# Patient Record
Sex: MALE | Race: WHITE | NOT HISPANIC OR LATINO | Employment: FULL TIME | URBAN - METROPOLITAN AREA
[De-identification: names, ages, dates, MRNs, and addresses within clinical notes are randomized per-mention and may not be internally consistent; named-entity substitution may affect disease eponyms.]

---

## 2023-06-18 ENCOUNTER — APPOINTMENT (EMERGENCY)
Dept: VASCULAR ULTRASOUND | Facility: HOSPITAL | Age: 48
End: 2023-06-18
Payer: COMMERCIAL

## 2023-06-18 ENCOUNTER — APPOINTMENT (EMERGENCY)
Dept: RADIOLOGY | Facility: HOSPITAL | Age: 48
End: 2023-06-18
Payer: COMMERCIAL

## 2023-06-18 ENCOUNTER — HOSPITAL ENCOUNTER (EMERGENCY)
Facility: HOSPITAL | Age: 48
Discharge: HOME | End: 2023-06-18
Attending: EMERGENCY MEDICINE
Payer: COMMERCIAL

## 2023-06-18 VITALS
DIASTOLIC BLOOD PRESSURE: 82 MMHG | HEART RATE: 80 BPM | OXYGEN SATURATION: 99 % | SYSTOLIC BLOOD PRESSURE: 122 MMHG | TEMPERATURE: 97.9 F | RESPIRATION RATE: 18 BRPM

## 2023-06-18 DIAGNOSIS — M25.561 RIGHT KNEE PAIN: Primary | ICD-10-CM

## 2023-06-18 LAB
ALBUMIN SERPL BCP-MCNC: 4.1 G/DL (ref 3.5–5)
ALP SERPL-CCNC: 172 U/L (ref 34–104)
ALT SERPL W P-5'-P-CCNC: 50 U/L (ref 7–52)
ANION GAP SERPL CALCULATED.3IONS-SCNC: 9 MMOL/L (ref 4–13)
AST SERPL W P-5'-P-CCNC: 31 U/L (ref 13–39)
BASOPHILS # BLD AUTO: 0.04 THOUSANDS/ÂΜL (ref 0–0.1)
BASOPHILS NFR BLD AUTO: 1 % (ref 0–1)
BILIRUB SERPL-MCNC: 0.64 MG/DL (ref 0.2–1)
BUN SERPL-MCNC: 19 MG/DL (ref 5–25)
CALCIUM SERPL-MCNC: 9.9 MG/DL (ref 8.4–10.2)
CHLORIDE SERPL-SCNC: 98 MMOL/L (ref 96–108)
CO2 SERPL-SCNC: 26 MMOL/L (ref 21–32)
CREAT SERPL-MCNC: 0.92 MG/DL (ref 0.6–1.3)
CRP SERPL QL: 241.2 MG/L
EOSINOPHIL # BLD AUTO: 0.29 THOUSAND/ÂΜL (ref 0–0.61)
EOSINOPHIL NFR BLD AUTO: 4 % (ref 0–6)
ERYTHROCYTE [DISTWIDTH] IN BLOOD BY AUTOMATED COUNT: 12.6 % (ref 11.6–15.1)
ERYTHROCYTE [SEDIMENTATION RATE] IN BLOOD: 48 MM/HOUR (ref 0–14)
GFR SERPL CREATININE-BSD FRML MDRD: 97 ML/MIN/1.73SQ M
GLUCOSE SERPL-MCNC: 98 MG/DL (ref 65–140)
HCT VFR BLD AUTO: 38.9 % (ref 36.5–49.3)
HGB BLD-MCNC: 13.1 G/DL (ref 12–17)
IMM GRANULOCYTES # BLD AUTO: 0.09 THOUSAND/UL (ref 0–0.2)
IMM GRANULOCYTES NFR BLD AUTO: 1 % (ref 0–2)
LYMPHOCYTES # BLD AUTO: 0.59 THOUSANDS/ÂΜL (ref 0.6–4.47)
LYMPHOCYTES NFR BLD AUTO: 7 % (ref 14–44)
MCH RBC QN AUTO: 33.1 PG (ref 26.8–34.3)
MCHC RBC AUTO-ENTMCNC: 33.7 G/DL (ref 31.4–37.4)
MCV RBC AUTO: 98 FL (ref 82–98)
MONOCYTES # BLD AUTO: 1.18 THOUSAND/ÂΜL (ref 0.17–1.22)
MONOCYTES NFR BLD AUTO: 14 % (ref 4–12)
NEUTROPHILS # BLD AUTO: 6.08 THOUSANDS/ÂΜL (ref 1.85–7.62)
NEUTS SEG NFR BLD AUTO: 73 % (ref 43–75)
NRBC BLD AUTO-RTO: 0 /100 WBCS
PLATELET # BLD AUTO: 412 THOUSANDS/UL (ref 149–390)
PMV BLD AUTO: 8.7 FL (ref 8.9–12.7)
POTASSIUM SERPL-SCNC: 4.1 MMOL/L (ref 3.5–5.3)
PROT SERPL-MCNC: 8.6 G/DL (ref 6.4–8.4)
RBC # BLD AUTO: 3.96 MILLION/UL (ref 3.88–5.62)
SODIUM SERPL-SCNC: 133 MMOL/L (ref 135–147)
WBC # BLD AUTO: 8.27 THOUSAND/UL (ref 4.31–10.16)

## 2023-06-18 PROCEDURE — 99284 EMERGENCY DEPT VISIT MOD MDM: CPT

## 2023-06-18 PROCEDURE — 85652 RBC SED RATE AUTOMATED: CPT

## 2023-06-18 PROCEDURE — 80053 COMPREHEN METABOLIC PANEL: CPT

## 2023-06-18 PROCEDURE — 36415 COLL VENOUS BLD VENIPUNCTURE: CPT

## 2023-06-18 PROCEDURE — 86140 C-REACTIVE PROTEIN: CPT

## 2023-06-18 PROCEDURE — 93971 EXTREMITY STUDY: CPT

## 2023-06-18 PROCEDURE — 85025 COMPLETE CBC W/AUTO DIFF WBC: CPT

## 2023-06-18 PROCEDURE — 73564 X-RAY EXAM KNEE 4 OR MORE: CPT

## 2023-06-18 NOTE — ED PROVIDER NOTES
"History  Chief Complaint   Patient presents with   • Knee Pain     Pt arrived to ED with crutches c/o \"right knee pain and swelling after tripping and landing on his knee last week  Was seen in an ER after the incident  Pt states the pain and swelling hasn't gone away  Has been taking Advil at home  \"     Patient is a 24-year-old male with a past medical history of anxiety, hypertension and migraines presenting to the emergency department for evaluation of right knee pain  Patient reports right knee pain and swelling after tripping and landing on his spine last week  Patient reports he was seen at [facility] and was told they were not going to tap his knee secondary to his psoriasis and that needs to be under control prior to a knee tap  Patient reports he has been having worsening pain, swelling and no decreased range of motion  Patient reports he has been unable to bend or straighten his knee since the injury  Patient reports a prior knee replacement in that knee along with a quadriceps tendon rupture  Reports he has been taking Advil placed himself in a knee immobilizer  Patient reports he has had no relief of his pain  Patient reports a previous puncture wound to the knee that he has been tending to and has stated it has been improving  Patient reports he did have a internal suture removed through the puncture wound by his orthopedic doctor a few weeks ago  Patient reports he is unable to weight-bear without worsening pain  Patient reports now he has been having pain in the posterior thigh and calf  Denies fevers, chills, rash, headache, weakness, dizziness, visual changes, abdominal pain, nausea, vomiting, diarrhea, constipation, chest pain, shortness of breath or difficulty breathing  Does not offer any other concerns or complaints            None       Past Medical History:   Diagnosis Date   • Anxiety    • Hypertension    • Migraine        Past Surgical History:   Procedure Laterality Date   • " REPLACEMENT TOTAL KNEE Right     x2       History reviewed  No pertinent family history  I have reviewed and agree with the history as documented  E-Cigarette/Vaping     E-Cigarette/Vaping Substances     Social History     Tobacco Use   • Smoking status: Never   • Smokeless tobacco: Current     Types: Chew   Substance Use Topics   • Alcohol use: Yes     Comment: occasionally       Review of Systems   Constitutional: Negative for chills and fever  HENT: Negative for ear pain and sore throat  Eyes: Negative for pain and visual disturbance  Respiratory: Negative for cough and shortness of breath  Cardiovascular: Negative for chest pain and palpitations  Gastrointestinal: Negative for abdominal pain, constipation, diarrhea, nausea and vomiting  Genitourinary: Negative for dysuria and hematuria  Musculoskeletal: Positive for gait problem and joint swelling  Negative for arthralgias and back pain  Right knee pain and swelling   Skin: Negative for color change and rash  Neurological: Negative for seizures and syncope  All other systems reviewed and are negative  Physical Exam  Physical Exam  Vitals and nursing note reviewed  Constitutional:       General: He is not in acute distress  Appearance: Normal appearance  He is well-developed  He is not toxic-appearing or diaphoretic  HENT:      Head: Normocephalic and atraumatic  Right Ear: External ear normal       Left Ear: External ear normal       Nose: Nose normal       Mouth/Throat:      Mouth: Mucous membranes are moist    Eyes:      General: No scleral icterus  Right eye: No discharge  Left eye: No discharge  Conjunctiva/sclera: Conjunctivae normal    Cardiovascular:      Rate and Rhythm: Normal rate and regular rhythm  Heart sounds: No murmur heard  Pulmonary:      Effort: Pulmonary effort is normal  No respiratory distress  Breath sounds: Normal breath sounds     Abdominal:      Palpations: Abdomen is soft  Tenderness: There is no abdominal tenderness  Musculoskeletal:         General: No swelling, deformity or signs of injury  Normal range of motion  Cervical back: Normal range of motion and neck supple  No rigidity  Legs:       Comments: Psoriasis noted throughout the legs  Swelling noted to the anterior aspect of the knee  There is erythema around the wound, patient reports improved  Patient is unable to flex or extend the knee  Attempted PROM, unable to range the knee  Skin:     General: Skin is warm and dry  Capillary Refill: Capillary refill takes less than 2 seconds  Coloration: Skin is not jaundiced  Findings: No erythema or rash  Neurological:      General: No focal deficit present  Mental Status: He is alert and oriented to person, place, and time  Mental status is at baseline  Cranial Nerves: No cranial nerve deficit  Gait: Gait normal    Psychiatric:         Mood and Affect: Mood normal          Behavior: Behavior normal          Thought Content:  Thought content normal          Judgment: Judgment normal          Vital Signs  ED Triage Vitals [06/18/23 1001]   Temperature Pulse Respirations Blood Pressure SpO2   97 9 °F (36 6 °C) 88 18 124/84 98 %      Temp Source Heart Rate Source Patient Position - Orthostatic VS BP Location FiO2 (%)   Temporal Monitor Sitting Right arm --      Pain Score       --           Vitals:    06/18/23 1001 06/18/23 1200   BP: 124/84 122/82   Pulse: 88 80   Patient Position - Orthostatic VS: Sitting Lying         Visual Acuity      ED Medications  Medications - No data to display    Diagnostic Studies  Results Reviewed     Procedure Component Value Units Date/Time    Comprehensive metabolic panel [[id number]]  (Abnormal) Collected: 06/18/23 1108    Lab Status: Final result Specimen: Blood from Arm, Right Updated: 06/18/23 1143     Sodium 133 mmol/L      Potassium 4 1 mmol/L      Chloride 98 mmol/L      CO2 26 mmol/L      ANION GAP 9 mmol/L      BUN 19 mg/dL      Creatinine 0 92 mg/dL      Glucose 98 mg/dL      Calcium 9 9 mg/dL      AST 31 U/L      ALT 50 U/L      Alkaline Phosphatase 172 U/L      Total Protein 8 6 g/dL      Albumin 4 1 g/dL      Total Bilirubin 0 64 mg/dL      eGFR 97 ml/min/1 73sq m     Narrative:      National Kidney Disease Foundation guidelines for Chronic Kidney Disease (CKD):   •  Stage 1 with normal or high GFR (GFR > 90 mL/min/1 73 square meters)  •  Stage 2 Mild CKD (GFR = 60-89 mL/min/1 73 square meters)  •  Stage 3A Moderate CKD (GFR = 45-59 mL/min/1 73 square meters)  •  Stage 3B Moderate CKD (GFR = 30-44 mL/min/1 73 square meters)  •  Stage 4 Severe CKD (GFR = 15-29 mL/min/1 73 square meters)  •  Stage 5 End Stage CKD (GFR <15 mL/min/1 73 square meters)  Note: GFR calculation is accurate only with a steady state creatinine    C-reactive protein [[id number]]  (Abnormal) Collected: 06/18/23 1108    Lab Status: Final result Specimen: Blood from Arm, Right Updated: 06/18/23 1143      2 mg/L     Sedimentation rate, automated [[id number]]  (Abnormal) Collected: 06/18/23 1108    Lab Status: Final result Specimen: Blood from Arm, Right Updated: 06/18/23 1120     Sed Rate 48 mm/hour     CBC and differential [[id number]]  (Abnormal) Collected: 06/18/23 1108    Lab Status: Final result Specimen: Blood from Arm, Right Updated: 06/18/23 1114     WBC 8 27 Thousand/uL      RBC 3 96 Million/uL      Hemoglobin 13 1 g/dL      Hematocrit 38 9 %      MCV 98 fL      MCH 33 1 pg      MCHC 33 7 g/dL      RDW 12 6 %      MPV 8 7 fL      Platelets 341 Thousands/uL      nRBC 0 /100 WBCs      Neutrophils Relative 73 %      Immat GRANS % 1 %      Lymphocytes Relative 7 %      Monocytes Relative 14 %      Eosinophils Relative 4 %      Basophils Relative 1 %      Neutrophils Absolute 6 08 Thousands/µL      Immature Grans Absolute 0 09 Thousand/uL      Lymphocytes Absolute 0 59 Thousands/µL      Monocytes Absolute 1 18 Thousand/µL      Eosinophils Absolute 0 29 Thousand/µL      Basophils Absolute 0 04 Thousands/µL                  VAS lower limb venous duplex study, unilateral/limited    (Results Pending)   XR knee 4+ views Right injury    (Results Pending)              Procedures  Procedures         ED Course  ED Course as of 06/18/23 1332   Sun Jun 18, 2023   1140 Venous duplex negative for DVT   1201 Reached out to ortho via TT    1226 Dr Bernarda Rinaldi, recommending outpatient follow up with knee immobilizer and weight bear as tolerated  SBIRT 22yo+    Flowsheet Row Most Recent Value   Initial Alcohol Screen: US AUDIT-C     1  How often do you have a drink containing alcohol? 0 Filed at: 06/18/2023 1031   2  How many drinks containing alcohol do you have on a typical day you are drinking? 1 Filed at: 06/18/2023 1031   3a  Male UNDER 65: How often do you have five or more drinks on one occasion? 0 Filed at: 06/18/2023 1031   Audit-C Score 1 Filed at: 06/18/2023 1031   STORM: How many times in the past year have you    Used an illegal drug or used a prescription medication for non-medical reasons? Never Filed at: 06/18/2023 1031                    Medical Decision Making    This is a 78-year-old male presenting to the emergency department for evaluation of right knee pain  Patient reports he has had multiple surgeries on this knee in the past   Patient reports sustaining a puncture wound to the area and followed up with orthopedics  Patient reports he had a internal suture coming through the hole which was taken out by orthopedics  Patient reports the wound has been healing  Patient reports last week he tripped over a shoe and landed on the right knee  Patient reports he did notice swelling to the knee  Patient reports he went to [facility] and was told the knee could not be drained secondary to his psoriasis by orthopedics    Patient reports the swelling, pain and range of motion has worsened  Patient reports he is unable to flex or extend his knee  Patient reports that has been going on since the fall  Patient reports he has been using a knee immobilizer at home along with Advil  Patient is in no acute distress on initial examination  Patient reports he is unable to bear weight without pain  Differential diagnosis to include but is not limited to: Osteoarthritis, septic joint, joint effusion, DVT    Initial ED Plan: CBC, CMP, ESR, CRP, venous duplex, x-ray right knee    ED results:  -Discussed with Dr Renetta Monroy, Ortho attending on call, recommending outpatient follow up with orthopedics and knee immobilization with weight bearing as tolerated  Venous duplex negative for DVT  Final ED assessment: Patient is stable and well appearing  Discussed radiologic studies and laboratory results  Discussed follow up with PCP and orthopedics  Strict return precautions were discussed including but not limited to worsening pain, redness, swelling  Patient verbalized understanding and is agreeable with the plan for discharge  Amount and/or Complexity of Data Reviewed  Labs: ordered  Radiology: ordered  Disposition  Final diagnoses:   Right knee pain     Time reflects when diagnosis was documented in both MDM as applicable and the Disposition within this note     Time User Action Codes Description Comment    6/18/2023 12:26 PM Angelito Martinez Add [M25 561] Right knee pain       ED Disposition     ED Disposition   Discharge    Condition   Stable    Date/Time   Sun Jun 18, 2023 12:27 PM    Comment   [facility] discharge to home/self care                 Follow-up Information     Follow up With Specialties Details Why Contact Info Additional Information    your PCP  Call in 3 days For follow up      [facility] Orthopedic Surgery Call in 3 days For follow up [address] [facility]  [facility], [address] [address] [facility], [place], [place], [address]    [facility] Emergency Department Emergency Medicine Go to  If symptoms worsen [facility] Emergency Department, [address] [place] [address], [id number]          There are no discharge medications for this patient  No discharge procedures on file      PDMP Review     None          ED Provider  Electronically Signed by           Janene Carpio PA-C  06/18/23 7810

## 2023-06-18 NOTE — DISCHARGE INSTRUCTIONS
Follow up with PCP  Follow up with orthopedics  Tylenol/motrin as needed for pain  Return to the ED with new or worsening symptoms including but not limited to worsening pain, swelling, redness, fevers

## 2023-06-19 PROCEDURE — 93971 EXTREMITY STUDY: CPT | Performed by: SURGERY

## 2023-06-29 NOTE — TELEPHONE ENCOUNTER
Called & left detailed message regarding Dr Scottie Mcduffie scheduling process for patient who have had a prior knee replacement and also revisions  I did make patient aware in message that records must be reviewed by Alexsander Marie before an appt can be offered  I did let him know that Dr Soraya Guevara would be agreeable to see him Thursday 0706/2023       #[phone]

## 2023-07-06 VITALS — WEIGHT: 189.6 LBS | DIASTOLIC BLOOD PRESSURE: 90 MMHG | SYSTOLIC BLOOD PRESSURE: 136 MMHG | HEART RATE: 83 BPM

## 2023-07-06 DIAGNOSIS — M25.561 ACUTE PAIN OF RIGHT KNEE: ICD-10-CM

## 2023-07-06 DIAGNOSIS — Z96.651 HISTORY OF REVISION OF TOTAL REPLACEMENT OF RIGHT KNEE JOINT: ICD-10-CM

## 2023-07-06 DIAGNOSIS — M25.461 EFFUSION OF RIGHT KNEE: Primary | ICD-10-CM

## 2023-07-06 PROCEDURE — 20610 DRAIN/INJ JOINT/BURSA W/O US: CPT | Performed by: STUDENT IN AN ORGANIZED HEALTH CARE EDUCATION/TRAINING PROGRAM

## 2023-07-06 PROCEDURE — 99204 OFFICE O/P NEW MOD 45 MIN: CPT | Performed by: STUDENT IN AN ORGANIZED HEALTH CARE EDUCATION/TRAINING PROGRAM

## 2023-07-06 RX ORDER — VENLAFAXINE HYDROCHLORIDE 150 MG/1
TABLET, EXTENDED RELEASE ORAL
COMMUNITY

## 2023-07-06 RX ORDER — FOLIC ACID 1 MG/1
1 TABLET ORAL DAILY
COMMUNITY
Start: 2023-06-13

## 2023-07-06 RX ORDER — LISINOPRIL 10 MG/1
10 TABLET ORAL DAILY
COMMUNITY
Start: 2023-04-10

## 2023-07-06 RX ORDER — BUSPIRONE HYDROCHLORIDE 15 MG/1
TABLET ORAL
COMMUNITY
Start: 2023-05-16

## 2023-07-06 RX ORDER — HYDROCODONE BITARTRATE AND ACETAMINOPHEN 5; 325 MG/1; MG/1
1 TABLET ORAL EVERY 6 HOURS PRN
Qty: 30 TABLET | Refills: 0 | Status: SHIPPED | OUTPATIENT
Start: 2023-07-06

## 2023-07-06 RX ORDER — TRAMADOL HYDROCHLORIDE 50 MG/1
50 TABLET ORAL EVERY 6 HOURS PRN
Status: CANCELLED | OUTPATIENT
Start: 2023-07-06

## 2023-07-06 RX ORDER — NORTRIPTYLINE HYDROCHLORIDE 50 MG/1
CAPSULE ORAL
COMMUNITY

## 2023-07-06 NOTE — PROGRESS NOTES
Knee New Office Note    Assessment:     1. Acute pain of right knee    2. 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 X-rays of the right knee reviewed showing that there is no acute fracture or dislocation noted and his prosthesis is in good position, however there is HO with a hyperdense effusion concerning for infection. On physical exam he has significantly limited range of motion with pain of the knee. 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 Labs from the ER show significantly elevated CRP of 241. We did explain that this could be elevated due to his psoriasis flare however it is highly likely that this could be from infection. He does have multiple psoriatic plaques around his right knee with a healing wound on the anterior aspect of the right knee. He does have an effusion on exam today. Discussed that based on his worsening pain, motion and elevated inflammatory markers from the ER there is concern for infection.   Right knee was aspirated today through a clean area of skin without psoriatic plaque yielding 10 cc of cloudy turbid yellow fluid. Synovial fluid sent for Synovasure testing. We will call him when we get results. Follow-up in 2 weeks to discuss next treatment plan. Subjective:     Patient ID: Cam Contreras is a 50 y.o. male. Chief Complaint:  HPI:  Patient is a 49 y/o male who presents today for an evaluation of his RIGHT knee. He states that about 3 weeks ago he woke up in the middle of the night and he tripped over a shoe and fell. When he fell he noted significant pain in the right knee and was seen at [facility] ER. He had xrays at that time and was told nothing was broken. He continued to experience pain in the knee and was then seen [facility] ER [facility]. He had labs done, xrays, and a doppler which was negative for DVT. He was placed in a knee immobilizer and referred to outpatient follow-ip. He has been using crutches and his knee immobilizer. About 2.5 months ago he states that he had a spitting suture removed by someone at [facility]. He continues with pain around the sides of the knee and aching in the surrounding musculature. He states that since his revision surgery, he has not had good motion of the knee. He had a TKA in 1998 and a Revision in 2016 by Dr. Bhumi Lorenzo in [place]. He fell after his revision and he tore his quad requiring surgical repair. He had his original TKA at the age of 20. He is on methotrexate for psoriasis, which he recently restarted. Previously he was on Stelara when back in [place]. Allergy:  No Known Allergies  Medications:  all current active meds have been reviewed  Past Medical History:  Past Medical History:   Diagnosis Date   • Anxiety    • Hypertension    • Migraine      Past Surgical History:  Past Surgical History:   Procedure Laterality Date   • REPLACEMENT TOTAL KNEE Right     x2     Family History:  History reviewed. No pertinent family history.   Social History:  Social History Substance and Sexual Activity   Alcohol Use Yes    Comment: occasionally     Social History     Substance and Sexual Activity   Drug Use Not on file     Social History     Tobacco Use   Smoking Status Never   Smokeless Tobacco Current   • Types: Chew           ROS:  General: Per HPI  Skin: Negative, except if noted below  HEENT: Negative  Respiratory: Negative  Cardiovascular: Negative  Gastrointestinal: Negative  Urinary: Negative  Vascular: Negative  Musculoskeletal: Positive per HPI   Neurologic: Positive per HPI  Endocrine: Negative    Objective:  BP Readings from Last 1 Encounters:   07/06/23 136/90      Wt Readings from Last 1 Encounters:   07/06/23 86 kg (189 lb 9.6 oz)        Respiratory:   non-labored respirations    Lymphatics:  no palpable lymph nodes    Gait:   antalgic    Neurologic:   Alert and oriented times 3  Patient with normal sensation except as noted below  Deep tendon reflexes 2+ except as noted in MSK exam    Bilateral Lower Extremity:    Right knee: Inspection:  Previous surgical scar noted with wound at mid-point that is improving. Several psoriatic plaques noted throughout the knee and lower leg. Overall limb alignment neurtral    Effusion: mild    ROM 35-60 with pain    Extensor Lag: none, able to hold SLR    Palpation: medial and lateral Joint line tenderness to palpation    AP Stability at 90 deg unable to test    M/L stability in full extension unable to test    M/L stability in midflexion stable    Motor: 5/5 Q/HS/TA/GS/P    Pulses: 2+ DP / 2+ PT    SILT DP/SP/S/S/TN    Imaging:  My interpretation XR R knee: cemented Shannan CCK TKA. Components in good position without fracture. HO along anterior and posterior femur. Hyperdense effusion. No signs of loosening or fracture. Large joint arthrocentesis: R knee  Universal Protocol:  Consent: Verbal consent obtained.   Risks and benefits: risks, benefits and alternatives were discussed  Consent given by: patient  Patient understanding: patient states understanding of the procedure being performed    Supporting Documentation  Indications: pain, joint swelling and diagnostic evaluation   Procedure Details  Location: knee - R knee  Preparation: Patient was prepped and draped in the usual sterile fashion  Needle size: 18 G  Approach: anterolateral    Aspirate amount: 10 mL  Aspirate: yellow, cloudy and serous  Analysis: fluid sample sent for laboratory analysis    Patient tolerance: patient tolerated the procedure well with no immediate complications  Dressing:  Sterile dressing applied          BMI:   There is no height or weight on file to calculate BMI. BSA:   There is no height or weight on file to calculate BSA.            Scribe Attestation    I,:  Luisana Hurt PA-C am acting as a scribe while in the presence of the attending physician.:       I,:  Louann Medina DO personally performed the services described in this documentation    as scribed in my presence.:

## 2023-07-20 VITALS
SYSTOLIC BLOOD PRESSURE: 148 MMHG | DIASTOLIC BLOOD PRESSURE: 90 MMHG | HEART RATE: 80 BPM | WEIGHT: 189 LBS | HEIGHT: 71 IN | BODY MASS INDEX: 26.46 KG/M2

## 2023-07-20 DIAGNOSIS — Z96.651 HISTORY OF REVISION OF TOTAL REPLACEMENT OF RIGHT KNEE JOINT: ICD-10-CM

## 2023-07-20 DIAGNOSIS — T84.53XA INFECTION OF TOTAL RIGHT KNEE REPLACEMENT, INITIAL ENCOUNTER (HCC): Primary | ICD-10-CM

## 2023-07-20 PROCEDURE — 99215 OFFICE O/P EST HI 40 MIN: CPT | Performed by: STUDENT IN AN ORGANIZED HEALTH CARE EDUCATION/TRAINING PROGRAM

## 2023-07-20 RX ORDER — ONDANSETRON 2 MG/ML
4 INJECTION INTRAMUSCULAR; INTRAVENOUS ONCE
Status: CANCELLED | OUTPATIENT
Start: 2023-07-28 | End: 2023-07-20

## 2023-07-20 RX ORDER — TRANEXAMIC ACID 10 MG/ML
1000 INJECTION, SOLUTION INTRAVENOUS ONCE
Status: CANCELLED | OUTPATIENT
Start: 2023-07-28 | End: 2023-07-20

## 2023-07-20 RX ORDER — MULTIVIT-MIN/IRON FUM/FOLIC AC 7.5 MG-4
1 TABLET ORAL DAILY
Qty: 30 TABLET | Refills: 1 | Status: ON HOLD | OUTPATIENT
Start: 2023-07-20

## 2023-07-20 RX ORDER — ASCORBIC ACID 500 MG
500 TABLET ORAL 2 TIMES DAILY
Qty: 60 TABLET | Refills: 1 | Status: ON HOLD | OUTPATIENT
Start: 2023-07-20

## 2023-07-20 RX ORDER — CHLORHEXIDINE GLUCONATE 0.12 MG/ML
15 RINSE ORAL ONCE
Status: CANCELLED | OUTPATIENT
Start: 2023-07-28 | End: 2023-07-20

## 2023-07-20 RX ORDER — VANCOMYCIN HYDROCHLORIDE 1 G/200ML
1000 INJECTION, SOLUTION INTRAVENOUS ONCE
Status: CANCELLED | OUTPATIENT
Start: 2023-07-28 | End: 2023-07-20

## 2023-07-20 RX ORDER — CHLORHEXIDINE GLUCONATE 4 G/100ML
SOLUTION TOPICAL DAILY PRN
Status: CANCELLED | OUTPATIENT
Start: 2023-07-28

## 2023-07-20 RX ORDER — CEFAZOLIN SODIUM 2 G/50ML
2000 SOLUTION INTRAVENOUS ONCE
Status: CANCELLED | OUTPATIENT
Start: 2023-07-28 | End: 2023-07-20

## 2023-07-20 RX ORDER — FERROUS SULFATE TAB EC 324 MG (65 MG FE EQUIVALENT) 324 (65 FE) MG
324 TABLET DELAYED RESPONSE ORAL
Qty: 60 TABLET | Refills: 1 | Status: ON HOLD | OUTPATIENT
Start: 2023-07-20 | End: 2023-09-18

## 2023-07-20 RX ORDER — GABAPENTIN 300 MG/1
300 CAPSULE ORAL ONCE
Status: CANCELLED | OUTPATIENT
Start: 2023-07-28 | End: 2023-07-20

## 2023-07-20 RX ORDER — ACETAMINOPHEN 325 MG/1
975 TABLET ORAL ONCE
Status: CANCELLED | OUTPATIENT
Start: 2023-07-28 | End: 2023-07-20

## 2023-07-20 RX ORDER — SODIUM CHLORIDE, SODIUM LACTATE, POTASSIUM CHLORIDE, CALCIUM CHLORIDE 600; 310; 30; 20 MG/100ML; MG/100ML; MG/100ML; MG/100ML
125 INJECTION, SOLUTION INTRAVENOUS CONTINUOUS
Status: CANCELLED | OUTPATIENT
Start: 2023-07-28

## 2023-07-20 NOTE — PROGRESS NOTES
Knee New Office Note    Assessment:     1. Infection of total right knee replacement, initial encounter ([address])    2. 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 Imaging and prognosis was reviewed with the patient today. Discussed that treatment of this infection requires a revision with an articulating antibiotic spacer vs static spacer with PICC line for antibiotics. Discussed that due to his arthrofibrosis and history of quad rupture in the past he is at significant risk of need for static spacer and extensor mechanism disruption. All questions were answered and risks/benefits discussed. Will have infectious disease see the patient post-op. Will urgently schedule the patient for approximately 1 week from today. All questions answered. Pre-op labs and surgical optimization consult placed. Subjective:     Patient ID: Connie Stanley is a 46 y.o. male. Chief Complaint:  HPI:  46 y.o. male presents to the office for follow up of right knee. HE is here today to review the results of his lab work. He continues with pain around the sides of the knee and aching in the surrounding musculature. He states that since his revision surgery, he has not had good motion of the knee. He had a TKA in 1998 and a Revision in 2016 by Dr. Any Beaver in [place]. He fell after his revision and he tore his quadriceps requiring surgical repair. He had his original TKA at the age of 20. He is on methotrexate for psoriasis, which he recently restarted. Previously he was on Stelara when back in [place].  He is accompanied by his wife today in the office. Allergy:  No Known Allergies  Medications:  all current active meds have been reviewed  Past Medical History:  Past Medical History:   Diagnosis Date   • Anxiety    • Hypertension    • Migraine      Past Surgical History:  Past Surgical History:   Procedure Laterality Date   • REPLACEMENT TOTAL KNEE Right     x2     Family History:  History reviewed. No pertinent family history. Social History:  Social History     Substance and Sexual Activity   Alcohol Use Yes    Comment: occasionally     Social History     Substance and Sexual Activity   Drug Use Not on file     Social History     Tobacco Use   Smoking Status Never   Smokeless Tobacco Current   • Types: Chew           ROS:  General: Per HPI  Skin: Negative, except if noted below  HEENT: Negative  Respiratory: Negative  Cardiovascular: Negative  Gastrointestinal: Negative  Urinary: Negative  Vascular: Negative  Musculoskeletal: Positive per HPI   Neurologic: Positive per HPI  Endocrine: Negative    Objective:  BP Readings from Last 1 Encounters:   07/20/23 148/90      Wt Readings from Last 1 Encounters:   07/20/23 85.7 kg (189 lb)        Respiratory:   non-labored respirations    Lymphatics:  no palpable lymph nodes    Gait:   antalgic    Neurologic:   Alert and oriented times 3  Patient with normal sensation except as noted below  Deep tendon reflexes 2+ except as noted in MSK exam    Bilateral Lower Extremity:    Right knee: Inspection:  Previous surgical scar noted with wound at mid-point that is improving. Several improving psoriatic plaques noted throughout the knee and lower leg.  Erythema present    Overall limb alignment neurtral    Effusion: mild    ROM 35-60 with significant pain    Extensor Lag: none, able to hold SLR at 35 degrees     Palpation: medial and lateral Joint line tenderness to palpation    AP Stability at 90 deg unable to test    M/L stability in full extension unable to test    M/L stability in midflexion stable    Motor: 5/5 Q/HS/TA/GS/P    Pulses: 2+ DP / 2+ PT    SILT DP/SP/S/S/TN     Imaging:  My interpretation XR R knee: cemented Shannan CCK TKA. Components in good position without fracture. HO along anterior and posterior femur. Hyperdense effusion. No signs of loosening or fracture. BMI:   Estimated body mass index is 26.36 kg/m² as calculated from the following:    Height as of this encounter: 5' 11" (1.803 m). Weight as of this encounter: 85.7 kg (189 lb). BSA:   Estimated body surface area is 2.06 meters squared as calculated from the following:    Height as of this encounter: 5' 11" (1.803 m). Weight as of this encounter: 85.7 kg (189 lb).            Scribe Attestation    I,:  Laqueta Spurling am acting as a scribe while in the presence of the attending physician.:       I,:  Chanel Her, DO personally performed the services described in this documentation    as scribed in my presence.:

## 2023-07-21 ENCOUNTER — APPOINTMENT (OUTPATIENT)
Dept: LAB | Facility: HOSPITAL | Age: 48
End: 2023-07-21
Payer: COMMERCIAL

## 2023-07-21 ENCOUNTER — LAB REQUISITION (OUTPATIENT)
Dept: LAB | Facility: HOSPITAL | Age: 48
End: 2023-07-21
Payer: COMMERCIAL

## 2023-07-21 ENCOUNTER — OFFICE VISIT (OUTPATIENT)
Dept: LAB | Facility: HOSPITAL | Age: 48
End: 2023-07-21
Payer: COMMERCIAL

## 2023-07-21 ENCOUNTER — TELEPHONE (OUTPATIENT)
Dept: OBGYN CLINIC | Facility: HOSPITAL | Age: 48
End: 2023-07-21

## 2023-07-21 DIAGNOSIS — Z96.651 HISTORY OF REVISION OF TOTAL REPLACEMENT OF RIGHT KNEE JOINT: ICD-10-CM

## 2023-07-21 DIAGNOSIS — M25.561 ACUTE PAIN OF RIGHT KNEE: ICD-10-CM

## 2023-07-21 DIAGNOSIS — Z96.651 PRESENCE OF RIGHT ARTIFICIAL KNEE JOINT: ICD-10-CM

## 2023-07-21 DIAGNOSIS — Z01.818 PRE-OP EXAMINATION: ICD-10-CM

## 2023-07-21 DIAGNOSIS — Z01.818 ENCOUNTER FOR OTHER PREPROCEDURAL EXAMINATION: ICD-10-CM

## 2023-07-21 DIAGNOSIS — M25.461 EFFUSION OF RIGHT KNEE: ICD-10-CM

## 2023-07-21 DIAGNOSIS — T84.53XA INFECTION OF TOTAL RIGHT KNEE REPLACEMENT, INITIAL ENCOUNTER (HCC): ICD-10-CM

## 2023-07-21 DIAGNOSIS — T84.53XA INFECTION OF TOTAL RIGHT KNEE REPLACEMENT, INITIAL ENCOUNTER (HCC): Primary | ICD-10-CM

## 2023-07-21 DIAGNOSIS — T84.53XA INFECTION AND INFLAMMATORY REACTION DUE TO INTERNAL RIGHT KNEE PROSTHESIS, INITIAL ENCOUNTER (HCC): ICD-10-CM

## 2023-07-21 PROBLEM — G43.909 MIGRAINE: Status: ACTIVE | Noted: 2023-07-21

## 2023-07-21 PROBLEM — L40.0 PLAQUE PSORIASIS: Status: ACTIVE | Noted: 2023-05-31

## 2023-07-21 PROBLEM — I10 HIGH BLOOD PRESSURE: Status: ACTIVE | Noted: 2023-07-21

## 2023-07-21 PROBLEM — F41.9 ANXIETY: Status: ACTIVE | Noted: 2023-07-21

## 2023-07-21 LAB
ABO GROUP BLD: NORMAL
ALBUMIN SERPL BCP-MCNC: 3.8 G/DL (ref 3.5–5)
ALP SERPL-CCNC: 98 U/L (ref 34–104)
ALT SERPL W P-5'-P-CCNC: 23 U/L (ref 7–52)
ANION GAP SERPL CALCULATED.3IONS-SCNC: 8 MMOL/L
APTT PPP: 34 SECONDS (ref 23–37)
AST SERPL W P-5'-P-CCNC: 17 U/L (ref 13–39)
BASOPHILS # BLD AUTO: 0.04 THOUSANDS/ÂΜL (ref 0–0.1)
BASOPHILS NFR BLD AUTO: 1 % (ref 0–1)
BILIRUB SERPL-MCNC: 0.34 MG/DL (ref 0.2–1)
BLD GP AB SCN SERPL QL: NEGATIVE
BUN SERPL-MCNC: 17 MG/DL (ref 5–25)
CALCIUM SERPL-MCNC: 9.9 MG/DL (ref 8.4–10.2)
CHLORIDE SERPL-SCNC: 102 MMOL/L (ref 96–108)
CO2 SERPL-SCNC: 28 MMOL/L (ref 21–32)
CREAT SERPL-MCNC: 0.78 MG/DL (ref 0.6–1.3)
EOSINOPHIL # BLD AUTO: 0.36 THOUSAND/ÂΜL (ref 0–0.61)
EOSINOPHIL NFR BLD AUTO: 6 % (ref 0–6)
ERYTHROCYTE [DISTWIDTH] IN BLOOD BY AUTOMATED COUNT: 13.6 % (ref 11.6–15.1)
EST. AVERAGE GLUCOSE BLD GHB EST-MCNC: 131 MG/DL
FERRITIN SERPL-MCNC: 248 NG/ML (ref 24–336)
GFR SERPL CREATININE-BSD FRML MDRD: 105 ML/MIN/1.73SQ M
GLUCOSE P FAST SERPL-MCNC: 102 MG/DL (ref 65–99)
HBA1C MFR BLD: 6.2 %
HCT VFR BLD AUTO: 31.7 % (ref 36.5–49.3)
HGB BLD-MCNC: 10.1 G/DL (ref 12–17)
IMM GRANULOCYTES # BLD AUTO: 0.03 THOUSAND/UL (ref 0–0.2)
IMM GRANULOCYTES NFR BLD AUTO: 1 % (ref 0–2)
INR PPP: 1.07 (ref 0.84–1.19)
IRON SATN MFR SERPL: 10 % (ref 20–50)
IRON SERPL-MCNC: 20 UG/DL (ref 65–175)
LYMPHOCYTES # BLD AUTO: 0.74 THOUSANDS/ÂΜL (ref 0.6–4.47)
LYMPHOCYTES NFR BLD AUTO: 13 % (ref 14–44)
MCH RBC QN AUTO: 30.1 PG (ref 26.8–34.3)
MCHC RBC AUTO-ENTMCNC: 31.9 G/DL (ref 31.4–37.4)
MCV RBC AUTO: 94 FL (ref 82–98)
MONOCYTES # BLD AUTO: 0.4 THOUSAND/ÂΜL (ref 0.17–1.22)
MONOCYTES NFR BLD AUTO: 7 % (ref 4–12)
NEUTROPHILS # BLD AUTO: 4.11 THOUSANDS/ÂΜL (ref 1.85–7.62)
NEUTS SEG NFR BLD AUTO: 72 % (ref 43–75)
NRBC BLD AUTO-RTO: 0 /100 WBCS
PLATELET # BLD AUTO: 410 THOUSANDS/UL (ref 149–390)
PMV BLD AUTO: 8.8 FL (ref 8.9–12.7)
POTASSIUM SERPL-SCNC: 3.8 MMOL/L (ref 3.5–5.3)
PROT SERPL-MCNC: 8.5 G/DL (ref 6.4–8.4)
PROTHROMBIN TIME: 13.7 SECONDS (ref 11.6–14.5)
RBC # BLD AUTO: 3.36 MILLION/UL (ref 3.88–5.62)
RETICS # AUTO: NORMAL 10*3/UL (ref 14356–105094)
RETICS # CALC: 1.08 % (ref 0.37–1.87)
RH BLD: POSITIVE
SODIUM SERPL-SCNC: 138 MMOL/L (ref 135–147)
SPECIMEN EXPIRATION DATE: NORMAL
TIBC SERPL-MCNC: 195 UG/DL (ref 250–450)
WBC # BLD AUTO: 5.68 THOUSAND/UL (ref 4.31–10.16)

## 2023-07-21 PROCEDURE — 83036 HEMOGLOBIN GLYCOSYLATED A1C: CPT

## 2023-07-21 PROCEDURE — 36415 COLL VENOUS BLD VENIPUNCTURE: CPT

## 2023-07-21 PROCEDURE — 85610 PROTHROMBIN TIME: CPT

## 2023-07-21 PROCEDURE — 85045 AUTOMATED RETICULOCYTE COUNT: CPT

## 2023-07-21 PROCEDURE — 85025 COMPLETE CBC W/AUTO DIFF WBC: CPT

## 2023-07-21 PROCEDURE — 93005 ELECTROCARDIOGRAM TRACING: CPT

## 2023-07-21 PROCEDURE — 86900 BLOOD TYPING SEROLOGIC ABO: CPT

## 2023-07-21 PROCEDURE — 86901 BLOOD TYPING SEROLOGIC RH(D): CPT

## 2023-07-21 PROCEDURE — 80053 COMPREHEN METABOLIC PANEL: CPT

## 2023-07-21 PROCEDURE — 82728 ASSAY OF FERRITIN: CPT

## 2023-07-21 PROCEDURE — 83550 IRON BINDING TEST: CPT

## 2023-07-21 PROCEDURE — 85730 THROMBOPLASTIN TIME PARTIAL: CPT

## 2023-07-21 PROCEDURE — 80323 ALKALOIDS NOS: CPT

## 2023-07-21 PROCEDURE — 83540 ASSAY OF IRON: CPT

## 2023-07-21 PROCEDURE — 86850 RBC ANTIBODY SCREEN: CPT

## 2023-07-21 RX ORDER — HYDROCODONE BITARTRATE AND ACETAMINOPHEN 5; 325 MG/1; MG/1
1 TABLET ORAL EVERY 6 HOURS PRN
Qty: 30 TABLET | Refills: 0 | Status: ON HOLD | OUTPATIENT
Start: 2023-07-21

## 2023-07-21 NOTE — TELEPHONE ENCOUNTER
Medication Name: Hydrocodone      Dosage of Med: 5-325 mg      Frequency of Med: Take 1 tablet po q 6 hrs prn for pain Max Daily Amount: 4 tablets      Pharmacy and Location: [facility]  Preferred Callback Phone Number: [phone]

## 2023-07-22 LAB
ATRIAL RATE: 62 BPM
P AXIS: 32 DEGREES
PR INTERVAL: 166 MS
QRS AXIS: 3 DEGREES
QRSD INTERVAL: 92 MS
QT INTERVAL: 410 MS
QTC INTERVAL: 416 MS
T WAVE AXIS: 3 DEGREES
VENTRICULAR RATE: 62 BPM

## 2023-07-22 PROCEDURE — 93010 ELECTROCARDIOGRAM REPORT: CPT | Performed by: INTERNAL MEDICINE

## 2023-07-24 ENCOUNTER — OFFICE VISIT (OUTPATIENT)
Age: 48
End: 2023-07-24
Payer: COMMERCIAL

## 2023-07-24 VITALS
BODY MASS INDEX: 26.46 KG/M2 | WEIGHT: 189 LBS | HEART RATE: 80 BPM | SYSTOLIC BLOOD PRESSURE: 126 MMHG | DIASTOLIC BLOOD PRESSURE: 74 MMHG | HEIGHT: 71 IN

## 2023-07-24 DIAGNOSIS — Z96.651 HISTORY OF REVISION OF TOTAL REPLACEMENT OF RIGHT KNEE JOINT: ICD-10-CM

## 2023-07-24 DIAGNOSIS — T84.53XA INFECTION OF TOTAL RIGHT KNEE REPLACEMENT, INITIAL ENCOUNTER (HCC): ICD-10-CM

## 2023-07-24 PROCEDURE — 99204 OFFICE O/P NEW MOD 45 MIN: CPT | Performed by: INTERNAL MEDICINE

## 2023-07-24 NOTE — H&P (VIEW-ONLY)
.       Internal Medicine Pre-Operative Evaluation:     Reason for Visit: Pre-operative Evaluation for Risk Stratification and Optimization    Patient ID: Shraddha Larios is a 46 y.o. male. Surgery: Antibx spacer placement and eventual Arthroplasty of right knee  Referring Provider: Dr Senait Blankenship      Recommendations to Proceed withSurgery    Patient is considered to be Low risk for Medium risk procedure. After evaluation and discussion with patient with emphasis that all surgery has some degree of inherent risk it is determined this procedure is of acceptable risk  medically. Patient may proceed with planned procedure      Assessment      Primary infected previous joint arthroplasty osteoarthritis right knee  • Failed conservative measures  • Electing to undergo antibiotix spacer and IV antibx via PICC with eventual new total joint arthroplasty    Psoriatic Arthritis  · Mgmt per rheumatology  · Continue methotrexate without interruption    HTN Essential  · Hold lisinopril 24 hours prior to surgery to lessen risk of MELY  · Continue metoprolol without interruption    Depression/Anxiety  · Continue current medical treatment without interruption    Pre-operative Medical Evaluation for planned surgery  • Patient meets preoperative quality goals as noted below  • Recommendations as listed in PLAN section below  • Contact surgical nurse  navigator with any questions regarding preoperative plan or schedule. Patient Active Problem List   Diagnosis   • Anxiety   • High blood pressure   • Migraine   • Plaque psoriasis   • Status post revision of total replacement of right knee   • History of arthroplasty of right knee        Plan:     1. Further preoperative workup as follows:   - none no further testing required may proceed with surgery    2.  Medication Management/Recommendations:   - continue all current medicines through morning of surgery with sip of water except the following:  - hold ACE / ARB specifically  24 hours prior to surgery  - avoid use of NSAID such as motrin, advil, aleve for 7 days prior to surgery  - hold rheumatologic medicines as instructed by your rheumatologist  - hold all OTC herbal or nutritional supplements 7 days before surgery    3. Patient requires further consultation with:   No Consults Required    4. Discharge Planning / Barriers to Discharge  none identified - patients has post discharge therapy plan in place, transportation arranged for discharge day, adequate family support at home to assist with discharge to home. Subjective:           History of Present Illness:     Connie Stanley is a 46 y.o. male who presents to the office today for a preoperative consultation at the request of surgeon. The patient understands this is an elective procedure and not emergent. They are electing to undergo planned procedure with an understanding that all surgery has inherent risk. They have worked with their surgeon and failed conservative treatment measures. Today they present for preoperative risk assessment and recommendations for optimization in preparation for surgery. Pt seen in surgical optimization center for upcoming proposed surgery. They have failed previous conservative measures and have elected surgical intervention. Pt will undergo intervention for infected right knee  Previous arthroplasty. Plan per his surgical team is for antibx spacer and PICC placement for long term antibx with eventual TKA down the road. Pt meets presurgical lab and BMI optimization goals. Upon interview questioning patient is able to perform greater than 4 METs workload in daily life without any reported cardio-pulmonary symptoms. ROS: No TIA's or unusual headaches, no dysphagia. No prolonged cough. No dyspnea or chest pain on exertion. No abdominal pain, change in bowel habits, black or bloody stools. No urinary tract or BPH symptoms.   Positive reported pain in arthritic joint. Positive difficulty with gait. No skin rashes or issues. Objective:      /74   Pulse 80   Ht 5' 11" (1.803 m)   Wt 85.7 kg (189 lb)   BMI 26.36 kg/m²       General Appearance: no distress, conversive  HEENT: PERRLA, conjuctiva normal; oropharynx clear; mucous membranes moist;   Neck:  Supple, no lymphadenopathy or thyromegaly  Lungs: breath sounds normal, normal respiratory effort, no retractions, expiratory effort normal  CV: normal heart sounds S1/S2, PMI normal   ABD: soft non tender, no masses , no hepatic or splenomegaly  EXT: DP pulses intact, no lymphadenopathy, no edema  Skin: normal turgor, normal texture, no rash  Psych: affect normal, mood normal  Neuro: AAOx3     ASSESSED BY CALIN Centeno    The following portions of the patient's history were reviewed and updated as appropriate: allergies, current medications, past family history, past medical history, past social history, past surgical history and problem list.     Past History:       Past Medical History:   Diagnosis Date   • Anxiety    • Hypertension    • Migraine     Past Surgical History:   Procedure Laterality Date   • REPLACEMENT TOTAL KNEE Right     x2          Social History     Tobacco Use   • Smoking status: Never   • Smokeless tobacco: Current     Types: Chew   Substance Use Topics   • Alcohol use: Yes     Comment: occasionally     No family history on file.        Allergies:     No Known Allergies     Current Medications:     Current Outpatient Medications   Medication Instructions   • ascorbic acid (VITAMIN C) 500 mg, Oral, 2 times daily   • busPIRone (BUSPAR) 15 mg tablet TAKE 1/2 TABLET (7.5 MG) BY ORAL ROUTE 2 TIMES PER DAY   • ferrous sulfate 324 mg, Oral, 2 times daily before meals   • folic acid (FOLVITE) 1 mg, Oral, Daily   • HYDROcodone-acetaminophen (Norco) 5-325 mg per tablet 1 tablet, Oral, Every 6 hours PRN   • lisinopril (ZESTRIL) 10 mg, Oral, Daily   • methotrexate 2.5 mg tablet 6 tablets, Oral, Weekly   • metoprolol succinate (TOPROL-XL) 100 mg 24 hr tablet No dose, route, or frequency recorded. • Multiple Vitamins-Minerals (multivitamin with minerals) tablet 1 tablet, Oral, Daily   • nortriptyline (PAMELOR) 50 mg capsule Oral   • venlafaxine 150 MG TB24 24 hr tablet Oral             PRE-OP WORKSHEET DATA    Assessment of Pre-Operative Risks     MLJ Quality Hard Stops:  BMI (<40) : Estimated body mass index is 26.36 kg/m² as calculated from the following:    Height as of this encounter: 5' 11" (1.803 m). Weight as of this encounter: 85.7 kg (189 lb). Hgb ( >11): Lab Results   Component Value Date    HGB 10.1 (L) 07/21/2023     HbA1c (<7.0) :   Lab Results   Component Value Date    HGBA1C 6.2 (H) 07/21/2023     GFR (>60) (Less then 45 = Nephrology consult):  Estimated Creatinine Clearance: 120.7 mL/min (by C-G formula based on SCr of 0.78 mg/dL). Active Decompensated Chronic Conditions which would delay surgery  No acutely decompensated medical issues such as recent CVA, MI, new onset arrhythmia, severe aortic stenosis, CHF, uncontrolled COPD       Exercise Capacity: (if less the 4 mets consider functional assessment via cardiac stress testing or consultation)    · Able to walk 2 blocks without symptoms?: Yes  · Able to walk 1 flights without symptoms?: Yes    Assessment of intra and post operative respiratory, hemodynamic and thrombotic risks     Prior Anesthesia Reactions: No     Personal history of venous thromboembolic disease? No    History of steroid use > 5 mg for >2 weeks within last year? No    Cardiac Risk Estimation: per the Revised Cardiac Risk Index (Circ. 100:1043, 1999),     The patient's risk factors for cardiac complications include :  serum Cr > 2.0 mg/dL or GFR < 30 and none    Mihir Wesley has a in hospital cardiac risk of RCI RISK CLASS I (0 risk factors, risk of major cardiac compl. appr.  0.5%) based on RCRI calculator          Pre-Op Data Reviewed: Laboratory Results: I have personally reviewed the pertinent laboratory results/reports     EKG:I have personally reviewed pertinent reports. . I personally reviewed and interpreted available tracings in the electronic medical record    Normal sinus rhythm  Minimal voltage criteria for LVH, may be normal variant  No previous ECGs available  Confirmed by Guille Paredes ([id number]) on 7/22/2023 10:31:26 AM    OLD RECORDS: reviewed old records in the chart review section if EHR on day of visit.     Previous cardiopulmonary studies within the past year:  · Echocardiogram: no  · Cardiac Catheterization: no  · Stress Test: no      Time of visit including pre-visit chart review, visit and post-visit coordination of plan and care , review of pre-surgical lab work, preparation and time spent documenting note in electronic medical record, time spent face-to-face in physical examination answering patient questions by care team 45 minutes             [address]

## 2023-07-24 NOTE — PROGRESS NOTES
.       Internal Medicine Pre-Operative Evaluation:     Reason for Visit: Pre-operative Evaluation for Risk Stratification and Optimization    Patient ID: Jaydon Prado is a 46 y.o. male. Surgery: Antibx spacer placement and eventual Arthroplasty of right knee  Referring Provider: Dr Christiano Musa      Recommendations to Proceed withSurgery    Patient is considered to be Low risk for Medium risk procedure. After evaluation and discussion with patient with emphasis that all surgery has some degree of inherent risk it is determined this procedure is of acceptable risk  medically. Patient may proceed with planned procedure      Assessment      Primary infected previous joint arthroplasty osteoarthritis right knee  • Failed conservative measures  • Electing to undergo antibiotix spacer and IV antibx via PICC with eventual new total joint arthroplasty    Psoriatic Arthritis  · Mgmt per rheumatology  · Continue methotrexate without interruption    HTN Essential  · Hold lisinopril 24 hours prior to surgery to lessen risk of MELY  · Continue metoprolol without interruption    Depression/Anxiety  · Continue current medical treatment without interruption    Pre-operative Medical Evaluation for planned surgery  • Patient meets preoperative quality goals as noted below  • Recommendations as listed in PLAN section below  • Contact surgical nurse  navigator with any questions regarding preoperative plan or schedule. Patient Active Problem List   Diagnosis   • Anxiety   • High blood pressure   • Migraine   • Plaque psoriasis   • Status post revision of total replacement of right knee   • History of arthroplasty of right knee        Plan:     1. Further preoperative workup as follows:   - none no further testing required may proceed with surgery    2.  Medication Management/Recommendations:   - continue all current medicines through morning of surgery with sip of water except the following:  - hold ACE / ARB specifically  24 hours prior to surgery  - avoid use of NSAID such as motrin, advil, aleve for 7 days prior to surgery  - hold rheumatologic medicines as instructed by your rheumatologist  - hold all OTC herbal or nutritional supplements 7 days before surgery    3. Patient requires further consultation with:   No Consults Required    4. Discharge Planning / Barriers to Discharge  none identified - patients has post discharge therapy plan in place, transportation arranged for discharge day, adequate family support at home to assist with discharge to home. Subjective:           History of Present Illness:     Sandy Neff is a 46 y.o. male who presents to the office today for a preoperative consultation at the request of surgeon. The patient understands this is an elective procedure and not emergent. They are electing to undergo planned procedure with an understanding that all surgery has inherent risk. They have worked with their surgeon and failed conservative treatment measures. Today they present for preoperative risk assessment and recommendations for optimization in preparation for surgery. Pt seen in surgical optimization center for upcoming proposed surgery. They have failed previous conservative measures and have elected surgical intervention. Pt will undergo intervention for infected right knee  Previous arthroplasty. Plan per his surgical team is for antibx spacer and PICC placement for long term antibx with eventual TKA down the road. Pt meets presurgical lab and BMI optimization goals. Upon interview questioning patient is able to perform greater than 4 METs workload in daily life without any reported cardio-pulmonary symptoms. ROS: No TIA's or unusual headaches, no dysphagia. No prolonged cough. No dyspnea or chest pain on exertion. No abdominal pain, change in bowel habits, black or bloody stools. No urinary tract or BPH symptoms.   Positive reported pain in arthritic joint. Positive difficulty with gait. No skin rashes or issues. Objective:      /74   Pulse 80   Ht 5' 11" (1.803 m)   Wt 85.7 kg (189 lb)   BMI 26.36 kg/m²       General Appearance: no distress, conversive  HEENT: PERRLA, conjuctiva normal; oropharynx clear; mucous membranes moist;   Neck:  Supple, no lymphadenopathy or thyromegaly  Lungs: breath sounds normal, normal respiratory effort, no retractions, expiratory effort normal  CV: normal heart sounds S1/S2, PMI normal   ABD: soft non tender, no masses , no hepatic or splenomegaly  EXT: DP pulses intact, no lymphadenopathy, no edema  Skin: normal turgor, normal texture, no rash  Psych: affect normal, mood normal  Neuro: AAOx3     ASSESSED BY CALIN Centeno    The following portions of the patient's history were reviewed and updated as appropriate: allergies, current medications, past family history, past medical history, past social history, past surgical history and problem list.     Past History:       Past Medical History:   Diagnosis Date   • Anxiety    • Hypertension    • Migraine     Past Surgical History:   Procedure Laterality Date   • REPLACEMENT TOTAL KNEE Right     x2          Social History     Tobacco Use   • Smoking status: Never   • Smokeless tobacco: Current     Types: Chew   Substance Use Topics   • Alcohol use: Yes     Comment: occasionally     No family history on file.        Allergies:     No Known Allergies     Current Medications:     Current Outpatient Medications   Medication Instructions   • ascorbic acid (VITAMIN C) 500 mg, Oral, 2 times daily   • busPIRone (BUSPAR) 15 mg tablet TAKE 1/2 TABLET (7.5 MG) BY ORAL ROUTE 2 TIMES PER DAY   • ferrous sulfate 324 mg, Oral, 2 times daily before meals   • folic acid (FOLVITE) 1 mg, Oral, Daily   • HYDROcodone-acetaminophen (Norco) 5-325 mg per tablet 1 tablet, Oral, Every 6 hours PRN   • lisinopril (ZESTRIL) 10 mg, Oral, Daily   • methotrexate 2.5 mg tablet 6 tablets, Oral, Weekly   • metoprolol succinate (TOPROL-XL) 100 mg 24 hr tablet No dose, route, or frequency recorded. • Multiple Vitamins-Minerals (multivitamin with minerals) tablet 1 tablet, Oral, Daily   • nortriptyline (PAMELOR) 50 mg capsule Oral   • venlafaxine 150 MG TB24 24 hr tablet Oral             PRE-OP WORKSHEET DATA    Assessment of Pre-Operative Risks     MLJ Quality Hard Stops:  BMI (<40) : Estimated body mass index is 26.36 kg/m² as calculated from the following:    Height as of this encounter: 5' 11" (1.803 m). Weight as of this encounter: 85.7 kg (189 lb). Hgb ( >11): Lab Results   Component Value Date    HGB 10.1 (L) 07/21/2023     HbA1c (<7.0) :   Lab Results   Component Value Date    HGBA1C 6.2 (H) 07/21/2023     GFR (>60) (Less then 45 = Nephrology consult):  Estimated Creatinine Clearance: 120.7 mL/min (by C-G formula based on SCr of 0.78 mg/dL). Active Decompensated Chronic Conditions which would delay surgery  No acutely decompensated medical issues such as recent CVA, MI, new onset arrhythmia, severe aortic stenosis, CHF, uncontrolled COPD       Exercise Capacity: (if less the 4 mets consider functional assessment via cardiac stress testing or consultation)    · Able to walk 2 blocks without symptoms?: Yes  · Able to walk 1 flights without symptoms?: Yes    Assessment of intra and post operative respiratory, hemodynamic and thrombotic risks     Prior Anesthesia Reactions: No     Personal history of venous thromboembolic disease? No    History of steroid use > 5 mg for >2 weeks within last year? No    Cardiac Risk Estimation: per the Revised Cardiac Risk Index (Circ. 100:1043, 1999),     The patient's risk factors for cardiac complications include :  serum Cr > 2.0 mg/dL or GFR < 30 and none    Bakari Arnett has a in hospital cardiac risk of RCI RISK CLASS I (0 risk factors, risk of major cardiac compl. appr.  0.5%) based on RCRI calculator          Pre-Op Data Reviewed: Laboratory Results: I have personally reviewed the pertinent laboratory results/reports     EKG:I have personally reviewed pertinent reports. . I personally reviewed and interpreted available tracings in the electronic medical record    Normal sinus rhythm  Minimal voltage criteria for LVH, may be normal variant  No previous ECGs available  Confirmed by Lemuel Cogan ([id number]) on 7/22/2023 10:31:26 AM    OLD RECORDS: reviewed old records in the chart review section if EHR on day of visit.     Previous cardiopulmonary studies within the past year:  · Echocardiogram: no  · Cardiac Catheterization: no  · Stress Test: no      Time of visit including pre-visit chart review, visit and post-visit coordination of plan and care , review of pre-surgical lab work, preparation and time spent documenting note in electronic medical record, time spent face-to-face in physical examination answering patient questions by care team 45 minutes             [address]

## 2023-07-24 NOTE — PATIENT INSTRUCTIONS
Contact surgical nurse  navigator with any questions regarding preoperative plan or schedule.   Stop all over the counter supplements, herbal, naturopathic  medications for 1 week prior to surgery UNLESS prescribed by your surgeon  Hold NSAIDS (i.e. advil, alleve, motrin, ibuprofen, celebrex) minimum 7 days prior to surgery  Hold Asprin minimum 7 days prior to surgery  Recommend using Tylenol ( acetaminophen ) 500mg every eight hours during the first week post discharge in conjunction with any additional pain medicine prescribed by your surgeon  Use bowel medicines prescribed by your surgeon ( colace) daily post op during the first 1-2 weeks to avoid post operative constipation issues  Call [phone] with any post discharge concerns or medical issues  The morning of surgery take only the following medication with small sip of water: metoprolol, venlafaxine, buspirone  Hold lisinopril day before and morning of surgery

## 2023-07-25 ENCOUNTER — ANESTHESIA EVENT (OUTPATIENT)
Dept: PERIOP | Facility: HOSPITAL | Age: 48
DRG: 302 | End: 2023-07-25
Payer: COMMERCIAL

## 2023-07-25 LAB
COTININE SERPL-MCNC: 267.9 NG/ML
NICOTINE SERPL-MCNC: 10.4 NG/ML

## 2023-07-25 NOTE — PRE-PROCEDURE INSTRUCTIONS
Pre-Surgery Instructions:   Medication Instructions   • ascorbic acid (VITAMIN C) 500 MG tablet Hold day of surgery. • busPIRone (BUSPAR) 15 mg tablet Take day of surgery. • ferrous sulfate 324 (65 Fe) mg Hold day of surgery. • folic acid (FOLVITE) 1 mg tablet Hold day of surgery. • HYDROcodone-acetaminophen (Norco) 5-325 mg per tablet Uses PRN- OK to take day of surgery   • lisinopril (ZESTRIL) 10 mg tablet Stop taking 1 day prior to surgery. • methotrexate 2.5 mg tablet takes Sundays-will not take DOS   • metoprolol succinate (TOPROL-XL) 100 mg 24 hr tablet Take day of surgery. • Multiple Vitamins-Minerals (multivitamin with minerals) tablet Hold day of surgery. • nortriptyline (PAMELOR) 50 mg capsule Take night before surgery   • venlafaxine 150 MG TB24 24 hr tablet Take day of surgery. Ortho class complete. Medication instructions for day surgery reviewed. Please use only a sip of water to take your instructed medications. Avoid all over the counter vitamins, supplements and NSAIDS for one week prior to surgery per anesthesia guidelines. Tylenol is ok to take as needed. You will receive a call one business day prior to surgery with an arrival time and hospital directions. If your surgery is scheduled on a Monday, the hospital will be calling you on the Friday prior to your surgery. If you have not heard from anyone by 8pm, please call the hospital supervisor through the hospital  at [phone]. [employer] [phone]). Do not eat or drink anything after midnight the night before your surgery, including candy, mints, lifesavers, or chewing gum. Do not drink alcohol 24hrs before your surgery. Try not to smoke at least 24hrs before your surgery. Follow the pre surgery showering instructions as listed in the [facility] Surgical Experience Booklet” or otherwise provided by your surgeon's office. Do not shave the surgical area 24 hours before surgery.  Do not apply any lotions, creams, including makeup, cologne, deodorant, or perfumes after showering on the day of your surgery. No contact lenses, eye make-up, or artificial eyelashes. Remove nail polish, including gel polish, and any artificial, gel, or acrylic nails if possible. Remove all jewelry including rings and body piercing jewelry. Wear causal clothing that is easy to take on and off. Consider your type of surgery. Keep any valuables, jewelry, piercings at home. Please bring any specially ordered equipment (sling, braces) if indicated. Arrange for a responsible person to drive you to and from the hospital on the day of your surgery. Visitor Guidelines discussed. Call the surgeon's office with any new illnesses, exposures, or additional questions prior to surgery. Please reference your [facility] Surgical Experience Booklet” for additional information to prepare for your upcoming surgery.

## 2023-07-28 ENCOUNTER — APPOINTMENT (OUTPATIENT)
Dept: RADIOLOGY | Facility: HOSPITAL | Age: 48
DRG: 302 | End: 2023-07-28
Payer: COMMERCIAL

## 2023-07-28 ENCOUNTER — ANESTHESIA (OUTPATIENT)
Dept: PERIOP | Facility: HOSPITAL | Age: 48
DRG: 302 | End: 2023-07-28
Payer: COMMERCIAL

## 2023-07-28 ENCOUNTER — HOSPITAL ENCOUNTER (INPATIENT)
Facility: HOSPITAL | Age: 48
LOS: 6 days | Discharge: HOME | DRG: 302 | End: 2023-08-03
Attending: STUDENT IN AN ORGANIZED HEALTH CARE EDUCATION/TRAINING PROGRAM | Admitting: STUDENT IN AN ORGANIZED HEALTH CARE EDUCATION/TRAINING PROGRAM
Payer: COMMERCIAL

## 2023-07-28 DIAGNOSIS — Z96.651 HISTORY OF REVISION OF TOTAL REPLACEMENT OF RIGHT KNEE JOINT: ICD-10-CM

## 2023-07-28 DIAGNOSIS — T84.53XA INFECTION OF TOTAL RIGHT KNEE REPLACEMENT, INITIAL ENCOUNTER (HCC): Primary | ICD-10-CM

## 2023-07-28 PROBLEM — T88.4XXA DIFFICULT INTUBATION: Chronic | Status: ACTIVE | Noted: 2023-07-28

## 2023-07-28 PROBLEM — Z98.890 PONV (POSTOPERATIVE NAUSEA AND VOMITING): Chronic | Status: ACTIVE | Noted: 2023-07-28

## 2023-07-28 PROBLEM — R11.2 PONV (POSTOPERATIVE NAUSEA AND VOMITING): Chronic | Status: ACTIVE | Noted: 2023-07-28

## 2023-07-28 LAB
ABO GROUP BLD: NORMAL
GLUCOSE SERPL-MCNC: 91 MG/DL (ref 65–140)
RH BLD: POSITIVE

## 2023-07-28 PROCEDURE — 87075 CULTR BACTERIA EXCEPT BLOOD: CPT | Performed by: STUDENT IN AN ORGANIZED HEALTH CARE EDUCATION/TRAINING PROGRAM

## 2023-07-28 PROCEDURE — 87070 CULTURE OTHR SPECIMN AEROBIC: CPT | Performed by: STUDENT IN AN ORGANIZED HEALTH CARE EDUCATION/TRAINING PROGRAM

## 2023-07-28 PROCEDURE — 0SRC0J9 REPLACEMENT OF RIGHT KNEE JOINT WITH SYNTHETIC SUBSTITUTE, CEMENTED, OPEN APPROACH: ICD-10-PCS | Performed by: STUDENT IN AN ORGANIZED HEALTH CARE EDUCATION/TRAINING PROGRAM

## 2023-07-28 PROCEDURE — C1776 JOINT DEVICE (IMPLANTABLE): HCPCS | Performed by: STUDENT IN AN ORGANIZED HEALTH CARE EDUCATION/TRAINING PROGRAM

## 2023-07-28 PROCEDURE — 73560 X-RAY EXAM OF KNEE 1 OR 2: CPT

## 2023-07-28 PROCEDURE — 0S9C0ZZ DRAINAGE OF RIGHT KNEE JOINT, OPEN APPROACH: ICD-10-PCS | Performed by: STUDENT IN AN ORGANIZED HEALTH CARE EDUCATION/TRAINING PROGRAM

## 2023-07-28 PROCEDURE — 0SRC0EZ REPLACEMENT OF RIGHT KNEE JOINT WITH ARTICULATING SPACER, OPEN APPROACH: ICD-10-PCS | Performed by: STUDENT IN AN ORGANIZED HEALTH CARE EDUCATION/TRAINING PROGRAM

## 2023-07-28 PROCEDURE — C1713 ANCHOR/SCREW BN/BN,TIS/BN: HCPCS | Performed by: STUDENT IN AN ORGANIZED HEALTH CARE EDUCATION/TRAINING PROGRAM

## 2023-07-28 PROCEDURE — 87147 CULTURE TYPE IMMUNOLOGIC: CPT | Performed by: STUDENT IN AN ORGANIZED HEALTH CARE EDUCATION/TRAINING PROGRAM

## 2023-07-28 PROCEDURE — 87102 FUNGUS ISOLATION CULTURE: CPT | Performed by: STUDENT IN AN ORGANIZED HEALTH CARE EDUCATION/TRAINING PROGRAM

## 2023-07-28 PROCEDURE — 87205 SMEAR GRAM STAIN: CPT | Performed by: STUDENT IN AN ORGANIZED HEALTH CARE EDUCATION/TRAINING PROGRAM

## 2023-07-28 PROCEDURE — 27488 REMOVAL OF KNEE PROSTHESIS: CPT | Performed by: STUDENT IN AN ORGANIZED HEALTH CARE EDUCATION/TRAINING PROGRAM

## 2023-07-28 PROCEDURE — 82948 REAGENT STRIP/BLOOD GLUCOSE: CPT

## 2023-07-28 PROCEDURE — 27488 REMOVAL OF KNEE PROSTHESIS: CPT | Performed by: PHYSICIAN ASSISTANT

## 2023-07-28 PROCEDURE — 0SPC0JZ REMOVAL OF SYNTHETIC SUBSTITUTE FROM RIGHT KNEE JOINT, OPEN APPROACH: ICD-10-PCS | Performed by: STUDENT IN AN ORGANIZED HEALTH CARE EDUCATION/TRAINING PROGRAM

## 2023-07-28 PROCEDURE — 87186 SC STD MICRODIL/AGAR DIL: CPT | Performed by: STUDENT IN AN ORGANIZED HEALTH CARE EDUCATION/TRAINING PROGRAM

## 2023-07-28 DEVICE — STIMULAN® RAPID CURE PROVIDED STERILE FOR SINGLE PATIENT USE. STIMULAN® RAPID CURE CONTAINS CALCIUM SULFATE POWDER AND MIXING SOLUTION IN PRE-MEASURED QUANTITIES SO THAT WHEN MIXED TOGETHER IN A STERILE MIXING BOWL, THE RESULTANT PASTE IS TO BE DIGITALLY PACKED INTO OPEN BONE VOID/GAP TO SET INSITU OR PLACED INTO THE MOULD PROVIDED, THE MIXTURE SETS TO FORM BEADS. THE BIODEGRADABLE, RADIOPAQUE BEADS ARE RESORBED IN APPROXIMATELY 30 – 60 DAYS WHEN USED IN ACCORDANCE WITH THE DEVICE LABELLING. STIMULAN® RAPID CURE IS MANUFACTURED FROM SYNTHETIC IMPLANT GRADE CALCIUM SULFATE DIHYDRATE(CASO4.2H2O) THAT RESORBS AND IS REPLACED WITH BONE DURING THE HEALING PROCESS. ALSO, AS THE BONE VOID FILLER BEADS ARE BIODEGRADABLE AND BIOCOMPATIBLE, THEY MAY BE USED AT AN INFECTED SITE.
Type: IMPLANTABLE DEVICE | Site: KNEE | Status: FUNCTIONAL
Brand: STIMULAN® RAPID CURE

## 2023-07-28 DEVICE — FULL DOSE BONE CEMENT, 10 PACK CATALOG NUMBER IS 6191-1-010
Type: IMPLANTABLE DEVICE | Site: KNEE | Status: FUNCTIONAL
Brand: SIMPLEX

## 2023-07-28 DEVICE — ATTUNE KNEE SYSTEM ALL POLY TIBIAL IMPLANT CRUCIATE RETAINING SIZE 6, 10MM AOX
Type: IMPLANTABLE DEVICE | Site: KNEE | Status: FUNCTIONAL
Brand: ATTUNE

## 2023-07-28 DEVICE — ATTUNE KNEE SYSTEM FEMORAL CRUCIATE RETAINING NARROW SIZE 6N RIGHT CEMENTED
Type: IMPLANTABLE DEVICE | Site: KNEE | Status: FUNCTIONAL
Brand: ATTUNE

## 2023-07-28 RX ORDER — ONDANSETRON 4 MG/1
4 TABLET, ORALLY DISINTEGRATING ORAL EVERY 6 HOURS PRN
Qty: 20 TABLET | Refills: 0 | Status: SHIPPED | OUTPATIENT
Start: 2023-07-28

## 2023-07-28 RX ORDER — AMOXICILLIN 250 MG
1 CAPSULE ORAL DAILY
Qty: 30 TABLET | Refills: 0 | Status: SHIPPED | OUTPATIENT
Start: 2023-07-28

## 2023-07-28 RX ORDER — PROPOFOL 10 MG/ML
INJECTION, EMULSION INTRAVENOUS CONTINUOUS PRN
Status: DISCONTINUED | OUTPATIENT
Start: 2023-07-28 | End: 2023-07-28

## 2023-07-28 RX ORDER — SODIUM CHLORIDE, SODIUM LACTATE, POTASSIUM CHLORIDE, CALCIUM CHLORIDE 600; 310; 30; 20 MG/100ML; MG/100ML; MG/100ML; MG/100ML
100 INJECTION, SOLUTION INTRAVENOUS CONTINUOUS
Status: DISCONTINUED | OUTPATIENT
Start: 2023-07-28 | End: 2023-07-29

## 2023-07-28 RX ORDER — METOPROLOL SUCCINATE 50 MG/1
100 TABLET, EXTENDED RELEASE ORAL DAILY
Status: DISCONTINUED | OUTPATIENT
Start: 2023-07-29 | End: 2023-08-03 | Stop reason: HOSPADM

## 2023-07-28 RX ORDER — OXYCODONE HYDROCHLORIDE 5 MG/1
5 TABLET ORAL EVERY 4 HOURS PRN
Status: DISCONTINUED | OUTPATIENT
Start: 2023-07-28 | End: 2023-08-03 | Stop reason: HOSPADM

## 2023-07-28 RX ORDER — CEFAZOLIN SODIUM 2 G/50ML
2000 SOLUTION INTRAVENOUS EVERY 8 HOURS
Status: DISCONTINUED | OUTPATIENT
Start: 2023-07-28 | End: 2023-08-03 | Stop reason: HOSPADM

## 2023-07-28 RX ORDER — OXYCODONE HYDROCHLORIDE 10 MG/1
10 TABLET ORAL EVERY 4 HOURS PRN
Status: DISCONTINUED | OUTPATIENT
Start: 2023-07-28 | End: 2023-08-03 | Stop reason: HOSPADM

## 2023-07-28 RX ORDER — OXYCODONE HYDROCHLORIDE 5 MG/1
5 TABLET ORAL EVERY 4 HOURS PRN
Qty: 42 TABLET | Refills: 0 | Status: SHIPPED | OUTPATIENT
Start: 2023-07-28 | End: 2023-08-07

## 2023-07-28 RX ORDER — DOCUSATE SODIUM 100 MG/1
100 CAPSULE, LIQUID FILLED ORAL 2 TIMES DAILY
Status: DISCONTINUED | OUTPATIENT
Start: 2023-07-28 | End: 2023-08-03 | Stop reason: HOSPADM

## 2023-07-28 RX ORDER — SODIUM CHLORIDE 9 MG/ML
125 INJECTION, SOLUTION INTRAVENOUS CONTINUOUS
Status: DISCONTINUED | OUTPATIENT
Start: 2023-07-28 | End: 2023-07-28

## 2023-07-28 RX ORDER — PROMETHAZINE HYDROCHLORIDE 25 MG/ML
6.25 INJECTION, SOLUTION INTRAMUSCULAR; INTRAVENOUS ONCE AS NEEDED
Status: DISCONTINUED | OUTPATIENT
Start: 2023-07-28 | End: 2023-07-28 | Stop reason: HOSPADM

## 2023-07-28 RX ORDER — SENNOSIDES 8.6 MG
1 TABLET ORAL DAILY
Status: DISCONTINUED | OUTPATIENT
Start: 2023-07-29 | End: 2023-08-03 | Stop reason: HOSPADM

## 2023-07-28 RX ORDER — LIDOCAINE HCL/PF 100 MG/5ML
SYRINGE (ML) INJECTION AS NEEDED
Status: DISCONTINUED | OUTPATIENT
Start: 2023-07-28 | End: 2023-07-28

## 2023-07-28 RX ORDER — PANTOPRAZOLE SODIUM 40 MG/1
40 TABLET, DELAYED RELEASE ORAL
Status: DISCONTINUED | OUTPATIENT
Start: 2023-07-29 | End: 2023-08-03 | Stop reason: HOSPADM

## 2023-07-28 RX ORDER — FENTANYL CITRATE/PF 50 MCG/ML
50 SYRINGE (ML) INJECTION
Status: DISCONTINUED | OUTPATIENT
Start: 2023-07-28 | End: 2023-07-28 | Stop reason: HOSPADM

## 2023-07-28 RX ORDER — FENTANYL CITRATE 50 UG/ML
INJECTION, SOLUTION INTRAMUSCULAR; INTRAVENOUS AS NEEDED
Status: DISCONTINUED | OUTPATIENT
Start: 2023-07-28 | End: 2023-07-28

## 2023-07-28 RX ORDER — MAGNESIUM HYDROXIDE 1200 MG/15ML
LIQUID ORAL AS NEEDED
Status: DISCONTINUED | OUTPATIENT
Start: 2023-07-28 | End: 2023-07-28 | Stop reason: HOSPADM

## 2023-07-28 RX ORDER — ACETAMINOPHEN 325 MG/1
650 TABLET ORAL EVERY 4 HOURS PRN
Status: DISCONTINUED | OUTPATIENT
Start: 2023-07-28 | End: 2023-08-03 | Stop reason: HOSPADM

## 2023-07-28 RX ORDER — ACETAMINOPHEN 325 MG/1
975 TABLET ORAL EVERY 8 HOURS SCHEDULED
Status: DISCONTINUED | OUTPATIENT
Start: 2023-07-28 | End: 2023-08-03 | Stop reason: HOSPADM

## 2023-07-28 RX ORDER — HYDRALAZINE HYDROCHLORIDE 20 MG/ML
INJECTION INTRAMUSCULAR; INTRAVENOUS AS NEEDED
Status: DISCONTINUED | OUTPATIENT
Start: 2023-07-28 | End: 2023-07-28

## 2023-07-28 RX ORDER — ONDANSETRON 2 MG/ML
4 INJECTION INTRAMUSCULAR; INTRAVENOUS ONCE AS NEEDED
Status: DISCONTINUED | OUTPATIENT
Start: 2023-07-28 | End: 2023-07-28 | Stop reason: HOSPADM

## 2023-07-28 RX ORDER — MEPERIDINE HYDROCHLORIDE 25 MG/ML
12.5 INJECTION INTRAMUSCULAR; INTRAVENOUS; SUBCUTANEOUS ONCE AS NEEDED
Status: DISCONTINUED | OUTPATIENT
Start: 2023-07-28 | End: 2023-07-28 | Stop reason: HOSPADM

## 2023-07-28 RX ORDER — SIMETHICONE 80 MG
80 TABLET,CHEWABLE ORAL 4 TIMES DAILY PRN
Status: DISCONTINUED | OUTPATIENT
Start: 2023-07-28 | End: 2023-08-03 | Stop reason: HOSPADM

## 2023-07-28 RX ORDER — SODIUM CHLORIDE, SODIUM LACTATE, POTASSIUM CHLORIDE, CALCIUM CHLORIDE 600; 310; 30; 20 MG/100ML; MG/100ML; MG/100ML; MG/100ML
125 INJECTION, SOLUTION INTRAVENOUS CONTINUOUS
Status: DISCONTINUED | OUTPATIENT
Start: 2023-07-28 | End: 2023-07-28

## 2023-07-28 RX ORDER — ONDANSETRON 2 MG/ML
INJECTION INTRAMUSCULAR; INTRAVENOUS AS NEEDED
Status: DISCONTINUED | OUTPATIENT
Start: 2023-07-28 | End: 2023-07-28

## 2023-07-28 RX ORDER — CHLORHEXIDINE GLUCONATE 0.12 MG/ML
15 RINSE ORAL ONCE
Status: COMPLETED | OUTPATIENT
Start: 2023-07-28 | End: 2023-07-28

## 2023-07-28 RX ORDER — ONDANSETRON 2 MG/ML
4 INJECTION INTRAMUSCULAR; INTRAVENOUS EVERY 6 HOURS PRN
Status: DISCONTINUED | OUTPATIENT
Start: 2023-07-28 | End: 2023-08-03 | Stop reason: HOSPADM

## 2023-07-28 RX ORDER — FOLIC ACID 1 MG/1
1 TABLET ORAL DAILY
Status: DISCONTINUED | OUTPATIENT
Start: 2023-07-29 | End: 2023-08-03 | Stop reason: HOSPADM

## 2023-07-28 RX ORDER — ASCORBIC ACID 500 MG
500 TABLET ORAL 2 TIMES DAILY WITH MEALS
Status: DISCONTINUED | OUTPATIENT
Start: 2023-07-29 | End: 2023-08-03 | Stop reason: HOSPADM

## 2023-07-28 RX ORDER — GABAPENTIN 300 MG/1
300 CAPSULE ORAL
Status: DISCONTINUED | OUTPATIENT
Start: 2023-07-28 | End: 2023-08-03 | Stop reason: HOSPADM

## 2023-07-28 RX ORDER — LABETALOL HYDROCHLORIDE 5 MG/ML
INJECTION, SOLUTION INTRAVENOUS AS NEEDED
Status: DISCONTINUED | OUTPATIENT
Start: 2023-07-28 | End: 2023-07-28

## 2023-07-28 RX ORDER — ONDANSETRON 2 MG/ML
4 INJECTION INTRAMUSCULAR; INTRAVENOUS ONCE
Status: DISCONTINUED | OUTPATIENT
Start: 2023-07-28 | End: 2023-07-28 | Stop reason: HOSPADM

## 2023-07-28 RX ORDER — SCOLOPAMINE TRANSDERMAL SYSTEM 1 MG/1
1 PATCH, EXTENDED RELEASE TRANSDERMAL ONCE AS NEEDED
Status: DISCONTINUED | OUTPATIENT
Start: 2023-07-28 | End: 2023-07-28 | Stop reason: HOSPADM

## 2023-07-28 RX ORDER — VANCOMYCIN HYDROCHLORIDE 1 G/200ML
1000 INJECTION, SOLUTION INTRAVENOUS ONCE
Status: DISCONTINUED | OUTPATIENT
Start: 2023-07-28 | End: 2023-07-28 | Stop reason: HOSPADM

## 2023-07-28 RX ORDER — SUCCINYLCHOLINE/SOD CL,ISO/PF 100 MG/5ML
SYRINGE (ML) INTRAVENOUS AS NEEDED
Status: DISCONTINUED | OUTPATIENT
Start: 2023-07-28 | End: 2023-07-28

## 2023-07-28 RX ORDER — TOBRAMYCIN 1.2 G/30ML
INJECTION, POWDER, LYOPHILIZED, FOR SOLUTION INTRAVENOUS AS NEEDED
Status: DISCONTINUED | OUTPATIENT
Start: 2023-07-28 | End: 2023-07-28 | Stop reason: HOSPADM

## 2023-07-28 RX ORDER — NORTRIPTYLINE HYDROCHLORIDE 25 MG/1
50 CAPSULE ORAL
Status: DISCONTINUED | OUTPATIENT
Start: 2023-07-28 | End: 2023-07-29

## 2023-07-28 RX ORDER — HYDROMORPHONE HCL/PF 1 MG/ML
0.5 SYRINGE (ML) INJECTION
Status: COMPLETED | OUTPATIENT
Start: 2023-07-28 | End: 2023-07-28

## 2023-07-28 RX ORDER — CALCIUM CARBONATE 500 MG/1
1000 TABLET, CHEWABLE ORAL DAILY PRN
Status: DISCONTINUED | OUTPATIENT
Start: 2023-07-28 | End: 2023-08-03 | Stop reason: HOSPADM

## 2023-07-28 RX ORDER — HYDROMORPHONE HCL/PF 1 MG/ML
SYRINGE (ML) INJECTION AS NEEDED
Status: DISCONTINUED | OUTPATIENT
Start: 2023-07-28 | End: 2023-07-28

## 2023-07-28 RX ORDER — NORTRIPTYLINE HYDROCHLORIDE 25 MG/1
100 CAPSULE ORAL
Status: DISCONTINUED | OUTPATIENT
Start: 2023-07-28 | End: 2023-07-28

## 2023-07-28 RX ORDER — SCOLOPAMINE TRANSDERMAL SYSTEM 1 MG/1
1 PATCH, EXTENDED RELEASE TRANSDERMAL ONCE AS NEEDED
Status: DISCONTINUED | OUTPATIENT
Start: 2023-07-28 | End: 2023-07-28

## 2023-07-28 RX ORDER — BUSPIRONE HYDROCHLORIDE 5 MG/1
7.5 TABLET ORAL 2 TIMES DAILY
Status: DISCONTINUED | OUTPATIENT
Start: 2023-07-28 | End: 2023-08-03 | Stop reason: HOSPADM

## 2023-07-28 RX ORDER — ACETAMINOPHEN 325 MG/1
975 TABLET ORAL ONCE
Status: COMPLETED | OUTPATIENT
Start: 2023-07-28 | End: 2023-07-28

## 2023-07-28 RX ORDER — VANCOMYCIN HYDROCHLORIDE 1 G/20ML
INJECTION, POWDER, LYOPHILIZED, FOR SOLUTION INTRAVENOUS AS NEEDED
Status: DISCONTINUED | OUTPATIENT
Start: 2023-07-28 | End: 2023-07-28 | Stop reason: HOSPADM

## 2023-07-28 RX ORDER — ACETAMINOPHEN 500 MG
1000 TABLET ORAL EVERY 8 HOURS
Qty: 60 TABLET | Refills: 0 | Status: SHIPPED | OUTPATIENT
Start: 2023-07-28

## 2023-07-28 RX ORDER — PROPOFOL 10 MG/ML
INJECTION, EMULSION INTRAVENOUS AS NEEDED
Status: DISCONTINUED | OUTPATIENT
Start: 2023-07-28 | End: 2023-07-28

## 2023-07-28 RX ORDER — DEXAMETHASONE SODIUM PHOSPHATE 10 MG/ML
INJECTION, SOLUTION INTRAMUSCULAR; INTRAVENOUS AS NEEDED
Status: DISCONTINUED | OUTPATIENT
Start: 2023-07-28 | End: 2023-07-28

## 2023-07-28 RX ORDER — CELECOXIB 200 MG/1
200 CAPSULE ORAL 2 TIMES DAILY
Qty: 60 CAPSULE | Refills: 0 | Status: SHIPPED | OUTPATIENT
Start: 2023-07-28

## 2023-07-28 RX ORDER — VENLAFAXINE HYDROCHLORIDE 150 MG/1
150 CAPSULE, EXTENDED RELEASE ORAL
Status: DISCONTINUED | OUTPATIENT
Start: 2023-07-28 | End: 2023-08-03 | Stop reason: HOSPADM

## 2023-07-28 RX ORDER — ROCURONIUM BROMIDE 10 MG/ML
INJECTION, SOLUTION INTRAVENOUS AS NEEDED
Status: DISCONTINUED | OUTPATIENT
Start: 2023-07-28 | End: 2023-07-28

## 2023-07-28 RX ORDER — GABAPENTIN 300 MG/1
300 CAPSULE ORAL ONCE
Status: COMPLETED | OUTPATIENT
Start: 2023-07-28 | End: 2023-07-28

## 2023-07-28 RX ORDER — CHLORHEXIDINE GLUCONATE 4 G/100ML
SOLUTION TOPICAL DAILY PRN
Status: DISCONTINUED | OUTPATIENT
Start: 2023-07-28 | End: 2023-07-28 | Stop reason: HOSPADM

## 2023-07-28 RX ORDER — CEFAZOLIN SODIUM 2 G/50ML
2000 SOLUTION INTRAVENOUS ONCE
Status: COMPLETED | OUTPATIENT
Start: 2023-07-28 | End: 2023-07-28

## 2023-07-28 RX ORDER — NORTRIPTYLINE HYDROCHLORIDE 25 MG/1
50 CAPSULE ORAL
Status: DISCONTINUED | OUTPATIENT
Start: 2023-07-28 | End: 2023-07-28

## 2023-07-28 RX ORDER — TRANEXAMIC ACID 10 MG/ML
1000 INJECTION, SOLUTION INTRAVENOUS ONCE
Status: DISCONTINUED | OUTPATIENT
Start: 2023-07-28 | End: 2023-07-28 | Stop reason: HOSPADM

## 2023-07-28 RX ORDER — MIDAZOLAM HYDROCHLORIDE 2 MG/2ML
INJECTION, SOLUTION INTRAMUSCULAR; INTRAVENOUS AS NEEDED
Status: DISCONTINUED | OUTPATIENT
Start: 2023-07-28 | End: 2023-07-28

## 2023-07-28 RX ADMIN — HYDROMORPHONE HYDROCHLORIDE 0.5 MG: 1 INJECTION, SOLUTION INTRAMUSCULAR; INTRAVENOUS; SUBCUTANEOUS at 14:01

## 2023-07-28 RX ADMIN — CEFAZOLIN SODIUM 2000 MG: 2 SOLUTION INTRAVENOUS at 13:05

## 2023-07-28 RX ADMIN — PROPOFOL 200 MG: 10 INJECTION, EMULSION INTRAVENOUS at 13:10

## 2023-07-28 RX ADMIN — FENTANYL CITRATE 50 MCG: 50 INJECTION INTRAMUSCULAR; INTRAVENOUS at 16:06

## 2023-07-28 RX ADMIN — VENLAFAXINE HYDROCHLORIDE 150 MG: 150 CAPSULE, EXTENDED RELEASE ORAL at 21:16

## 2023-07-28 RX ADMIN — ROCURONIUM BROMIDE 10 MG: 10 INJECTION, SOLUTION INTRAVENOUS at 14:32

## 2023-07-28 RX ADMIN — FENTANYL CITRATE 50 MCG: 50 INJECTION INTRAMUSCULAR; INTRAVENOUS at 13:15

## 2023-07-28 RX ADMIN — HYDROMORPHONE HYDROCHLORIDE 0.5 MG: 1 INJECTION, SOLUTION INTRAMUSCULAR; INTRAVENOUS; SUBCUTANEOUS at 16:18

## 2023-07-28 RX ADMIN — SCOPALAMINE 1 PATCH: 1 PATCH, EXTENDED RELEASE TRANSDERMAL at 10:05

## 2023-07-28 RX ADMIN — SUGAMMADEX 200 MG: 100 INJECTION, SOLUTION INTRAVENOUS at 15:48

## 2023-07-28 RX ADMIN — FENTANYL CITRATE 50 MCG: 50 INJECTION INTRAMUSCULAR; INTRAVENOUS at 13:27

## 2023-07-28 RX ADMIN — Medication 100 MG: at 13:10

## 2023-07-28 RX ADMIN — BUSPIRONE HYDROCHLORIDE 7.5 MG: 5 TABLET ORAL at 19:37

## 2023-07-28 RX ADMIN — HYDROMORPHONE HYDROCHLORIDE 0.5 MG: 1 INJECTION, SOLUTION INTRAMUSCULAR; INTRAVENOUS; SUBCUTANEOUS at 14:50

## 2023-07-28 RX ADMIN — ROCURONIUM BROMIDE 10 MG: 10 INJECTION, SOLUTION INTRAVENOUS at 13:29

## 2023-07-28 RX ADMIN — GABAPENTIN 300 MG: 300 CAPSULE ORAL at 09:39

## 2023-07-28 RX ADMIN — ACETAMINOPHEN 325MG 975 MG: 325 TABLET ORAL at 19:38

## 2023-07-28 RX ADMIN — PROPOFOL 20 MCG/KG/MIN: 10 INJECTION, EMULSION INTRAVENOUS at 13:25

## 2023-07-28 RX ADMIN — LABETALOL HYDROCHLORIDE 2.5 MG: 5 INJECTION, SOLUTION INTRAVENOUS at 14:01

## 2023-07-28 RX ADMIN — ASPIRIN 81 MG: 81 TABLET, COATED ORAL at 21:15

## 2023-07-28 RX ADMIN — SODIUM CHLORIDE, SODIUM LACTATE, POTASSIUM CHLORIDE, AND CALCIUM CHLORIDE 100 ML/HR: .6; .31; .03; .02 INJECTION, SOLUTION INTRAVENOUS at 19:39

## 2023-07-28 RX ADMIN — FENTANYL CITRATE 50 MCG: 50 INJECTION INTRAMUSCULAR; INTRAVENOUS at 13:46

## 2023-07-28 RX ADMIN — ROCURONIUM BROMIDE 40 MG: 10 INJECTION, SOLUTION INTRAVENOUS at 13:15

## 2023-07-28 RX ADMIN — GABAPENTIN 300 MG: 300 CAPSULE ORAL at 21:16

## 2023-07-28 RX ADMIN — SODIUM CHLORIDE 125 ML/HR: 0.9 INJECTION, SOLUTION INTRAVENOUS at 09:39

## 2023-07-28 RX ADMIN — DEXAMETHASONE SODIUM PHOSPHATE 5 MG: 10 INJECTION INTRAMUSCULAR; INTRAVENOUS at 13:15

## 2023-07-28 RX ADMIN — LABETALOL HYDROCHLORIDE 2.5 MG: 5 INJECTION, SOLUTION INTRAVENOUS at 13:52

## 2023-07-28 RX ADMIN — HYDROMORPHONE HYDROCHLORIDE 0.5 MG: 1 INJECTION, SOLUTION INTRAMUSCULAR; INTRAVENOUS; SUBCUTANEOUS at 17:02

## 2023-07-28 RX ADMIN — ROCURONIUM BROMIDE 10 MG: 10 INJECTION, SOLUTION INTRAVENOUS at 13:54

## 2023-07-28 RX ADMIN — HYDROMORPHONE HYDROCHLORIDE 0.5 MG: 1 INJECTION, SOLUTION INTRAMUSCULAR; INTRAVENOUS; SUBCUTANEOUS at 16:35

## 2023-07-28 RX ADMIN — OXYCODONE HYDROCHLORIDE 10 MG: 10 TABLET ORAL at 19:37

## 2023-07-28 RX ADMIN — SODIUM CHLORIDE, SODIUM LACTATE, POTASSIUM CHLORIDE, AND CALCIUM CHLORIDE 125 ML/HR: .6; .31; .03; .02 INJECTION, SOLUTION INTRAVENOUS at 18:38

## 2023-07-28 RX ADMIN — LIDOCAINE HYDROCHLORIDE 100 MG: 20 INJECTION INTRAVENOUS at 13:10

## 2023-07-28 RX ADMIN — CHLORHEXIDINE GLUCONATE 15 ML: 1.2 RINSE ORAL at 09:39

## 2023-07-28 RX ADMIN — FENTANYL CITRATE 50 MCG: 50 INJECTION INTRAMUSCULAR; INTRAVENOUS at 18:12

## 2023-07-28 RX ADMIN — MIDAZOLAM 2 MG: 1 INJECTION INTRAMUSCULAR; INTRAVENOUS at 13:01

## 2023-07-28 RX ADMIN — MORPHINE SULFATE 2 MG: 2 INJECTION, SOLUTION INTRAMUSCULAR; INTRAVENOUS at 21:20

## 2023-07-28 RX ADMIN — NORTRIPTYLINE HYDROCHLORIDE 50 MG: 25 CAPSULE ORAL at 21:15

## 2023-07-28 RX ADMIN — CEFAZOLIN SODIUM 2000 MG: 2 SOLUTION INTRAVENOUS at 21:17

## 2023-07-28 RX ADMIN — FENTANYL CITRATE 50 MCG: 50 INJECTION INTRAMUSCULAR; INTRAVENOUS at 14:24

## 2023-07-28 RX ADMIN — MORPHINE SULFATE 2 MG: 2 INJECTION, SOLUTION INTRAMUSCULAR; INTRAVENOUS at 23:21

## 2023-07-28 RX ADMIN — HYDROMORPHONE HYDROCHLORIDE 0.5 MG: 1 INJECTION, SOLUTION INTRAMUSCULAR; INTRAVENOUS; SUBCUTANEOUS at 16:26

## 2023-07-28 RX ADMIN — ACETAMINOPHEN 325MG 975 MG: 325 TABLET ORAL at 09:39

## 2023-07-28 RX ADMIN — HYDRALAZINE HYDROCHLORIDE 5 MG: 20 INJECTION, SOLUTION INTRAMUSCULAR; INTRAVENOUS at 14:31

## 2023-07-28 RX ADMIN — ONDANSETRON 4 MG: 2 INJECTION INTRAMUSCULAR; INTRAVENOUS at 15:20

## 2023-07-28 RX ADMIN — LABETALOL HYDROCHLORIDE 5 MG: 5 INJECTION, SOLUTION INTRAVENOUS at 14:50

## 2023-07-28 RX ADMIN — SODIUM CHLORIDE, SODIUM LACTATE, POTASSIUM CHLORIDE, AND CALCIUM CHLORIDE: .6; .31; .03; .02 INJECTION, SOLUTION INTRAVENOUS at 14:02

## 2023-07-28 RX ADMIN — FENTANYL CITRATE 50 MCG: 50 INJECTION INTRAMUSCULAR; INTRAVENOUS at 16:11

## 2023-07-28 RX ADMIN — DOCUSATE SODIUM 100 MG: 100 CAPSULE, LIQUID FILLED ORAL at 19:37

## 2023-07-28 NOTE — ANESTHESIA POSTPROCEDURE EVALUATION
Post-Op Assessment Note    CV Status:  Stable  Pain Score: 7    Pain management: adequate     Mental Status:  Alert and awake   Hydration Status:  Euvolemic   PONV Controlled:  Controlled   Airway Patency:  Patent  Airway: intubated   Two or more mitigation strategies used for obstructive sleep apnea   Post Op Vitals Reviewed: Yes      Staff: Anesthesiologist, CRNA         No notable events documented.     BP      Temp     Pulse     Resp      SpO2      /82 (BP Location: Right arm)   Pulse 94   Temp (!) 97 °F (36.1 °C) (Temporal)   Resp 18   Ht 5' 11" (1.803 m)   Wt 83.4 kg (183 lb 13.8 oz)   SpO2 95%   BMI 25.64 kg/m²

## 2023-07-28 NOTE — ANESTHESIA PREPROCEDURE EVALUATION
Procedure:  ARTHROPLASTY KNEE TOTAL REVISION (Right: Knee)    Relevant Problems   ANESTHESIA   (+) Difficult intubation   (+) PONV (postoperative nausea and vomiting)      CARDIO   (+) High blood pressure   (+) Migraine      NEURO/PSYCH   (+) Anxiety   (+) Migraine        Physical Exam    Airway    Mallampati score: III  TM Distance: <3 FB  Neck ROM: full     Dental   No notable dental hx     Cardiovascular  Rhythm: regular, Rate: normal, Cardiovascular exam normal    Pulmonary  Pulmonary exam normal Breath sounds clear to auscultation,     Other Findings        Anesthesia Plan  ASA Score- 2     Anesthesia Type- general with ASA Monitors. Additional Monitors:   Airway Plan:     Comment: Adductor block+/-  Scopolamine patch  . Plan Factors-    Chart reviewed. Existing labs reviewed. Patient summary reviewed. Obstructive sleep apnea risk education given perioperatively. Induction- intravenous. Postoperative Plan- Plan for postoperative opioid use. Informed Consent- Anesthetic plan and risks discussed with patient. Asthma exacerbation

## 2023-07-28 NOTE — DISCHARGE INSTR - AVS FIRST PAGE
HOME RN TO REMOVE PICC AFTER FINAL DOSE OF IV ANTIBIOTIC ON 9/17/2023. Weekly CBCD and creatinine while on IV antibiotic  You will be set up with an appointment in the outpatient Infectious Disease office. It is important for you to keep this appointment in order for us to monitor you while you are on IV antibiotics. This will include evaluating your lab work, examining your PICC line, and making sure you are not having toxicities or side effects of the medication(s) you are receiving.   ------------------------------------------------------------------------------------------------------------                     Dr. Yasmani Fletcher    What to Expect/Activity  It is normal to have some discomfort in your knee for several days to weeks. You are weight bearing as tolerated to your operative leg with assist devices. Please use crutches/walker when ambulating until your follow-up  Swelling and discomfort in the knee is normal for several days after surgery. For the first 2-3 days, use ice around the knee to help. Use for 20-30 minutes every 1-2 hours for 48 hours, while awake. You may continue beyond 48 hours as needed. Place one or two pillows underneath your calf, not your knee, to reduce swelling. Physical therapy on your own at home should start as soon as possible (see below). Please perform heel slides and extension exercises on your own as well (see diagram). Please use incentive spirometer 10 times per hour while awake (see diagram). Dressing/Wound Care/Bathing  You may remove your toe-to-groin dressing 24 hours after surgery. There will be a surgical dressing over your incision that stays in place for 7 days after surgery. You may start showering 24 hours after surgery, the surgical dressing will remain in place. Please pat the dressing dry. If you notice the dressing appears saturated or is starting to come off, please replace with dry dressing.   You can keep the dressing in place until follow-up in the office. Do not place any creams, ointments or gels on or around the incision. No baths, swimming or submerging until cleared by Dr. Srinivas Ledezma may resume your usual medications. Please take the following medications:  Anti-coagulation (blood clot prevention) - aspirin 81mg twice daily for 4 weeks  Pain medication:  Narcotic: Take as directed  NSAID/Anti-inflammatory: Take as directed  Tylenol 1000mg every 8 hours  Zofran (ondasetron) - 4mg every 8 hours as needed for nausea  Stool softeners (senna/colace) - take daily to prevent constipation as narcotic pain medication causes constipation  Antibiotic - take as directed if prescribed   If you have questions or pain concerns, please contact the office. Pain medication cannot remove all post-operative pain. Follow up/Call if:  The findings of your surgery will be explained to you and your family immediately after surgery. However, in the post-operative period, during recovery from anesthesia you may not fully remember or fully understand what was said. This will be again gone over when you return for your post-op appointment.   Please contact Dr. Jaja Slade office if you experience the following:  Excessive bleeding (bleeding through your dressing)  Fever greater than 101 degrees F after 48 hours (low grade fevers the day or two after surgery are normal)  Persistent nausea or vomiting  Decreased sensation or discoloration of the operative limb  Pain or swelling that is getting worse and not better with medication    Dr. Lev Cat: [phone]

## 2023-07-28 NOTE — OP NOTE
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]    Procedure(s):  Right - ARTHROPLASTY KNEE TOTAL REVISION  Right - INCISION AND DRAINAGE DOWN TO AND INCLUDING BONE  Right - INSERTION OF BIODEGRADABLE DRUG DELIVERY DEVICE (STIMULAN)    Specimens:  ID Type Source Tests Collected by Time Destination   A : right knee tissue culture #1 Tissue Joint, Right Knee ANAEROBIC CULTURE AND GRAM STAIN, FUNGAL CULTURE, CULTURE, TISSUE AND GRAM STAIN Padma Princer, DO 2023 1344    B : right knee tissue culture #2 Tissue Joint, Right Knee ANAEROBIC CULTURE AND GRAM STAIN, FUNGAL CULTURE, CULTURE, TISSUE AND GRAM STAIN Padma Princer, DO 2023 1344    C : right knee tissue culture #3 Tissue Joint, Right Knee ANAEROBIC CULTURE AND GRAM STAIN, FUNGAL CULTURE, CULTURE, TISSUE AND GRAM STAIN Padma Princer, DO 2023 1344    D : right knee tissue culture #4 Tissue Joint, Right Knee ANAEROBIC CULTURE AND GRAM STAIN, FUNGAL CULTURE, CULTURE, TISSUE AND GRAM STAIN Padma Princer, DO 2023 1347    E : right knee tissue culture #5 Tissue Joint, Right Knee ANAEROBIC CULTURE AND GRAM STAIN, FUNGAL CULTURE, CULTURE, TISSUE AND GRAM STAIN Padma Princer, DO 2023 1407        Estimated Blood Loss:   50 mL    Drains:  Closed/Suction Drain Right Knee Accordion 10 Fr.  (Active)   Drainage Appearance Bloody 23 1602   Status Accordion suction 23 2209   Output (mL) 30 mL 07/28/23 1702   Number of days: 0       [REMOVED] Urethral Catheter Non-latex 16 Fr. 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 Discussed that treatment of this infection requires a revision with an articulating antibiotic spacer vs static spacer with PICC line for antibiotics. Discussed that due to his arthrofibrosis and history of quad rupture in the past he is at significant risk of need for static spacer and extensor mechanism disruption. Risks and benefits discussed to include but not limited to bleeding, infection, damage to surrounding structures, extensor mechanism disruption, fracture, instability, continued infection, wound complications, continued pain, continued arthrofibrosis, need for further surgery, blood clots, stroke, heart attack and death. Patient was excepting of risks and proceeded to the operating room. Operative Findings:  Gross purulence   Sinus tract through defect in quadriceps tendon to the anterior knee  Well-fixed femoral and tibial components   Stretch quadriceps repair with ethibond suture along entire extensor mechanism   Pre-op ROM 50-75  Post-op ROM 0-120  Attenuation of MCL  Bone loss distal lateral femoral condyle   Thin patellar bone stock     Complications:   None    Knee Technique: Suture (direct) Repair  Knee Approach: Medial Parapatellar    Procedure and Technique:  Patient seen and examined in the preoperative holding area. Consent was confirmed for left knee I&D with explant and static versus articulating antibiotic spacer for chronic right knee periprosthetic joint infection.   All questions were answered. A consent for PICC line placement was also obtained. Patient was taken the operating room and general anesthesia was performed. Patient was placed supine on the operating room table and all bony prominences were well-padded. Patient was given Ancef as preoperative antibiotics as his Synovasure was positive for MSSA. A formal timeout was performed identifying the patient and correct surgical site. Patient had a impending open sinus tract over the anterior knee and his psoriatic plaques have much improved. The right lower extremity was exsanguinated with gravity and the pneumatic tourniquet was inflated to 250 mmHg. Patient's anterior knee incision was sharply incised ellipsing out his skin that was overlying his sinus tract. Upon incising the knee a large purulent collection was decompressed. There was a sinus tract through his quadriceps tendon down into his knee joint. At this time we created medial and lateral skin flaps above his extensor mechanism. We next performed a medial parapatellar arthrotomy being careful to avoid the patellar tendon through his previous medial parapatellar where all of his Ethibond suture was present. We next performed a medial peel along the proximal tibia. We released scar above his extensor mechanism insertion at his tubercle around the lateral aspect of the knee. We resected scar from his extensor mechanism to create his gutters and also debride the knee down to and including bone. We sent 5 soft tissue samples for culture from around the knee. At this time we assessed his components. He has an [facility] revision total knee arthroplasty in place. The femoral and tibial components were well fixed with press-fit stems. We first removed the locking screw for the polyethylene mechanism. We then removed the polyethylene. We next used osteotomes around the femoral component to loosen cement implant interface.   We next used the [facility] set with tamp to explant the femoral component with minimal bone loss. There were medial and lateral distal femoral augments on his femoral component. We next exposed his tibia carefully releasing scar around laterally. We used an oscillating saw to loosen the implant cement interface of the tibia. We next back slapped the tibial component with stem out of the tibia with minimal bone loss. At this time we exposed the patellar component and using oscillating saw to remove the polyethylene component and the patella and debrided the bone surface. There was decreased bone stock of the patella. At this time we continued with an extensive debridement of the knee down to including bone. We removed all previous cement from his bone interface. We reamed up and down the canals to debride all devitalized, infected tissues. We irrigated the knee with 9 L of normal saline solution and a combination of bactisure and Irrisept. Once the knee was extensively debrided and all devitalized, infected tissue was removed we started trialing the knee for antibiotic spacer. We found a 6 narrow DePuy attune femoral component to be appropriate along with a size 6 10 mm tibial component. We took the knee through range of motion with adequate stability. At this time the Stimulan beads were hand crafted with 10 cc of Stimulan and 1 g of vancomycin with 600 mg of tobramycin. These beads were packed half into the femoral canal and half into the tibial canal.    At this time we hand-crafted antibiotic cement with 3 bags of 40 g Simplex plain cement, 6 g of vancomycin and 3 g of tobramycin. The size 6 attune 10 mm all poly tibial component was cemented into place. Peripheral cement was removed. Next the size 6 narrow attune femur was cemented into place and peripheral cement was removed. The knee was held in extension until the cement was cured. The knee was again irrigated with Irrisept followed by 3 L of normal saline solution.   The knee was next taken through range of motion and found to have adequate stability. A 10 Malagasy drain was placed deep to the capsule. The extensor mechanism was closed with interrupted #1 suture followed by #1 strata fix. The subcutaneous tissues were closed with interrupted 2-0 sutures. The skin was closed with 2-0 nylon suture in horizontal mattress fashion. Sponge and instrument counts were correct x2. A sterile Mepilex was placed. A thigh to foot Ace was placed followed by knee immobilizer for anterior soft tissue protection due to sinus tract excision. Patient was awoken from anesthesia and taken the recovery room in stable condition. I was present for the entire procedure., A qualified resident physician was not available. and A physician assistant was required during the procedure for retraction, tissue handling, dissection and suturing.     Patient Disposition:  PACU      48M s/p R knee I&D, explant, articulating antibiotic spacer for R knee PJI 7/28  - pain control  - ancef q 8 hrs  - DVT ppx: aspirin 81 mg BID x 4 weeks  - PT/OT  - WBAT, KI at all times  - maintain HV drain   - ID consult  - f/u I/O cultures, synovasure + MSSA  - PICC line placement pending   - f/u labs   - plan for KI x 2 weeks for soft tissue protection 2/2 sinus tract excision anterior knee  - f/u 10-14 days         SIGNATURE: Del Moreno DO  DATE: July 28, 2023  TIME: 7:17 PM

## 2023-07-29 LAB
ANION GAP SERPL CALCULATED.3IONS-SCNC: 8 MMOL/L
BUN SERPL-MCNC: 9 MG/DL (ref 5–25)
CALCIUM SERPL-MCNC: 9 MG/DL (ref 8.4–10.2)
CHLORIDE SERPL-SCNC: 99 MMOL/L (ref 96–108)
CO2 SERPL-SCNC: 27 MMOL/L (ref 21–32)
CREAT SERPL-MCNC: 0.68 MG/DL (ref 0.6–1.3)
DME PARACHUTE DELIVERY DATE REQUESTED: NORMAL
DME PARACHUTE DELIVERY NOTE: NORMAL
DME PARACHUTE ITEM DESCRIPTION: NORMAL
DME PARACHUTE ORDER STATUS: NORMAL
DME PARACHUTE SUPPLIER NAME: NORMAL
DME PARACHUTE SUPPLIER PHONE: NORMAL
ERYTHROCYTE [DISTWIDTH] IN BLOOD BY AUTOMATED COUNT: 13.9 % (ref 11.6–15.1)
GFR SERPL CREATININE-BSD FRML MDRD: 111 ML/MIN/1.73SQ M
GLUCOSE SERPL-MCNC: 106 MG/DL (ref 65–140)
HCT VFR BLD AUTO: 28.7 % (ref 36.5–49.3)
HGB BLD-MCNC: 8.9 G/DL (ref 12–17)
MCH RBC QN AUTO: 29.5 PG (ref 26.8–34.3)
MCHC RBC AUTO-ENTMCNC: 31 G/DL (ref 31.4–37.4)
MCV RBC AUTO: 95 FL (ref 82–98)
PLATELET # BLD AUTO: 437 THOUSANDS/UL (ref 149–390)
PMV BLD AUTO: 8.9 FL (ref 8.9–12.7)
POTASSIUM SERPL-SCNC: 3.6 MMOL/L (ref 3.5–5.3)
RBC # BLD AUTO: 3.02 MILLION/UL (ref 3.88–5.62)
SODIUM SERPL-SCNC: 134 MMOL/L (ref 135–147)
WBC # BLD AUTO: 8.01 THOUSAND/UL (ref 4.31–10.16)

## 2023-07-29 PROCEDURE — 99222 1ST HOSP IP/OBS MODERATE 55: CPT | Performed by: INTERNAL MEDICINE

## 2023-07-29 PROCEDURE — 80048 BASIC METABOLIC PNL TOTAL CA: CPT | Performed by: PHYSICIAN ASSISTANT

## 2023-07-29 PROCEDURE — 97110 THERAPEUTIC EXERCISES: CPT

## 2023-07-29 PROCEDURE — 99223 1ST HOSP IP/OBS HIGH 75: CPT | Performed by: INTERNAL MEDICINE

## 2023-07-29 PROCEDURE — 97163 PT EVAL HIGH COMPLEX 45 MIN: CPT

## 2023-07-29 PROCEDURE — 97530 THERAPEUTIC ACTIVITIES: CPT

## 2023-07-29 PROCEDURE — 85027 COMPLETE CBC AUTOMATED: CPT | Performed by: PHYSICIAN ASSISTANT

## 2023-07-29 PROCEDURE — 97166 OT EVAL MOD COMPLEX 45 MIN: CPT

## 2023-07-29 PROCEDURE — 97535 SELF CARE MNGMENT TRAINING: CPT

## 2023-07-29 PROCEDURE — 99024 POSTOP FOLLOW-UP VISIT: CPT

## 2023-07-29 RX ORDER — METHOCARBAMOL 500 MG/1
500 TABLET, FILM COATED ORAL EVERY 6 HOURS PRN
Status: DISCONTINUED | OUTPATIENT
Start: 2023-07-29 | End: 2023-08-03 | Stop reason: HOSPADM

## 2023-07-29 RX ORDER — NORTRIPTYLINE HYDROCHLORIDE 25 MG/1
100 CAPSULE ORAL
Status: DISCONTINUED | OUTPATIENT
Start: 2023-07-29 | End: 2023-08-03 | Stop reason: HOSPADM

## 2023-07-29 RX ADMIN — OXYCODONE HYDROCHLORIDE AND ACETAMINOPHEN 500 MG: 500 TABLET ORAL at 17:13

## 2023-07-29 RX ADMIN — MORPHINE SULFATE 2 MG: 2 INJECTION, SOLUTION INTRAMUSCULAR; INTRAVENOUS at 05:14

## 2023-07-29 RX ADMIN — ACETAMINOPHEN 325MG 975 MG: 325 TABLET ORAL at 13:06

## 2023-07-29 RX ADMIN — ACETAMINOPHEN 325MG 975 MG: 325 TABLET ORAL at 21:05

## 2023-07-29 RX ADMIN — NORTRIPTYLINE HYDROCHLORIDE 100 MG: 25 CAPSULE ORAL at 21:04

## 2023-07-29 RX ADMIN — FOLIC ACID 1 MG: 1 TABLET ORAL at 08:10

## 2023-07-29 RX ADMIN — MULTIPLE VITAMINS W/ MINERALS TAB 1 TABLET: TAB ORAL at 08:10

## 2023-07-29 RX ADMIN — MORPHINE SULFATE 2 MG: 2 INJECTION, SOLUTION INTRAMUSCULAR; INTRAVENOUS at 14:37

## 2023-07-29 RX ADMIN — OXYCODONE HYDROCHLORIDE 10 MG: 10 TABLET ORAL at 21:41

## 2023-07-29 RX ADMIN — PANTOPRAZOLE SODIUM 40 MG: 40 TABLET, DELAYED RELEASE ORAL at 06:14

## 2023-07-29 RX ADMIN — OXYCODONE HYDROCHLORIDE 10 MG: 10 TABLET ORAL at 12:58

## 2023-07-29 RX ADMIN — BUSPIRONE HYDROCHLORIDE 7.5 MG: 5 TABLET ORAL at 17:13

## 2023-07-29 RX ADMIN — DOCUSATE SODIUM 100 MG: 100 CAPSULE, LIQUID FILLED ORAL at 17:13

## 2023-07-29 RX ADMIN — OXYCODONE HYDROCHLORIDE 10 MG: 10 TABLET ORAL at 03:18

## 2023-07-29 RX ADMIN — DOCUSATE SODIUM 100 MG: 100 CAPSULE, LIQUID FILLED ORAL at 08:10

## 2023-07-29 RX ADMIN — GABAPENTIN 300 MG: 300 CAPSULE ORAL at 21:05

## 2023-07-29 RX ADMIN — METHOCARBAMOL 500 MG: 500 TABLET ORAL at 22:47

## 2023-07-29 RX ADMIN — OXYCODONE HYDROCHLORIDE 10 MG: 10 TABLET ORAL at 08:18

## 2023-07-29 RX ADMIN — VENLAFAXINE HYDROCHLORIDE 150 MG: 150 CAPSULE, EXTENDED RELEASE ORAL at 21:06

## 2023-07-29 RX ADMIN — OXYCODONE HYDROCHLORIDE 10 MG: 10 TABLET ORAL at 17:13

## 2023-07-29 RX ADMIN — SENNOSIDES 8.6 MG: 8.6 TABLET, FILM COATED ORAL at 08:10

## 2023-07-29 RX ADMIN — CEFAZOLIN SODIUM 2000 MG: 2 SOLUTION INTRAVENOUS at 21:10

## 2023-07-29 RX ADMIN — CEFAZOLIN SODIUM 2000 MG: 2 SOLUTION INTRAVENOUS at 05:10

## 2023-07-29 RX ADMIN — MORPHINE SULFATE 2 MG: 2 INJECTION, SOLUTION INTRAMUSCULAR; INTRAVENOUS at 11:42

## 2023-07-29 RX ADMIN — MORPHINE SULFATE 2 MG: 2 INJECTION, SOLUTION INTRAMUSCULAR; INTRAVENOUS at 19:52

## 2023-07-29 RX ADMIN — ASPIRIN 81 MG: 81 TABLET, COATED ORAL at 08:10

## 2023-07-29 RX ADMIN — OXYCODONE HYDROCHLORIDE AND ACETAMINOPHEN 500 MG: 500 TABLET ORAL at 08:10

## 2023-07-29 RX ADMIN — CEFAZOLIN SODIUM 2000 MG: 2 SOLUTION INTRAVENOUS at 12:58

## 2023-07-29 RX ADMIN — ASPIRIN 81 MG: 81 TABLET, COATED ORAL at 17:13

## 2023-07-29 RX ADMIN — ACETAMINOPHEN 325MG 975 MG: 325 TABLET ORAL at 05:13

## 2023-07-29 RX ADMIN — BUSPIRONE HYDROCHLORIDE 7.5 MG: 5 TABLET ORAL at 08:10

## 2023-07-29 RX ADMIN — METOPROLOL SUCCINATE 100 MG: 50 TABLET, EXTENDED RELEASE ORAL at 08:10

## 2023-07-29 NOTE — PLAN OF CARE
Problem: MOBILITY - ADULT  Goal: Maintain or return to baseline ADL function  Description: INTERVENTIONS:  -  Assess patient's ability to carry out ADLs; assess patient's baseline for ADL function and identify physical deficits which impact ability to perform ADLs (bathing, care of mouth/teeth, toileting, grooming, dressing, etc.)  - Assess/evaluate cause of self-care deficits   - Assess range of motion  - Assess patient's mobility; develop plan if impaired  - Assess patient's need for assistive devices and provide as appropriate  - Encourage maximum independence but intervene and supervise when necessary  - Involve family in performance of ADLs  - Assess for home care needs following discharge   - Consider OT consult to assist with ADL evaluation and planning for discharge  - Provide patient education as appropriate  Outcome: Progressing  Goal: Maintains/Returns to pre admission functional level  Description: INTERVENTIONS:  - Perform BMAT or MOVE assessment daily.   - Set and communicate daily mobility goal to care team and patient/family/caregiver. - Collaborate with rehabilitation services on mobility goals if consulted  - Perform Range of Motion 3 times a day. - Reposition patient every 2 hours.   - Dangle patient 3 times a day  - Stand patient 3 times a day  - Ambulate patient 3 times a day  - Out of bed to chair 3 times a day   - Out of bed for meals 3 times a day  - Out of bed for toileting  - Record patient progress and toleration of activity level   Outcome: Progressing     Problem: PAIN - ADULT  Goal: Verbalizes/displays adequate comfort level or baseline comfort level  Description: Interventions:  - Encourage patient to monitor pain and request assistance  - Assess pain using appropriate pain scale  - Administer analgesics based on type and severity of pain and evaluate response  - Implement non-pharmacological measures as appropriate and evaluate response  - Consider cultural and social influences on pain and pain management  - Notify physician/advanced practitioner if interventions unsuccessful or patient reports new pain  Outcome: Progressing     Problem: SAFETY ADULT  Goal: Maintain or return to baseline ADL function  Description: INTERVENTIONS:  -  Assess patient's ability to carry out ADLs; assess patient's baseline for ADL function and identify physical deficits which impact ability to perform ADLs (bathing, care of mouth/teeth, toileting, grooming, dressing, etc.)  - Assess/evaluate cause of self-care deficits   - Assess range of motion  - Assess patient's mobility; develop plan if impaired  - Assess patient's need for assistive devices and provide as appropriate  - Encourage maximum independence but intervene and supervise when necessary  - Involve family in performance of ADLs  - Assess for home care needs following discharge   - Consider OT consult to assist with ADL evaluation and planning for discharge  - Provide patient education as appropriate  Outcome: Progressing  Goal: Maintains/Returns to pre admission functional level  Description: INTERVENTIONS:  - Perform BMAT or MOVE assessment daily.   - Set and communicate daily mobility goal to care team and patient/family/caregiver. - Collaborate with rehabilitation services on mobility goals if consulted  - Perform Range of Motion 3 times a day. - Reposition patient every 2 hours.   - Dangle patient 3 times a day  - Stand patient 3 times a day  - Ambulate patient 3 times a day  - Out of bed to chair 3 times a day   - Out of bed for meals 3 times a day  - Out of bed for toileting  - Record patient progress and toleration of activity level   Outcome: Progressing  Goal: Patient will remain free of falls  Description: INTERVENTIONS:  - Educate patient/family on patient safety including physical limitations  - Instruct patient to call for assistance with activity   - Consult OT/PT to assist with strengthening/mobility   - Keep Call bell within reach  - Keep bed low and locked with side rails adjusted as appropriate  - Keep care items and personal belongings within reach  - Initiate and maintain comfort rounds  - Make Fall Risk Sign visible to staff  - Offer Toileting every 2 Hours, in advance of need  - Initiate/Maintain bed alarm  - Obtain necessary fall risk management equipment  - Apply yellow socks and bracelet for high fall risk patients  - Consider moving patient to room near nurses station  Outcome: Progressing     Problem: DISCHARGE PLANNING  Goal: Discharge to home or other facility with appropriate resources  Description: INTERVENTIONS:  - Identify barriers to discharge w/patient and caregiver  - Arrange for needed discharge resources and transportation as appropriate  - Identify discharge learning needs (meds, wound care, etc.)  - Arrange for interpretive services to assist at discharge as needed  - Refer to Case Management Department for coordinating discharge planning if the patient needs post-hospital services based on physician/advanced practitioner order or complex needs related to functional status, cognitive ability, or social support system  Outcome: Progressing     Problem: Knowledge Deficit  Goal: Patient/family/caregiver demonstrates understanding of disease process, treatment plan, medications, and discharge instructions  Description: Complete learning assessment and assess knowledge base.   Interventions:  - Provide teaching at level of understanding  - Provide teaching via preferred learning methods  Outcome: Progressing

## 2023-07-29 NOTE — PLAN OF CARE
Problem: PHYSICAL THERAPY ADULT  Goal: Performs mobility at highest level of function for planned discharge setting. See evaluation for individualized goals. Description: Treatment/Interventions: Functional transfer training, Elevations, Therapeutic exercise, Endurance training, Patient/family training, Equipment eval/education, Bed mobility, Gait training, Compensatory technique education, Continued evaluation, Spoke to nursing  Equipment Recommended:  (has DME)       See flowsheet documentation for full assessment, interventions and recommendations. Outcome: Progressing  Note: Prognosis: Good  Problem List: Decreased strength, Decreased range of motion, Decreased endurance, Impaired balance, Decreased mobility, Decreased skin integrity, Orthopedic restrictions, Pain  Assessment: Errol Fish is a 47 y/o male with hx of psoriasis, HTN, Anxiety, depression, RA, migraines, R TKR 1998 followed by revision in 2016. Hx of fall with quadriceps tear requiring surgical repair and arthofibrosis following his TKR revision. 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 PT consulted. WBAT and in post operative immobilizer per Dr. Jana Langley. Up with assist orders. Prior to admission resides in 2 story home with sister and brother in law, however has main level accommodations with 10 PAVREEN. Last 1.5 months ambulating with crutches related to ongoing R knee pain. Hx of fall initiating symptoms 1.5 months ago with decline in knee function as well as ambulation/mobility. Per Dr Jana Langley ROM limited to 50-70* ROM range prior to surgery. Prior to 1.5 months was independent without AD use.   Currently presents with functional limitations related to post operative course and R knee ROM limitations, maintenance of KI, post operative pain, decreased RLE strength, joint integrity, decreased activity tolerance, balance, functional mobility, posture, and locomotion requiring RW support. Min A for bed mobility. S for transfers and ambulation with RW support. Cues for sequencing and to improve gait quality. Cues for foot flat/improved heel strike. Gait slowed, antalgic, step to pattern. Limited tolerance to distances. BP supine 139/95 and /79. No dizziness reported throughout. Given impairments will benefit from skilled PT in order to progress and optimize functional outcomes, facilitating return to independence. The patient's AM-PAC Basic Mobility Inpatient Short Form Raw Score is 21. A Raw score of greater than 16 suggests the patient may benefit from discharge to home. Please also refer to the recommendation of the Physical Therapist for safe discharge planning. Anticipate ability to return home with family support and OPPT as dictated by ortho. PT will follow and progress with POC QD-BID prn in order to facilitate independence with mobility. See progress note following evaluation. Barriers to Discharge: Inaccessible home environment  Barriers to Discharge Comments: 10 PARVEEN. Family works  PT Discharge Recommendation: Home with outpatient rehabilitation (as cleared by ortho for progression of OPPT.)    See flowsheet documentation for full assessment.

## 2023-07-29 NOTE — CONSULTS
Consultation - Infectious Disease   Preston Brown 46 y.o. male MRN: [id number]  Unit/Bed#: E2 -01 Encounter: [id number]    IMPRESSION & RECOMMENDATIONS:   1. R TKR infection. Patient had aspiration in outpatient orthopedic surgery office which showed MSSA on Synovasure. Unclear if bacteria was introduced to knee during quadriceps repair, if it entered through skin breakdown from his plaque psoriasis, or from a wound on the R knee in 5/2023 after a fall. His infection is complicated by patient being on chronic methotrexate. Patient is now s/p R TKR arthroplasty, I&D down to and including bone, and insertion of biodegradable drug delivery device (stimulan). Gross purulence was noted and sinus tract was noted in deft in quadriceps tendon to the anterior knee. Multiple operative cultures were collected. The patient has been started on IV Cefazolin which he is tolerating without difficulty. I will continue this antibiotic for now while we await updated OR cultures. Anticipate patient will require PICC placement and 6-weeks of IV antibiotic.   -continue IV Cefazolin  -check CBCD and BMP tomorrow  -follow up operative cultures  -monitor vitals  -serial R knee exams  -local surgical site care per orthopedic surgery  -continue follow up with orthopedic surgery    2. Falls. Patient with fall after his R TKA revision which resulted in a quadriceps tear and required surgical repair. He additionally fell in 5/2023 which resulted in a wound to his knee. Traumatic injury could be source of his bacterial infection above. 3. Rheumatoid arthritis and plaque psoriasis. Patient on chronic methotrexate. This is risk factor for complications with healing and infection. SLIM consult pending for medical management.  -follow up SLIM consult     We will continue to follow along with the patient. Above plan was discussed in detail with patient at he bedside. Above plan was discussed in detail with SLIM.     I have spent 80 minutes with patient, other providers, and in review of records today in completing this consultation. Total time spent included review of testing, ordering medications and tests, communicating with other providers, documentation time, and counseling/providing education to patient as detailed in my note. 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 year old male who presented to the [address] [place] [address] on 7/28/2023 for scheduled surgery with orthopedic surgeon, Dr. Brian Alaniz. The patient had his initial TKA in 1998 and a revision in 2016. After his revision he had a fall and tore his quadricepts which required surgical repair. Patient has experienced acute worsening pain and limited ROM. He was assessed outpatient by orthopedic surgery and was found to have an effusion. Fluid was aspirated and synovasure was positive with panel suggesting staph aureus infection. Patient's situation is complicated by the fact that he has RA and psoriasis with active plaques and is on chronic methotrexate. Patient underwent R TKR arthroplasty, I&D down to and including bone, and insertion of biodegradable drug delivery device (stimulan). Gross purulence was noted and sinus tract was noted in deft in quadriceps tendon to the anterior knee. Multiple operative cultures were collected. He was started on Cefazolin. An internal medicine consult is pending. 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 He has a history of difficult intubation, fall, R hand injury, and R TKR. He is a former smoker. He uses chewing tobacco. He has no known drug allergies. REVIEW OF SYSTEMS:  Patient reports he's doing pretty good this morning. He reports that he definitely has pain in the R knee but he's already been up walking around with his walker, feels it is overall manageable. He denies fever, chills, sweats, shakes, nausea, vomiting, abdominal pain, diarrhea, headaches, dizziness, neck stiffness, cough, difficulty breathing, chest pain, and SOB. A complete 12 point system-based review of systems is otherwise negative. PAST MEDICAL HISTORY:  Past Medical History:   Diagnosis Date   • Anxiety    • Crutches as ambulation aid 2023   • Depression    • Difficult intubation 2023   • Hypertension    • Limited jaw ROM    • Migraine    • PONV (postoperative nausea and vomiting)    • PONV (postoperative nausea and vomiting) 2023   • Psoriasis    • RA (rheumatoid arthritis) ([facility])      Past Surgical History:   Procedure Laterality Date   • REPLACEMENT TOTAL KNEE Right     x2     FAMILY HISTORY:  Non-contributory    SOCIAL HISTORY:  Social History   Social History     Substance and Sexual Activity   Alcohol Use Yes    Comment: rarely     Social History     Substance and Sexual Activity   Drug Use Not Currently     Social History     Tobacco Use   Smoking Status Former   • Packs/day: 1.00   • Years: 10.00   • Total pack years: 10.00   • Types: Cigarettes   • Quit date:    • Years since quittin.5   Smokeless Tobacco Current   • Types: Chew   Tobacco Comments    Last chewed tobacco 23     ALLERGIES:  No Known Allergies    MEDICATIONS:  All current active medications have been reviewed.     ANTIBIOTICS:  Cefazolin 2  Antibiotic 2    PHYSICAL EXAM:  Temp:  [97 °F (36.1 °C)-98.9 °F (37.2 °C)] 98 °F (36.7 °C)  HR:  [77-97] 87  Resp:  [13-22] 18  BP: (118-142)/(73-97) 118/73  SpO2:  [92 %-98 %] 94 %  Temp (24hrs), Av °F (36.7 °C), Min:97 °F (36.1 °C), Max:98.9 °F (37.2 °C)  Current: Temperature: 98 °F (36.7 °C)    Intake/Output Summary (Last 24 hours) at 7/29/2023 0844  Last data filed at 7/29/2023 0620  Gross per 24 hour   Intake 2100 ml   Output 1290 ml   Net 810 ml     General Appearance:  Appearing well, nontoxic, and in no distress. He appears comfortable sitting up in bed. Head:  Normocephalic, without obvious abnormality, atraumatic. Eyes:  Conjunctiva pink and sclera anicteric, both eyes. Nose: Nares normal, mucosa normal, no drainage. Throat: Oropharynx moist without lesions. Back:   ROM normal, no CVA tenderness. Lungs:   Clear to auscultation bilaterally, respirations unlabored on room air. Chest Wall:  No tenderness or deformity. Heart:  RRR; no murmur, rub or gallop. Abdomen:   Soft, non-tender, non-distended, positive bowel sounds throughout. Extremities: R leg heavily dressed with overlying ace wrap and immobilizer, no breakthrough drainage or bleeding noted. Hemovac drain emerging intact to canister suction, output is dark sanguinous. Skin: No rashes or lesions noted on exposed skin. Lymph nodes: Cervical, supraclavicular nodes normal.   Neurologic: Alert and oriented times 3, follows commands, speech is organized and appropriate, symmetric facial movement. LABS, IMAGING, & OTHER STUDIES:  Lab Results:  I have personally reviewed pertinent labs. Results from last 7 days   Lab Units 07/29/23  0506   WBC Thousand/uL 8.01   HEMOGLOBIN g/dL 8.9*   PLATELETS Thousands/uL 437*     Results from last 7 days   Lab Units 07/29/23  0506   POTASSIUM mmol/L 3.6   CHLORIDE mmol/L 99   CO2 mmol/L 27   BUN mg/dL 9   CREATININE mg/dL 0.68   EGFR ml/min/1.73sq m 111   CALCIUM mg/dL 9.0     Results from last 7 days   Lab Units 07/28/23  1407 07/28/23  1347 07/28/23  1344   GRAM STAIN RESULT  Rare Polys  No bacteria seen 3+ Polys  No bacteria seen Rare Polys  No bacteria seen  No Polys or Bacteria seen  Rare Polys  No bacteria seen     Imaging Studies:   XR KNEE RIGHT 1 OR 2 VIEWS 7/28/2023 - results are pending.     Other Studies: Multiple operative cultures collected from R knee on 7/28/2023 are pending.

## 2023-07-29 NOTE — MALNUTRITION/BMI
This medical record reflects one or more clinical indicators suggestive of malnutrition and/or morbid obesity. Malnutrition Findings:      Adult Degree of Malnutrition: Malnutrition of mild degree  Malnutrition Characteristics: Inadequate energy, Weight loss                  360 Statement: PCM R/T knee pain AEB 7.6% unintended wt loss from 6/09/23 to present and < 75% energy intake compared to estimated energy needs for > 1 month. Treating with supplements. BMI Findings: Body mass index is 25.64 kg/m². See Nutrition note dated 07/29/2023 for additional details. Completed nutrition assessment is viewable in the nutrition documentation.

## 2023-07-29 NOTE — PHYSICAL THERAPY NOTE
PT EVALUATION 08:48-09:03  Treat: 09:03-09:17    46 y.o.    [id number]    Infection of total right knee replacement, initial encounter ([address]) [T84.53XA]  History of revision of total replacement of right knee joint [Z96.651]    Past Medical History:   Diagnosis Date    Anxiety     Crutches as ambulation aid 07/25/2023    Depression     Difficult intubation 7/28/2023    Hypertension     Limited jaw ROM     Migraine     PONV (postoperative nausea and vomiting)     PONV (postoperative nausea and vomiting) 7/28/2023    Psoriasis     RA (rheumatoid arthritis) ([address])          Past Surgical History:   Procedure Laterality Date    REPLACEMENT TOTAL KNEE Right     x2        07/29/23 0848   PT Last Visit   PT Visit Date 07/29/23   Note Type   Note type Evaluation  (and treat)   Pain Assessment   Pain Score 5   Pain Location/Orientation Orientation: Right;Location: Knee; Location: Leg   Restrictions/Precautions   Other Precautions Fall Risk;Pain;WBS  (knee immobilizer, hemovac drain)   Home Living   Type of Home House   Home Layout Two level;Bed/bath upstairs; Performs ADLs on one level; Able to live on main level with bedroom/bathroom;Stairs to enter with rails  (10 stgeps to main level living)   Bathroom Shower/Tub Tub/shower unit   Bathroom Toilet Raised   Bathroom Equipment Shower chair;Grab bars in shower;Commode  (to verify having shower chair and BSC)   [address] Walker;Crutches   Additional Comments resides in 2 story home with sister and brother in law. [facility] to bed and bath with 10 PARVEEN   Prior Function   Level of [place] Independent with ADLs; Independent with functional mobility   Lives With [facility] Help From Family  (both working)   IADLs Family/Friend/Other provides transportation; Independent with meal prep; Independent with medication management   Falls in the last 6 months 1 to 4  (1 which initiated all symptoms) Vocational Part time employment  (2-3 times per week)   Comments Last month and a half ambulating with crutches 2* problems with knee. Prior to that no use of AD. Unable to drive last month and a half. General   Additional Pertinent History Pt is 47 y/o male admitted for TKR revision 2* infection. PT consulted. WBAT. IN knee immobilzier post operatively. Up with assist orders. Family/Caregiver Present No   Cognition   Overall Cognitive Status WFL   Arousal/Participation Alert   Orientation Level Oriented X4   Following Commands Follows all commands and directions without difficulty   Comments Pleasant   Subjective   Subjective Agreeable to therapy. Pain 5/10. No worse wtih movement. RUE Assessment   RUE Assessment WFL   LUE Assessment   LUE Assessment WFL   RLE Assessment   RLE Assessment X  (in knee immobilzer. Unable to SLR)   Strength RLE   RLE Overall Strength   (grossly <3-/5)   LLE Assessment   LLE Assessment WFL   Strength LLE   LLE Overall Strength 4/5   Bed Mobility   Supine to Sit 4  Minimal assistance   Additional items Assist x 1; Increased time required;Verbal cues;LE management   Additional Comments Increased time to complete. Transfers   Sit to Stand 5  Supervision   Additional items Increased time required;Verbal cues   Stand to Sit 5  Supervision   Additional items Increased time required;Verbal cues   Additional Comments cues for hand and operative leg placement. Ambulation/Elevation   Gait pattern Improper Weight shift;Decreased foot clearance; Antalgic;Decreased L stance; Inconsistent kenna; Short stride; Excessively slow; Step to   Gait Assistance 5  Supervision   Additional items Verbal cues   Assistive Device Rolling walker  (+ RLE knee immobilzer)   Distance Amb with RW 30'x1. Gait slowed, step to pattern. Cues for foot flat/heel strike as tolerated.    Balance   Static Sitting Good   Dynamic Sitting Fair +   Static Standing Fair   Dynamic Standing [address] [place] [address] Endurance Deficit   Endurance Deficit Yes   Endurance Deficit Description fatigue, pain. BP supine 139/95, /79. Denies dizziness. Activity Tolerance   Activity Tolerance Patient limited by fatigue;Patient limited by pain; Patient tolerated treatment well   Medical Staff Made Aware NurseBronwyn   Nurse Made Aware yes   Assessment   Prognosis Good   Problem List Decreased strength;Decreased range of motion;Decreased endurance; Impaired balance;Decreased mobility; Decreased skin integrity;Orthopedic restrictions;Pain   Assessment [facility] Javon is a 45 y/o male with hx of psoriasis, HTN, Anxiety, depression, RA, migraines, R TKR 1998 followed by revision in 2016. Hx of fall with quadriceps tear requiring surgical repair and arthofibrosis following his TKR revision. 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 PT consulted. WBAT and in post operative immobilizer per Dr. Emma Morin. Up with assist orders. Prior to admission resides in 2 story home with sister and brother in law, however has main level accommodations with 10 PARVEEN. Last 1.5 months ambulating with crutches related to ongoing R knee pain. Hx of fall initiating symptoms 1.5 months ago with decline in knee function as well as ambulation/mobility. Per Dr Emma Morin ROM limited to 50-70* ROM range prior to surgery. Prior to 1.5 months was independent without AD use. Currently presents with functional limitations related to post operative course and R knee ROM limitations, maintenance of KI, post operative pain, decreased RLE strength, joint integrity, decreased activity tolerance, balance, functional mobility, posture, and locomotion requiring RW support. Min A for bed mobility. S for transfers and ambulation with RW support. Cues for sequencing and to improve gait quality. Cues for foot flat/improved heel strike. Gait slowed, antalgic, step to pattern. Limited tolerance to distances.   BP supine 139/95 and /79. No dizziness reported throughout. Given impairments will benefit from skilled PT in order to progress and optimize functional outcomes, facilitating return to independence. The patient's AM-PAC Basic Mobility Inpatient Short Form Raw Score is 21. A Raw score of greater than 16 suggests the patient may benefit from discharge to home. Please also refer to the recommendation of the Physical Therapist for safe discharge planning. Anticipate ability to return home with family support and OPPT as dictated by ortho. PT will follow and progress with POC QD-BID prn in order to facilitate independence with mobility. See progress note following evaluation. Barriers to Discharge Inaccessible home environment   Barriers to Discharge Comments 10 PARVEEN. Family works   Goals   Patient Goals get better   STG Expiration Date 08/07/23   Short Term Goal #1 10 days: 1). Pt will perform bed mobility with Charles demonstrating appropriate technique 100% of the time in order to improve function. 2)  Perform all transfers with Charles demonstrating safe and appropriate technique 100% of the time in order to improve ability to negotiate safely in home environment. 3) Amb with least restrictive AD > 150'x1 with mod I in order to demonstrate ability to negotiate in home environment. 4)  Improve overall strength and balance 1/2 grade in order to optimize ability to perform functional tasks and reduce fall risk. 5) Increase activity tolerance to 45 minutes in order to improve endurance to functional tasks. 6)  Negotiate stairs using most appropriate technique and Charles in order to be able to negotiate safely in home environment. 7) PT for ongoing patient and family/caregiver education, DME needs and d/c planning in order to promote highest level of function in least restrictive environment. PT Treatment Day 1   Plan   Treatment/Interventions Functional transfer training;Elevations; Therapeutic exercise; Endurance training;Patient/family training;Equipment eval/education; Bed mobility;Gait training; Compensatory technique education;Continued evaluation;Spoke to nursing   PT Frequency Other (Comment)  (QD-BID prn to facilitate acheivement of mobility goals.)   Recommendation   PT Discharge Recommendation Home with outpatient rehabilitation  (as cleared by ortho for progression of OPPT.)   Equipment Recommended   (has DME)   AM-PAC Basic Mobility Inpatient   Turning in Flat Bed Without Bedrails 3   Lying on Back to Sitting on Edge of Flat Bed Without Bedrails 3   Moving Bed to Chair 4   Standing Up From Chair Using Arms 4   Walk in Room 4   Climb 3-5 Stairs With Railing 3   Basic Mobility Inpatient Raw Score 21   Basic Mobility Standardized Score 45.55   Highest Level Of Mobility   JH-HLM Goal 6: Walk 10 steps or more   JH-HLM Achieved 7: Walk 25 feet or more   Additional Treatment Session   Start Time 0903   End Time 0917   Treatment Assessment Treatment following evaluation. Sit to supine with S, self assists operative leg using non operative leg technique demosntrated. Increased time to complete. Once supine instructed in and performed x 10 AP, QS, GS. A with elevation and application of ice following eval/treat. Continue mobility progress to faciitate independence for d/c to home with family support. Review of POC and mobiltiy goals to facilitate independence. Equipment Use RW   Additional Treatment Day 1   End of Consult   Patient Position at End of Consult Supine; All needs within reach   End of Consult Comments ice applied   Hx/personal factors: PARVEEN home environment, difficulty performing IADLs, fall risk , functional decline , increased reliance on DME , and Inability to perform current job functions , comorbidities    Examination:decreased strength , decreased LE ROM, joint integrity, decreased balance, decreased activity tolerance, gait deviations, increased pain, impaired posture, decreased skin integrity , and orthopedic restrictions. Clinical: unpredictable Ongoing medical status, Trending/abnormal lab values, Risk for falls, Pain management, Decreased activity tolerance compared to baseline, More restrictive AD use than baseline, and Decline from PLOF. Oleksandr Sands      Complexity: high             Lawrence Liner, PT

## 2023-07-29 NOTE — PLAN OF CARE
Problem: OCCUPATIONAL THERAPY ADULT  Goal: Performs self-care activities at highest level of function for planned discharge setting. See evaluation for individualized goals. Description: Treatment Interventions: ADL retraining, UE strengthening/ROM, Functional transfer training, Endurance training, Cognitive reorientation, Equipment evaluation/education, Compensatory technique education, Continued evaluation, Activityengagement, Energy conservation  Equipment Recommended: Bedside commode       See flowsheet documentation for full assessment, interventions and recommendations. Note: Limitation: Decreased ADL status, Decreased Safe judgement during ADL, Decreased UE strength, Decreased cognition, Decreased endurance, Decreased self-care trans, Decreased high-level ADLs  Prognosis: Good  Assessment: Patient is a 46y.o. 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 Patient with active OT orders and activity orders for Up with assistance. Personal factors affecting pt at time of IE include: difficulty performing ADLs and IADLs, difficulty with functional mobility/transfers. Prior to admission, was (I) with ADLs and was (I) with IADLs. Upon evaluation, patient’s functional status as follows: eating: Independent, grooming: Darrius, UB bathing: Darrius, LB bathing: Kamilla, UB dressing: Darrius, LB dressing: Kamilla, toileting: supervision ; functional transfers: Darrius and supervision , bed mobility: Darrius, functional mobility: supervision , sitting/standing tolerance: ~4 min with b/l UE support - due to the following deficits impacting occupational performance: weakness, decreased strength , decreased balance, decreased activity tolerance, limited functional reach, increased pain and orthopedic restrictions.  These impairments, as well at pt’s [place] home environment, steps within home environment, difficulty performing ADLs, difficulty performing IADLs, difficulty performing transfers/mobility, fall risk , functional decline , increased reliance on DME  and Inability to perform current job functions  limit pt’s ability to safely engage in all baseline areas of occupation. Patient would benefit from continued skilled OT therapy while in acute setting to address deficits as defined above and maximize (I) with ADLs and functional mobility. Occupational performance areas to address include: grooming, bathing/shower, toilet hygiene, dressing and health maintenance. Based on the aforementioned OT evaluation, functional performance deficits, and assessments, pt has been identified as a moderate complexity evaluation. From OT standpoint, recommend home with OP PT upon D/C. OT will continue to follow pt 2-3x/wk to address the following goals to  w/in 10-14 days.      OT Discharge Recommendation: Home with outpatient rehabilitation

## 2023-07-29 NOTE — CONSULTS
Consultation - Harman Marcum 46 y.o. male MRN: [id number]    Unit/Bed#: E2 -01 Encounter: [id number]      No chief complaint on file. Assessment/Plan     Right TKR   status post right TKA revision with articulating spacer for PJI postop care by orthopedics    Hypertension   controlled with metoprolol      Psoriatic arthritis  continue methotrexate weekly    Anxiety    controlled with BuSpar and Effexor      Acute postop anemia  hemoglobin 8.9 monitor with orthopedics    Patient Active Problem List   Diagnosis   • Anxiety   • High blood pressure   • Migraine   • Plaque psoriasis   • Status post revision of total replacement of right knee   • History of arthroplasty of right knee   • Difficult intubation   • PONV (postoperative nausea and vomiting)   • 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 HPI: Harman Marcum is a 46y.o. year old male who presents with history of previous right total knee replacement, possible infection, underwent revision of total knee replacement with right arthroplasty knee total revision, incision and drainage and insertion of biodegradable drug delivery system advised  Consults    Review of Systems   Constitutional: Negative. HENT: Negative. Eyes: Negative. Respiratory: Negative. Cardiovascular: Negative. Gastrointestinal: Negative. Endocrine: Negative. Genitourinary: Negative. Musculoskeletal: Positive for gait problem. Skin: Negative. Hematological: Negative. Psychiatric/Behavioral: Negative.         Historical Information   Past Medical History:   Diagnosis Date   • Anxiety    • Crutches as ambulation aid 07/25/2023   • Depression    • Difficult intubation 7/28/2023   • Hypertension    • Limited jaw ROM    • Migraine    • PONV (postoperative nausea and vomiting)    • PONV (postoperative nausea and vomiting) 7/28/2023   • Psoriasis    • RA (rheumatoid arthritis) ([address])      Past Surgical History: Procedure Laterality Date   • REPLACEMENT TOTAL KNEE Right     x2     Social History   Social History     Substance and Sexual Activity   Alcohol Use Yes    Comment: rarely     Social History     Substance and Sexual Activity   Drug Use Not Currently     Social History     Tobacco Use   Smoking Status Former   • Packs/day: 1.00   • Years: 10.00   • Total pack years: 10.00   • Types: Cigarettes   • Quit date:    • Years since quittin.5   Smokeless Tobacco Current   • Types: Chew   Tobacco Comments    Last chewed tobacco 23     Family History: non-contributory    Meds/Allergies   all current active meds have been reviewed  No Known Allergies    Objective   Vitals: Blood pressure 118/73, pulse 87, temperature 98 °F (36.7 °C), temperature source Temporal, resp. rate 18, height 5' 11" (1.803 m), weight 83.4 kg (183 lb 13.8 oz), SpO2 94 %. Intake/Output Summary (Last 24 hours) at 2023 1118  Last data filed at 2023 0137  Gross per 24 hour   Intake 2100 ml   Output 1290 ml   Net 810 ml     Invasive Devices     Peripheral Intravenous Line  Duration           Peripheral IV 23 Right Arm 1 day          Drain  Duration           Closed/Suction Drain Right Knee Accordion 10 Fr. <1 day                Physical Exam  Constitutional:       Appearance: Normal appearance. HENT:      Head: Normocephalic and atraumatic. Nose: Nose normal.      Mouth/Throat:      Mouth: Mucous membranes are moist.   Eyes:      Extraocular Movements: Extraocular movements intact. Cardiovascular:      Rate and Rhythm: Normal rate and regular rhythm. Pulmonary:      Effort: Pulmonary effort is normal.      Breath sounds: Normal breath sounds. Abdominal:      General: Abdomen is flat. Bowel sounds are normal.      Palpations: Abdomen is soft. Musculoskeletal:      Cervical back: Normal range of motion and neck supple. Skin:     General: Skin is warm. Neurological:      General: No focal deficit present. Mental Status: He is alert and oriented to person, place, and time. Psychiatric:         Mood and Affect: Mood normal.         Behavior: Behavior normal.         Lab Results: I have personally reviewed pertinent reports. Imaging Studies: I have personally reviewed pertinent reports. EKG, Pathology, and Other Studies: I have personally reviewed pertinent reports. VTE Prophylaxis: Sequential compression device (Venodyne)               Code Status: Level 1 - Full Code  Advance Directive and Living Will:      Power of :    POLST:      Counseling / Coordination of Care  Total floor / unit time spent today 30 minutes  minutes. Greater than 50% of total time was spent with the patient and / or family counseling and / or coordination of care.  A description of the counseling / coordination of care:

## 2023-07-29 NOTE — PLAN OF CARE
Problem: MOBILITY - ADULT  Goal: Maintain or return to baseline ADL function  Description: INTERVENTIONS:  -  Assess patient's ability to carry out ADLs; assess patient's baseline for ADL function and identify physical deficits which impact ability to perform ADLs (bathing, care of mouth/teeth, toileting, grooming, dressing, etc.)  - Assess/evaluate cause of self-care deficits   - Assess range of motion  - Assess patient's mobility; develop plan if impaired  - Assess patient's need for assistive devices and provide as appropriate  - Encourage maximum independence but intervene and supervise when necessary  - Involve family in performance of ADLs  - Assess for home care needs following discharge   - Consider OT consult to assist with ADL evaluation and planning for discharge  - Provide patient education as appropriate  Outcome: Progressing  Goal: Maintains/Returns to pre admission functional level  Description: INTERVENTIONS:  - Perform BMAT or MOVE assessment daily.   - Set and communicate daily mobility goal to care team and patient/family/caregiver. - Collaborate with rehabilitation services on mobility goals if consulted  - Perform Range of Motion 3 times a day. - Reposition patient every 2 hours.   - Dangle patient 3 times a day  - Stand patient 3 times a day  - Ambulate patient 3 times a day  - Out of bed to chair 3 times a day   - Out of bed for meals 3 times a day  - Out of bed for toileting  - Record patient progress and toleration of activity level   Outcome: Progressing     Problem: PAIN - ADULT  Goal: Verbalizes/displays adequate comfort level or baseline comfort level  Description: Interventions:  - Encourage patient to monitor pain and request assistance  - Assess pain using appropriate pain scale  - Administer analgesics based on type and severity of pain and evaluate response  - Implement non-pharmacological measures as appropriate and evaluate response  - Consider cultural and social influences on pain and pain management  - Notify physician/advanced practitioner if interventions unsuccessful or patient reports new pain  Outcome: Progressing     Problem: SAFETY ADULT  Goal: Maintain or return to baseline ADL function  Description: INTERVENTIONS:  -  Assess patient's ability to carry out ADLs; assess patient's baseline for ADL function and identify physical deficits which impact ability to perform ADLs (bathing, care of mouth/teeth, toileting, grooming, dressing, etc.)  - Assess/evaluate cause of self-care deficits   - Assess range of motion  - Assess patient's mobility; develop plan if impaired  - Assess patient's need for assistive devices and provide as appropriate  - Encourage maximum independence but intervene and supervise when necessary  - Involve family in performance of ADLs  - Assess for home care needs following discharge   - Consider OT consult to assist with ADL evaluation and planning for discharge  - Provide patient education as appropriate  Outcome: Progressing  Goal: Maintains/Returns to pre admission functional level  Description: INTERVENTIONS:  - Perform BMAT or MOVE assessment daily.   - Set and communicate daily mobility goal to care team and patient/family/caregiver. - Collaborate with rehabilitation services on mobility goals if consulted  - Perform Range of Motion 3 times a day. - Reposition patient every 2 hours.   - Dangle patient 3 times a day  - Stand patient 3 times a day  - Ambulate patient 3 times a day  - Out of bed to chair 3 times a day   - Out of bed for meals 3 times a day  - Out of bed for toileting  - Record patient progress and toleration of activity level   Outcome: Progressing  Goal: Patient will remain free of falls  Description: INTERVENTIONS:  - Educate patient/family on patient safety including physical limitations  - Instruct patient to call for assistance with activity   - Consult OT/PT to assist with strengthening/mobility   - Keep Call bell within reach  - Keep bed low and locked with side rails adjusted as appropriate  - Keep care items and personal belongings within reach  - Initiate and maintain comfort rounds  - Make Fall Risk Sign visible to staff  - Offer Toileting every 2 Hours, in advance of need  - Initiate/Maintain bed alarm  - Obtain necessary fall risk management equipment: walker, alarm on   - Apply yellow socks and bracelet for high fall risk patients  - Consider moving patient to room near nurses station  Outcome: Progressing     Problem: DISCHARGE PLANNING  Goal: Discharge to home or other facility with appropriate resources  Description: INTERVENTIONS:  - Identify barriers to discharge w/patient and caregiver  - Arrange for needed discharge resources and transportation as appropriate  - Identify discharge learning needs (meds, wound care, etc.)  - Arrange for interpretive services to assist at discharge as needed  - Refer to Case Management Department for coordinating discharge planning if the patient needs post-hospital services based on physician/advanced practitioner order or complex needs related to functional status, cognitive ability, or social support system  Outcome: Progressing     Problem: Knowledge Deficit  Goal: Patient/family/caregiver demonstrates understanding of disease process, treatment plan, medications, and discharge instructions  Description: Complete learning assessment and assess knowledge base. Interventions:  - Provide teaching at level of understanding  - Provide teaching via preferred learning methods  Outcome: Progressing     Problem: Nutrition/Hydration-ADULT  Goal: Nutrient/Hydration intake appropriate for improving, restoring or maintaining nutritional needs  Description: Monitor and assess patient's nutrition/hydration status for malnutrition. Collaborate with interdisciplinary team and initiate plan and interventions as ordered. Monitor patient's weight and dietary intake as ordered or per policy.  Utilize nutrition screening tool and intervene as necessary. Determine patient's food preferences and provide high-protein, high-caloric foods as appropriate.      INTERVENTIONS:  - Monitor oral intake, urinary output, labs, and treatment plans  - Assess nutrition and hydration status and recommend course of action  - Evaluate amount of meals eaten  - Assist patient with eating if necessary   - Allow adequate time for meals  - Recommend/ encourage appropriate diets, oral nutritional supplements, and vitamin/mineral supplements  - Order, calculate, and assess calorie counts as needed  - Recommend, monitor, and adjust tube feedings and TPN/PPN based on assessed needs  - Assess need for intravenous fluids  - Provide specific nutrition/hydration education as appropriate  - Include patient/family/caregiver in decisions related to nutrition  Outcome: Progressing

## 2023-07-29 NOTE — OCCUPATIONAL THERAPY NOTE
Occupational Therapy Evaluation     Patient Name: Moo Greenberg  [id number] Date: 7/29/2023  Problem List  Principal Problem:    Infection of prosthetic right knee joint [facility])    Past Medical History  Past Medical History:   Diagnosis Date    Anxiety     Crutches as ambulation aid 07/25/2023    Depression     Difficult intubation 7/28/2023    Hypertension     Limited jaw ROM     Migraine     PONV (postoperative nausea and vomiting)     PONV (postoperative nausea and vomiting) 7/28/2023    Psoriasis     RA (rheumatoid arthritis) ([address])      Past Surgical History  Past Surgical History:   Procedure Laterality Date    REPLACEMENT TOTAL KNEE Right     x2         07/29/23 1002   OT Last Visit   OT Visit Date 07/29/23   Note Type   Note type Evaluation  (treatment)   Pain Assessment   Pain Assessment Tool 0-10   Pain Score 5  (increased to 7 w/ mobility)   Pain Location/Orientation Orientation: Right;Location: [facility] Pain Intervention(s) Cold applied;Repositioned; Ambulation/increased activity; Elevated; Emotional support   Restrictions/Precautions   Weight Bearing Precautions Per Order Yes   RLE Weight Bearing Per Order WBAT   Other Precautions Fall Risk;Pain;WBS  (knee immobilizer, hemovac drain)   Home Living   Type of [address] [place] [address] Two level;Bed/bath upstairs; Able to live on main level with bedroom/bathroom;Stairs to enter with rails  ([address])   Bathroom Shower/Tub Tub/shower unit   Bathroom Toilet Raised   Bathroom Equipment Shower chair;Grab bars in [address] Walker;Crutches   Additional Comments Was able to verify he does not have BSC   Prior Function   Level of [place] Independent with ADLs; Independent with functional mobility; Independent with IADLS   Lives With Family  (Sister and MANUEL)   [address] Help From Family  (Both work.)   IADLs Family/Friend/Other provides transportation; Independent with meal prep; Independent with medication management   Falls in the last 6 months 1 to 4   Vocational Part time employment   General   Additional Pertinent History Comorbidities affecting pt’s functional performance include a significant PMH of: anxiety, high BP, hx of TKR. Family/Caregiver Present No   Subjective   Subjective "I'm doing ok"   ADL   Where Assessed Edge of bed   Eating Assistance 7  Independent   Grooming Assistance 6  Modified Independent   UB Bathing Assistance 6  Modified Independent   LB Bathing Assistance 4  Minimal Assistance   UB Dressing Assistance 6  Modified independent   [address] [facility] 4  [address]  5  Supervision/Setup   Bed Mobility   Supine to Sit 6  Modified independent   Additional items Increased time required; Bedrails   Sit to Supine 6  Modified independent   Additional items Increased time required;Verbal cues   Additional Comments Able to hook surgical LE with other leg to assist with transition. Good safety. Transfers   Sit to Stand 6  Modified independent   Additional items Increased time required;Verbal cues   Stand to Sit 6  Modified independent   Additional items Increased time required;Verbal cues   Functional Mobility   Functional Mobility 5  Supervision   Additional items Rolling walker   Balance   Static Sitting Normal   Dynamic Sitting Fair +   Static Standing Fair   Dynamic Standing Fair -   Ambulatory Fair -   Activity Tolerance   Activity Tolerance Patient limited by fatigue;Patient limited by pain; Patient tolerated treatment well   Medical Staff Made Aware Drea Knapp RN   Nurse Made Aware Yes   RUE Assessment   RUE Assessment WFL   LUE Assessment   LUE Assessment WFL   Hand Function   Gross Motor Coordination Functional   Fine Motor Coordination Functional   Sensation   Light Touch No apparent deficits   Proprioception   Proprioception No apparent deficits   Vision-Basic Assessment   Current Vision No visual deficits   Vision - Complex Assessment   Ocular Range of Motion Intact   Acuity Able to read clock/calendar on wall without difficulty; Able to read employee name badge without difficulty   Perception   Inattention/Neglect Appears intact   Cognition   Overall Cognitive Status [facility]   Arousal/Participation Alert; Responsive; Cooperative   Attention Within functional limits   Orientation Level Oriented X4   Memory Within functional limits   Following Commands Follows all commands and directions without difficulty   Comments Pleasant and cooperative. Assessment   Limitation Decreased ADL status; Decreased Safe judgement during ADL;Decreased UE strength;Decreased cognition;Decreased endurance;Decreased self-care trans;Decreased high-level ADLs   Prognosis Good   Assessment Patient is a 46y.o. year old male seen for OT eval s/p admit to [facility] on 7/28/2023 with infection of prosthetic R knee join s/p right TKA revision with articulating spacer for PJI postop care by orthopedics. Patient with active OT orders and activity orders for Up with assistance. Personal factors affecting pt at time of IE include: difficulty performing ADLs and IADLs, difficulty with functional mobility/transfers. Prior to admission, was (I) with ADLs and was (I) with IADLs. Upon evaluation, patient’s functional status as follows: eating: Independent, grooming: Darrius, UB bathing: Darrius, LB bathing: Kamilla, UB dressing: Darrius, LB dressing: Kamilla, toileting: supervision ; functional transfers: Darrius and supervision , bed mobility: Darrius, functional mobility: supervision , sitting/standing tolerance: ~4 min with b/l UE support - due to the following deficits impacting occupational performance: weakness, decreased strength , decreased balance, decreased activity tolerance, limited functional reach, increased pain and orthopedic restrictions.  These impairments, as well at pt’s [place] home environment, steps within home environment, difficulty performing ADLs, difficulty performing IADLs, difficulty performing transfers/mobility, fall risk , functional decline , increased reliance on DME  and Inability to perform current job functions  limit pt’s ability to safely engage in all baseline areas of occupation. Patient would benefit from continued skilled OT therapy while in acute setting to address deficits as defined above and maximize (I) with ADLs and functional mobility. Occupational performance areas to address include: grooming, bathing/shower, toilet hygiene, dressing and health maintenance. Based on the aforementioned OT evaluation, functional performance deficits, and assessments, pt has been identified as a moderate complexity evaluation. From OT standpoint, recommend home with OP PT upon D/C. OT will continue to follow pt 2-3x/wk to address the following goals to  w/in 10-14 days. Goals   Patient Goals to feel better   LTG Time Frame 10-   Plan   Treatment Interventions ADL retraining;UE strengthening/ROM; Functional transfer training; Endurance training;Cognitive reorientation;Equipment evaluation/education; Compensatory technique education;Continued evaluation; Activityengagement; Energy conservation   Goal Expiration Date 23   OT Treatment Day 0   OT Frequency 3-5x/wk   Recommendation   OT Discharge Recommendation Home with outpatient rehabilitation   Equipment Recommended Bedside commode   Commode Type Standard   AM-PAC Daily Activity Inpatient   Lower Body Dressing 3   Bathing 3   Toileting 3   Upper Body Dressing 4   Grooming 4   Eating 4   Daily Activity Raw Score 21   Daily Activity Standardized Score (Calc for Raw Score >=11) 44.27   AM-PAC Applied Cognition Inpatient   Following a Speech/Presentation 4   Understanding Ordinary Conversation 4   Taking Medications 4   Remembering Where Things Are Placed or Put Away 4   Remembering List of 4-5 Errands 4   Taking Care of Complicated Tasks 4   Applied Cognition Raw Score 24   Applied Cognition Standardized Score 62.21   Additional Treatment Session   Start Time 1015   End Time 1040   Treatment Assessment Completed education on LHAE with demo. Pt able to return demo with min VCs. Sit<>stands completed with Charles and appropraite hand placement. Limited household distance for functional mobility with RW and SBA due to increased pain. Bed mobility completed with Charles. Will continue to maintain pt on OT caseload to increase (I) with ADLs and functional mobility. Continue to rec home with OP PT. Pt did purchase hip kit for home   Additional Treatment Day 1     Occupational Therapy goals: In 7-14 days:     1- Patient will verbalize and demonstrate use of energy conservation/deep breathing technique and work simplification skills during functional activity with no verbal cues. 2- Patient will verbalize and demonstrate good body mechanics and joint protection techniques during ADLs/IADLs with no verbal cues   3- Pt will complete bed mobility at a Mod I level w/ G balance/safety demonstrated to decrease caregiver assistance required   4- Patient will increase OOB/ sitting tolerance to 2-4 hours per day for increased participation in self care and leisure tasks with no s/s of exertion.    5-Patient will increase standing tolerance time to 5 minutes with unilateral UE support to complete sink level ADLs@ mod I level    6- Pt will improve functional transfers to Mod I on/off all surfaces using DME as needed w/ G balance/safety   7- Patient will complete UB ADLs with Charles utilizing appropriate DME/AE PRN   8- Patient will complete LB ADLs with Charles utilizing appropriate DME/AE PRN   9- Patient will complete toileting tasks with Charles with G hygiene/thoroughness utilizing appropriate DME/AE PRN   10- Pt will improve functional mobility during ADL/IADL/leisure tasks to Mod I using DME as needed w/ G balance/safety    11- Pt will be attentive 100% of the time during ongoing cognitive assessment w/ G participation to assist w/ safe d/c planning/recommendations   12- Pt will participate in simulated IADL management task to increase independence to Mod I w/ G safety and endurance   13- Pt will increase BUE strength by 1MM grade via AROM/AAROM/PROM exercises to increase independence in ADLs and transfers         Kristy Rouse OTR/L

## 2023-07-29 NOTE — PROGRESS NOTES
Progress Note - Orthopedics   Louis Oly Brown 46 y.o. male MRN: [id number]  Unit/Bed#: E2 -01      Subjective:    46 y.o.male POD 1 right TKA revision with articulating spacer for PJI. No acute overnight events, no new complaints. Patient doing well. He states that his pain is reasonably well controlled at a 5/10. He was up and walking with PT earlier which caused his pain to increase. He denies numbness or tingling, but admits to some burning over the anterior aspect of his knee. He denies fevers, chills, CP, SOB, or N/V.     Labs:  0   Lab Value Date/Time    HCT 28.7 (L) 07/29/2023 0506    HCT 31.7 (L) 07/21/2023 0846    HCT 38.9 06/18/2023 1108    HGB 8.9 (L) 07/29/2023 0506    HGB 10.1 (L) 07/21/2023 0846    HGB 13.1 06/18/2023 1108    INR 1.07 07/21/2023 0846    WBC 8.01 07/29/2023 0506    WBC 5.68 07/21/2023 0846    WBC 8.27 06/18/2023 1108    ESR 48 (H) 06/18/2023 1108    .2 (H) 06/18/2023 1108       Meds:    Current Facility-Administered Medications:   •  acetaminophen (TYLENOL) tablet 650 mg, 650 mg, Oral, Q4H PRN, Verona Funez PA-C  •  acetaminophen (TYLENOL) tablet 975 mg, 975 mg, Oral, Q8H [facility], Shraddha Trevizo PA-C, 975 mg at 07/29/23 7607  •  ascorbic acid (VITAMIN C) tablet 500 mg, 500 mg, Oral, BID With Meals, Shraddha Trevizo PA-C, 500 mg at 07/29/23 0810  •  aspirin (ECOTRIN LOW STRENGTH) EC tablet 81 mg, 81 mg, Oral, BID, Shraddha Trevizo PA-C, 81 mg at 07/29/23 4104  •  busPIRone (BUSPAR) tablet 7.5 mg, 7.5 mg, Oral, BID, Shraddha Trevizo PA-C, 7.5 mg at 07/29/23 1146  •  calcium carbonate (TUMS) chewable tablet 1,000 mg, 1,000 mg, Oral, Daily PRN, Verona Funez PA-C  •  ceFAZolin (ANCEF) IVPB (premix in dextrose) 2,000 mg 50 mL, 2,000 mg, Intravenous, Q8H, Verona Funez PA-C, Last Rate: 100 mL/hr at 07/29/23 0510, 2,000 mg at 07/29/23 0510  •  docusate sodium (COLACE) capsule 100 mg, 100 mg, Oral, BID, Shraddha Trevizo PA-C, 100 mg at 11/43/99 5662  •  folic acid (FOLVITE) tablet 1 mg, 1 mg, Oral, Daily, Vega Christian, PA-C, 1 mg at 07/29/23 9852  •  gabapentin (NEURONTIN) capsule 300 mg, 300 mg, Oral, HS, Vegayakelin Christian, PA-C, 300 mg at 07/28/23 2116  •  lactated ringers bolus 1,000 mL, 1,000 mL, Intravenous, Once PRN **AND** lactated ringers bolus 1,000 mL, 1,000 mL, Intravenous, Once PRN, Vega Christian PA-C  •  lactated ringers infusion, 100 mL/hr, Intravenous, Continuous, Vega Christian PA-C, Stopped at 07/29/23 3110  •  [START ON 7/30/2023] methotrexate tablet 15 mg, 15 mg, Oral, Weekly, Vega Christian PA-C  •  metoprolol succinate (TOPROL-XL) 24 hr tablet 100 mg, 100 mg, Oral, Daily, Vegayakelin Christian, PA-C, 100 mg at 07/29/23 0210  •  morphine injection 2 mg, 2 mg, Intravenous, Q2H PRN, Vega Christian PA-C, 2 mg at 07/29/23 4355  •  multivitamin-minerals (CENTRUM) tablet 1 tablet, 1 tablet, Oral, Daily, Vega Christian PA-C, 1 tablet at 07/29/23 0810  •  nortriptyline (PAMELOR) capsule 50 mg, 50 mg, Oral, HS, Juani Johnson, PA-C, 50 mg at 07/28/23 2115  •  ondansetron [facility]) injection 4 mg, 4 mg, Intravenous, Q6H PRN, Vega Christian PA-C  •  oxyCODONE (ROXICODONE) immediate release tablet 10 mg, 10 mg, Oral, Q4H PRN, Vega Christian PA-C, 10 mg at 07/29/23 0818  •  oxyCODONE (ROXICODONE) IR tablet 5 mg, 5 mg, Oral, Q4H PRN, Vegayakelin Christian PA-C  •  pantoprazole (PROTONIX) EC tablet 40 mg, 40 mg, Oral, Daily Before Breakfast, Vega Christian PA-C, 40 mg at 07/29/23 9383  •  senna (SENOKOT) tablet 8.6 mg, 1 tablet, Oral, Daily, Prudence Trevizo PA-C, 8.6 mg at 07/29/23 3572  •  simethicone (MYLICON) chewable tablet 80 mg, 80 mg, Oral, 4x Daily PRN, Vega Christian PA-C  •  sodium chloride 0.9 % bolus 1,000 mL, 1,000 mL, Intravenous, Once PRN **AND** sodium chloride 0.9 % bolus 1,000 mL, 1,000 mL, Intravenous, Once PRN, Vega Christian PA-C  •  venlafaxine [facility] 24 hr capsule 150 mg, 150 mg, Oral, HS, Prudence Trevizo PA-C, 150 mg at 07/28/23 2116    Blood Culture:   No results found for: "BLOODCX"    Wound Culture:   No results found for: "WOUNDCULT"    Ins and Outs:  I/O last 24 hours: In: 2100 [I.V.:2050; IV Piggyback:50]  Out: 1166 [Urine:1050; Drains:190; Blood:50]          Physical:  Vitals:    07/29/23 0730   BP: 118/73   Pulse: 87   Resp: 18   Temp: 98 °F (36.7 °C)   SpO2: 94%     Musculoskeletal: right Lower Extremity  · Dressing C/D/I with KI appropriately placed. · The patient's drain has several cc of serosanguinous fluid present without any chad purulence. · TTP over the knee. · Sensation intact over the toes. · Motor intact to +FHL/EHL, +ankle dorsi/plantar flexion  · Digits warm and well perfused  · Capillary refill < 2 seconds    Assessment:    48 y.o.male POD 1 right TKA revision with articulating spacer for PJI with Dr. Adam Mujica. Patient is doing well. Plan:  · WBAT RLE in knee immobilizer. Keep knee immobilizer in place at all times. · Infection  · Several cultures were taken in the operating room. These results are pending. We will continue to follow-up on the results of these tests. · The patient is currently on IV ancef q 8 hours. There is a note pending from ID. We continue to appreciate all ID recommendations. · Patient due to undergo PICC placement on Monday. · The patient's drain will stay in place until discharge. · Will monitor for ABLA and administer IVF/prbc as indicated for greater than 2 gram Hgb drop or Hgb < 7. Hgb this morning is 8.9. No signs of bleeding in the operative extremity. Will continue to monitor. · PT/OT  · Pain control  · DVT ppx with aspirin 81 mg BID  · Dispo: Ortho will follow. The patient will be here through the weekend as he will be undergoing PICC placement on Monday. PICC consult has previously been placed and PICC consent was previously signed.     Chula Pardo PA-C

## 2023-07-29 NOTE — CASE MANAGEMENT
Case Management Discharge Planning Note    Patient name Preston Ribeiro [facility]  Location East 2 /E2 -* MRN [id number]  : 1975 Date 2023       Current Admission Date: 2023  Current Admission Diagnosis:Infection of prosthetic right knee joint [facility])   Patient Active Problem List    Diagnosis Date Noted   • Difficult intubation 2023   • PONV (postoperative nausea and vomiting) 2023   • Infection of prosthetic right knee joint ([address]) 2023   • Anxiety 2023   • High blood pressure 2023   • Migraine 2023   • History of arthroplasty of right knee 2023   • Plaque psoriasis 2023   • Status post revision of total replacement of right knee 2023      LOS (days): 1  Geometric Mean LOS (GMLOS) (days):   Days to GMLOS:     OBJECTIVE:  Risk of Unplanned Readmission Score: 8.33         Current admission status: Inpatient   Preferred Pharmacy:   [address] , [facility] [address]   [address] [place] [id number]  Phone: [phone] Fax: [phone]    Primary Care Provider: Antoni Ramos MD    Primary Insurance: [facility]  Secondary Insurance:     DISCHARGE DETAILS:                                     DME Referral Provided  Referral made for DME?: Yes  DME referral completed for the following items[de-identified] Bedside Commode  DME Supplier Name[de-identified] [employer]              Treatment Team Recommendation: Home (with OPPT)  Discharge Destination Plan[de-identified] Home (with OPPT)

## 2023-07-30 PROBLEM — D64.9 ANEMIA: Status: ACTIVE | Noted: 2023-07-30

## 2023-07-30 PROBLEM — R73.03 PREDIABETES: Status: ACTIVE | Noted: 2023-07-30

## 2023-07-30 LAB
ANION GAP SERPL CALCULATED.3IONS-SCNC: 7 MMOL/L
BACTERIA SPEC ANAEROBE CULT: NORMAL
BACTERIA TISS AEROBE CULT: ABNORMAL
BUN SERPL-MCNC: 8 MG/DL (ref 5–25)
CALCIUM SERPL-MCNC: 9.1 MG/DL (ref 8.4–10.2)
CHLORIDE SERPL-SCNC: 98 MMOL/L (ref 96–108)
CO2 SERPL-SCNC: 31 MMOL/L (ref 21–32)
CREAT SERPL-MCNC: 0.73 MG/DL (ref 0.6–1.3)
ERYTHROCYTE [DISTWIDTH] IN BLOOD BY AUTOMATED COUNT: 14.1 % (ref 11.6–15.1)
GFR SERPL CREATININE-BSD FRML MDRD: 108 ML/MIN/1.73SQ M
GLUCOSE SERPL-MCNC: 92 MG/DL (ref 65–140)
GRAM STN SPEC: ABNORMAL
HCT VFR BLD AUTO: 27.5 % (ref 36.5–49.3)
HGB BLD-MCNC: 8.8 G/DL (ref 12–17)
MCH RBC QN AUTO: 30 PG (ref 26.8–34.3)
MCHC RBC AUTO-ENTMCNC: 32 G/DL (ref 31.4–37.4)
MCV RBC AUTO: 94 FL (ref 82–98)
PLATELET # BLD AUTO: 431 THOUSANDS/UL (ref 149–390)
PMV BLD AUTO: 8.9 FL (ref 8.9–12.7)
POTASSIUM SERPL-SCNC: 3.4 MMOL/L (ref 3.5–5.3)
RBC # BLD AUTO: 2.93 MILLION/UL (ref 3.88–5.62)
SODIUM SERPL-SCNC: 136 MMOL/L (ref 135–147)
WBC # BLD AUTO: 7.08 THOUSAND/UL (ref 4.31–10.16)

## 2023-07-30 PROCEDURE — 99232 SBSQ HOSP IP/OBS MODERATE 35: CPT | Performed by: PHYSICIAN ASSISTANT

## 2023-07-30 PROCEDURE — 99233 SBSQ HOSP IP/OBS HIGH 50: CPT | Performed by: NURSE PRACTITIONER

## 2023-07-30 PROCEDURE — 99024 POSTOP FOLLOW-UP VISIT: CPT

## 2023-07-30 PROCEDURE — 80048 BASIC METABOLIC PNL TOTAL CA: CPT | Performed by: PHYSICIAN ASSISTANT

## 2023-07-30 PROCEDURE — 97116 GAIT TRAINING THERAPY: CPT

## 2023-07-30 PROCEDURE — 97110 THERAPEUTIC EXERCISES: CPT

## 2023-07-30 PROCEDURE — 85027 COMPLETE CBC AUTOMATED: CPT | Performed by: PHYSICIAN ASSISTANT

## 2023-07-30 RX ORDER — POTASSIUM CHLORIDE 20 MEQ/1
20 TABLET, EXTENDED RELEASE ORAL ONCE
Status: COMPLETED | OUTPATIENT
Start: 2023-07-30 | End: 2023-07-30

## 2023-07-30 RX ADMIN — OXYCODONE HYDROCHLORIDE 5 MG: 5 TABLET ORAL at 01:42

## 2023-07-30 RX ADMIN — FOLIC ACID 1 MG: 1 TABLET ORAL at 08:02

## 2023-07-30 RX ADMIN — MORPHINE SULFATE 2 MG: 2 INJECTION, SOLUTION INTRAMUSCULAR; INTRAVENOUS at 22:33

## 2023-07-30 RX ADMIN — DOCUSATE SODIUM 100 MG: 100 CAPSULE, LIQUID FILLED ORAL at 08:03

## 2023-07-30 RX ADMIN — ACETAMINOPHEN 325MG 975 MG: 325 TABLET ORAL at 05:15

## 2023-07-30 RX ADMIN — OXYCODONE HYDROCHLORIDE 10 MG: 10 TABLET ORAL at 05:59

## 2023-07-30 RX ADMIN — CEFAZOLIN SODIUM 2000 MG: 2 SOLUTION INTRAVENOUS at 05:16

## 2023-07-30 RX ADMIN — OXYCODONE HYDROCHLORIDE 10 MG: 10 TABLET ORAL at 11:04

## 2023-07-30 RX ADMIN — ASPIRIN 81 MG: 81 TABLET, COATED ORAL at 17:24

## 2023-07-30 RX ADMIN — MORPHINE SULFATE 2 MG: 2 INJECTION, SOLUTION INTRAMUSCULAR; INTRAVENOUS at 08:11

## 2023-07-30 RX ADMIN — ACETAMINOPHEN 325MG 975 MG: 325 TABLET ORAL at 13:51

## 2023-07-30 RX ADMIN — METHOCARBAMOL 500 MG: 500 TABLET ORAL at 11:09

## 2023-07-30 RX ADMIN — OXYCODONE HYDROCHLORIDE 10 MG: 10 TABLET ORAL at 17:23

## 2023-07-30 RX ADMIN — CEFAZOLIN SODIUM 2000 MG: 2 SOLUTION INTRAVENOUS at 15:31

## 2023-07-30 RX ADMIN — PANTOPRAZOLE SODIUM 40 MG: 40 TABLET, DELAYED RELEASE ORAL at 06:00

## 2023-07-30 RX ADMIN — MULTIPLE VITAMINS W/ MINERALS TAB 1 TABLET: TAB ORAL at 08:02

## 2023-07-30 RX ADMIN — OXYCODONE HYDROCHLORIDE 10 MG: 10 TABLET ORAL at 21:36

## 2023-07-30 RX ADMIN — METHOCARBAMOL 500 MG: 500 TABLET ORAL at 20:12

## 2023-07-30 RX ADMIN — DOCUSATE SODIUM 100 MG: 100 CAPSULE, LIQUID FILLED ORAL at 17:23

## 2023-07-30 RX ADMIN — SENNOSIDES 8.6 MG: 8.6 TABLET, FILM COATED ORAL at 08:03

## 2023-07-30 RX ADMIN — ASPIRIN 81 MG: 81 TABLET, COATED ORAL at 08:02

## 2023-07-30 RX ADMIN — BUSPIRONE HYDROCHLORIDE 7.5 MG: 5 TABLET ORAL at 08:02

## 2023-07-30 RX ADMIN — METHOTREXATE 15 MG: 2.5 TABLET ORAL at 08:06

## 2023-07-30 RX ADMIN — GABAPENTIN 300 MG: 300 CAPSULE ORAL at 22:43

## 2023-07-30 RX ADMIN — METOPROLOL SUCCINATE 100 MG: 50 TABLET, EXTENDED RELEASE ORAL at 08:02

## 2023-07-30 RX ADMIN — POTASSIUM CHLORIDE 20 MEQ: 1500 TABLET, EXTENDED RELEASE ORAL at 08:02

## 2023-07-30 RX ADMIN — METHOCARBAMOL 500 MG: 500 TABLET ORAL at 05:15

## 2023-07-30 RX ADMIN — CEFAZOLIN SODIUM 2000 MG: 2 SOLUTION INTRAVENOUS at 21:36

## 2023-07-30 RX ADMIN — OXYCODONE HYDROCHLORIDE AND ACETAMINOPHEN 500 MG: 500 TABLET ORAL at 08:03

## 2023-07-30 RX ADMIN — BUSPIRONE HYDROCHLORIDE 7.5 MG: 5 TABLET ORAL at 17:24

## 2023-07-30 RX ADMIN — OXYCODONE HYDROCHLORIDE AND ACETAMINOPHEN 500 MG: 500 TABLET ORAL at 17:24

## 2023-07-30 RX ADMIN — VENLAFAXINE HYDROCHLORIDE 150 MG: 150 CAPSULE, EXTENDED RELEASE ORAL at 22:43

## 2023-07-30 RX ADMIN — NORTRIPTYLINE HYDROCHLORIDE 100 MG: 25 CAPSULE ORAL at 22:43

## 2023-07-30 RX ADMIN — ACETAMINOPHEN 325MG 650 MG: 325 TABLET ORAL at 20:12

## 2023-07-30 NOTE — ASSESSMENT & PLAN NOTE
Addended by: CESAR SMYTH on: 2/22/2022 09:31 AM     Modules accepted: Level of Service     Continue home medications: Buspar 7.5 mg bid and Effexor 150 mg qHS

## 2023-07-30 NOTE — PROGRESS NOTES
[facility]  Progress Note  Name: Kasey Alcaraz I  MRN: [id number]  Unit/Bed#: E2 -01 I Date of Admission: 7/28/2023   Date of Service: 7/30/2023 I Hospital Day: 2    Assessment/Plan   * Infection of prosthetic right knee joint [facility])  Assessment & Plan  · Admitted under orthopedic service for infection of right total knee replacement   · S/p right total knee revision, I&D down to and including bone, insertion of biodegradable device 7/28   · Infectious Disease consulted for ongoing antibiotic recommendations   · Management per primary team   · On ASA 81 mg bid for 4 weeks   · Follow cultures   · Currently on high dose IV ancef with anticipation for 6 weeks of antibitoics     Anemia  Assessment & Plan  Lab Results   Component Value Date    HGB 8.8 (L) 07/30/2023    HGB 8.9 (L) 07/29/2023    HGB 10.1 (L) 07/21/2023    HGB 13.1 06/18/2023     Monitor post operatively   Transfuse hgb <7     Prediabetes  Assessment & Plan  A1c 6.2%   Monitor for hyperglycemia in setting of acute infection/stress   Lifestyle modifications     Plaque psoriasis  Assessment & Plan  · With psoriatic arthritis follows with Rheumatology outpt   · Maintained on methotrexate and folic acid supplementation     Migraine  Assessment & Plan  · maintained on nortriptyline     HTN (hypertension)  Assessment & Plan  · Continue toprol  mg daily   · Holding  lisinopril 10 mg daily as BP remains stable at this time   · Monitor VS   · Renal function stable     Anxiety  Assessment & Plan  Continue home medications: Buspar 7.5 mg bid and Effexor 150 mg qHS             VTE Pharmacologic Prophylaxis:   per ortho on asa 81 mg bid      Total Time Spent on Date of Encounter in care of patient: 25 minutes This time was spent on one or more of the following: performing physical exam; counseling and coordination of care; obtaining or reviewing history; documenting in the medical record; reviewing/ordering tests, medications or procedures; communicating with other healthcare professionals and discussing with patient's family/caregivers. Current Length of Stay: 2 day(s)  Current Patient Status: Inpatient   Certification Statement: The patient will continue to require additional inpatient hospital stay due to infection right knee prosthetic   Discharge Plan: per ortho     Code Status: Level 1 - Full Code    Subjective:   Patient doing well. No we went over all of his home medications again today. Objective:     Vitals:   Temp (24hrs), Av °F (36.7 °C), Min:97.5 °F (36.4 °C), Max:98.3 °F (36.8 °C)    Temp:  [97.5 °F (36.4 °C)-98.3 °F (36.8 °C)] 97.5 °F (36.4 °C)  HR:  [67-86] 86  Resp:  [16-20] 18  BP: (109-146)/(74-87) 119/74  SpO2:  [92 %-96 %] 94 %  Body mass index is 25.64 kg/m². Input and Output Summary (last 24 hours): Intake/Output Summary (Last 24 hours) at 2023 0802  Last data filed at 2023 0757  Gross per 24 hour   Intake --   Output 2245 ml   Net -2245 ml       Physical Exam:   Physical Exam  Vitals and nursing note reviewed. Constitutional:       General: He is not in acute distress. Appearance: He is not toxic-appearing. Cardiovascular:      Rate and Rhythm: Normal rate and regular rhythm. Pulmonary:      Effort: Pulmonary effort is normal. No respiratory distress. Breath sounds: Normal breath sounds. Abdominal:      General: Bowel sounds are normal.      Palpations: Abdomen is soft. Musculoskeletal:         General: Deformity present. Left lower leg: No edema. Skin:     Comments: Dressing and brace to right lower extremity   Neurological:      Mental Status: He is alert. Mental status is at baseline.    Psychiatric:         Mood and Affect: Mood normal.          Additional Data:     Labs:  Results from last 7 days   Lab Units 23  0438   WBC Thousand/uL 7.08   HEMOGLOBIN g/dL 8.8*   HEMATOCRIT % 27.5*   PLATELETS Thousands/uL 431*     Results from last 7 days   Lab Units 07/30/23  0438   SODIUM mmol/L 136   POTASSIUM mmol/L 3.4*   CHLORIDE mmol/L 98   CO2 mmol/L 31   BUN mg/dL 8   CREATININE mg/dL 0.73   ANION GAP mmol/L 7   CALCIUM mg/dL 9.1   GLUCOSE RANDOM mg/dL 92         Results from last 7 days   Lab Units 07/28/23  0938   POC GLUCOSE mg/dl 91               Lines/Drains:  Invasive Devices     Peripheral Intravenous Line  Duration           Peripheral IV 07/28/23 Right Arm 1 day          Drain  Duration           Closed/Suction Drain Right Knee Accordion 10 Fr. 1 day                      Imaging: No pertinent imaging reviewed.     Recent Cultures (last 7 days):   Results from last 7 days   Lab Units 07/28/23  1407 07/28/23  1347 07/28/23  1344   GRAM STAIN RESULT  Rare Polys  No bacteria seen 3+ Polys  No bacteria seen Rare Polys  No bacteria seen  No Polys or Bacteria seen  Rare Polys  No bacteria seen       Last 24 Hours Medication List:   Current Facility-Administered Medications   Medication Dose Route Frequency Provider Last Rate   • acetaminophen  650 mg Oral Q4H PRN Destinee Santos PA-C     • acetaminophen  975 mg Oral [facility] Destinee Santos PA-C     • ascorbic acid  500 mg Oral BID With Meals Destinee Santos PA-C     • aspirin  81 mg Oral BID Destinee Santos PA-C     • busPIRone  7.5 mg Oral BID Destinee Santos PA-C     • calcium carbonate  1,000 mg Oral Daily PRN Destinee Santos PA-C     • cefazolin  2,000 mg Intravenous Q8H Destinee Santos PA-C 2,000 mg (07/30/23 0516)   • docusate sodium  100 mg Oral BID Destinee Santos PA-C     • folic acid  1 mg Oral Daily Destinee Santos PA-C     • gabapentin  300 mg Oral HS Destinee Santos PA-C     • methocarbamol  500 mg Oral Q6H PRN Juan Hernandez PA-C     • methotrexate  15 mg Oral Weekly Destinee Santos PA-C     • metoprolol succinate  100 mg Oral Daily Destinee Santos PA-C     • morphine injection  2 mg Intravenous Q2H PRN Destinee Santos PA-C     • multivitamin-minerals  1 tablet Oral Daily Mennie Slice Arjun Schofield PA-C     • nortriptyline  100 mg Oral HS Nola Rodríguez PA-C     • ondansetron  4 mg Intravenous Q6H PRN [facility], [place]     • oxyCODONE  10 mg Oral Q4H PRN [facility], [place]     • oxyCODONE  5 mg Oral Q4H PRN [facility], [place]     • pantoprazole  40 mg Oral Daily Before Breakfast [facility]     • potassium chloride  20 mEq Oral Once Jazmin Chow PA-C     • senna  1 tablet Oral Daily [facility]     • simethicone  80 mg Oral 4x Daily PRN [facility], [place]     • venlafaxine  150 mg Oral HS [facility], [place]          Today, Patient Was Seen By: Jazmin Chow PA-C    **Please Note: This note may have been constructed using a voice recognition system. **

## 2023-07-30 NOTE — ASSESSMENT & PLAN NOTE
· Continue toprol  mg daily   · Holding  lisinopril 10 mg daily as BP remains stable at this time , resume when appropriate to avoid hypotension   · Monitor VS   · Renal function stable

## 2023-07-30 NOTE — ASSESSMENT & PLAN NOTE
· Admitted under orthopedic service for infection of right total knee replacement   · S/p right total knee revision explant, I&D down to and including bone, insertion of biodegradable device 7/28   · Infectious Disease consulted for ongoing antibiotic recommendations   · Management per primary team, ortho   · For PICC placement when cleared by ID   · On ASA 81 mg bid for 4 weeks   · Follow cultures - currently growing staph aureus, previously MSSA  · Currently on high dose IV ancef with anticipation for 6 weeks of IV antibitoics

## 2023-07-30 NOTE — ASSESSMENT & PLAN NOTE
Lab Results   Component Value Date    HGB 8.8 (L) 07/30/2023    HGB 8.9 (L) 07/29/2023    HGB 10.1 (L) 07/21/2023    HGB 13.1 06/18/2023     Monitor post operatively   Transfuse hgb <7

## 2023-07-30 NOTE — ASSESSMENT & PLAN NOTE
· Admitted under orthopedic service for infection of right total knee replacement   · S/p right total knee revision, I&D down to and including bone, insertion of biodegradable device 7/28   · Infectious Disease consulted for ongoing antibiotic recommendations   · Management per primary team   · On ASA 81 mg bid for 4 weeks   · Follow cultures   · Currently on high dose IV ancef with anticipation for 6 weeks of IV antibitoics

## 2023-07-30 NOTE — UTILIZATION REVIEW
Initial Clinical Review  Elective outpatient procedure, converted to inpatient admission due to Infection of total right knee replacement  WITH Right - ARTHROPLASTY KNEE TOTAL REVISION,Right - INCISION AND DRAINAGE DOWN TO AND INCLUDING BONE,Right - INSERTION OF BIODEGRADABLE DRUG DELIVERY DEVICE ([facility]). Elective INPT surgical procedure  Age/Sex: 46 y.o. male  Surgery Date: 7/28/2023  Procedure: Right - ARTHROPLASTY KNEE TOTAL REVISION  Right - INCISION AND DRAINAGE DOWN TO AND INCLUDING BONE  Right - [facility] OF [facility]  Anesthesia: General  Operative Findings:   Gross purulence   Sinus tract through defect in quadriceps tendon to the anterior knee  Well-fixed femoral and tibial components   Stretch quadriceps repair with ethibond suture along entire extensor mechanism   Pre-op ROM 50-75  Post-op ROM 0-120  Attenuation of MCL  Bone loss distal lateral femoral condyle   Thin patellar bone stock     POD#1 Progress Note:POD 1 right TKA revision with articulating spacer for PJI. No acute overnight events, no new complaints. Patient doing well. He states that his pain is reasonably well controlled at a 5/10. He was up and walking with PT earlier which caused his pain to increase. He denies numbness or tingling, but admits to some burning over the anterior aspect of his knee. He denies fevers, chills, CP, SOB, or N/V. Plan:  • WBAT RLE in knee immobilizer. Keep knee immobilizer in place at all times. • Infection  ? Several cultures were taken in the operating room. These results are pending. We will continue to follow-up on the results of these tests. ? The patient is currently on IV ancef q 8 hours. There is a note pending from ID. We continue to appreciate all ID recommendations. ? Patient due to undergo PICC placement on Monday. ? The patient's drain will stay in place until discharge.   • Will monitor for ABLA and administer IVF/prbc as indicated for greater than 2 gram Hgb drop or Hgb < 7. Hgb this morning is 8.9. No signs of bleeding in the operative extremity. Will continue to monitor. • PT/OT  • Pain control  • DVT ppx with aspirin 81 mg BID  • Dispo: Ortho will follow. The patient will be here through the weekend as he will be undergoing PICC placement on Monday.   PICC consult has previously been placed and PICC consent was previously signed  •   Admission Orders: Date/Time/Statement:   Admission Orders (From admission, onward)     Ordered        07/28/23 1639  Inpatient Admission  Once                      Orders Placed This Encounter   Procedures   • Inpatient Admission     Standing Status:   Standing     Number of Occurrences:   1     Order Specific Question:   Level of Care     Answer:   Med Surg [16]     Order Specific Question:   Estimated length of stay     Answer:   Inpatient Only Surgery     Vital Signs: /79 (BP Location: Left arm)   Pulse 81   Temp 98.5 °F (36.9 °C) (Temporal)   Resp 18   Ht 5' 11" (1.803 m)   Wt 83.4 kg (183 lb 13.8 oz)   SpO2 99%   BMI 25.64 kg/m²   07/30/23 1058 98.5 °F (36.9 °C) 81 18 124/79 97 99 % -- -- None (Room air) -- Lying   07/30/23 0712 97.5 °F (36.4 °C) 86 18 119/74 90 94 % -- -- None (Room air) -- Lying   07/30/23 0251 98.1 °F (36.7 °C) 67 18 136/82 -- 96 % -- -- None (Room air) -- Lying   07/29/23 2300 98.2 °F (36.8 °C) 70 20 146/87 -- 94 % -- -- None (Room air) -- Lying   07/29/23 1905 98.3 °F (36.8 °C) 82 16 131/83 102 92 % -- -- None (Room air) -- Lying   07/29/23 1515 97.6 °F (36.4 °C) 78 16 109/77 82 94 % -- -- None (Room air) -- Lying   07/29/23 1123 98 °F (36.7 °C) 76 18 111/77 85 92 % -- -- None (Room air) -- Lying   07/29/23 0730 98 °F (36.7 °C) 87 18 118/73 89 94 % -- -- None (Room air) -- Lying   07/29/23 0317 98.9 °F (37.2 °C) 93 18 138/90 -- 92 % -- -- -- -- Lying   07/28/23 2342 98.1 °F (36.7 °C) 97 18 142/97 -- 93 % -- -- -- -- Lying   07/28/23 1851 97 °F (36.1 °C) Abnormal  94 18 125/82 91 95 % -- -- None (Room air) -- Lying   07/28/23 1832 -- 90 -- -- -- 92 % -- -- -- -- --   07/28/23 1732 -- 84 22 118/84 96 97 % -- -- -- -- --   07/28/23 1717 -- 88 21 130/85 101 97 % -- -- -- -- --   07/28/23 1702 -- 86 16 131/87 103 97 % -- -- -- -- --   07/28/23 1647 -- 88 22 137/92 107 97 % -- -- -- -- --   07/28/23 1635 -- 88 14 133/91 106 97 % -- -- -- -- --   07/28/23 1632 -- 88 13 133/91 106 97 % -- -- -- -- --   07/28/23 1618 -- 86 13 135/90 108 96 % -- -- -- -- --   07/28/23 1617 -- 86 13 135/90 108 96 % 32 3 L/min Nasal cannula -- --   07/28/23 1602 97.7 °F (36.5 °C) 83 18 124/82 -- 93 % -- -- None (Room air) WDL --   07/28/23 0923 98.2 °F (36.8 °C) 77 16 128/82 -- 98 % -- -- None (Room air) -- Sitting       Pertinent Labs/Diagnostic Test Results:   XR knee right 1 or 2 views    (Results Pending)   IR PICC line placement single lumen (preferred for home antibiotics/medications)    (Results Pending)         Results from last 7 days   Lab Units 07/30/23  0438 07/29/23  0506   WBC Thousand/uL 7.08 8.01   HEMOGLOBIN g/dL 8.8* 8.9*   HEMATOCRIT % 27.5* 28.7*   PLATELETS Thousands/uL 431* 437*         Results from last 7 days   Lab Units 07/30/23  0438 07/29/23  0506   SODIUM mmol/L 136 134*   POTASSIUM mmol/L 3.4* 3.6   CHLORIDE mmol/L 98 99   CO2 mmol/L 31 27   ANION GAP mmol/L 7 8   BUN mg/dL 8 9   CREATININE mg/dL 0.73 0.68   EGFR ml/min/1.73sq m 108 111   CALCIUM mg/dL 9.1 9.0         Results from last 7 days   Lab Units 07/28/23  0938   POC GLUCOSE mg/dl 91     Results from last 7 days   Lab Units 07/30/23  0438 07/29/23  0506   GLUCOSE RANDOM mg/dL 92 106       Results from last 7 days   Lab Units 07/28/23  1407 07/28/23  1347 07/28/23  1344   GRAM STAIN RESULT  Rare Polys  No bacteria seen 3+ Polys  No bacteria seen Rare Polys  No bacteria seen  No Polys or Bacteria seen  Rare Polys  No bacteria seen       Diet: REG DIET  Mobility: OOB  DVT Prophylaxis: SCD    Medications/Pain Control: Scheduled Medications:  acetaminophen, 975 mg, Oral, Q8H [address]  ascorbic acid, 500 mg, Oral, BID With Meals  aspirin, 81 mg, Oral, BID  busPIRone, 7.5 mg, Oral, BID  cefazolin, 2,000 mg, Intravenous, Q8H  docusate sodium, 100 mg, Oral, BID  folic acid, 1 mg, Oral, Daily  gabapentin, 300 mg, Oral, HS  methotrexate, 15 mg, Oral, Weekly  metoprolol succinate, 100 mg, Oral, Daily  multivitamin-minerals, 1 tablet, Oral, Daily  nortriptyline, 100 mg, Oral, HS  pantoprazole, 40 mg, Oral, Daily Before Breakfast  senna, 1 tablet, Oral, Daily  venlafaxine, 150 mg, Oral, HS      Continuous IV Infusions:  lactated ringers infusion  Rate: 100 mL/hr Dose: 100 mL/hr  Freq: Continuous Route: IV  Indications of Use: IV Hydration  Last Dose: Stopped (07/29/23 0620)  Start: 07/28/23 1900 End: 07/29/23 1128   Admin Instructions:   Nurse to discontinue when tolerating PO           1939      0620     1128-D/C'd       lactated ringers infusion  Rate: 125 mL/hr Dose: 125 mL/hr  Freq: Continuous Route: IV  Last Dose: Stopped (07/29/23 0821)  Start: 07/28/23 0915 End: 07/28/23 1848           1402     1548     1838     1848-D/C'd    0821 [C]         propofol (DIPRIVAN) 1000 mg in 100 mL infusion (premix)  Freq: Continuous PRN Route: IV  Last Dose: Stopped (07/28/23 1549)  Start: 07/28/23 1325 End: 07/28/23 1552           1325     1549     1552-D/C'd        sodium chloride 0.9 % infusion  Rate: 125 mL/hr Dose: 125 mL/hr  Freq: Continuous Route: IV  Indications of Use: IV Hydration  Last Dose: Stopped (07/28/23 1402)  Start: 07/28/23 0915 End: 07/28/23 1848           0939     1210     1258     1400 [C]     1401     1402     1848-D/C'd                PRN Meds:  acetaminophen, 650 mg, Oral, Q4H PRN  calcium carbonate, 1,000 mg, Oral, Daily PRN  methocarbamol, 500 mg, Oral, Q6H PRN  7/29 X 1, 7/30 X 2  morphine injection, 2 mg, Intravenous, Q2H PRN 7/28 X 2, 7/29 X 4, 7/30 X 1  ondansetron, 4 mg, Intravenous, Q6H PRN  oxyCODONE, 10 mg, Oral, Q4H PRN 7/28 X 1, 7/29 X 5, 7/30 X 2  oxyCODONE, 5 mg, Oral, Q4H PRN 7/30 X 1  simethicone, 80 mg, Oral, 4x Daily PRN        Network Utilization Review Department  ATTENTION: Please call with any questions or concerns to [phone] and carefully listen to the prompts so that you are directed to the right person. All voicemails are confidential.  Katerina Brownneela all requests for admission clinical reviews, approved or denied determinations and any other requests to dedicated fax number below belonging to the campus where the patient is receiving treatment.  List of dedicated fax numbers for the Facilities:  [place] DENIALS (Administrative/Medical Necessity) [phone] [facility] [phone]   [address] [place] [address] [phone]   [facility] [phone] 150[address] [facility] [address] [address] [facility] [phone]   [address] [facility] [address] [facility]  [address] [phone]

## 2023-07-30 NOTE — PHYSICAL THERAPY NOTE
PHYSICAL THERAPY NOTE          Patient Name: Kisha Pond  [id number] Date: 7/30/2023  09:35-10:05       07/30/23 0935   PT Last Visit   PT Visit Date 07/30/23   Note Type   Note Type Treatment   Pain Assessment   Pain Score 5   Pain Location/Orientation Orientation: Right;Location: Knee   Restrictions/Precautions   RLE Weight Bearing Per Order WBAT   Other Precautions Fall Risk;Pain;WBS  (hemovac drain, LE immobilzer.)   General   Chart Reviewed Yes   Response to Previous Treatment Patient with no complaints from previous session. Family/Caregiver Present No   Cognition   Overall Cognitive Status WFL   Comments Pleasant   Subjective   Subjective Agreeable to therapy. Doing ok. Bed Mobility   Supine to Sit 6  Modified independent   Additional items Increased time required;LE management  (self assists operative leg with nonoperative leg)   Sit to Supine 6  Modified independent   Additional items Increased time required;LE management  (self assists operative leg with nonoperative leg.)   Additional Comments uses non operative leg to self assist operative leg. Transfers   Sit to Stand 6  Modified independent   Additional items Increased time required   Stand to Sit 5  Supervision   Additional items Increased time required;Verbal cues   Additional Comments Cues for hand and operative leg placement with stand to sit. Ambulation/Elevation   Gait pattern Improper Weight shift;Decreased foot clearance;Decreased R stance; Inconsistent kenna; Short stride; Step to  (Cues to improve foot flat/heel strike with WB.)   Gait Assistance 5  Supervision   Additional items Verbal cues   Assistive Device Rolling walker  (+ RLE immobilizer)   Distance Amb with RW 45'x2. Seated rest, then stair trainnig. Stair Management Assistance 4  Minimal assist   Additional items Assist x 1;Verbal cues; Tactile cues   Stair Management Technique Two rails; Step to pattern; Foreward;Nonreciprocal   Number of Stairs 5  (5 steps performed x 2.)   Balance   Static Sitting Normal   Dynamic Sitting Good   Static Standing Fair   Dynamic Standing Fair -   Ambulatory Fair -   Endurance Deficit   Endurance Deficit Yes   Endurance Deficit Description pain, fatigue   Activity Tolerance   Activity Tolerance Patient tolerated treatment well;Patient limited by fatigue;Patient limited by pain   Medical Staff Made Aware Nurse, Catherine Vallejo   Nurse Made Aware yes   Exercises   Quad Sets Supine;10 reps;AROM; Right   Glute Sets Supine;10 reps;AROM; Bilateral   Ankle Pumps Supine;10 reps;AROM; Right   Heel Cord Stretch Supine;5 reps;PROM  (gentle heel cord stretch x 5 with 10 sec hold. Use of sheet to self assist.)   Assessment   Prognosis Good   Problem List Decreased strength;Decreased range of motion;Decreased endurance; Impaired balance;Decreased mobility; Decreased skin integrity;Orthopedic restrictions;Pain   Barriers to Discharge Inaccessible home environment   Barriers to Discharge Comments 10 PARVEEN.  + B/L rails. Goals   Patient Goals get better   STG Expiration Date 07/07/23   PT Treatment Day 2   Plan   Treatment/Interventions Functional transfer training;LE strengthening/ROM; Elevations; Therapeutic exercise; Endurance training;Patient/family training;Equipment eval/education; Bed mobility;Gait training; Compensatory technique education;Continued evaluation;Spoke to nursing   Progress Progressing toward goals   PT Frequency Other (Comment)  (daily to acheive functional independence.)   Recommendation   PT Discharge Recommendation Home with outpatient rehabilitation   Equipment Recommended Other (Comment)  [facility])   AM-PAC Basic Mobility Inpatient   Turning in Flat Bed Without Bedrails 4   Lying on Back to Sitting on Edge of Flat Bed Without Bedrails 4   Moving Bed to Chair 4   Standing Up From Chair Using Arms 4   Walk in Room 4   Climb 3-5 Stairs With Railing 3   Basic Mobility Inpatient Raw Score 23   Basic Mobility Standardized Score 50.88   Highest Level Of Mobility   -HLM Goal 7: Walk 25 feet or more   JH-HLM Achieved 7: Walk 25 feet or more   Education   Education Provided Mobility training;Home exercise program;Assistive device   Patient Demonstrates acceptance/verbal understanding   End of Consult   Patient Position at End of Consult Supine;Bed/Chair alarm activated; All needs within reach  (ice applied, LE elevation)   Lawrence Liner, PT

## 2023-07-30 NOTE — ASSESSMENT & PLAN NOTE
Lab Results   Component Value Date    HGB 8.8 (L) 07/31/2023    HGB 8.8 (L) 07/30/2023    HGB 8.9 (L) 07/29/2023    HGB 10.1 (L) 07/21/2023    HGB 13.1 06/18/2023   ABLA post op  Monitor for stabilization post op   hgb stable today from yesterday at 8.8   Transfuse hgb <7

## 2023-07-30 NOTE — ASSESSMENT & PLAN NOTE
· Continue toprol  mg daily   · Holding  lisinopril 10 mg daily as BP remains stable at this time   · Monitor VS   · Renal function stable

## 2023-07-30 NOTE — PROGRESS NOTES
Progress Note - Infectious Disease   Annabella Brown 46 y.o. male MRN: [id number]  Unit/Bed#: E2 -01 Encounter: [id number]    Impression/Plan:  1. R TKR infection. Patient had aspiration in outpatient orthopedic surgery office which showed MSSA on Synovasure. Unclear if bacteria was introduced to knee during quadriceps repair, if it entered through skin breakdown from his plaque psoriasis, or from a wound on the R knee in 5/2023 after a fall. His infection is complicated by patient being on chronic methotrexate. Patient is now s/p R TKR arthroplasty, I&D down to and including bone, and insertion of biodegradable drug delivery device (stimulan). Gross purulence was noted and sinus tract was noted in deft in quadriceps tendon to the anterior knee. Multiple operative cultures are pending, so far there is preliminary growth of staph aureus. The patient has been started on IV Cefazolin which he is tolerating without difficulty. I will continue this antibiotic for now while we await updated OR cultures. Anticipate patient will require PICC placement and 6-weeks of IV antibiotic.   -continue IV Cefazolin  -monitor CBCD and BMP  -follow up operative cultures  -monitor vitals  -serial R knee exams  -local surgical site care per orthopedic surgery  -continue follow up with orthopedic surgery     2. Falls. Patient with fall after his R TKA revision which resulted in a quadriceps tear and required surgical repair. He additionally fell in 5/2023 which resulted in a wound to his knee. Traumatic injury could be source of his bacterial infection above.     3. Rheumatoid arthritis and plaque psoriasis. Patient on chronic methotrexate. This is risk factor for complications with healing and infection. [facility] is following for medical management.  -continue follow up with [facility]     Above plan was discussed in detail with patient at the bedside.   Above plan was discussed in detail with orthopedic surgery team.    Antibiotics:  Cefazolin 3  Antibiotics 3  Post op #2    Subjective:  Patient just finishing up work with  PT. He is having some pain in the knee. He was able to ambulate the hallways and do some stairs. He has no fever, chills, sweats, shakes; no nausea, vomiting, abdominal pain, diarrhea, or dysuria; no cough, shortness of breath, or chest pain. No new symptoms. Objective:  Vitals:  Temp:  [97.5 °F (36.4 °C)-98.3 °F (36.8 °C)] 97.5 °F (36.4 °C)  HR:  [67-86] 86  Resp:  [16-20] 18  BP: (109-146)/(74-87) 119/74  SpO2:  [92 %-96 %] 94 %  Temp (24hrs), Av °F (36.7 °C), Min:97.5 °F (36.4 °C), Max:98.3 °F (36.8 °C)  Current: Temperature: 97.5 °F (36.4 °C)    Physical Exam:   Limited PE as patient was completing PT work:  General Appearance:  Alert, interactive, nontoxic, no acute distress. Throat: Oropharynx moist without lesions. Lungs:   Respirations unlabored on room air. Extremities: R leg dressing remains intact with overlying ace wrap, no areas of breakthrough drainage or leaking, hemovac emerging intact to canister suction, output remains dark sanguinous without purulence. Skin: No new rashes or lesions noted on exposed skin.      Labs, Imaging, & Other studies:   All pertinent labs and imaging studies were personally reviewed  Results from last 7 days   Lab Units 23  0438 23  0506   WBC Thousand/uL 7.08 8.01   HEMOGLOBIN g/dL 8.8* 8.9*   PLATELETS Thousands/uL 431* 437*     Results from last 7 days   Lab Units 23  0438   POTASSIUM mmol/L 3.4*   CHLORIDE mmol/L 98   CO2 mmol/L 31   BUN mg/dL 8   CREATININE mg/dL 0.73   EGFR ml/min/1.73sq m 108   CALCIUM mg/dL 9.1     Results from last 7 days   Lab Units 23  1407 23  1347 23  1344   GRAM STAIN RESULT  Rare Polys  No bacteria seen 3+ Polys  No bacteria seen Rare Polys  No bacteria seen  No Polys or Bacteria seen  Rare Polys  No bacteria seen

## 2023-07-30 NOTE — PLAN OF CARE
Problem: MOBILITY - ADULT  Goal: Maintain or return to baseline ADL function  Description: INTERVENTIONS:  -  Assess patient's ability to carry out ADLs; assess patient's baseline for ADL function and identify physical deficits which impact ability to perform ADLs (bathing, care of mouth/teeth, toileting, grooming, dressing, etc.)  - Assess/evaluate cause of self-care deficits   - Assess range of motion  - Assess patient's mobility; develop plan if impaired  - Assess patient's need for assistive devices and provide as appropriate  - Encourage maximum independence but intervene and supervise when necessary  - Involve family in performance of ADLs  - Assess for home care needs following discharge   - Consider OT consult to assist with ADL evaluation and planning for discharge  - Provide patient education as appropriate  Outcome: Progressing  Goal: Maintains/Returns to pre admission functional level  Description: INTERVENTIONS:  - Perform BMAT or MOVE assessment daily.   - Set and communicate daily mobility goal to care team and patient/family/caregiver. - Collaborate with rehabilitation services on mobility goals if consulted  - Perform Range of Motion 3 times a day. - Reposition patient every 2 hours.   - Dangle patient 3 times a day  - Stand patient 3 times a day  - Ambulate patient 3 times a day  - Out of bed to chair 3 times a day   - Out of bed for meals 3 times a day  - Out of bed for toileting  - Record patient progress and toleration of activity level   Outcome: Progressing     Problem: PAIN - ADULT  Goal: Verbalizes/displays adequate comfort level or baseline comfort level  Description: Interventions:  - Encourage patient to monitor pain and request assistance  - Assess pain using appropriate pain scale  - Administer analgesics based on type and severity of pain and evaluate response  - Implement non-pharmacological measures as appropriate and evaluate response  - Consider cultural and social influences on pain and pain management  - Notify physician/advanced practitioner if interventions unsuccessful or patient reports new pain  Outcome: Progressing     Problem: SAFETY ADULT  Goal: Maintain or return to baseline ADL function  Description: INTERVENTIONS:  -  Assess patient's ability to carry out ADLs; assess patient's baseline for ADL function and identify physical deficits which impact ability to perform ADLs (bathing, care of mouth/teeth, toileting, grooming, dressing, etc.)  - Assess/evaluate cause of self-care deficits   - Assess range of motion  - Assess patient's mobility; develop plan if impaired  - Assess patient's need for assistive devices and provide as appropriate  - Encourage maximum independence but intervene and supervise when necessary  - Involve family in performance of ADLs  - Assess for home care needs following discharge   - Consider OT consult to assist with ADL evaluation and planning for discharge  - Provide patient education as appropriate  Outcome: Progressing  Goal: Maintains/Returns to pre admission functional level  Description: INTERVENTIONS:  - Perform BMAT or MOVE assessment daily.   - Set and communicate daily mobility goal to care team and patient/family/caregiver. - Collaborate with rehabilitation services on mobility goals if consulted  - Perform Range of Motion 3 times a day. - Reposition patient every 2 hours.   - Dangle patient 3 times a day  - Stand patient 3 times a day  - Ambulate patient 3 times a day  - Out of bed to chair 3 times a day   - Out of bed for meals 3 times a day  - Out of bed for toileting  - Record patient progress and toleration of activity level   Outcome: Progressing  Goal: Patient will remain free of falls  Description: INTERVENTIONS:  - Educate patient/family on patient safety including physical limitations  - Instruct patient to call for assistance with activity   - Consult OT/PT to assist with strengthening/mobility   - Keep Call bell within reach  - Keep bed low and locked with side rails adjusted as appropriate  - Keep care items and personal belongings within reach  - Initiate and maintain comfort rounds  - Make Fall Risk Sign visible to staff  - Offer Toileting every 2 Hours, in advance of need  - Initiate/Maintain bed alarm  - Obtain necessary fall risk management equipment: walker, alarm on, knee immobilizer  - Apply yellow socks and bracelet for high fall risk patients  - Consider moving patient to room near nurses station  Outcome: Progressing     Problem: DISCHARGE PLANNING  Goal: Discharge to home or other facility with appropriate resources  Description: INTERVENTIONS:  - Identify barriers to discharge w/patient and caregiver  - Arrange for needed discharge resources and transportation as appropriate  - Identify discharge learning needs (meds, wound care, etc.)  - Arrange for interpretive services to assist at discharge as needed  - Refer to Case Management Department for coordinating discharge planning if the patient needs post-hospital services based on physician/advanced practitioner order or complex needs related to functional status, cognitive ability, or social support system  Outcome: Progressing     Problem: Knowledge Deficit  Goal: Patient/family/caregiver demonstrates understanding of disease process, treatment plan, medications, and discharge instructions  Description: Complete learning assessment and assess knowledge base. Interventions:  - Provide teaching at level of understanding  - Provide teaching via preferred learning methods  Outcome: Progressing     Problem: Nutrition/Hydration-ADULT  Goal: Nutrient/Hydration intake appropriate for improving, restoring or maintaining nutritional needs  Description: Monitor and assess patient's nutrition/hydration status for malnutrition. Collaborate with interdisciplinary team and initiate plan and interventions as ordered. Monitor patient's weight and dietary intake as ordered or per policy. Utilize nutrition screening tool and intervene as necessary. Determine patient's food preferences and provide high-protein, high-caloric foods as appropriate.      INTERVENTIONS:  - Monitor oral intake, urinary output, labs, and treatment plans  - Assess nutrition and hydration status and recommend course of action  - Evaluate amount of meals eaten  - Assist patient with eating if necessary   - Allow adequate time for meals  - Recommend/ encourage appropriate diets, oral nutritional supplements, and vitamin/mineral supplements  - Order, calculate, and assess calorie counts as needed  - Recommend, monitor, and adjust tube feedings and TPN/PPN based on assessed needs  - Assess need for intravenous fluids  - Provide specific nutrition/hydration education as appropriate  - Include patient/family/caregiver in decisions related to nutrition  Outcome: Progressing

## 2023-07-30 NOTE — ASSESSMENT & PLAN NOTE
· With psoriatic arthritis follows with Rheumatology outpt   · Maintained on methotrexate and folic acid supplementation

## 2023-07-30 NOTE — PROGRESS NOTES
Progress Note - Orthopedics   Yobani Brown 46 y.o. 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 No acute overnight events, no new complaints. The patient states that his pain has been worse today than it was yesterday and he rates his pain at a 6/10, but the medications he receives do help. He denies fevers, chills, CP, SOB, N/V, or numbness or tingling.      Labs:  0   Lab Value Date/Time    HCT 27.5 (L) 07/30/2023 0438    HCT 28.7 (L) 07/29/2023 0506    HCT 31.7 (L) 07/21/2023 0846    HGB 8.8 (L) 07/30/2023 0438    HGB 8.9 (L) 07/29/2023 0506    HGB 10.1 (L) 07/21/2023 0846    INR 1.07 07/21/2023 0846    WBC 7.08 07/30/2023 0438    WBC 8.01 07/29/2023 0506    WBC 5.68 07/21/2023 0846    ESR 48 (H) 06/18/2023 1108    .2 (H) 06/18/2023 1108       Meds:    Current Facility-Administered Medications:   •  acetaminophen (TYLENOL) tablet 650 mg, 650 mg, Oral, Q4H PRN, Jarrett Eldridge PA-C  •  acetaminophen (TYLENOL) tablet 975 mg, 975 mg, Oral, Q8H [address], Clarence Trevizo PA-C, 975 mg at 07/30/23 1729  •  ascorbic acid (VITAMIN C) tablet 500 mg, 500 mg, Oral, BID With Meals, Jarrett Eldridge PA-C, 500 mg at 07/30/23 0803  •  aspirin (ECOTRIN LOW STRENGTH) EC tablet 81 mg, 81 mg, Oral, BID, Jarrett Eldridge PA-C, 81 mg at 07/30/23 4668  •  busPIRone (BUSPAR) tablet 7.5 mg, 7.5 mg, Oral, BID, Clarence Trevizo PA-C, 7.5 mg at 07/30/23 0442  •  calcium carbonate (TUMS) chewable tablet 1,000 mg, 1,000 mg, Oral, Daily PRN, Jarrett Eldridge PA-C  •  ceFAZolin (ANCEF) IVPB (premix in dextrose) 2,000 mg 50 mL, 2,000 mg, Intravenous, Q8H, Jarrett Eldridge PA-C, Last Rate: 100 mL/hr at 07/30/23 0516, 2,000 mg at 07/30/23 0516  •  docusate sodium (COLACE) capsule 100 mg, 100 mg, Oral, BID, Clarence Trevizo PA-C, 100 mg at 98/79/98 0440  •  folic acid (FOLVITE) tablet 1 mg, 1 mg, Oral, Daily, Clarence Trevizo PA-C, 1 mg at 07/30/23 0802  •  gabapentin (NEURONTIN) capsule 300 mg, 300 mg, Oral, HS, Oli Trevizo PA-C, 300 mg at 07/29/23 2105  •  methocarbamol (ROBAXIN) tablet 500 mg, 500 mg, Oral, Q6H PRN, Aliya Irvin PA-C, 500 mg at 07/30/23 4762  •  methotrexate tablet 15 mg, 15 mg, Oral, Weekly, Oli Trevizo PA-C, 15 mg at 07/30/23 0013  •  metoprolol succinate (TOPROL-XL) 24 hr tablet 100 mg, 100 mg, Oral, Daily, Oli Trevizo PA-C, 100 mg at 07/30/23 4890  •  morphine injection 2 mg, 2 mg, Intravenous, Q2H PRN, Onelia Lazaro PA-C, 2 mg at 07/30/23 0285  •  multivitamin-minerals (CENTRUM) tablet 1 tablet, 1 tablet, Oral, Daily, Onelia Lazaro PA-C, 1 tablet at 07/30/23 0802  •  nortriptyline (PAMELOR) capsule 100 mg, 100 mg, Oral, HS, Juani Johnson PA-C, 100 mg at 07/29/23 2104  •  ondansetron [facility]) injection 4 mg, 4 mg, Intravenous, Q6H PRN, Onelia Lazaro PA-C  •  oxyCODONE (ROXICODONE) immediate release tablet 10 mg, 10 mg, Oral, Q4H PRN, Onelia Lazaro PA-C, 10 mg at 07/30/23 0559  •  oxyCODONE (ROXICODONE) IR tablet 5 mg, 5 mg, Oral, Q4H PRN, Onelia Lazaro PA-C, 5 mg at 07/30/23 0142  •  pantoprazole (PROTONIX) EC tablet 40 mg, 40 mg, Oral, Daily Before Breakfast, Oli Trevizo PA-C, 40 mg at 07/30/23 0600  •  senna (SENOKOT) tablet 8.6 mg, 1 tablet, Oral, Daily, Oli Trevizo PA-C, 8.6 mg at 07/30/23 0803  •  simethicone (MYLICON) chewable tablet 80 mg, 80 mg, Oral, 4x Daily PRN, Onelia Lazaro PA-C  •  venlafaxine [facility] 24 hr capsule 150 mg, 150 mg, Oral, HS, Onelia Lazaro PA-C, 150 mg at 07/29/23 2106    Blood Culture:   No results found for: "BLOODCX"    Wound Culture:   No results found for: "WOUNDCULT"    Ins and Outs:  I/O last 24 hours:   In: -   Out: 2395 [Urine:2250; Drains:145]          Physical:  Vitals:    07/30/23 0712   BP: 119/74   Pulse: 86   Resp: 18   Temp: 97.5 °F (36.4 °C)   SpO2: 94%     Musculoskeletal: right Lower Extremity  • Dressing C/D/I with KI appropriately placed. • The patient's drain has a few cc of serosanguinous fluid present without any purulence. · TTP over the knee  · Sensation intact over the toes  · Motor intact to +FHL/EHL, +ankle dorsi/plantar flexion  · Digits warm and well perfused  · Capillary refill < 2 seconds    Assessment:    48 y.o.male POD 2 right TKA revision with articulating spacer for PJI with Dr. Alaina Sampson. Patient is doing well. Plan:  • WBAT RLE in knee immobilizer. Keep knee immobilizer in place at all times. • Infection  ? Several cultures were taken in the operating room. So far they are growing Staphylococcus aureus. We will continue to follow-up on the results of these tests. ? The patient is currently on IV ancef q 8 hours. ID agrees with this regimen for now and will make further recommendations as the culture results are finalized. ? Patient due to undergo PICC placement tomorrow. ? The patient's drain will stay in place until discharge. • Will monitor for ABLA and administer IVF/prbc as indicated for greater than 2 gram Hgb drop or Hgb < 7. Hgb this morning is 8.8. No signs of bleeding in the operative extremity. Will continue to monitor. • PT/OT  • Pain control  • DVT ppx with aspirin 81 mg BID  • Dispo: Ortho will follow. The patient will be here through the weekend as he will be undergoing PICC placement tomorrow. PICC consult has previously been placed and PICC consent was previously signed.     González Royal PA-C

## 2023-07-30 NOTE — PLAN OF CARE
Problem: MOBILITY - ADULT  Goal: Maintain or return to baseline ADL function  Description: INTERVENTIONS:  -  Assess patient's ability to carry out ADLs; assess patient's baseline for ADL function and identify physical deficits which impact ability to perform ADLs (bathing, care of mouth/teeth, toileting, grooming, dressing, etc.)  - Assess/evaluate cause of self-care deficits   - Assess range of motion  - Assess patient's mobility; develop plan if impaired  - Assess patient's need for assistive devices and provide as appropriate  - Encourage maximum independence but intervene and supervise when necessary  - Involve family in performance of ADLs  - Assess for home care needs following discharge   - Consider OT consult to assist with ADL evaluation and planning for discharge  - Provide patient education as appropriate  Outcome: Progressing  Goal: Maintains/Returns to pre admission functional level  Description: INTERVENTIONS:  - Perform BMAT or MOVE assessment daily.   - Set and communicate daily mobility goal to care team and patient/family/caregiver. - Collaborate with rehabilitation services on mobility goals if consulted  - Perform Range of Motion 3 times a day. - Reposition patient every 2 hours.   - Dangle patient 3 times a day  - Stand patient 3 times a day  - Ambulate patient 3 times a day  - Out of bed to chair 3 times a day   - Out of bed for meals 3 times a day  - Out of bed for toileting  - Record patient progress and toleration of activity level   Outcome: Progressing     Problem: PAIN - ADULT  Goal: Verbalizes/displays adequate comfort level or baseline comfort level  Description: Interventions:  - Encourage patient to monitor pain and request assistance  - Assess pain using appropriate pain scale  - Administer analgesics based on type and severity of pain and evaluate response  - Implement non-pharmacological measures as appropriate and evaluate response  - Consider cultural and social influences on pain and pain management  - Notify physician/advanced practitioner if interventions unsuccessful or patient reports new pain  Outcome: Progressing     Problem: SAFETY ADULT  Goal: Maintain or return to baseline ADL function  Description: INTERVENTIONS:  -  Assess patient's ability to carry out ADLs; assess patient's baseline for ADL function and identify physical deficits which impact ability to perform ADLs (bathing, care of mouth/teeth, toileting, grooming, dressing, etc.)  - Assess/evaluate cause of self-care deficits   - Assess range of motion  - Assess patient's mobility; develop plan if impaired  - Assess patient's need for assistive devices and provide as appropriate  - Encourage maximum independence but intervene and supervise when necessary  - Involve family in performance of ADLs  - Assess for home care needs following discharge   - Consider OT consult to assist with ADL evaluation and planning for discharge  - Provide patient education as appropriate  Outcome: Progressing  Goal: Maintains/Returns to pre admission functional level  Description: INTERVENTIONS:  - Perform BMAT or MOVE assessment daily.   - Set and communicate daily mobility goal to care team and patient/family/caregiver. - Collaborate with rehabilitation services on mobility goals if consulted  - Perform Range of Motion 3 times a day. - Reposition patient every 2 hours.   - Dangle patient 3 times a day  - Stand patient 3 times a day  - Ambulate patient 3 times a day  - Out of bed to chair 3 times a day   - Out of bed for meals 3 times a day  - Out of bed for toileting  - Record patient progress and toleration of activity level   Outcome: Progressing  Goal: Patient will remain free of falls  Description: INTERVENTIONS:  - Educate patient/family on patient safety including physical limitations  - Instruct patient to call for assistance with activity   - Consult OT/PT to assist with strengthening/mobility   - Keep Call bell within reach  - Keep bed low and locked with side rails adjusted as appropriate  - Keep care items and personal belongings within reach  - Initiate and maintain comfort rounds  - Make Fall Risk Sign visible to staff  - Offer Toileting every 2 Hours, in advance of need  - Initiate/Maintain bed alarm  - Obtain necessary fall risk management equipment  - Apply yellow socks and bracelet for high fall risk patients  - Consider moving patient to room near nurses station  Outcome: Progressing     Problem: DISCHARGE PLANNING  Goal: Discharge to home or other facility with appropriate resources  Description: INTERVENTIONS:  - Identify barriers to discharge w/patient and caregiver  - Arrange for needed discharge resources and transportation as appropriate  - Identify discharge learning needs (meds, wound care, etc.)  - Arrange for interpretive services to assist at discharge as needed  - Refer to Case Management Department for coordinating discharge planning if the patient needs post-hospital services based on physician/advanced practitioner order or complex needs related to functional status, cognitive ability, or social support system  Outcome: Progressing     Problem: Knowledge Deficit  Goal: Patient/family/caregiver demonstrates understanding of disease process, treatment plan, medications, and discharge instructions  Description: Complete learning assessment and assess knowledge base. Interventions:  - Provide teaching at level of understanding  - Provide teaching via preferred learning methods  Outcome: Progressing     Problem: Nutrition/Hydration-ADULT  Goal: Nutrient/Hydration intake appropriate for improving, restoring or maintaining nutritional needs  Description: Monitor and assess patient's nutrition/hydration status for malnutrition. Collaborate with interdisciplinary team and initiate plan and interventions as ordered. Monitor patient's weight and dietary intake as ordered or per policy.  Utilize nutrition screening tool and intervene as necessary. Determine patient's food preferences and provide high-protein, high-caloric foods as appropriate.      INTERVENTIONS:  - Monitor oral intake, urinary output, labs, and treatment plans  - Assess nutrition and hydration status and recommend course of action  - Evaluate amount of meals eaten  - Assist patient with eating if necessary   - Allow adequate time for meals  - Recommend/ encourage appropriate diets, oral nutritional supplements, and vitamin/mineral supplements  - Order, calculate, and assess calorie counts as needed  - Recommend, monitor, and adjust tube feedings and TPN/PPN based on assessed needs  - Assess need for intravenous fluids  - Provide specific nutrition/hydration education as appropriate  - Include patient/family/caregiver in decisions related to nutrition  Outcome: Progressing

## 2023-07-31 LAB
ANION GAP SERPL CALCULATED.3IONS-SCNC: 6 MMOL/L
BACTERIA TISS AEROBE CULT: ABNORMAL
BUN SERPL-MCNC: 10 MG/DL (ref 5–25)
CALCIUM SERPL-MCNC: 9.2 MG/DL (ref 8.4–10.2)
CHLORIDE SERPL-SCNC: 96 MMOL/L (ref 96–108)
CO2 SERPL-SCNC: 31 MMOL/L (ref 21–32)
CREAT SERPL-MCNC: 0.72 MG/DL (ref 0.6–1.3)
ERYTHROCYTE [DISTWIDTH] IN BLOOD BY AUTOMATED COUNT: 14.1 % (ref 11.6–15.1)
FUNGUS SPEC CULT: NORMAL
GFR SERPL CREATININE-BSD FRML MDRD: 108 ML/MIN/1.73SQ M
GLUCOSE SERPL-MCNC: 104 MG/DL (ref 65–140)
GLUCOSE SERPL-MCNC: 138 MG/DL (ref 65–140)
GRAM STN SPEC: ABNORMAL
GRAM STN SPEC: ABNORMAL
HCT VFR BLD AUTO: 27.4 % (ref 36.5–49.3)
HGB BLD-MCNC: 8.8 G/DL (ref 12–17)
MCH RBC QN AUTO: 29.8 PG (ref 26.8–34.3)
MCHC RBC AUTO-ENTMCNC: 32.1 G/DL (ref 31.4–37.4)
MCV RBC AUTO: 93 FL (ref 82–98)
PLATELET # BLD AUTO: 407 THOUSANDS/UL (ref 149–390)
PMV BLD AUTO: 8.9 FL (ref 8.9–12.7)
POTASSIUM SERPL-SCNC: 3.6 MMOL/L (ref 3.5–5.3)
RBC # BLD AUTO: 2.95 MILLION/UL (ref 3.88–5.62)
SODIUM SERPL-SCNC: 133 MMOL/L (ref 135–147)
WBC # BLD AUTO: 7.21 THOUSAND/UL (ref 4.31–10.16)

## 2023-07-31 PROCEDURE — 36569 INSJ PICC 5 YR+ W/O IMAGING: CPT

## 2023-07-31 PROCEDURE — C1751 CATH, INF, PER/CENT/MIDLINE: HCPCS

## 2023-07-31 PROCEDURE — 99232 SBSQ HOSP IP/OBS MODERATE 35: CPT | Performed by: PHYSICIAN ASSISTANT

## 2023-07-31 PROCEDURE — 85027 COMPLETE CBC AUTOMATED: CPT | Performed by: PHYSICIAN ASSISTANT

## 2023-07-31 PROCEDURE — 80048 BASIC METABOLIC PNL TOTAL CA: CPT | Performed by: PHYSICIAN ASSISTANT

## 2023-07-31 PROCEDURE — 05HY33Z INSERTION OF INFUSION DEVICE INTO UPPER VEIN, PERCUTANEOUS APPROACH: ICD-10-PCS | Performed by: STUDENT IN AN ORGANIZED HEALTH CARE EDUCATION/TRAINING PROGRAM

## 2023-07-31 PROCEDURE — 99233 SBSQ HOSP IP/OBS HIGH 50: CPT | Performed by: STUDENT IN AN ORGANIZED HEALTH CARE EDUCATION/TRAINING PROGRAM

## 2023-07-31 PROCEDURE — 99024 POSTOP FOLLOW-UP VISIT: CPT | Performed by: PHYSICIAN ASSISTANT

## 2023-07-31 RX ADMIN — CEFAZOLIN SODIUM 2000 MG: 2 SOLUTION INTRAVENOUS at 06:00

## 2023-07-31 RX ADMIN — ASPIRIN 81 MG: 81 TABLET, COATED ORAL at 17:30

## 2023-07-31 RX ADMIN — METOPROLOL SUCCINATE 100 MG: 50 TABLET, EXTENDED RELEASE ORAL at 08:08

## 2023-07-31 RX ADMIN — METHOCARBAMOL 500 MG: 500 TABLET ORAL at 06:06

## 2023-07-31 RX ADMIN — MULTIPLE VITAMINS W/ MINERALS TAB 1 TABLET: TAB ORAL at 08:09

## 2023-07-31 RX ADMIN — OXYCODONE HYDROCHLORIDE 10 MG: 10 TABLET ORAL at 01:45

## 2023-07-31 RX ADMIN — FOLIC ACID 1 MG: 1 TABLET ORAL at 08:08

## 2023-07-31 RX ADMIN — ACETAMINOPHEN 325MG 975 MG: 325 TABLET ORAL at 06:04

## 2023-07-31 RX ADMIN — PANTOPRAZOLE SODIUM 40 MG: 40 TABLET, DELAYED RELEASE ORAL at 06:04

## 2023-07-31 RX ADMIN — CEFAZOLIN SODIUM 2000 MG: 2 SOLUTION INTRAVENOUS at 13:44

## 2023-07-31 RX ADMIN — OXYCODONE HYDROCHLORIDE AND ACETAMINOPHEN 500 MG: 500 TABLET ORAL at 08:08

## 2023-07-31 RX ADMIN — GABAPENTIN 300 MG: 300 CAPSULE ORAL at 21:52

## 2023-07-31 RX ADMIN — DOCUSATE SODIUM 100 MG: 100 CAPSULE, LIQUID FILLED ORAL at 17:30

## 2023-07-31 RX ADMIN — VENLAFAXINE HYDROCHLORIDE 150 MG: 150 CAPSULE, EXTENDED RELEASE ORAL at 21:52

## 2023-07-31 RX ADMIN — OXYCODONE HYDROCHLORIDE AND ACETAMINOPHEN 500 MG: 500 TABLET ORAL at 17:30

## 2023-07-31 RX ADMIN — SENNOSIDES 8.6 MG: 8.6 TABLET, FILM COATED ORAL at 08:08

## 2023-07-31 RX ADMIN — ASPIRIN 81 MG: 81 TABLET, COATED ORAL at 08:08

## 2023-07-31 RX ADMIN — ACETAMINOPHEN 325MG 975 MG: 325 TABLET ORAL at 13:43

## 2023-07-31 RX ADMIN — OXYCODONE HYDROCHLORIDE 10 MG: 10 TABLET ORAL at 21:51

## 2023-07-31 RX ADMIN — BUSPIRONE HYDROCHLORIDE 7.5 MG: 5 TABLET ORAL at 08:08

## 2023-07-31 RX ADMIN — OXYCODONE HYDROCHLORIDE 10 MG: 10 TABLET ORAL at 08:09

## 2023-07-31 RX ADMIN — METHOCARBAMOL 500 MG: 500 TABLET ORAL at 21:52

## 2023-07-31 RX ADMIN — OXYCODONE HYDROCHLORIDE 10 MG: 10 TABLET ORAL at 13:43

## 2023-07-31 RX ADMIN — MORPHINE SULFATE 2 MG: 2 INJECTION, SOLUTION INTRAMUSCULAR; INTRAVENOUS at 14:50

## 2023-07-31 RX ADMIN — ACETAMINOPHEN 325MG 975 MG: 325 TABLET ORAL at 21:52

## 2023-07-31 RX ADMIN — DOCUSATE SODIUM 100 MG: 100 CAPSULE, LIQUID FILLED ORAL at 08:09

## 2023-07-31 RX ADMIN — OXYCODONE HYDROCHLORIDE 10 MG: 10 TABLET ORAL at 17:32

## 2023-07-31 RX ADMIN — CEFAZOLIN SODIUM 2000 MG: 2 SOLUTION INTRAVENOUS at 21:48

## 2023-07-31 RX ADMIN — NORTRIPTYLINE HYDROCHLORIDE 100 MG: 25 CAPSULE ORAL at 21:51

## 2023-07-31 NOTE — PLAN OF CARE
Problem: MOBILITY - ADULT  Goal: Maintains/Returns to pre admission functional level  Description: INTERVENTIONS:  - Perform BMAT or MOVE assessment daily.   - Set and communicate daily mobility goal to care team and patient/family/caregiver. - Collaborate with rehabilitation services on mobility goals if consulted  - Remind patient to turn and reposition every 2 hours while in bed and to weight shift every 15 minutes while in chair.   - Dangle patient 3 times a day  - Stand patient 3 times a day  - Ambulate patient 2 times a day  - Out of bed to chair 2 times a day   - Out of bed for meals  - Out of bed for toileting  - Record patient progress and toleration of activity level   Outcome: Progressing     Problem: SAFETY ADULT  Goal: Maintains/Returns to pre admission functional level  Description: INTERVENTIONS:  - Perform BMAT or MOVE assessment daily.   - Set and communicate daily mobility goal to care team and patient/family/caregiver.    - Collaborate with rehabilitation services on mobility goals if consulted  - Remind patient to turn and reposition every 2 hours while in bed and to weight shift every 15 minutes while in chair.   - Dangle patient 3 times a day  - Stand patient 3 times a day  - Ambulate patient 2 times a day  - Out of bed to chair 2 times a day   - Out of bed for meals  - Out of bed for toileting  - Record patient progress and toleration of activity level   Outcome: Progressing     Problem: DISCHARGE PLANNING  Goal: Discharge to home or other facility with appropriate resources  Description: INTERVENTIONS:  - Identify barriers to discharge w/patient and caregiver  - Arrange for needed discharge resources and transportation as appropriate  - Identify discharge learning needs (meds, wound care, etc.)  - Refer to Case Management Department for coordinating discharge planning if the patient needs post-hospital services based on physician/advanced practitioner order or complex needs related to functional status, cognitive ability, or social support system  Outcome: Progressing     Problem: Knowledge Deficit  Goal: Patient/family/caregiver demonstrates understanding of disease process, treatment plan, medications, and discharge instructions  Description: Complete learning assessment and assess knowledge base.   Interventions:  - Provide teaching at level of understanding  - Provide teaching via preferred learning methods  Outcome: Progressing     Problem: MUSCULOSKELETAL - ADULT  Goal: Maintain proper alignment of affected body part  Description: INTERVENTIONS:  - Support, maintain and protect limb and body alignment  - Provide patient/ family with appropriate education  Outcome: Progressing

## 2023-07-31 NOTE — PROGRESS NOTES
[facility]  Progress Note  Name: Mihir Wesley I  MRN: [id number]  Unit/Bed#: E2 -01 I Date of Admission: 7/28/2023   Date of Service: 7/31/2023 I Hospital Day: 3    Assessment/Plan   * Infection of prosthetic right knee joint [facility])  Assessment & Plan  · Admitted under orthopedic service for infection of right total knee replacement   · S/p right total knee revision explant, I&D down to and including bone, insertion of biodegradable device 7/28   · Infectious Disease consulted for ongoing antibiotic recommendations   · Management per primary team, ortho   · For PICC placement when cleared by ID   · On ASA 81 mg bid for 4 weeks   · Follow cultures - currently growing staph aureus, previously MSSA  · Currently on high dose IV ancef with anticipation for 6 weeks of IV antibitoics     Anemia  Assessment & Plan  Lab Results   Component Value Date    HGB 8.8 (L) 07/31/2023    HGB 8.8 (L) 07/30/2023    HGB 8.9 (L) 07/29/2023    HGB 10.1 (L) 07/21/2023    HGB 13.1 06/18/2023   ABLA post op  Monitor for stabilization post op   hgb stable today from yesterday at 8.8   Transfuse hgb <7     Prediabetes  Assessment & Plan  A1c 6.2%   Monitor for hyperglycemia in setting of acute infection/stress   Lifestyle modifications     Plaque psoriasis  Assessment & Plan  · With psoriatic arthritis follows with Rheumatology outpt   · Maintained on methotrexate and folic acid supplementation     Migraine  Assessment & Plan  · maintained on nortriptyline 100 mg nightly   · No migraines at this time     HTN (hypertension)  Assessment & Plan  · Continue toprol  mg daily   · Holding  lisinopril 10 mg daily as BP remains stable at this time , resume when appropriate to avoid hypotension   · Monitor VS   · Renal function stable     Anxiety  Assessment & Plan  Continue home medications: Buspar 7.5 mg bid and Effexor 150 mg qHS             VTE Pharmacologic Prophylaxis:   asa 81 mg bid per ortho     Total Time Spent on Date of Encounter in care of patient: 25 minutes This time was spent on one or more of the following: performing physical exam; counseling and coordination of care; obtaining or reviewing history; documenting in the medical record; reviewing/ordering tests, medications or procedures; communicating with other healthcare professionals and discussing with patient's family/caregivers. Current Length of Stay: 3 day(s)  Current Patient Status: Inpatient   Certification Statement: The patient will continue to require additional inpatient hospital stay due to PJI of right knee  Discharge Plan: per ortho and ID     Code Status: Level 1 - Full Code    Subjective:   Doing well no complaints or events overnihgt. No needs at this time. Pain better controlled last evening. No BM but does not feel like he needs advancement in bowel regimen at this time. No CP or SOB. No N/V. No fevers. Objective:     Vitals:   Temp (24hrs), Av.9 °F (36.6 °C), Min:97.1 °F (36.2 °C), Max:98.5 °F (36.9 °C)    Temp:  [97.1 °F (36.2 °C)-98.5 °F (36.9 °C)] 98.2 °F (36.8 °C)  HR:  [76-86] 83  Resp:  [18-20] 20  BP: (119-131)/(74-84) 119/75  SpO2:  [94 %-99 %] 97 %  Body mass index is 25.64 kg/m². Input and Output Summary (last 24 hours): Intake/Output Summary (Last 24 hours) at 2023 0709  Last data filed at 2023 0601  Gross per 24 hour   Intake 140 ml   Output 1742 ml   Net -1602 ml       Physical Exam:   Physical Exam  Vitals and nursing note reviewed. Constitutional:       General: He is not in acute distress. Appearance: He is not ill-appearing, toxic-appearing or diaphoretic. Cardiovascular:      Rate and Rhythm: Normal rate and regular rhythm. Pulmonary:      Effort: Pulmonary effort is normal.      Breath sounds: Normal breath sounds. Abdominal:      General: Bowel sounds are normal.      Palpations: Abdomen is soft. Musculoskeletal:         General: Deformity present.    Skin:     Comments: Dressing and brace to RLE   Neurological:      Mental Status: He is alert. Mental status is at baseline.    Psychiatric:         Mood and Affect: Mood normal.          Additional Data:     Labs:  Results from last 7 days   Lab Units 07/31/23  0500   WBC Thousand/uL 7.21   HEMOGLOBIN g/dL 8.8*   HEMATOCRIT % 27.4*   PLATELETS Thousands/uL 407*     Results from last 7 days   Lab Units 07/31/23  0500   SODIUM mmol/L 133*   POTASSIUM mmol/L 3.6   CHLORIDE mmol/L 96   CO2 mmol/L 31   BUN mg/dL 10   CREATININE mg/dL 0.72   ANION GAP mmol/L 6   CALCIUM mg/dL 9.2   GLUCOSE RANDOM mg/dL 104         Results from last 7 days   Lab Units 07/28/23  0938   POC GLUCOSE mg/dl 91               Lines/Drains:  Invasive Devices     Peripheral Intravenous Line  Duration           Peripheral IV 07/28/23 Right Arm 2 days          Drain  Duration           Closed/Suction Drain Right Knee Accordion 10 Fr. 2 days                      Recent Cultures (last 7 days):   Results from last 7 days   Lab Units 07/28/23  1407 07/28/23  1347 07/28/23  1344   GRAM STAIN RESULT  Rare Polys  No bacteria seen 3+ Polys  No bacteria seen Rare Polys  No bacteria seen  No Polys or Bacteria seen  Rare Polys  No bacteria seen       Last 24 Hours Medication List:   Current Facility-Administered Medications   Medication Dose Route Frequency Provider Last Rate   • acetaminophen  650 mg Oral Q4H PRN Suzy Busch PA-C     • acetaminophen  975 mg Oral [facility] Suzy Busch PA-C     • ascorbic acid  500 mg Oral BID With Meals Suzy Busch PA-C     • aspirin  81 mg Oral BID Suzy Busch PA-C     • busPIRone  7.5 mg Oral BID Suzy Busch PA-C     • calcium carbonate  1,000 mg Oral Daily PRN Suzy Busch PA-C     • cefazolin  2,000 mg Intravenous Q8H Suzy Busch PA-C 2,000 mg (07/31/23 0600)   • docusate sodium  100 mg Oral BID Suzy Busch PA-C     • folic acid  1 mg Oral Daily Suzy Busch PA-C     • gabapentin  300 mg Oral HS Kapil Francisco PA-C     • methocarbamol  500 mg Oral Q6H PRN Estelle De La Cruz PA-C     • methotrexate  15 mg Oral Weekly Kapil Francisco PA-C     • metoprolol succinate  100 mg Oral Daily Kapil Francisco PA-C     • morphine injection  2 mg Intravenous Q2H PRN Estelle De La Cruz PA-C     • multivitamin-minerals  1 tablet Oral Daily Kapil Francisco PA-C     • nortriptyline  100 mg Oral HS Juani Johnson PA-C     • ondansetron  4 mg Intravenous Q6H PRN Kapil Francisco PA-C     • oxyCODONE  10 mg Oral Q4H PRN Kapil Francisco PA-C     • oxyCODONE  5 mg Oral Q4H PRN Kapil Francisco PA-C     • pantoprazole  40 mg Oral Daily Before Breakfast Kapil Francisco PA-C     • senna  1 tablet Oral Daily Kapil Francisco PA-C     • simethicone  80 mg Oral 4x Daily PRN Kapil Francisco PA-C     • venlafaxine  150 mg Oral HS Kapil Francisco PA-C          Today, Patient Was Seen By: Erin Rubin PA-C    **Please Note: This note may have been constructed using a voice recognition system. **

## 2023-07-31 NOTE — ASSESSMENT & PLAN NOTE
Lab Results   Component Value Date    HGB 8.8 (L) 07/31/2023    HGB 8.8 (L) 07/30/2023    HGB 8.9 (L) 07/29/2023    HGB 10.1 (L) 07/21/2023    HGB 13.1 06/18/2023   ABLA post op  No labs today check in AM   Monitor for stabilization post op   hgb stable today from yesterday at 8.8   Transfuse hgb <7

## 2023-07-31 NOTE — PLAN OF CARE
Problem: MOBILITY - ADULT  Goal: Maintain or return to baseline ADL function  Description: INTERVENTIONS:  -  Assess patient's ability to carry out ADLs; assess patient's baseline for ADL function and identify physical deficits which impact ability to perform ADLs (bathing, care of mouth/teeth, toileting, grooming, dressing, etc.)  - Assess/evaluate cause of self-care deficits   - Assess range of motion  - Assess patient's mobility; develop plan if impaired  - Assess patient's need for assistive devices and provide as appropriate  - Encourage maximum independence but intervene and supervise when necessary  - Involve family in performance of ADLs  - Assess for home care needs following discharge   - Consider OT consult to assist with ADL evaluation and planning for discharge  - Provide patient education as appropriate  Outcome: Progressing  Goal: Maintains/Returns to pre admission functional level  Description: INTERVENTIONS:  - Perform BMAT or MOVE assessment daily.   - Set and communicate daily mobility goal to care team and patient/family/caregiver.    - Collaborate with rehabilitation services on mobility goals if consulted  - Remind patient to turn and reposition every 2 hours while in bed and to weight shift every 15 minutes while in chair.   - Dangle patient 3 times a day  - Stand patient 3 times a day  - Ambulate patient 2 times a day  - Out of bed to chair 2 times a day   - Out of bed for meals  - Out of bed for toileting  - Record patient progress and toleration of activity level   Outcome: Progressing     Problem: PAIN - ADULT  Goal: Verbalizes/displays adequate comfort level or baseline comfort level  Description: Interventions:  - Encourage patient to monitor pain and request assistance  - Assess pain using appropriate pain scale  - Administer analgesics based on type and severity of pain and evaluate response  - Implement non-pharmacological measures as appropriate and evaluate response  - Consider cultural and social influences on pain and pain management  - Notify physician/advanced practitioner if interventions unsuccessful or patient reports new pain  Outcome: Progressing     Problem: SAFETY ADULT  Goal: Maintain or return to baseline ADL function  Description: INTERVENTIONS:  -  Assess patient's ability to carry out ADLs; assess patient's baseline for ADL function and identify physical deficits which impact ability to perform ADLs (bathing, care of mouth/teeth, toileting, grooming, dressing, etc.)  - Assess/evaluate cause of self-care deficits   - Assess range of motion  - Assess patient's mobility; develop plan if impaired  - Assess patient's need for assistive devices and provide as appropriate  - Encourage maximum independence but intervene and supervise when necessary  - Involve family in performance of ADLs  - Assess for home care needs following discharge   - Consider OT consult to assist with ADL evaluation and planning for discharge  - Provide patient education as appropriate  Outcome: Progressing  Goal: Maintains/Returns to pre admission functional level  Description: INTERVENTIONS:  - Perform BMAT or MOVE assessment daily.   - Set and communicate daily mobility goal to care team and patient/family/caregiver.    - Collaborate with rehabilitation services on mobility goals if consulted  - Remind patient to turn and reposition every 2 hours while in bed and to weight shift every 15 minutes while in chair.   - Dangle patient 3 times a day  - Stand patient 3 times a day  - Ambulate patient 2 times a day  - Out of bed to chair 2 times a day   - Out of bed for meals  - Out of bed for toileting  - Record patient progress and toleration of activity level   Outcome: Progressing  Goal: Patient will remain free of falls  Description: INTERVENTIONS:  - Educate patient/family on patient safety including physical limitations  - Instruct patient to call for assistance with activity   - Consult OT/PT to assist with strengthening/mobility   - Keep Call bell within reach  - Keep bed low and locked with side rails adjusted as appropriate  - Keep care items and personal belongings within reach  - Initiate and maintain comfort rounds  - Make Fall Risk Sign visible to staff  - Offer Toileting every 2 Hours, in advance of need  - Initiate/Maintain bed alarm  - Obtain necessary fall risk management equipment: walker nearby, alarm on  - Apply yellow socks and bracelet for high fall risk patients  - Consider moving patient to room near nurses station  Outcome: Progressing     Problem: DISCHARGE PLANNING  Goal: Discharge to home or other facility with appropriate resources  Description: INTERVENTIONS:  - Identify barriers to discharge w/patient and caregiver  - Arrange for needed discharge resources and transportation as appropriate  - Identify discharge learning needs (meds, wound care, etc.)  - Refer to Case Management Department for coordinating discharge planning if the patient needs post-hospital services based on physician/advanced practitioner order or complex needs related to functional status, cognitive ability, or social support system  Outcome: Progressing     Problem: Knowledge Deficit  Goal: Patient/family/caregiver demonstrates understanding of disease process, treatment plan, medications, and discharge instructions  Description: Complete learning assessment and assess knowledge base.   Interventions:  - Provide teaching at level of understanding  - Provide teaching via preferred learning methods  Outcome: Progressing     Problem: INFECTION - ADULT  Goal: Absence or prevention of progression during hospitalization  Description: INTERVENTIONS:  - Assess and monitor for signs and symptoms of infection  - Monitor lab/diagnostic results  - Monitor all insertion sites, i.e. indwelling lines, tubes, and drains  - Monitor endotracheal if appropriate and nasal secretions for changes in amount and color  - [place] appropriate cooling/warming therapies per order  - Administer medications as ordered  - Instruct and encourage patient and family to use good hand hygiene technique  - Identify and instruct in appropriate isolation precautions for identified infection/condition  Outcome: Progressing     Problem: Nutrition/Hydration-ADULT  Goal: Nutrient/Hydration intake appropriate for improving, restoring or maintaining nutritional needs  Description: Monitor and assess patient's nutrition/hydration status for malnutrition. Collaborate with interdisciplinary team and initiate plan and interventions as ordered. Monitor patient's weight and dietary intake as ordered or per policy. Utilize nutrition screening tool and intervene as necessary. Determine patient's food preferences and provide high-protein, high-caloric foods as appropriate.      INTERVENTIONS:  - Monitor oral intake, urinary output, labs, and treatment plans  - Assess nutrition and hydration status and recommend course of action  - Evaluate amount of meals eaten  - Allow adequate time for meals  - Recommend/ encourage appropriate diets, oral nutritional supplements, and vitamin/mineral supplements  - Assess need for intravenous fluids  - Provide specific nutrition/hydration education as appropriate  - Include patient/family/caregiver in decisions related to nutrition  Outcome: Progressing     Problem: MUSCULOSKELETAL - ADULT  Goal: Maintain proper alignment of affected body part  Description: INTERVENTIONS:  - Support, maintain and protect limb and body alignment  - Provide patient/ family with appropriate education  Outcome: Progressing

## 2023-07-31 NOTE — PROGRESS NOTES
Progress Note - Orthopedics   Stacey Brown 46 y.o. male MRN: [id number]  Unit/Bed#: E2 -01      Subjective:    46 y.o.male POD 3 right TKA revision with articulating spacer for PJI. No acute overnight events, no new complaints. The patient states that he is doing okay overall and has been able to ambulate with his KI in place. He denies fevers, chills, CP, SOB, N/V, or numbness or tingling. He is anticipating getting his PICC placed today. He states that the drain has not been producing much output.     Labs:  0   Lab Value Date/Time    HCT 27.4 (L) 07/31/2023 0500    HCT 27.5 (L) 07/30/2023 0438    HCT 28.7 (L) 07/29/2023 0506    HGB 8.8 (L) 07/31/2023 0500    HGB 8.8 (L) 07/30/2023 0438    HGB 8.9 (L) 07/29/2023 0506    INR 1.07 07/21/2023 0846    WBC 7.21 07/31/2023 0500    WBC 7.08 07/30/2023 0438    WBC 8.01 07/29/2023 0506    ESR 48 (H) 06/18/2023 1108    .2 (H) 06/18/2023 1108       Meds:    Current Facility-Administered Medications:   •  acetaminophen (TYLENOL) tablet 650 mg, 650 mg, Oral, Q4H PRN, Vega Christian PA-C, 650 mg at 07/30/23 2012  •  acetaminophen (TYLENOL) tablet 975 mg, 975 mg, Oral, Q8H [address], Prudence Trevizo PA-C, 975 mg at 07/31/23 1343  •  ascorbic acid (VITAMIN C) tablet 500 mg, 500 mg, Oral, BID With Meals, Prudence Trevizo PA-C, 500 mg at 07/31/23 6057  •  aspirin (ECOTRIN LOW STRENGTH) EC tablet 81 mg, 81 mg, Oral, BID, Prudence Trevizo PA-C, 81 mg at 07/31/23 3308  •  busPIRone (BUSPAR) tablet 7.5 mg, 7.5 mg, Oral, BID, Prudence Trevizo PA-C, 7.5 mg at 07/31/23 0296  •  calcium carbonate (TUMS) chewable tablet 1,000 mg, 1,000 mg, Oral, Daily PRN, Vega Christian PA-C  •  ceFAZolin (ANCEF) IVPB (premix in dextrose) 2,000 mg 50 mL, 2,000 mg, Intravenous, Q8H, Vega Christian PA-C, Last Rate: 100 mL/hr at 07/31/23 1344, 2,000 mg at 07/31/23 1344  •  docusate sodium (COLACE) capsule 100 mg, 100 mg, Oral, BID, Prudence Trevizo PA-C, 100 mg at 69/02/15 0609  •  folic acid (FOLVITE) tablet 1 mg, 1 mg, Oral, Daily, Prudence Trevizo PA-C, 1 mg at 07/31/23 0841  •  gabapentin (NEURONTIN) capsule 300 mg, 300 mg, Oral, HS, Prudence Trevizo PA-C, 300 mg at 07/30/23 2243  •  methocarbamol (ROBAXIN) tablet 500 mg, 500 mg, Oral, Q6H PRN, Adelita Espino PA-C, 500 mg at 07/31/23 4088  •  methotrexate tablet 15 mg, 15 mg, Oral, Weekly, Prudence Trevizo PA-C, 15 mg at 07/30/23 6545  •  metoprolol succinate (TOPROL-XL) 24 hr tablet 100 mg, 100 mg, Oral, Daily, Prudence Trevizo PA-C, 100 mg at 07/31/23 7813  •  morphine injection 2 mg, 2 mg, Intravenous, Q2H PRN, Adelita Espino PA-C, 2 mg at 07/30/23 2233  •  multivitamin-minerals (CENTRUM) tablet 1 tablet, 1 tablet, Oral, Daily, Vega Christian PA-C, 1 tablet at 07/31/23 0809  •  nortriptyline (PAMELOR) capsule 100 mg, 100 mg, Oral, HS, Juani Johnson PA-C, 100 mg at 07/30/23 2243  •  ondansetron (ZOFRAN) injection 4 mg, 4 mg, Intravenous, Q6H PRN, Vega Christian PA-C  •  oxyCODONE (ROXICODONE) immediate release tablet 10 mg, 10 mg, Oral, Q4H PRN, Prudence Trevizo PA-C, 10 mg at 07/31/23 1343  •  oxyCODONE (ROXICODONE) IR tablet 5 mg, 5 mg, Oral, Q4H PRN, Vega Christian PA-C, 5 mg at 07/30/23 0142  •  pantoprazole (PROTONIX) EC tablet 40 mg, 40 mg, Oral, Daily Before Breakfast, Vega Christian PA-C, 40 mg at 07/31/23 0041  •  senna (SENOKOT) tablet 8.6 mg, 1 tablet, Oral, Daily, Prudence Trevizo PA-C, 8.6 mg at 07/31/23 2787  •  simethicone (MYLICON) chewable tablet 80 mg, 80 mg, Oral, 4x Daily PRN, Vgea Christian PA-C  •  venlafaxine [facility] 24 hr capsule 150 mg, 150 mg, Oral, HS, Prudence Trevizo PA-C, 150 mg at 07/30/23 3463    Blood Culture:   No results found for: "BLOODCX"    Wound Culture:   No results found for: "WOUNDCULT"    Ins and Outs:  I/O last 24 hours:   In: 140 [I.V.:40; IV Piggyback:100]  Out: 2382 [Urine:1700; Drains:42]          Physical:  Vitals:    07/31/23 0700   BP: 119/75   Pulse: 83   Resp: 20 Temp: 98.2 °F (36.8 °C)   SpO2: 97%     Musculoskeletal: right Lower Extremity  • Dressing C/D/I with KI appropriately placed. • The patient's drain has a few cc of serosanguinous fluid present without any purulence. Drain removed at the bedside and pressure dressing placed. · TTP over the knee  · Sensation intact over the toes  · Motor intact to +FHL/EHL, +ankle dorsi/plantar flexion  · Digits warm and well perfused  · Capillary refill < 2 seconds    Assessment:    48 y.o.male POD 3 right TKA revision with articulating spacer for PJI with Dr. Angie Clark. Patient is doing well. Plan:  • WBAT RLE in knee immobilizer. Keep knee immobilizer in place at all times. • Infection  ? Several cultures were taken in the operating room. Final cultures show growth of Staphylococcus aureus (MSSA). ? The patient is currently on IV ancef q 8 hours. ID agrees with this regimen and now that cultures are finalized, will continue with ancef. ? Patient due to undergo PICC placement today. ? The patient's drain was removed at the bedside today and pressure dressing placed. • Will monitor for ABLA and administer IVF/prbc as indicated for greater than 2 gram Hgb drop or Hgb < 7. Hgb this morning is 8.8. No signs of bleeding in the operative extremity. Will continue to monitor. • PT/OT  • Pain control  • DVT ppx with aspirin 81 mg BID  • Dispo: Ortho will follow. Anticipate D/C after PICC placed, possibly tomorrow, ID final recs on antibiotics have been made. • Follow up with Dr. Angie Clark in the office in 14 days post op.       Wendy Soler PA-C

## 2023-07-31 NOTE — ASSESSMENT & PLAN NOTE
· Admitted under orthopedic service for infection of right total knee replacement   · S/p right total knee revision explant, I&D down to and including bone, insertion of biodegradable device 7/28   · Infectious Disease consulted for ongoing antibiotic recommendations   · Management per primary team, ortho   · S/p PICC 7/31   · On ASA 81 mg bid for 4 weeks   · Follow cultures - currently growing MSSA   · Currently on high dose IV ancef with anticipation for 6 weeks of IV antibitoics   · Discussed with ID yesterday   · Notified CM of plan for 6 weeks IV abx , they are working on home VNA   · Discharge planning per ID and Ortho   · Okay for discharge from [facility] once arrangements made for home abx

## 2023-07-31 NOTE — ASSESSMENT & PLAN NOTE
· Continue toprol  mg daily   · Holding  lisinopril 10 mg daily as BP remains stable at this time , resume when appropriate to avoid hypotension   · Monitor VS   · Renal function stable , check BMP in AM

## 2023-07-31 NOTE — PHYSICAL THERAPY NOTE
07/31/23 1426   PT Last Visit   PT Visit Date 07/31/23   Note Type   Note Type Cancelled Session   Cancel Reasons Other  (having PICC line placed)     Physical Therapy Cancellation Note    Chart review performed. At this time, PT treatment session cancelled , patient having a PICC line placed. PT will follow and provide PT interventions as appropriate.     Lawanda Archuleta, PTA

## 2023-07-31 NOTE — PROGRESS NOTES
Progress Note - Infectious Disease   Ernestine Brown 46 y.o. male MRN: [id number]  Unit/Bed#: E2 [facility] [place]-01 Encounter: [id number]      Impression/Plan:    1. Right knee PJI. Patient had aspiration in outpatient orthopedic surgery office which showed MSSA on Synovasure. Unclear if bacteria was introduced to knee during quadriceps repair, if it entered through skin breakdown from his plaque psoriasis, or from a wound on the R knee in 5/2023 after a fall. His infection is complicated by patient being on chronic methotrexate. Patient is now s/p R total knee arthroplasty, I&D down to and including bone, and insertion of biodegradable drug delivery device abx spacer (stimulan) 7/28/23. Gross purulence was noted and sinus tract was noted in deft in quadriceps tendon to the anterior knee. Multiple operative cultures growing MSSA. -continue IV Cefazolin 2g every 8 hours  -Recommend a 6-week course of IV cefazolin, through 9/7/2023. Prescription provided to case management  -Hold on initiation of rifampin due to drug drug interactions  -Ortho planning for two-stage exchange. After 6 weeks IV antibiotics they are planning for a 2-week antibiotic free holiday, followed by reaspiration and then reimplantation of hardware if infection free  -Weekly CBC with differential and BMP on IV antibiotics  -Okay for PICC line placement  -Will require ID follow-up 2 weeks after discharge, office will coordinate  -monitor vitals  -serial R knee exams  -local surgical site care per orthopedic surgery  -continue follow up with orthopedic surgery     2. Falls. Patient with fall after his R TKA revision which resulted in a quadriceps tear and required surgical repair. He additionally fell in 5/2023 which resulted in a wound to his knee. Traumatic injury could be source of his bacterial infection above.     3. Rheumatoid arthritis and plaque psoriasis. Patient on chronic methotrexate.  This is risk factor for complications with healing and infection. SLIM is following for medical management.  -continue follow up with [facility]     Above management plan discussed with orthopedic surgery and the patient at bedside. ID will follow. Antibiotics:  Postop day 3 Cefazolin    Subjective: The patient is overall feeling okay today. He has had some pain in the right knee but denies any fever, chills. Tolerating antibiotics without nausea, vomiting, diarrhea. Objective:  Vitals:  Temp:  [97.1 °F (36.2 °C)-98.2 °F (36.8 °C)] 98 °F (36.7 °C)  HR:  [76-83] 77  Resp:  [18-20] 18  BP: (119-131)/(75-84) 123/78  SpO2:  [97 %-98 %] 98 %  Temp (24hrs), Av.8 °F (36.6 °C), Min:97.1 °F (36.2 °C), Max:98.2 °F (36.8 °C)  Current: Temperature: 98 °F (36.7 °C)    Physical Exam:   General Appearance:  Alert, interactive, nontoxic, no acute distress. Throat: Oropharynx moist without lesions. Lungs:   Clear to auscultation bilaterally; no wheezes, rhonchi or rales; respirations unlabored   Heart:  RRR; no murmur, rub or gallop   Abdomen:   Soft, non-tender, non-distended, positive bowel sounds. Extremities: No clubbing, cyanosis or edema   Skin: No new rashes or lesions. No draining wounds noted. Labs:    All pertinent labs and imaging studies were personally reviewed  Results from last 7 days   Lab Units 23  0500 23  0438 23  0506   WBC Thousand/uL 7.21 7.08 8.01   HEMOGLOBIN g/dL 8.8* 8.8* 8.9*   PLATELETS Thousands/uL 407* 431* 437*     Results from last 7 days   Lab Units 23  0500 23  0438 23  0506   SODIUM mmol/L 133* 136 134*   POTASSIUM mmol/L 3.6 3.4* 3.6   CHLORIDE mmol/L 96 98 99   CO2 mmol/L 31 31 27   BUN mg/dL 10 8 9   CREATININE mg/dL 0.72 0.73 0.68   EGFR ml/min/1.73sq m 108 108 111   CALCIUM mg/dL 9.2 9.1 9.0                       Micro:  Results from last 7 days   Lab Units 23  1407 23  1347 23  1344   GRAM STAIN RESULT  Rare Polys  No bacteria seen 3+ Polys  No bacteria seen Rare Polys No bacteria seen  No Polys or Bacteria seen  Rare Polys  No bacteria seen       Imaging:  Pertinent imaging in PACS reviewed

## 2023-07-31 NOTE — PLAN OF CARE
Problem: INFECTION - ADULT  Goal: Absence or prevention of progression during hospitalization  Description: INTERVENTIONS:  Picc procedure and maintenance  - Assess and monitor for signs and symptoms of infection  - Monitor lab/diagnostic results  - Monitor all insertion sites, i.e. indwelling lines, tubes, and drains  - Monitor endotracheal if appropriate and nasal secretions for changes in amount and color  - [place] appropriate cooling/warming therapies per order  - Administer medications as ordered  - Instruct and encourage patient and family to use good hand hygiene technique  - Identify and instruct in appropriate isolation precautions for identified infection/condition  Outcome: Progressing

## 2023-07-31 NOTE — UTILIZATION REVIEW
NOTIFICATION OF INPATIENT ADMISSION   AUTHORIZATION REQUEST   SERVICING FACILITY:   [address] [place] [address]  Tax ID: [id number]  NPI: [id number] ATTENDING PROVIDER:  Attending Name and NPI#: Sunshine Solorio [[id number]]  Address: [address]  Phone: [phone]     ADMISSION INFORMATION:  [facility]patient [facility]   Place of Service Code: 21  Inpatient Admission Date/Time: 7/28/23  4:39 PM  Discharge Date/Time: No discharge date for patient encounter. 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  Contact for approvals/pending authorizations, clinical reviews, and discharge. PHYSICIAN ADVISORY SERVICES:  Medical Necessity Denial & Irqz-jb-Vdtu Review  Phone: [phone]  Fax: [phone]  Email: [email or web address]

## 2023-07-31 NOTE — PROCEDURES
Insert Complex Venous Access Line    Date/Time: 7/31/2023 2:52 PM    Performed by: Aditi Lorenzo RN  Authorized by: Brittney James PA-C    Patient location:  Bedside  Consent:     Consent obtained:  Written    Consent given by:  Patient    Procedural risks discussed: consent obtained by physician. [place] protocol:     Procedure explained and questions answered to patient or proxy's satisfaction: yes      Relevant documents present and verified: yes      Test results available and properly labeled: yes      Radiology Images displayed and confirmed. If images not available, report reviewed: yes      Required blood products, implants, devices, and special equipment available: yes      Site/side marked: yes      Immediately prior to procedure, a time out was called: yes      Patient identity confirmed:  Verbally with patient, arm band, provided demographic data and hospital-assigned identification number  Pre-procedure details:     Hand hygiene: Hand hygiene performed prior to insertion      Sterile barrier technique: All elements of maximal sterile technique followed      Skin preparation:  ChloraPrep    Skin preparation agent: Skin preparation agent completely dried prior to procedure    Indications:     PICC line indications: long term antibiotics    Anesthesia (see MAR for exact dosages):      Anesthesia method:  Local infiltration (3ml)    Local anesthetic:  Lidocaine 1% w/o epi  Procedure details:     Location:  Basilic    Vessel type: vein      Laterality:  Right    Approach: percutaneous technique used      Patient position:  Flat    Procedural supplies:  Single lumen    Catheter size:  4 Fr    Landmarks identified: yes      Ultrasound guidance: yes      Ultrasound image availability:  Not saved    Sterile ultrasound techniques: Sterile gel and sterile probe covers were used      Number of attempts:  1    Successful placement: yes      Vessel of catheter tip end:  Sherlock 3CG confirmed (sherlock 3cg procedure record confirmed placement sent to medical records)    Total catheter length (cm):  42    Catheter out on skin (cm):  0    Max flow rate:  999ml/hr    Arm circumference:  34cm  Post-procedure details:     Post-procedure:  Dressing applied and securement device placed    Assessment:  Blood return through all ports and free fluid flow (sherlock 3cg confirmed and picc okay to be utilized)    Post-procedure complications: none      Patient tolerance of procedure:   Tolerated well, no immediate complications  Comments:      ID confirmed pt cleared for picc placement  Lot#[id number] 2024-05-31

## 2023-07-31 NOTE — CASE MANAGEMENT
Case Management Discharge Planning Note    Patient name Stacey [facility]  Location East 2 /E2 -* MRN [id number]  : 1975 Date 2023       Current Admission Date: 2023  Current Admission Diagnosis:Infection of prosthetic right knee joint [facility])   Patient Active Problem List    Diagnosis Date Noted   • Prediabetes 2023   • Anemia 2023   • Difficult intubation 2023   • PONV (postoperative nausea and vomiting) 2023   • Infection of prosthetic right knee joint ([address]) 2023   • Anxiety 2023   • HTN (hypertension) 2023   • Migraine 2023   • History of arthroplasty of right knee 2023   • Plaque psoriasis 2023   • Status post revision of total replacement of right knee 2023      LOS (days): 3  Geometric Mean LOS (GMLOS) (days):   Days to GMLOS:     OBJECTIVE:  Risk of Unplanned Readmission Score: 8.5         Current admission status: Inpatient   Preferred Pharmacy:   [address], [place] [address]   [address] [place] [id number]  Phone: [phone] Fax: [phone]    Primary Care Provider: Isaías Odonnell MD    Primary Insurance: [facility]  Secondary Insurance:     DISCHARGE DETAILS:  Pt is medically stable and will require home infusion/IV abx. Referral made to [facility], [facility] via [facility] and has been accepted. Pt is from [place], [place] in [facility]. CM made referrals to numerous VNAs via [employer] however at this time we do not have an accepting agency. Most agencies are unavailable due to out of service area. CM updated the pt. CM will continue to follow.

## 2023-08-01 LAB
DME PARACHUTE DELIVERY DATE ACTUAL: NORMAL
DME PARACHUTE DELIVERY DATE REQUESTED: NORMAL
DME PARACHUTE DELIVERY NOTE: NORMAL
DME PARACHUTE ITEM DESCRIPTION: NORMAL
DME PARACHUTE ORDER STATUS: NORMAL
DME PARACHUTE SUPPLIER NAME: NORMAL
DME PARACHUTE SUPPLIER PHONE: NORMAL

## 2023-08-01 PROCEDURE — 97110 THERAPEUTIC EXERCISES: CPT

## 2023-08-01 PROCEDURE — 99232 SBSQ HOSP IP/OBS MODERATE 35: CPT | Performed by: STUDENT IN AN ORGANIZED HEALTH CARE EDUCATION/TRAINING PROGRAM

## 2023-08-01 PROCEDURE — 97535 SELF CARE MNGMENT TRAINING: CPT

## 2023-08-01 PROCEDURE — 99024 POSTOP FOLLOW-UP VISIT: CPT | Performed by: PHYSICIAN ASSISTANT

## 2023-08-01 PROCEDURE — 97530 THERAPEUTIC ACTIVITIES: CPT

## 2023-08-01 PROCEDURE — 97116 GAIT TRAINING THERAPY: CPT

## 2023-08-01 PROCEDURE — 99232 SBSQ HOSP IP/OBS MODERATE 35: CPT | Performed by: PHYSICIAN ASSISTANT

## 2023-08-01 RX ORDER — POLYETHYLENE GLYCOL 3350 17 G/17G
17 POWDER, FOR SOLUTION ORAL DAILY
Status: DISCONTINUED | OUTPATIENT
Start: 2023-08-01 | End: 2023-08-03 | Stop reason: HOSPADM

## 2023-08-01 RX ADMIN — ASPIRIN 81 MG: 81 TABLET, COATED ORAL at 17:56

## 2023-08-01 RX ADMIN — BUSPIRONE HYDROCHLORIDE 7.5 MG: 5 TABLET ORAL at 09:43

## 2023-08-01 RX ADMIN — CEFAZOLIN SODIUM 2000 MG: 2 SOLUTION INTRAVENOUS at 12:24

## 2023-08-01 RX ADMIN — SENNOSIDES 8.6 MG: 8.6 TABLET, FILM COATED ORAL at 08:50

## 2023-08-01 RX ADMIN — POLYETHYLENE GLYCOL 3350 17 G: 17 POWDER, FOR SOLUTION ORAL at 12:25

## 2023-08-01 RX ADMIN — MULTIPLE VITAMINS W/ MINERALS TAB 1 TABLET: TAB ORAL at 08:45

## 2023-08-01 RX ADMIN — ASPIRIN 81 MG: 81 TABLET, COATED ORAL at 08:45

## 2023-08-01 RX ADMIN — DOCUSATE SODIUM 100 MG: 100 CAPSULE, LIQUID FILLED ORAL at 08:45

## 2023-08-01 RX ADMIN — CEFAZOLIN SODIUM 2000 MG: 2 SOLUTION INTRAVENOUS at 20:11

## 2023-08-01 RX ADMIN — METHOCARBAMOL 500 MG: 500 TABLET ORAL at 17:55

## 2023-08-01 RX ADMIN — GABAPENTIN 300 MG: 300 CAPSULE ORAL at 22:36

## 2023-08-01 RX ADMIN — OXYCODONE HYDROCHLORIDE 5 MG: 5 TABLET ORAL at 08:51

## 2023-08-01 RX ADMIN — OXYCODONE HYDROCHLORIDE AND ACETAMINOPHEN 500 MG: 500 TABLET ORAL at 16:40

## 2023-08-01 RX ADMIN — ACETAMINOPHEN 325MG 975 MG: 325 TABLET ORAL at 05:54

## 2023-08-01 RX ADMIN — BUSPIRONE HYDROCHLORIDE 7.5 MG: 5 TABLET ORAL at 17:57

## 2023-08-01 RX ADMIN — OXYCODONE HYDROCHLORIDE 10 MG: 10 TABLET ORAL at 20:03

## 2023-08-01 RX ADMIN — PANTOPRAZOLE SODIUM 40 MG: 40 TABLET, DELAYED RELEASE ORAL at 05:54

## 2023-08-01 RX ADMIN — METHOCARBAMOL 500 MG: 500 TABLET ORAL at 12:38

## 2023-08-01 RX ADMIN — ACETAMINOPHEN 325MG 650 MG: 325 TABLET ORAL at 17:55

## 2023-08-01 RX ADMIN — CEFAZOLIN SODIUM 2000 MG: 2 SOLUTION INTRAVENOUS at 05:54

## 2023-08-01 RX ADMIN — OXYCODONE HYDROCHLORIDE 5 MG: 5 TABLET ORAL at 16:40

## 2023-08-01 RX ADMIN — ACETAMINOPHEN 325MG 650 MG: 325 TABLET ORAL at 12:24

## 2023-08-01 RX ADMIN — ACETAMINOPHEN 325MG 975 MG: 325 TABLET ORAL at 22:36

## 2023-08-01 RX ADMIN — VENLAFAXINE HYDROCHLORIDE 150 MG: 150 CAPSULE, EXTENDED RELEASE ORAL at 22:36

## 2023-08-01 RX ADMIN — NORTRIPTYLINE HYDROCHLORIDE 100 MG: 25 CAPSULE ORAL at 22:36

## 2023-08-01 RX ADMIN — OXYCODONE HYDROCHLORIDE AND ACETAMINOPHEN 500 MG: 500 TABLET ORAL at 08:46

## 2023-08-01 RX ADMIN — DOCUSATE SODIUM 100 MG: 100 CAPSULE, LIQUID FILLED ORAL at 17:56

## 2023-08-01 RX ADMIN — FOLIC ACID 1 MG: 1 TABLET ORAL at 08:45

## 2023-08-01 NOTE — OCCUPATIONAL THERAPY NOTE
Occupational Therapy Progress Note     Patient Name: Sonja Stewart  QOXTF'[id number] Date: 2023  Problem List  Principal Problem:    Infection of prosthetic right knee joint ([address])  Active Problems:    Anxiety    HTN (hypertension)    Migraine    Plaque psoriasis    Prediabetes    Anemia            23 1450   Note Type   Note Type Treatment   Pain Assessment   Pain Assessment Tool FLACC   Pain Rating: FLACC (Rest) - Face 0   Pain Rating: FLACC (Rest) - Legs 0   Pain Rating: FLACC (Rest) - Activity 0   Pain Rating: FLACC (Rest) - Cry 0   Restrictions/Precautions   Weight Bearing Precautions Per Order Yes   RLE Weight Bearing Per Order WBAT   Braces or Orthoses Knee immobilizer   Other Precautions WBS; Fall Risk;Multiple lines   ADL   Where Assessed Edge of bed   UB Bathing Assistance 6  Modified Independent   LB Bathing Assistance 6  Modified Independent   UB Dressing Assistance 6  Modified independent   LB Dressing Assistance 6  Modified independent   Functional Standing Tolerance   Time 1-2mins   Bed Mobility   Rolling R 6  Modified independent   Rolling L 6  Modified independent   Supine to Sit 6  Modified independent   Sit to Supine 6  Modified independent   Transfers   Sit to Stand 7  Independent   Stand to Sit 7  Independent   Functional Mobility   Functional Mobility 5  Supervision   Additional Comments 2* IV line   Additional items Rolling walker   Subjective   Subjective "It's different down that I'm able to put full weight on my leg."   Cognition   Overall Cognitive Status WFL   Arousal/Participation Alert   Attention Within functional limits   Orientation Level Oriented X4   Memory Within functional limits   Following Commands Follows all commands and directions without difficulty   Activity Tolerance   Activity Tolerance Patient tolerated treatment well   Medical Staff Made Aware nsg   Assessment   Assessment Pt seen for 24min tx session with focus on functional balance, functional mobility, ADL status, transfer safety, and education. Pt able to tolerate OOB mobility; sitting balance=g, standing balance=f+/f. Pt able to demonstrate good functional mobility and ADL status. Pt demonstrating good carrryover with R LE precautions(i.e.use of knee immobilizer, WBS). Good cognition noted(i.e.orientation, direction-following, memory, carryover). Pt states good family support in home environment w/o concerns for going directly home. Acute OT tx d/c'ed at this time 2* pt's limited functional deficits. The patient's raw score on the AM-PAC Daily Activity Inpatient Short Form is 24. A raw score of greater than or equal to 19 suggests the patient may benefit from discharge to home. Please refer to the recommendation of the Occupational Therapist for safe discharge planning. Plan   Treatment Interventions ADL retraining;Functional transfer training; Endurance training; Compensatory technique education   Goal Expiration Date 08/12/23   OT Treatment Day 1   OT Frequency Other (comment)  (d/c OT services)   Recommendation   OT Discharge Recommendation Home with home health rehabilitation  (home P. T.)   AM-PAC Daily Activity Inpatient   Lower Body Dressing 4   Bathing 4   Toileting 4   Upper Body Dressing 4   Grooming 4   Eating 4   Daily Activity Raw Score 24   Daily Activity Standardized Score (Calc for Raw Score >=11) 57.54   AM-PAC Applied Cognition Inpatient   Following a Speech/Presentation 4   Understanding Ordinary Conversation 4   Taking Medications 4   Remembering Where Things Are Placed or Put Away 4   Remembering List of 4-5 Errands 4   Taking Care of Complicated Tasks 4   Applied Cognition Raw Score 24   Applied Cognition Standardized Score 62.21     Lyndal Barthel

## 2023-08-01 NOTE — Clinical Note
The patient's raw score on the AM-PAC Daily Activity Inpatient Short Form is 24. A raw score of greater than or equal to 19 suggests the patient may benefit from discharge to home. Please refer to the recommendation of the Occupational Therapist for safe discharge planning.

## 2023-08-01 NOTE — PLAN OF CARE
Problem: MOBILITY - ADULT  Goal: Maintain or return to baseline ADL function  Description: INTERVENTIONS:  -  Assess patient's ability to carry out ADLs; assess patient's baseline for ADL function and identify physical deficits which impact ability to perform ADLs (bathing, care of mouth/teeth, toileting, grooming, dressing, etc.)  - Assess/evaluate cause of self-care deficits   - Assess range of motion  - Assess patient's mobility; develop plan if impaired  - Assess patient's need for assistive devices and provide as appropriate  - Encourage maximum independence but intervene and supervise when necessary  - Involve family in performance of ADLs  - Assess for home care needs following discharge   - Consider OT consult to assist with ADL evaluation and planning for discharge  - Provide patient education as appropriate  Outcome: Progressing  Goal: Maintains/Returns to pre admission functional level  Description: INTERVENTIONS:  - Perform BMAT or MOVE assessment daily.   - Set and communicate daily mobility goal to care team and patient/family/caregiver.    - Collaborate with rehabilitation services on mobility goals if consulted  - Remind patient to turn and reposition every 2 hours while in bed and to weight shift every 15 minutes while in chair.   - Dangle patient 3 times a day  - Stand patient 3 times a day  - Ambulate patient 2 times a day  - Out of bed to chair 2 times a day   - Out of bed for meals  - Out of bed for toileting  - Record patient progress and toleration of activity level   Outcome: Progressing     Problem: PAIN - ADULT  Goal: Verbalizes/displays adequate comfort level or baseline comfort level  Description: Interventions:  - Encourage patient to monitor pain and request assistance  - Assess pain using appropriate pain scale  - Administer analgesics based on type and severity of pain and evaluate response  - Implement non-pharmacological measures as appropriate and evaluate response  - Consider cultural and social influences on pain and pain management  - Notify physician/advanced practitioner if interventions unsuccessful or patient reports new pain  Outcome: Progressing     Problem: SAFETY ADULT  Goal: Maintain or return to baseline ADL function  Description: INTERVENTIONS:  -  Assess patient's ability to carry out ADLs; assess patient's baseline for ADL function and identify physical deficits which impact ability to perform ADLs (bathing, care of mouth/teeth, toileting, grooming, dressing, etc.)  - Assess/evaluate cause of self-care deficits   - Assess range of motion  - Assess patient's mobility; develop plan if impaired  - Assess patient's need for assistive devices and provide as appropriate  - Encourage maximum independence but intervene and supervise when necessary  - Involve family in performance of ADLs  - Assess for home care needs following discharge   - Consider OT consult to assist with ADL evaluation and planning for discharge  - Provide patient education as appropriate  Outcome: Progressing  Goal: Maintains/Returns to pre admission functional level  Description: INTERVENTIONS:  - Perform BMAT or MOVE assessment daily.   - Set and communicate daily mobility goal to care team and patient/family/caregiver.    - Collaborate with rehabilitation services on mobility goals if consulted  - Remind patient to turn and reposition every 2 hours while in bed and to weight shift every 15 minutes while in chair.   - Dangle patient 3 times a day  - Stand patient 3 times a day  - Ambulate patient 2 times a day  - Out of bed to chair 2 times a day   - Out of bed for meals  - Out of bed for toileting  - Record patient progress and toleration of activity level   Outcome: Progressing  Goal: Patient will remain free of falls  Description: INTERVENTIONS:  -  Assess patient's ability to carry out ADLs; assess patient's baseline for ADL function and identify physical deficits which impact ability to perform ADLs (bathing, care of mouth/teeth, toileting, grooming, dressing, etc.)  - Assess/evaluate cause of self-care deficits   - Assess range of motion  - Assess patient's mobility; develop plan if impaired  - Assess patient's need for assistive devices and provide as appropriate  - Encourage maximum independence but intervene and supervise when necessary  - Involve family in performance of ADLs  - Assess for home care needs following discharge   - Consider OT consult to assist with ADL evaluation and planning for discharge  - Provide patient education as appropriate  Outcome: Progressing     Problem: Knowledge Deficit  Goal: Patient/family/caregiver demonstrates understanding of disease process, treatment plan, medications, and discharge instructions  Description: Complete learning assessment and assess knowledge base. Interventions:  - Provide teaching at level of understanding  - Provide teaching via preferred learning methods  Outcome: Progressing     Problem: INFECTION - ADULT  Goal: Absence or prevention of progression during hospitalization  Description: INTERVENTIONS:  - Assess and monitor for signs and symptoms of infection  - Monitor lab/diagnostic results  - Monitor all insertion sites, i.e. indwelling lines, tubes, and drains  - Monitor endotracheal if appropriate and nasal secretions for changes in amount and color  - [place] appropriate cooling/warming therapies per order  - Administer medications as ordered  - Instruct and encourage patient and family to use good hand hygiene technique  - Identify and instruct in appropriate isolation precautions for identified infection/condition  Outcome: Progressing     Problem: Nutrition/Hydration-ADULT  Goal: Nutrient/Hydration intake appropriate for improving, restoring or maintaining nutritional needs  Description: Monitor and assess patient's nutrition/hydration status for malnutrition.  Collaborate with interdisciplinary team and initiate plan and interventions as ordered. Monitor patient's weight and dietary intake as ordered or per policy. Utilize nutrition screening tool and intervene as necessary. Determine patient's food preferences and provide high-protein, high-caloric foods as appropriate.      INTERVENTIONS:  - Monitor oral intake, urinary output, labs, and treatment plans  - Assess nutrition and hydration status and recommend course of action  - Evaluate amount of meals eaten  - Allow adequate time for meals  - Recommend/ encourage appropriate diets, oral nutritional supplements, and vitamin/mineral supplements  - Assess need for intravenous fluids  - Provide specific nutrition/hydration education as appropriate  - Include patient/family/caregiver in decisions related to nutrition  Outcome: Progressing

## 2023-08-01 NOTE — PROGRESS NOTES
[facility]  Progress Note  Name: Chelsy Harvey I  MRN: [id number]  Unit/Bed#: E2 -01 I Date of Admission: 7/28/2023   Date of Service: 8/1/2023 I Hospital Day: 4    Assessment/Plan   * Infection of prosthetic right knee joint [facility])  Assessment & Plan  · Admitted under orthopedic service for infection of right total knee replacement   · S/p right total knee revision explant, I&D down to and including bone, insertion of biodegradable device 7/28   · Infectious Disease consulted for ongoing antibiotic recommendations   · Management per primary team, ortho   · S/p PICC 7/31   · On ASA 81 mg bid for 4 weeks   · Follow cultures - currently growing MSSA   · Currently on high dose IV ancef with anticipation for 6 weeks of IV antibitoics   · Discussed with ID yesterday   · Notified CM of plan for 6 weeks IV abx , they are working on home VNA   · Discharge planning per ID and Ortho   · Okay for discharge from slim standpoint once arrangements made for home abx     Anemia  Assessment & Plan  Lab Results   Component Value Date    HGB 8.8 (L) 07/31/2023    HGB 8.8 (L) 07/30/2023    HGB 8.9 (L) 07/29/2023    HGB 10.1 (L) 07/21/2023    HGB 13.1 06/18/2023   ABLA post op  No labs today check in AM   Monitor for stabilization post op   hgb stable today from yesterday at 8.8   Transfuse hgb <7     Prediabetes  Assessment & Plan  A1c 6.2%   Monitor for hyperglycemia in setting of acute infection/stress   Lifestyle modifications     Plaque psoriasis  Assessment & Plan  · With psoriatic arthritis follows with Rheumatology outpt   · Maintained on methotrexate and folic acid supplementation     Migraine  Assessment & Plan  · maintained on nortriptyline 100 mg nightly   · No migraines at this time     HTN (hypertension)  Assessment & Plan  · Continue toprol  mg daily   · Holding  lisinopril 10 mg daily as BP remains stable at this time , resume when appropriate to avoid hypotension   · Monitor VS · Renal function stable , check BMP in AM     Anxiety  Assessment & Plan  Continue home medications: Buspar 7.5 mg bid and Effexor 150 mg qHS             VTE Pharmacologic Prophylaxis:   asa per ortho     Patient Centered Rounds: I performed bedside rounds with nursing staff today. Discussions with Specialists or Other Care Team Provider: SHUKRI    Education and Discussions with Family / Elizabeth Main at the bedside     Total Time Spent on Date of Encounter in care of patient: 25 minutes This time was spent on one or more of the following: performing physical exam; counseling and coordination of care; obtaining or reviewing history; documenting in the medical record; reviewing/ordering tests, medications or procedures; communicating with other healthcare professionals and discussing with patient's family/caregivers. Current Length of Stay: 4 day(s)  Current Patient Status: Inpatient   Certification Statement: The patient will continue to require additional inpatient hospital stay due to Infected right knee joint  Discharge Plan: Per Ortho infectious disease    Code Status: Level 1 - Full Code    Subjective:   Doing well. Pain controlled. No abdominal pain nausea vomiting diarrhea. No chest pain shortness of breath fevers or chills    Objective:     Vitals:   Temp (24hrs), Av.8 °F (36.6 °C), Min:97.2 °F (36.2 °C), Max:98.3 °F (36.8 °C)    Temp:  [97.2 °F (36.2 °C)-98.3 °F (36.8 °C)] 98.3 °F (36.8 °C)  HR:  [67-82] 67  Resp:  [18] 18  BP: (100-123)/(66-78) 100/66  SpO2:  [96 %-98 %] 98 %  Body mass index is 25.64 kg/m². Input and Output Summary (last 24 hours): Intake/Output Summary (Last 24 hours) at 2023 1132  Last data filed at 2023 8755  Gross per 24 hour   Intake --   Output 1050 ml   Net -1050 ml       Physical Exam:   Physical Exam  Vitals and nursing note reviewed. Cardiovascular:      Rate and Rhythm: Normal rate and regular rhythm.    Pulmonary:      Effort: Pulmonary effort is normal.      Breath sounds: Normal breath sounds. Abdominal:      General: Bowel sounds are normal.      Palpations: Abdomen is soft. Musculoskeletal:         General: Deformity (right LE dressing and brace ) present. Neurological:      Mental Status: He is alert. Mental status is at baseline.    Psychiatric:         Mood and Affect: Mood normal.          Additional Data:     Labs:  Results from last 7 days   Lab Units 07/31/23  0500   WBC Thousand/uL 7.21   HEMOGLOBIN g/dL 8.8*   HEMATOCRIT % 27.4*   PLATELETS Thousands/uL 407*     Results from last 7 days   Lab Units 07/31/23  0500   SODIUM mmol/L 133*   POTASSIUM mmol/L 3.6   CHLORIDE mmol/L 96   CO2 mmol/L 31   BUN mg/dL 10   CREATININE mg/dL 0.72   ANION GAP mmol/L 6   CALCIUM mg/dL 9.2   GLUCOSE RANDOM mg/dL 104         Results from last 7 days   Lab Units 07/28/23  1615 07/28/23  0938   POC GLUCOSE mg/dl 138 91               Lines/Drains:  Invasive Devices     Peripherally Inserted Central Catheter Line  Duration           PICC Line 54/39/59 Right Basilic <1 day          Peripheral Intravenous Line  Duration           Peripheral IV 07/30/23 Left Antecubital 1 day                Central Line:  Goal for removal: long term abx d/c after 6 weeks IV abx            Recent Cultures (last 7 days):   Results from last 7 days   Lab Units 07/28/23  1407 07/28/23  1347 07/28/23  1344   GRAM STAIN RESULT  Rare Polys  No bacteria seen 3+ Polys  No bacteria seen Rare Polys  No bacteria seen  No Polys or Bacteria seen  Rare Polys  No bacteria seen       Last 24 Hours Medication List:   Current Facility-Administered Medications   Medication Dose Route Frequency Provider Last Rate   • acetaminophen  650 mg Oral Q4H PRN Marvel Barnhart PA-C     • acetaminophen  975 mg Oral [facility] Marvel Barnhart PA-C     • ascorbic acid  500 mg Oral BID With Meals Marvel Barnhart PA-C     • aspirin  81 mg Oral BID Marvel Barnhart PA-C     • busPIRone  7.5 mg Oral BID Tonio tSeele CALIN Trevizo     • calcium carbonate  1,000 mg Oral Daily PRN Dinora Kumar PA-C     • cefazolin  2,000 mg Intravenous Q8H Dinora Kumar PA-C 2,000 mg (08/01/23 0554)   • docusate sodium  100 mg Oral BID Dinora Kumar PA-C     • folic acid  1 mg Oral Daily Dinora Kumar PA-C     • gabapentin  300 mg Oral HS Dinora Kumar PA-C     • methocarbamol  500 mg Oral Q6H PRN Samira CALIN Patel     • methotrexate  15 mg Oral Weekly Dinora Kumar PA-C     • metoprolol succinate  100 mg Oral Daily Dinora Kumar PA-C     • morphine injection  2 mg Intravenous Q2H PRN Samira Patel PA-C     • multivitamin-minerals  1 tablet Oral Daily Dinora Kumar PA-C     • nortriptyline  100 mg Oral HS Juani Johnson PA-C     • ondansetron  4 mg Intravenous Q6H PRN Dinora Kumar PA-C     • oxyCODONE  10 mg Oral Q4H PRN Dinora Kumar PA-C     • oxyCODONE  5 mg Oral Q4H PRN Dinora Kumar PA-C     • pantoprazole  40 mg Oral Daily Before Breakfast Dinora Kumar PA-C     • senna  1 tablet Oral Daily Dinora Kumar PA-C     • simethicone  80 mg Oral 4x Daily PRN Dinora Kumar PA-C     • venlafaxine  150 mg Oral HS Dinora Kumar PA-C          Today, Patient Was Seen By: Jaron Chicas PA-C    **Please Note: This note may have been constructed using a voice recognition system. **

## 2023-08-01 NOTE — PROGRESS NOTES
Orthopedics   Blayne Brown 46 y.o. male MRN: [id number]  Unit/Bed#: E2 -01      Subjective:  46 y.o.male post operative day 4 right revision total knee arthroplasty with articulating spacer. Pt doing well. Pain controlled - improved today. Patient has had his PICC placed. He jordan any acute complaints.      Labs:  0   Lab Value Date/Time    HCT 27.4 (L) 07/31/2023 0500    HCT 27.5 (L) 07/30/2023 0438    HCT 28.7 (L) 07/29/2023 0506    HGB 8.8 (L) 07/31/2023 0500    HGB 8.8 (L) 07/30/2023 0438    HGB 8.9 (L) 07/29/2023 0506    INR 1.07 07/21/2023 0846    WBC 7.21 07/31/2023 0500    WBC 7.08 07/30/2023 0438    WBC 8.01 07/29/2023 0506    ESR 48 (H) 06/18/2023 1108    .2 (H) 06/18/2023 1108       Meds:    Current Facility-Administered Medications:   •  acetaminophen (TYLENOL) tablet 650 mg, 650 mg, Oral, Q4H PRN, Selina Trevizo PA-C, 650 mg at 08/01/23 1224  •  acetaminophen (TYLENOL) tablet 975 mg, 975 mg, Oral, Q8H [address], Selina Trevizo PA-C, 975 mg at 08/01/23 5897  •  ascorbic acid (VITAMIN C) tablet 500 mg, 500 mg, Oral, BID With Meals, Selina Trevizo PA-C, 500 mg at 08/01/23 0846  •  aspirin (ECOTRIN LOW STRENGTH) EC tablet 81 mg, 81 mg, Oral, BID, Selina Trevizo PA-C, 81 mg at 08/01/23 0845  •  busPIRone (BUSPAR) tablet 7.5 mg, 7.5 mg, Oral, BID, Selina Trevizo PA-C, 7.5 mg at 08/01/23 9499  •  calcium carbonate (TUMS) chewable tablet 1,000 mg, 1,000 mg, Oral, Daily PRN, Gauri Boston PA-C  •  ceFAZolin (ANCEF) IVPB (premix in dextrose) 2,000 mg 50 mL, 2,000 mg, Intravenous, Q8H, Gauri Boston PA-C, Last Rate: 100 mL/hr at 08/01/23 1224, 2,000 mg at 08/01/23 1224  •  docusate sodium (COLACE) capsule 100 mg, 100 mg, Oral, BID, Selina Trevizo PA-C, 100 mg at 57/30/11 3173  •  folic acid (FOLVITE) tablet 1 mg, 1 mg, Oral, Daily, Selina Trevizo PA-C, 1 mg at 08/01/23 0845  •  gabapentin (NEURONTIN) capsule 300 mg, 300 mg, Oral, HS, Gauri Boston PA-C, 300 mg at 07/31/23 2152  • methocarbamol (ROBAXIN) tablet 500 mg, 500 mg, Oral, Q6H PRN, Karn Frankel, PA-C, 500 mg at 07/31/23 2152  •  methotrexate tablet 15 mg, 15 mg, Oral, Weekly, Syed Trevizo PA-C, 15 mg at 07/30/23 2362  •  metoprolol succinate (TOPROL-XL) 24 hr tablet 100 mg, 100 mg, Oral, Daily, Syed Trevizo PA-C, 100 mg at 07/31/23 0891  •  morphine injection 2 mg, 2 mg, Intravenous, Q2H PRN, Karn Frankel, PA-C, 2 mg at 07/31/23 1450  •  multivitamin-minerals (CENTRUM) tablet 1 tablet, 1 tablet, Oral, Daily, Brittney James PA-C, 1 tablet at 08/01/23 0845  •  nortriptyline (PAMELOR) capsule 100 mg, 100 mg, Oral, HS, Juani Johnson PA-C, 100 mg at 07/31/23 2151  •  ondansetron (ZOFRAN) injection 4 mg, 4 mg, Intravenous, Q6H PRN, Brittney James PA-C  •  oxyCODONE (ROXICODONE) immediate release tablet 10 mg, 10 mg, Oral, Q4H PRN, Brittney James PA-C, 10 mg at 07/31/23 2151  •  oxyCODONE (ROXICODONE) IR tablet 5 mg, 5 mg, Oral, Q4H PRN, Brittney James PA-C, 5 mg at 08/01/23 7196  •  pantoprazole (PROTONIX) EC tablet 40 mg, 40 mg, Oral, Daily Before Breakfast, Brittney James PA-C, 40 mg at 08/01/23 4662  •  polyethylene glycol (MIRALAX) packet 17 g, 17 g, Oral, Daily, Elvia Abel PA-C, 17 g at 08/01/23 1225  •  senna (SENOKOT) tablet 8.6 mg, 1 tablet, Oral, Daily, Syed Trevizo PA-C, 8.6 mg at 08/01/23 0850  •  simethicone (MYLICON) chewable tablet 80 mg, 80 mg, Oral, 4x Daily PRN, Brittney James PA-C  •  venlafaxine [facility] 24 hr capsule 150 mg, 150 mg, Oral, HS, Brittney James PA-C, 150 mg at 07/31/23 3852    Blood Culture:   No results found for: "BLOODCX"    Wound Culture:   No results found for: "WOUNDCULT"    Ins and Outs:  I/O last 24 hours:   In: -   Out: 7544 [Urine:1050]          Physical:  Vitals:    08/01/23 0800   BP: 100/66   Pulse:    Resp:    Temp:    SpO2:      right lower extremity:  · Dressings C/D/I  · + ankle plantarflexion and dorsiflexion  · Sensation intact distally  · 2+ dorsalis pedis pulse    _*_*_*_*_*_*_*_*_*_*_*_*_*_*_*_*_*_*_*_*_*_*_*_*_*_*_*_*_*_*_*_*_*_*_*_*_*_*_*_*_*    Assessment: 48 y.o.male post operative day 4 right revision total knee arthroplasty with spacer. Doing well    Plan:  · Weight Bearing as tolerated in KI  · Up and out of bed  · DVT prophylaxis  · Aspirin 81 mg BID x4 weeks  · IV abx via PICC line  · Ancef q8  · ID on board, recommending 6 weeks IV abx  · Analgesics  · PT/OT  · Patient noted to have acute blood loss anemia due to a drop in Hbg of > 2.0g from preop levels, will monitor vital signs and resuscitate with IV fluids as needed   · DC planning: patient will remain inpatient until 8/3 to arrange for home nursing and IV antibiotic delivery.     Carmina Huang PA-C

## 2023-08-01 NOTE — CASE MANAGEMENT
Case Management Assessment & Discharge Planning Note    Patient name Alfie Gamez [facility]  Location East 2 /E2 -* MRN [id number]  : 1975 Date 2023       Current Admission Date: 2023  Current Admission Diagnosis:Infection of prosthetic right knee joint [facility])   Patient Active Problem List    Diagnosis Date Noted   • Prediabetes 2023   • Anemia 2023   • Difficult intubation 2023   • PONV (postoperative nausea and vomiting) 2023   • Infection of prosthetic right knee joint ([address]) 2023   • Anxiety 2023   • HTN (hypertension) 2023   • Migraine 2023   • History of arthroplasty of right knee 2023   • Plaque psoriasis 2023   • Status post revision of total replacement of right knee 2023      LOS (days): 4  Geometric Mean LOS (GMLOS) (days):   Days to GMLOS:     OBJECTIVE:    Risk of Unplanned Readmission Score: 8.62         Current admission status: Inpatient       Preferred Pharmacy:   [address] [address]  Phone: [phone] Fax: [phone]    Primary Care Provider: Wen Guan MD    Primary Insurance: [facility]  Secondary Insurance:     ASSESSMENT:  [place] Proxies    There are no active Health Care Proxies on file. Patient Information  Admitted from[de-identified] Home  Mental Status: Alert  During Assessment patient was accompanied by: Not accompanied during assessment  Assessment information provided by[de-identified] Patient  Primary Caregiver: Self  Support Systems: Family members,  [address] of Residence: [address] [place] [address] do you live in?: Satin entry access options.  Select all that apply.: Stairs  Number of steps to enter home.: 10  Do the steps have railings?: Yes  Type of Current Residence: [address] [place] [address] home  Upon entering residence, is there a bedroom on the main floor (no further steps)?: Yes  Upon entering residence, is there a bathroom on the main floor (no further steps)?: Yes  In the last 12 months, was there a time when you were not able to pay the mortgage or rent on time?: No  In the last 12 months, how many places have you lived?: 1  In the last 12 months, was there a time when you did not have a steady place to sleep or slept in a shelter (including now)?: No  Homeless/housing insecurity resource given?: N/A  Living Arrangements: Lives w/ Extended Family    Activities of Daily Living Prior to Admission  Functional Status: Independent  Completes ADLs independently?: Yes  Ambulates independently?: Yes  Does patient use assisted devices?: No  Does patient currently own DME?: Yes  What DME does the patient currently own?: Arnulfo Ramseyer Chair  Does patient have a history of Outpatient Therapy (PT/OT)?: Yes  Does the patient have a history of Short-Term Rehab?: No  Does patient have a history of HHC?: No  Does patient currently have [address]?: No         Patient Information Continued  Income Source: Employed (works part time)  Does patient have prescription coverage?: Yes  Within the past 12 months, you worried that your food would run out before you got the money to buy more.: Never true  Within the past 12 months, the food you bought just didn't last and you didn't have money to get more.: Never true  Food insecurity resource given?: N/A  Does patient receive dialysis treatments?: No  Does patient have a history of substance abuse?: No  Does patient have a history of Mental Health Diagnosis?: Yes  Is patient receiving treatment for mental health?: Yes  Has patient received inpatient treatment related to mental health in the last 2 years?: No         Means of Transportation  Means of Transport to Appts[de-identified] Drives Self  In the past 12 months, has lack of transportation kept you from medical appointments or from getting medications?: No  In the past 12 months, has lack of transportation kept you from meetings, work, or from getting things needed for daily living?: No  Was application for public transport provided?: N/A        DISCHARGE DETAILS:    Discharge planning discussed with[de-identified] Patient  Freedom of Choice: Yes  Comments - Freedom of Choice: Agreeable to [facility] with home IV abx. Trying to find an accepting [facility]  CM contacted family/caregiver?: No- see comments (declined need)  Were Treatment Team discharge recommendations reviewed with patient/caregiver?: Yes  Did patient/caregiver verbalize understanding of patient care needs?: Yes  Were patient/caregiver advised of the risks associated with not following Treatment Team discharge recommendations?: Yes         [address]         Is the patient interested in [facility] at discharge?: Yes  [facility] requested[de-identified] [facility] Name[de-identified] Other  [facility] Provider[de-identified] PCP  Home Health Services Needed[de-identified] Administration of IV, IM or SC Medications  Homebound Criteria Met[de-identified] Uses an Assist Device (i.e. cane, walker, etc)  Supporting Clincal Findings[de-identified] Limited Endurance    DME Referral Provided  Referral made for DME?: Yes  DME referral completed for the following items[de-identified] Bedside Commode  DME Supplier Name[de-identified] [facility]    Other Referral/Resources/Interventions Provided:  Interventions: HHC, Home Infusion  Referral Comments: referrals placed via aidin         Treatment Team Recommendation: Home with [address]  Discharge Destination Plan[de-identified] Home with [address]                                         Additional Comments: Referrals placed for [facility] for SN in the home to assist with home IV abx. At this time, there is no accepting [facility] agency. CM reopened the home infusion referral to see if insurance works with [facility] and if they would be able to provide a nurse for the pt.

## 2023-08-01 NOTE — PLAN OF CARE
Problem: MOBILITY - ADULT  Goal: Maintain or return to baseline ADL function  Description: INTERVENTIONS:  -  Assess patient's ability to carry out ADLs; assess patient's baseline for ADL function and identify physical deficits which impact ability to perform ADLs (bathing, care of mouth/teeth, toileting, grooming, dressing, etc.)  - Assess/evaluate cause of self-care deficits   - Assess range of motion  - Assess patient's mobility; develop plan if impaired  - Assess patient's need for assistive devices and provide as appropriate  - Encourage maximum independence but intervene and supervise when necessary  - Involve family in performance of ADLs  - Assess for home care needs following discharge   - Consider OT consult to assist with ADL evaluation and planning for discharge  - Provide patient education as appropriate  Outcome: Progressing     Problem: PAIN - ADULT  Goal: Verbalizes/displays adequate comfort level or baseline comfort level  Description: Interventions:  - Encourage patient to monitor pain and request assistance  - Assess pain using appropriate pain scale  - Administer analgesics based on type and severity of pain and evaluate response  - Implement non-pharmacological measures as appropriate and evaluate response  - Consider cultural and social influences on pain and pain management  - Notify physician/advanced practitioner if interventions unsuccessful or patient reports new pain  Outcome: Progressing     Problem: DISCHARGE PLANNING  Goal: Discharge to home or other facility with appropriate resources  Description: INTERVENTIONS:  - Identify barriers to discharge w/patient and caregiver  - Arrange for needed discharge resources and transportation as appropriate  - Identify discharge learning needs (meds, wound care, etc.)  - Refer to Case Management Department for coordinating discharge planning if the patient needs post-hospital services based on physician/advanced practitioner order or complex needs related to functional status, cognitive ability, or social support system  Outcome: Progressing

## 2023-08-01 NOTE — PROGRESS NOTES
Progress Note - Infectious Disease   Preston Brown 46 y.o. male MRN: [id number]  Unit/Bed#: E2 -01 Encounter: [id number]      Impression/Plan:    1. Right knee PJI. Patient had aspiration in outpatient orthopedic surgery office which showed MSSA on Synovasure. Unclear if bacteria was introduced to knee during quadriceps repair, if it entered through skin breakdown from his plaque psoriasis, or from a wound on the R knee in 5/2023 after a fall. His infection is complicated by patient being on chronic methotrexate. Patient is now s/p R total knee arthroplasty, I&D down to and including bone, and insertion of biodegradable drug delivery device abx spacer (stimulan) 7/28/23.  All hardware and previous sutures from quadriceps repair removed. Gross purulence was noted and sinus tract was noted in deft in quadriceps tendon to the anterior knee. Multiple operative cultures growing MSSA. -continue IV Cefazolin 2g every 8 hours  -Recommend a 6-week course of IV cefazolin, through 9/7/2023. Prescription provided to case management  -Ortho planning for two-stage exchange. After 6 weeks IV antibiotics they are planning for a 2-week antibiotic free holiday, followed by reaspiration and then reimplantation of hardware if infection free  -Weekly CBC with differential and BMP on IV antibiotics  -Okay for PICC line placement  -ID follow up scheduled 8/16/23  -monitor vitals  -serial R knee exams  -local surgical site care per orthopedic surgery  -continue follow up with orthopedic surgery     2. Falls. Patient with fall after his R TKA revision which resulted in a quadriceps tear and required surgical repair. He additionally fell in 5/2023 which resulted in a wound to his knee. Traumatic injury could be source of his bacterial infection above.     3. Rheumatoid arthritis and plaque psoriasis. Patient on chronic methotrexate. This is risk factor for complications with healing and infection.  [facility] is following for medical management.  -continue follow up with [facility]     Above management plan discussed with orthopedic surgery and the patient at bedside. ID will follow. Antibiotics:  Postop day 4 Cefazolin    Subjective: The patient well. PICC placed yesterday. He does have pain in the right knee but he has been working with physical therapy. He denies fever, chills, nausea, vomiting, diarrhea. Objective:  Vitals:  Temp:  [97.2 °F (36.2 °C)-98.3 °F (36.8 °C)] 98.3 °F (36.8 °C)  HR:  [67-82] 67  Resp:  [18] 18  BP: (100-123)/(66-78) 100/66  SpO2:  [96 %-98 %] 98 %  Temp (24hrs), Av.8 °F (36.6 °C), Min:97.2 °F (36.2 °C), Max:98.3 °F (36.8 °C)  Current: Temperature: 98.3 °F (36.8 °C)    Physical Exam:   General Appearance:  Alert, interactive, nontoxic, no acute distress. Throat: Oropharynx moist without lesions. Lungs:   Clear to auscultation bilaterally; no wheezes, rhonchi or rales; respirations unlabored   Heart:  RRR; no murmur, rub or gallop   Abdomen:   Soft, non-tender, non-distended, positive bowel sounds. Extremities:  Right knee in immobilizer brace. Right upper extremity PICC in place   Skin: No new rashes or lesions. No draining wounds noted. Labs:    All pertinent labs and imaging studies were personally reviewed  Results from last 7 days   Lab Units 23  0500 23  0438 23  0506   WBC Thousand/uL 7.21 7.08 8.01   HEMOGLOBIN g/dL 8.8* 8.8* 8.9*   PLATELETS Thousands/uL 407* 431* 437*     Results from last 7 days   Lab Units 23  0500 23  0438 23  0506   SODIUM mmol/L 133* 136 134*   POTASSIUM mmol/L 3.6 3.4* 3.6   CHLORIDE mmol/L 96 98 99   CO2 mmol/L 31 31 27   BUN mg/dL 10 8 9   CREATININE mg/dL 0.72 0.73 0.68   EGFR ml/min/1.73sq m 108 108 111   CALCIUM mg/dL 9.2 9.1 9.0                       Micro:  Results from last 7 days   Lab Units 23  1407 23  1347 23  1344   GRAM STAIN RESULT  Rare Polys  No bacteria seen 3+ Polys  No bacteria seen Rare Polys  No bacteria seen  No Polys or Bacteria seen  Rare Polys  No bacteria seen       Imaging:  Pertinent imaging in PACS reviewed

## 2023-08-01 NOTE — PLAN OF CARE
Problem: OCCUPATIONAL THERAPY ADULT  Goal: Performs self-care activities at highest level of function for planned discharge setting. See evaluation for individualized goals. Description: Treatment Interventions: ADL retraining, Functional transfer training, Endurance training, Compensatory technique education  Equipment Recommended: Bedside commode       See flowsheet documentation for full assessment, interventions and recommendations. Outcome: Adequate for Discharge  Note: Limitation: Decreased ADL status, Decreased Safe judgement during ADL, Decreased UE strength, Decreased cognition, Decreased endurance, Decreased self-care trans, Decreased high-level ADLs  Prognosis: Good  Assessment: Pt seen for 24min tx session with focus on functional balance, functional mobility, ADL status, transfer safety, and education. Pt able to tolerate OOB mobility; sitting balance=g, standing balance=f+/f. Pt able to demonstrate good functional mobility and ADL status. Pt demonstrating good carrryover with R LE precautions(i.e.use of knee immobilizer, WBS). Good cognition noted(i.e.orientation, direction-following, memory, carryover). Pt states good family support in home environment w/o concerns for going directly home. Acute OT tx d/c'ed at this time 2* pt's limited functional deficits. The patient's raw score on the AM-PAC Daily Activity Inpatient Short Form is 24. A raw score of greater than or equal to 19 suggests the patient may benefit from discharge to home. Please refer to the recommendation of the Occupational Therapist for safe discharge planning. OT Discharge Recommendation: Home with home health rehabilitation (home P. T.)

## 2023-08-01 NOTE — UTILIZATION REVIEW
Continued Stay Review    Date: 8/1                          Current Patient Class: Ip Current Level of Care: MS    HPI:48 y.o. male initially admitted on 7/28 with infection prosthetic R knee joint. Assessment/Plan:   Pt to have PICC line placed today. Continues on h igh dose IV Cefazolin for 6 wks. Possible she will d/c to home and received IV services at home infusion services. On exam pt has no abnormal symptoms. Tolerating IV med well.       Vital Signs:   08/01/23 0800 -- -- -- 100/66 -- -- -- Sitting   08/01/23 0651 98.3 °F (36.8 °C) 67 18 101/68 80 98 % None (Room air) Lying   07/31/23 2010 97.2 °F (36.2 °C) Abnormal  82 18 108/69 83 96 % None (Room air) Lying   07/31/23 1915 -- -- -- -- -- -- None (Room air) --   07/31/23 1440 98 °F (36.7 °C) 77 18 123/78 -- 98 % None (Room air) --   07/31/23 0700 98.2 °F (36.8 °C) 83 20 119/75 -- 97 % None (Room air) Lying   07/30/23 2034 97.1 °F (36.2 °C) Abnormal  76 20 131/84 -- 98 % None (Room air) Lying   07/30/23 2012 -- -- -- -- -- -- None (Room air) --   07/30/23 1448 98.1 °F (36.7 °C) 85 18 124/81 99 97 % None (Room air) Lying   07/30/23 1058 98.5 °F (36.9 °C) 81 18 124/79 97 99 % None (Room air) Lying   07/30/23 0712 97.5 °F (36.4 °C) 86 18 119/74 90 94 % None (Room air) Lying   07/30/23 0251 98.1 °F (36.7 °C) 67 18 136/82 -- 96 % None (Room air) Lying     Pertinent Labs/Diagnostic Results:       Results from last 7 days   Lab Units 07/31/23  0500 07/30/23  0438 07/29/23  0506   WBC Thousand/uL 7.21 7.08 8.01   HEMOGLOBIN g/dL 8.8* 8.8* 8.9*   HEMATOCRIT % 27.4* 27.5* 28.7*   PLATELETS Thousands/uL 407* 431* 437*         Results from last 7 days   Lab Units 07/31/23  0500 07/30/23  0438 07/29/23  0506   SODIUM mmol/L 133* 136 134*   POTASSIUM mmol/L 3.6 3.4* 3.6   CHLORIDE mmol/L 96 98 99   CO2 mmol/L 31 31 27   ANION GAP mmol/L 6 7 8   BUN mg/dL 10 8 9   CREATININE mg/dL 0.72 0.73 0.68   EGFR ml/min/1.73sq m 108 108 111   CALCIUM mg/dL 9.2 9.1 9.0 Results from last 7 days   Lab Units 07/28/23  1615 07/28/23  0938   POC GLUCOSE mg/dl 138 91     Results from last 7 days   Lab Units 07/31/23  0500 07/30/23  0438 07/29/23  0506   GLUCOSE RANDOM mg/dL 104 92 106     Results from last 7 days   Lab Units 07/28/23  1407 07/28/23  1347 07/28/23  1344   GRAM STAIN RESULT  Rare Polys  No bacteria seen 3+ Polys  No bacteria seen Rare Polys  No bacteria seen  No Polys or Bacteria seen  Rare Polys  No bacteria seen     Medications:   Scheduled Medications:  acetaminophen, 975 mg, Oral, Q8H [facility]  ascorbic acid, 500 mg, Oral, BID With Meals  aspirin, 81 mg, Oral, BID  busPIRone, 7.5 mg, Oral, BID  cefazolin, 2,000 mg, Intravenous, Q8H  docusate sodium, 100 mg, Oral, BID  folic acid, 1 mg, Oral, Daily  gabapentin, 300 mg, Oral, HS  methotrexate, 15 mg, Oral, Weekly  metoprolol succinate, 100 mg, Oral, Daily  multivitamin-minerals, 1 tablet, Oral, Daily  nortriptyline, 100 mg, Oral, HS  pantoprazole, 40 mg, Oral, Daily Before Breakfast  polyethylene glycol, 17 g, Oral, Daily  senna, 1 tablet, Oral, Daily  venlafaxine, 150 mg, Oral, HS      Continuous IV Infusions:     PRN Meds:  acetaminophen, 650 mg, Oral, Q4H PRN  calcium carbonate, 1,000 mg, Oral, Daily PRN  methocarbamol, 500 mg, Oral, Q6H PRN - x 23 7/31, x 1 8/1  morphine injection, 2 mg, Intravenous, Q2H PRN - x 1 7/31 and d/c today  ondansetron, 4 mg, Intravenous, Q6H PRN  oxyCODONE, 10 mg, Oral, Q4H PRN - x 5 7/31  oxyCODONE, 5 mg, Oral, Q4H PRN - x 1 8/1  simethicone, 80 mg, Oral, 4x Daily PRN    Discharge Plan: poss home with home infusion services. Network Utilization Review Department  ATTENTION: Please call with any questions or concerns to [phone] and carefully listen to the prompts so that you are directed to the right person.  All voicemails are confidential.  [facility] all requests for admission clinical reviews, approved or denied determinations and any other requests to dedicated fax number below belonging to the campus where the patient is receiving treatment.  List of dedicated fax numbers for the Facilities:  [place] DENIALS (Administrative/Medical Necessity) [phone] [address] (Maternity/NICU/Pediatrics) [phone]   [address] [place] [address] [phone]   [facility] [address] [facility] [phone]   [address] [place] [address] [phone]   [address] [place] [address] [phone]

## 2023-08-01 NOTE — PHYSICAL THERAPY NOTE
PHYSICAL THERAPY TREATMENT NOTE  NAME:  Blayne Garcia [facility]  DATE: 08/01/23    Length Of Stay: 4  Performed at least 2 patient identifiers during session: Name and ID bracelet    TREATMENT:      08/01/23 0929   PT Last Visit   PT Visit Date 08/01/23   Note Type   Note Type Treatment   Pain Assessment   Pain Assessment Tool 0-10   Pain Score 5   Pain Location/Orientation Orientation: Right;Location: Knee   Pain Onset/Description Onset: Ongoing;Frequency: Constant/Continuous; Descriptor: Throbbing   Patient's Stated Pain Goal No pain   Hospital Pain Intervention(s) Repositioned; Ambulation/increased activity; Emotional support; Rest   Restrictions/Precautions   Weight Bearing Precautions Per Order Yes   RLE Weight Bearing Per Order WBAT   Other Precautions WBS; Fall Risk;Multiple lines;Pain  (knee immobilizer)   General   Chart Reviewed Yes   Family/Caregiver Present No   Cognition   Overall Cognitive Status WFL   Arousal/Participation Alert; Responsive; Cooperative   Attention Within functional limits   Orientation Level Oriented X4   Memory Within functional limits   Following Commands Follows all commands and directions without difficulty   Comments pleasant and cooperative   Subjective   Subjective agreeable to PT session   Bed Mobility   Supine to Sit 6  Modified independent   Additional items Assist x 1; Increased time required;Verbal cues   Sit to Supine 6  Modified independent   Additional items Increased time required;Verbal cues   Additional Comments use of LLE to assist R LE   Transfers   Sit to Stand 6  Modified independent   Additional items Increased time required;Verbal cues   Stand to Sit 6  Modified independent   Additional items Increased time required;Verbal cues   Additional Comments cues for proper hand placement and positioning of R LE   Ambulation/Elevation   Gait pattern Improper Weight shift;Decreased foot clearance;Decreased R stance; Short stride; Excessively slow   Gait Assistance 5  Supervision Additional items Verbal cues   Assistive Device Rolling walker   Distance 75 ftx1   Balance   Static Sitting Normal   Dynamic Sitting Good   Static Standing Fair +   Dynamic Standing Fair   Ambulatory Fair   Endurance Deficit   Endurance Deficit Yes   Activity Tolerance   Activity Tolerance Patient tolerated treatment well;Patient limited by pain   Nurse Made Aware yes   Exercises   Quad Sets Supine;15 reps;AROM; Right   Glute Sets Supine;15 reps;AROM; Bilateral   Ankle Pumps Supine;15 reps;AROM; Right   Assessment   Prognosis Good   Problem List Decreased strength;Decreased range of motion;Decreased endurance; Impaired balance;Decreased mobility; Decreased skin integrity;Orthopedic restrictions;Pain   Assessment Pt seen for PT treatment session this date with interventions consisting of gait training w/ emphasis on improving pt's ability to ambulate level surfaces x 75 ftx1 with close S provided by therapist with RW, Therapeutic exercise consisting of: AROM 15 reps R LE in supine position and therapeutic activity consisting of training: bed mobility, supine<>sit transfers and sit<>stand transfers. Pt agreeable to PT treatment session upon arrival, pt found supine in bed w/ HOB elevated, in no apparent distress and responsive. In comparison to previous session, pt with improvements in distance ambulated. Post session: pt returned BTB, all needs in reach and RN notified of session findings/recommendations. Continue to recommend home with outpatient rehabilitation at time of d/c in order to maximize pt's functional independence and safety w/ mobility. Pt continues to be functioning below baseline level. PT will continue to see pt during current hospitalization in order to address the deficits listed above and provide interventions consistent w/ POC in effort to achieve STGs.    Barriers to Discharge Inaccessible home environment   Barriers to Discharge Comments PARVEEN   Goals   STG Expiration Date 08/07/23   PT Treatment Day 3   Plan   Treatment/Interventions Functional transfer training;LE strengthening/ROM; Elevations; Therapeutic exercise; Endurance training;Patient/family training;Equipment eval/education; Bed mobility;Gait training; Compensatory technique education;Spoke to nursing   Progress Progressing toward goals   PT Frequency   (daily)   Recommendation   PT Discharge Recommendation Home with outpatient rehabilitation   AM-PAC Basic Mobility Inpatient   Turning in Flat Bed Without Bedrails 4   Lying on Back to Sitting on Edge of Flat Bed Without Bedrails 4   Moving Bed to Chair 4   Standing Up From Chair Using Arms 4   Walk in Room 4   Climb 3-5 Stairs With Railing 3   Basic Mobility Inpatient Raw Score 23   Basic Mobility Standardized Score 50.88   Highest Level Of Mobility   -HLM Goal 7: Walk 25 feet or more   JH-HLM Achieved 7: Walk 25 feet or more   Education   Education Provided Mobility training;Assistive device   Patient Demonstrates acceptance/verbal understanding   End of Consult   Patient Position at End of Consult Supine; All needs within reach       The patient's AM-PAC Basic Mobility Inpatient Short Form Raw Score is 23. A Raw score of greater than 16 suggests the patient may benefit from discharge to home. Please also refer to the recommendation of the Physical Therapist for safe discharge planning.       Manuel Andino, PTA,PTA

## 2023-08-01 NOTE — PLAN OF CARE
Problem: PHYSICAL THERAPY ADULT  Goal: Performs mobility at highest level of function for planned discharge setting. See evaluation for individualized goals. Description: Treatment/Interventions: Functional transfer training, Elevations, Therapeutic exercise, Endurance training, Patient/family training, Equipment eval/education, Bed mobility, Gait training, Compensatory technique education, Continued evaluation, Spoke to nursing  Equipment Recommended:  (has DME)       See flowsheet documentation for full assessment, interventions and recommendations. Outcome: Progressing  Note: Prognosis: Good  Problem List: Decreased strength, Decreased range of motion, Decreased endurance, Impaired balance, Decreased mobility, Decreased skin integrity, Orthopedic restrictions, Pain  Assessment: Pt seen for PT treatment session this date with interventions consisting of gait training w/ emphasis on improving pt's ability to ambulate level surfaces x 75 ftx1 with close S provided by therapist with RW, Therapeutic exercise consisting of: AROM 15 reps R LE in supine position and therapeutic activity consisting of training: bed mobility, supine<>sit transfers and sit<>stand transfers. Pt agreeable to PT treatment session upon arrival, pt found supine in bed w/ HOB elevated, in no apparent distress and responsive. In comparison to previous session, pt with improvements in distance ambulated. Post session: pt returned BTB, all needs in reach and RN notified of session findings/recommendations. Continue to recommend home with outpatient rehabilitation at time of d/c in order to maximize pt's functional independence and safety w/ mobility. Pt continues to be functioning below baseline level. PT will continue to see pt during current hospitalization in order to address the deficits listed above and provide interventions consistent w/ POC in effort to achieve STGs.   Barriers to Discharge: Inaccessible home environment  Barriers to Discharge Comments: PARVEEN  PT Discharge Recommendation: Home with outpatient rehabilitation    See flowsheet documentation for full assessment.

## 2023-08-02 PROBLEM — E44.1 MILD PROTEIN-CALORIE MALNUTRITION (HCC): Status: ACTIVE | Noted: 2023-08-02

## 2023-08-02 LAB
ANION GAP SERPL CALCULATED.3IONS-SCNC: 7 MMOL/L
BUN SERPL-MCNC: 11 MG/DL (ref 5–25)
CALCIUM SERPL-MCNC: 9.2 MG/DL (ref 8.4–10.2)
CHLORIDE SERPL-SCNC: 98 MMOL/L (ref 96–108)
CO2 SERPL-SCNC: 31 MMOL/L (ref 21–32)
CREAT SERPL-MCNC: 0.62 MG/DL (ref 0.6–1.3)
ERYTHROCYTE [DISTWIDTH] IN BLOOD BY AUTOMATED COUNT: 14.1 % (ref 11.6–15.1)
GFR SERPL CREATININE-BSD FRML MDRD: 115 ML/MIN/1.73SQ M
GLUCOSE SERPL-MCNC: 97 MG/DL (ref 65–140)
HCT VFR BLD AUTO: 28.1 % (ref 36.5–49.3)
HGB BLD-MCNC: 8.7 G/DL (ref 12–17)
MCH RBC QN AUTO: 29.3 PG (ref 26.8–34.3)
MCHC RBC AUTO-ENTMCNC: 31 G/DL (ref 31.4–37.4)
MCV RBC AUTO: 95 FL (ref 82–98)
PLATELET # BLD AUTO: 461 THOUSANDS/UL (ref 149–390)
PMV BLD AUTO: 8.6 FL (ref 8.9–12.7)
POTASSIUM SERPL-SCNC: 4 MMOL/L (ref 3.5–5.3)
RBC # BLD AUTO: 2.97 MILLION/UL (ref 3.88–5.62)
SODIUM SERPL-SCNC: 136 MMOL/L (ref 135–147)
WBC # BLD AUTO: 5.56 THOUSAND/UL (ref 4.31–10.16)

## 2023-08-02 PROCEDURE — 99232 SBSQ HOSP IP/OBS MODERATE 35: CPT | Performed by: STUDENT IN AN ORGANIZED HEALTH CARE EDUCATION/TRAINING PROGRAM

## 2023-08-02 PROCEDURE — 97116 GAIT TRAINING THERAPY: CPT

## 2023-08-02 PROCEDURE — 97530 THERAPEUTIC ACTIVITIES: CPT

## 2023-08-02 PROCEDURE — 97110 THERAPEUTIC EXERCISES: CPT

## 2023-08-02 PROCEDURE — 80048 BASIC METABOLIC PNL TOTAL CA: CPT | Performed by: PHYSICIAN ASSISTANT

## 2023-08-02 PROCEDURE — 99024 POSTOP FOLLOW-UP VISIT: CPT

## 2023-08-02 PROCEDURE — 85027 COMPLETE CBC AUTOMATED: CPT | Performed by: PHYSICIAN ASSISTANT

## 2023-08-02 RX ADMIN — ACETAMINOPHEN 325MG 975 MG: 325 TABLET ORAL at 13:28

## 2023-08-02 RX ADMIN — OXYCODONE HYDROCHLORIDE 10 MG: 10 TABLET ORAL at 15:30

## 2023-08-02 RX ADMIN — CEFAZOLIN SODIUM 2000 MG: 2 SOLUTION INTRAVENOUS at 13:28

## 2023-08-02 RX ADMIN — NORTRIPTYLINE HYDROCHLORIDE 100 MG: 25 CAPSULE ORAL at 21:14

## 2023-08-02 RX ADMIN — GABAPENTIN 300 MG: 300 CAPSULE ORAL at 21:14

## 2023-08-02 RX ADMIN — POLYETHYLENE GLYCOL 3350 17 G: 17 POWDER, FOR SOLUTION ORAL at 08:10

## 2023-08-02 RX ADMIN — ACETAMINOPHEN 325MG 975 MG: 325 TABLET ORAL at 21:13

## 2023-08-02 RX ADMIN — OXYCODONE HYDROCHLORIDE 10 MG: 10 TABLET ORAL at 06:11

## 2023-08-02 RX ADMIN — METOPROLOL SUCCINATE 100 MG: 50 TABLET, EXTENDED RELEASE ORAL at 08:11

## 2023-08-02 RX ADMIN — OXYCODONE HYDROCHLORIDE 5 MG: 5 TABLET ORAL at 10:47

## 2023-08-02 RX ADMIN — CEFAZOLIN SODIUM 2000 MG: 2 SOLUTION INTRAVENOUS at 06:00

## 2023-08-02 RX ADMIN — OXYCODONE HYDROCHLORIDE AND ACETAMINOPHEN 500 MG: 500 TABLET ORAL at 08:12

## 2023-08-02 RX ADMIN — FOLIC ACID 1 MG: 1 TABLET ORAL at 08:12

## 2023-08-02 RX ADMIN — DOCUSATE SODIUM 100 MG: 100 CAPSULE, LIQUID FILLED ORAL at 08:12

## 2023-08-02 RX ADMIN — BUSPIRONE HYDROCHLORIDE 7.5 MG: 5 TABLET ORAL at 08:12

## 2023-08-02 RX ADMIN — OXYCODONE HYDROCHLORIDE AND ACETAMINOPHEN 500 MG: 500 TABLET ORAL at 15:31

## 2023-08-02 RX ADMIN — OXYCODONE HYDROCHLORIDE 10 MG: 10 TABLET ORAL at 21:13

## 2023-08-02 RX ADMIN — MULTIPLE VITAMINS W/ MINERALS TAB 1 TABLET: TAB ORAL at 08:11

## 2023-08-02 RX ADMIN — DOCUSATE SODIUM 100 MG: 100 CAPSULE, LIQUID FILLED ORAL at 17:11

## 2023-08-02 RX ADMIN — VENLAFAXINE HYDROCHLORIDE 150 MG: 150 CAPSULE, EXTENDED RELEASE ORAL at 21:14

## 2023-08-02 RX ADMIN — ACETAMINOPHEN 325MG 975 MG: 325 TABLET ORAL at 06:10

## 2023-08-02 RX ADMIN — CEFAZOLIN SODIUM 2000 MG: 2 SOLUTION INTRAVENOUS at 21:13

## 2023-08-02 RX ADMIN — SENNOSIDES 8.6 MG: 8.6 TABLET, FILM COATED ORAL at 08:12

## 2023-08-02 RX ADMIN — ASPIRIN 81 MG: 81 TABLET, COATED ORAL at 08:12

## 2023-08-02 RX ADMIN — BUSPIRONE HYDROCHLORIDE 7.5 MG: 5 TABLET ORAL at 17:11

## 2023-08-02 RX ADMIN — ASPIRIN 81 MG: 81 TABLET, COATED ORAL at 17:11

## 2023-08-02 RX ADMIN — PANTOPRAZOLE SODIUM 40 MG: 40 TABLET, DELAYED RELEASE ORAL at 06:10

## 2023-08-02 RX ADMIN — METHOCARBAMOL 500 MG: 500 TABLET ORAL at 13:28

## 2023-08-02 NOTE — PHYSICAL THERAPY NOTE
PHYSICAL THERAPY NOTE          Patient Name: Ash Quarles  [id number]'[id number] Date: 8/2/2023 08/02/23 0945   Note Type   Note Type Treatment   Pain Assessment   Pain Assessment Tool 0-10   Pain Score 4   Pain Location/Orientation Orientation: Right;Location: Knee  (medail and lateral aspects)   Hospital Pain Intervention(s) Repositioned;Cold applied; Ambulation/increased activity; Emotional support; Rest   Restrictions/Precautions   RLE Weight Bearing Per Order WBAT   Braces or Orthoses Knee immobilizer  (on at all times)   Other Precautions WBS; Fall Risk;Pain   General   Chart Reviewed Yes   Family/Caregiver Present No   Cognition   Overall Cognitive Status WFL   Arousal/Participation Alert   Attention Within functional limits   Orientation Level Oriented X4   Memory Within functional limits   Following Commands Follows all commands and directions without difficulty   Subjective   Subjective Pt reports less pain and is agreeable to PT. Bed Mobility   Supine to Sit 6  Modified independent   Additional items Increased time required;HOB elevated   Sit to Supine 6  Modified independent   Additional items Increased time required;HOB elevated   Transfers   Sit to Stand 6  Modified independent   Stand to Sit 6  Modified independent   Ambulation/Elevation   Gait pattern Decreased R stance; Antalgic; Excessively slow;Decreased toe off   Gait Assistance 6  Modified independent   Assistive Device Rolling walker   Distance 115' x2   Stair Management Assistance 5  Supervision   Additional items Assist x 1; Increased time required   Stair Management Technique Two rails; Foreward;Nonreciprocal   Number of Stairs 10   Ambulation/Elevation Additional Comments Pt displays safe and correct stair climbing techniques and proper le sequencing.    Balance   Static Sitting Normal   Dynamic Sitting Normal   Static Standing Good   Dynamic Standing Fair +   Ambulatory Fair +   Exercises   Quad Sets Supine;10 reps;AROM; Bilateral   Glute Sets Supine;10 reps;AROM; Bilateral   Hip Flexion Supine;10 reps;AAROM; Right  (SLR)   Hip Abduction Supine;10 reps;AAROM; Right   Hip Adduction Supine;10 reps;AAROM; Right   Ankle Pumps Supine;10 reps;AROM; Bilateral   Equipment Use   Comments Pt issued written HEP reviewed and peformed with pt. Assessment   Prognosis Good   Problem List Decreased strength;Decreased range of motion; Impaired balance;Decreased mobility; Decreased skin integrity;Pain;Orthopedic restrictions   Assessment Pt seen for PT treatment session this date with interventions consisting of bed mobility, transfer training, gait training, stair training and HEP, and education provided as needed for safety and direction to improve functional mobility, safety awareness, and activity tolerance. Pt agreeable to PT treatment session upon arrival, pt found supine in bed . At end of session, pt left  Supine in bed with ice to R knee with all needs in reach. In comparison to previous session, pt with improvement in activity tolerance, endurance, ambulation distances, AM- pac score and functional mobility. Pt  continues to make good progress toward PT goals. Pt is ambulating > household distances and is able to negotiate 10 steps in order to enter home. Pt  demonstrates safe and proper bed mobility, transfer, gait and stair climbing techniques and is performing HEP with assistance for SLR and hip abd./add., pt tolerated well. Pt reports ability to don and doff R knee immobilizer. Pt  Is appropriate for d/c to  home with OPPT and home with family support at time of d/c in order to maximize pt's functional independence and safety w/ mobility. Pt continues to be functioning below baseline level. PT will continue to see pt while here in order to address the deficits listed above and provide interventions consistent w/ POC in effort to achieve STGs.     The patient's AM-PAC Basic Mobility Inpatient Short Form Raw Score is 24. A raw score greater than 16 suggests the patient may benefit from discharge to home. Please also refer to the recommendation of the Physical Therapist for safe discharge planning. Goals   Patient Goals to get back to work   STG Expiration Date 08/07/23   PT Treatment Day 4   Plan   Treatment/Interventions Functional transfer training;LE strengthening/ROM; Therapeutic exercise;Elevations; Endurance training;Patient/family training;Equipment eval/education; Bed mobility;Gait training;Spoke to nursing   Progress Improving as expected   Recommendation   PT Discharge Recommendation Home with outpatient rehabilitation   AM-PAC Basic Mobility Inpatient   Turning in Flat Bed Without Bedrails 4   Lying on Back to Sitting on Edge of Flat Bed Without Bedrails 4   Moving Bed to Chair 4   Standing Up From Chair Using Arms 4   Walk in Room 4   Climb 3-5 Stairs With Railing 4   Basic Mobility Inpatient Raw Score 24   Basic Mobility Standardized Score 57.68   Highest Level Of Mobility   JH-HLM Goal 8: Walk 250 feet or more   JH-HLM Achieved 7: Walk 25 feet or more   Education   Education Provided Mobility training;Home exercise program;Assistive device   Patient Demonstrates acceptance/verbal understanding   End of Consult   Patient Position at End of Consult Supine; All needs within reach   End of Consult Comments ice to R anterior and posterior knee and scd to L le.      08/02/23 0945   Note Type   Note Type Treatment   Pain Assessment   Pain Assessment Tool 0-10   Pain Score 4   Pain Location/Orientation Orientation: Right;Location: Knee  (medail and lateral aspects)   Hospital Pain Intervention(s) Repositioned;Cold applied; Ambulation/increased activity; Emotional support; Rest   Restrictions/Precautions   RLE Weight Bearing Per Order WBAT   Braces or Orthoses Knee immobilizer  (on at all times)   Other Precautions WBS; Fall Risk;Pain   General   Chart Reviewed Yes   Family/Caregiver Present No Cognition   Overall Cognitive Status WFL   Arousal/Participation Alert   Attention Within functional limits   Orientation Level Oriented X4   Memory Within functional limits   Following Commands Follows all commands and directions without difficulty   Subjective   Subjective Pt reports less pain and is agreeable to PT. Bed Mobility   Supine to Sit 6  Modified independent   Additional items Increased time required;HOB elevated   Sit to Supine 6  Modified independent   Additional items Increased time required;HOB elevated   Transfers   Sit to Stand 6  Modified independent   Stand to Sit 6  Modified independent   Ambulation/Elevation   Gait pattern Decreased R stance; Antalgic; Excessively slow;Decreased toe off   Gait Assistance 6  Modified independent   Assistive Device Rolling walker   Distance 115' x2   Stair Management Assistance 5  Supervision   Additional items Assist x 1; Increased time required   Stair Management Technique Two rails; Foreward;Nonreciprocal   Number of Stairs 10   Ambulation/Elevation Additional Comments Pt displays safe and correct stair climbing techniques and proper le sequencing. Balance   Static Sitting Normal   Dynamic Sitting Normal   Static Standing Good   Dynamic Standing Fair +   Ambulatory Fair +   Exercises   Quad Sets Supine;10 reps;AROM; Bilateral   Glute Sets Supine;10 reps;AROM; Bilateral   Hip Flexion Supine;10 reps;AAROM; Right  (SLR)   Hip Abduction Supine;10 reps;AAROM; Right   Hip Adduction Supine;10 reps;AAROM; Right   Ankle Pumps Supine;10 reps;AROM; Bilateral   Equipment Use   Comments Pt issued written HEP reviewed and peformed with pt. Assessment   Prognosis Good   Problem List Decreased strength;Decreased range of motion; Impaired balance;Decreased mobility; Decreased skin integrity;Pain;Orthopedic restrictions   Assessment Pt seen for PT treatment session this date with interventions consisting of bed mobility, transfer training, gait training, stair training and HEP, and education provided as needed for safety and direction to improve functional mobility, safety awareness, and activity tolerance. Pt agreeable to PT treatment session upon arrival, pt found supine in bed . At end of session, pt left  Supine in bed with ice to R knee with all needs in reach. In comparison to previous session, pt with improvement in activity tolerance, endurance, ambulation distances, AM- pac score and functional mobility. Pt  continues to make good progress toward PT goals. Pt is ambulating > household distances and is able to negotiate 10 steps in order to enter home. Pt  demonstrates safe and proper bed mobility, transfer, gait and stair climbing techniques and is performing HEP with assistance for SLR and hip abd./add., pt tolerated well. Pt reports ability to don and doff R knee immobilizer. Pt  Is appropriate for d/c to  home with OPPT and home with family support at time of d/c in order to maximize pt's functional independence and safety w/ mobility. Pt continues to be functioning below baseline level. PT will continue to see pt while here in order to address the deficits listed above and provide interventions consistent w/ POC in effort to achieve STGs. The patient's AM-[facility] Basic Mobility Inpatient Short Form Raw Score is 24. A raw score greater than 16 suggests the patient may benefit from discharge to home. Please also refer to the recommendation of the Physical Therapist for safe discharge planning. Goals   Patient Goals to get back to work   STG Expiration Date 08/07/23   PT Treatment Day 4   Plan   Treatment/Interventions Functional transfer training;LE strengthening/ROM; Therapeutic exercise;Elevations; Endurance training;Patient/family training;Equipment eval/education; Bed mobility;Gait training;Spoke to nursing   Progress Improving as expected   Recommendation   PT Discharge Recommendation Home with outpatient rehabilitation   AM-PAC Basic Mobility Inpatient   Turning in Flat Bed Without Bedrails 4   Lying on Back to Sitting on Edge of Flat Bed Without Bedrails 4   Moving Bed to Chair 4   Standing Up From Chair Using Arms 4   Walk in Room 4   Climb 3-5 Stairs With Railing 4   Basic Mobility Inpatient Raw Score 24   Basic Mobility Standardized Score 57.68   Highest Level Of Mobility   JH-HLM Goal 8: Walk 250 feet or more   JH-HLM Achieved 7: Walk 25 feet or more   Education   Education Provided Mobility training;Home exercise program;Assistive device   Patient Demonstrates acceptance/verbal understanding   End of Consult   Patient Position at End of Consult Supine; All needs within reach   End of Consult Comments ice to R anterior and posterior knee and scd to DINA Larsen, PTA

## 2023-08-02 NOTE — PROGRESS NOTES
Chart review completed  Patient awaiting home health agency for IV abx. Hemoglobin has remained stable this AM.   Blood pressure stable on Toprol XL, lisinopril on hold during hospital stay, can be resumed at discharge. Patient is medically stable for discharge from internal medicine standpoint. Recommend PCP follow up at time of discharge. Internal medicine will sign off.  Please call with questions

## 2023-08-02 NOTE — CASE MANAGEMENT
Case Management Discharge Planning Note    Patient name Manuel Aschoff [facility]  Location East 2 /E2 -* MRN [id number]  : 1975 Date 2023       Current Admission Date: 2023  Current Admission Diagnosis:Infection of prosthetic right knee joint [facility])   Patient Active Problem List    Diagnosis Date Noted   • Mild protein-calorie malnutrition ([address]) 2023   • Prediabetes 2023   • Anemia 2023   • Difficult intubation 2023   • PONV (postoperative nausea and vomiting) 2023   • Infection of prosthetic right knee joint ([address]) 2023   • Anxiety 2023   • HTN (hypertension) 2023   • Migraine 2023   • History of arthroplasty of right knee 2023   • Plaque psoriasis 2023   • Status post revision of total replacement of right knee 2023      LOS (days): 5  Geometric Mean LOS (GMLOS) (days):   Days to GMLOS:     OBJECTIVE:  Risk of Unplanned Readmission Score: 8.76         Current admission status: Inpatient   Preferred Pharmacy:   [address] , [facility] [address]  [address] [place] [id number]  Phone: [phone] Fax: [phone]    Primary Care Provider: Earl Jose MD    Primary Insurance: [facility]  Secondary Insurance:     DISCHARGE DETAILS:    Discharge planning discussed with[de-identified] Patient  Freedom of Choice: Yes  Comments - Freedom of Choice: Agreeable to [address] and IV abx. Explained only [address] able to service is [facility].  Pt agreeable  CM contacted family/caregiver?: No- see comments (declined need)  Were Treatment Team discharge recommendations reviewed with patient/caregiver?: Yes  Did patient/caregiver verbalize understanding of patient care needs?: Yes  Were patient/caregiver advised of the risks associated with not following Treatment Team discharge recommendations?: Yes         Requested [address]         Is the patient interested in [address] at discharge?: Yes  Home Health Discipline requested[de-identified] [facility] Name[de-identified] Other ([facility])  HHA External Referral Reason (only applicable if external HHA name selected): Services not provided in network or near patient location  [address] Provider[de-identified] Referring Provider  Home Health Services Needed[de-identified] Administration of IV, IM or SC Medications  Homebound Criteria Met[de-identified] Uses an Assist Device (i.e. cane, walker, etc)  Supporting Clincal Findings[de-identified] Limited Endurance    DME Referral Provided  Referral made for DME?: Yes  DME referral completed for the following items[de-identified] Bedside Commode  DME Supplier Name[de-identified] [employer]    Other Referral/Resources/Interventions Provided:  Interventions: HHC, DME, Home Infusion  Referral Comments: referrals placed via aidin         Treatment Team Recommendation: Home with [address] [facility]  Discharge Destination Plan[de-identified] Home with [address] [address] at Discharge : Family                       Accompanied by: Family member              Additional Comments: Referrals placed for [address] for SN in the home to assist with home IV abx. [facility] is the only one able to accept pt. Pt agreeable. [facility] able to have a nurse to the home 8/3. Render Risk Assessment In Note?: no Additional Notes: Paperwork signed and scanned in Detail Level: Simple

## 2023-08-02 NOTE — PLAN OF CARE
Problem: MOBILITY - ADULT  Goal: Maintain or return to baseline ADL function  Description: INTERVENTIONS:  -  Assess patient's ability to carry out ADLs; assess patient's baseline for ADL function and identify physical deficits which impact ability to perform ADLs (bathing, care of mouth/teeth, toileting, grooming, dressing, etc.)  - Assess/evaluate cause of self-care deficits   - Assess range of motion  - Assess patient's mobility; develop plan if impaired  - Assess patient's need for assistive devices and provide as appropriate  - Encourage maximum independence but intervene and supervise when necessary  - Involve family in performance of ADLs  - Assess for home care needs following discharge   - Consider OT consult to assist with ADL evaluation and planning for discharge  - Provide patient education as appropriate  Outcome: Progressing  Goal: Maintains/Returns to pre admission functional level  Description: INTERVENTIONS:  - Perform BMAT or MOVE assessment daily.   - Set and communicate daily mobility goal to care team and patient/family/caregiver.    - Collaborate with rehabilitation services on mobility goals if consulted  - Remind patient to turn and reposition every 2 hours while in bed and to weight shift every 15 minutes while in chair.   - Dangle patient 3 times a day  - Stand patient 3 times a day  - Ambulate patient 2 times a day  - Out of bed to chair 2 times a day   - Out of bed for meals  - Out of bed for toileting  - Record patient progress and toleration of activity level   Outcome: Progressing     Problem: PAIN - ADULT  Goal: Verbalizes/displays adequate comfort level or baseline comfort level  Description: Interventions:  - Encourage patient to monitor pain and request assistance  - Assess pain using appropriate pain scale  - Administer analgesics based on type and severity of pain and evaluate response  - Implement non-pharmacological measures as appropriate and evaluate response  - Consider cultural and social influences on pain and pain management  - Notify physician/advanced practitioner if interventions unsuccessful or patient reports new pain  Outcome: Progressing     Problem: SAFETY ADULT  Goal: Maintain or return to baseline ADL function  Description: INTERVENTIONS:  -  Assess patient's ability to carry out ADLs; assess patient's baseline for ADL function and identify physical deficits which impact ability to perform ADLs (bathing, care of mouth/teeth, toileting, grooming, dressing, etc.)  - Assess/evaluate cause of self-care deficits   - Assess range of motion  - Assess patient's mobility; develop plan if impaired  - Assess patient's need for assistive devices and provide as appropriate  - Encourage maximum independence but intervene and supervise when necessary  - Involve family in performance of ADLs  - Assess for home care needs following discharge   - Consider OT consult to assist with ADL evaluation and planning for discharge  - Provide patient education as appropriate  Outcome: Progressing  Goal: Maintains/Returns to pre admission functional level  Description: INTERVENTIONS:  - Perform BMAT or MOVE assessment daily.   - Set and communicate daily mobility goal to care team and patient/family/caregiver.    - Collaborate with rehabilitation services on mobility goals if consulted  - Remind patient to turn and reposition every 2 hours while in bed and to weight shift every 15 minutes while in chair.   - Dangle patient 3 times a day  - Stand patient 3 times a day  - Ambulate patient 2 times a day  - Out of bed to chair 2 times a day   - Out of bed for meals  - Out of bed for toileting  - Record patient progress and toleration of activity level   Outcome: Progressing  Goal: Patient will remain free of falls  Description: INTERVENTIONS:  - Educate patient/family on patient safety including physical limitations  - Instruct patient to call for assistance with activity   - Consult OT/PT to assist with strengthening/mobility   - Keep Call bell within reach  - Keep bed low and locked with side rails adjusted as appropriate  - Keep care items and personal belongings within reach  - Initiate and maintain comfort rounds  - Make Fall Risk Sign visible to staff  - Offer Toileting every 2 Hours, in advance of need  - Initiate/Maintain bed alarm  - Obtain necessary fall risk management equipment  - Apply yellow socks and bracelet for high fall risk patients  - Consider moving patient to room near nurses station  Outcome: Progressing     Problem: DISCHARGE PLANNING  Goal: Discharge to home or other facility with appropriate resources  Description: INTERVENTIONS:  - Identify barriers to discharge w/patient and caregiver  - Arrange for needed discharge resources and transportation as appropriate  - Identify discharge learning needs (meds, wound care, etc.)  - Refer to Case Management Department for coordinating discharge planning if the patient needs post-hospital services based on physician/advanced practitioner order or complex needs related to functional status, cognitive ability, or social support system  Outcome: Progressing     Problem: Knowledge Deficit  Goal: Patient/family/caregiver demonstrates understanding of disease process, treatment plan, medications, and discharge instructions  Description: Complete learning assessment and assess knowledge base. Interventions:  - Provide teaching at level of understanding  - Provide teaching via preferred learning methods  Outcome: Progressing     Problem: Nutrition/Hydration-ADULT  Goal: Nutrient/Hydration intake appropriate for improving, restoring or maintaining nutritional needs  Description: Monitor and assess patient's nutrition/hydration status for malnutrition. Collaborate with interdisciplinary team and initiate plan and interventions as ordered. Monitor patient's weight and dietary intake as ordered or per policy.  Utilize nutrition screening tool and intervene as necessary. Determine patient's food preferences and provide high-protein, high-caloric foods as appropriate.      INTERVENTIONS:  - Monitor oral intake, urinary output, labs, and treatment plans  - Assess nutrition and hydration status and recommend course of action  - Evaluate amount of meals eaten  - Allow adequate time for meals  - Recommend/ encourage appropriate diets, oral nutritional supplements, and vitamin/mineral supplements  - Assess need for intravenous fluids  - Provide specific nutrition/hydration education as appropriate  - Include patient/family/caregiver in decisions related to nutrition  Outcome: Progressing     Problem: MUSCULOSKELETAL - ADULT  Goal: Maintain proper alignment of affected body part  Description: INTERVENTIONS:  - Support, maintain and protect limb and body alignment  - Provide patient/ family with appropriate education  Outcome: Progressing     Problem: SKIN/TISSUE INTEGRITY - ADULT  Goal: Incision(s), wounds(s) or drain site(s) healing without S/S of infection  Description: INTERVENTIONS  - Assess and document dressing, incision, wound bed, drain sites and surrounding tissue  - Provide patient and family education  - Perform skin care/dressing changes per order  Outcome: Progressing     Problem: INFECTION - ADULT  Goal: Absence or prevention of progression during hospitalization  Description: INTERVENTIONS:  - Assess and monitor for signs and symptoms of infection  - Monitor lab/diagnostic results  - Monitor all insertion sites, i.e. indwelling lines, tubes, and drains  - Monitor endotracheal if appropriate and nasal secretions for changes in amount and color  - [place] appropriate cooling/warming therapies per order  - Administer medications as ordered  - Instruct and encourage patient and family to use good hand hygiene technique  - Identify and instruct in appropriate isolation precautions for identified infection/condition  Outcome: Progressing

## 2023-08-02 NOTE — PLAN OF CARE
Problem: PHYSICAL THERAPY ADULT  Goal: Performs mobility at highest level of function for planned discharge setting. See evaluation for individualized goals. Description: Treatment/Interventions: Functional transfer training, Elevations, Therapeutic exercise, Endurance training, Patient/family training, Equipment eval/education, Bed mobility, Gait training, Compensatory technique education, Continued evaluation, Spoke to nursing  Equipment Recommended:  (has DME)       See flowsheet documentation for full assessment, interventions and recommendations. Outcome: Adequate for Discharge  Note: Prognosis: Good  Problem List: Decreased strength, Decreased range of motion, Impaired balance, Decreased mobility, Decreased skin integrity, Pain, Orthopedic restrictions  Assessment: Pt seen for PT treatment session this date with interventions consisting of bed mobility, transfer training, gait training, stair training and HEP, and education provided as needed for safety and direction to improve functional mobility, safety awareness, and activity tolerance. Pt agreeable to PT treatment session upon arrival, pt found supine in bed . At end of session, pt left  Supine in bed with ice to R knee with all needs in reach. In comparison to previous session, pt with improvement in activity tolerance, endurance, ambulation distances, AM- pac score and functional mobility. Pt  continues to make good progress toward PT goals. Pt is ambulating > household distances and is able to negotiate 10 steps in order to enter home. Pt  demonstrates safe and proper bed mobility, transfer, gait and stair climbing techniques and is performing HEP with assistance for SLR and hip abd./add., pt tolerated well. Pt reports ability to don and doff R knee immobilizer. Pt  Is appropriate for d/c to  home with OPPT and home with family support at time of d/c in order to maximize pt's functional independence and safety w/ mobility.  Pt continues to be functioning below baseline level. PT will continue to see pt while here in order to address the deficits listed above and provide interventions consistent w/ POC in effort to achieve STGs. The patient's AM-PAC Basic Mobility Inpatient Short Form Raw Score is 24. A raw score greater than 16 suggests the patient may benefit from discharge to home. Please also refer to the recommendation of the Physical Therapist for safe discharge planning. Barriers to Discharge: Inaccessible home environment  Barriers to Discharge Comments: PARVEEN  PT Discharge Recommendation: Home with outpatient rehabilitation    See flowsheet documentation for full assessment.

## 2023-08-02 NOTE — PLAN OF CARE
Problem: MOBILITY - ADULT  Goal: Maintain or return to baseline ADL function  Description: INTERVENTIONS:  -  Assess patient's ability to carry out ADLs; assess patient's baseline for ADL function and identify physical deficits which impact ability to perform ADLs (bathing, care of mouth/teeth, toileting, grooming, dressing, etc.)  - Assess/evaluate cause of self-care deficits   - Assess range of motion  - Assess patient's mobility; develop plan if impaired  - Assess patient's need for assistive devices and provide as appropriate  - Encourage maximum independence but intervene and supervise when necessary  - Involve family in performance of ADLs  - Assess for home care needs following discharge   - Consider OT consult to assist with ADL evaluation and planning for discharge  - Provide patient education as appropriate  Outcome: Progressing  Goal: Maintains/Returns to pre admission functional level  Description: INTERVENTIONS:  - Perform BMAT or MOVE assessment daily.   - Set and communicate daily mobility goal to care team and patient/family/caregiver.    - Collaborate with rehabilitation services on mobility goals if consulted  - Remind patient to turn and reposition every 2 hours while in bed and to weight shift every 15 minutes while in chair.   - Dangle patient 3 times a day  - Stand patient 3 times a day  - Ambulate patient 2 times a day  - Out of bed to chair 2 times a day   - Out of bed for meals  - Out of bed for toileting  - Record patient progress and toleration of activity level   Outcome: Progressing     Problem: PAIN - ADULT  Goal: Verbalizes/displays adequate comfort level or baseline comfort level  Description: Interventions:  - Encourage patient to monitor pain and request assistance  - Assess pain using appropriate pain scale  - Administer analgesics based on type and severity of pain and evaluate response  - Implement non-pharmacological measures as appropriate and evaluate response  - Consider cultural and social influences on pain and pain management  - Notify physician/advanced practitioner if interventions unsuccessful or patient reports new pain  Outcome: Progressing     Problem: SAFETY ADULT  Goal: Maintain or return to baseline ADL function  Description: INTERVENTIONS:  -  Assess patient's ability to carry out ADLs; assess patient's baseline for ADL function and identify physical deficits which impact ability to perform ADLs (bathing, care of mouth/teeth, toileting, grooming, dressing, etc.)  - Assess/evaluate cause of self-care deficits   - Assess range of motion  - Assess patient's mobility; develop plan if impaired  - Assess patient's need for assistive devices and provide as appropriate  - Encourage maximum independence but intervene and supervise when necessary  - Involve family in performance of ADLs  - Assess for home care needs following discharge   - Consider OT consult to assist with ADL evaluation and planning for discharge  - Provide patient education as appropriate  Outcome: Progressing  Goal: Maintains/Returns to pre admission functional level  Description: INTERVENTIONS:  - Perform BMAT or MOVE assessment daily.   - Set and communicate daily mobility goal to care team and patient/family/caregiver.    - Collaborate with rehabilitation services on mobility goals if consulted  - Remind patient to turn and reposition every 2 hours while in bed and to weight shift every 15 minutes while in chair.   - Dangle patient 3 times a day  - Stand patient 3 times a day  - Ambulate patient 2 times a day  - Out of bed to chair 2 times a day   - Out of bed for meals  - Out of bed for toileting  - Record patient progress and toleration of activity level   Outcome: Progressing  Goal: Patient will remain free of falls  Description: INTERVENTIONS:  - Educate patient/family on patient safety including physical limitations  - Instruct patient to call for assistance with activity   - Consult OT/PT to assist with strengthening/mobility   - Keep Call bell within reach  - Keep bed low and locked with side rails adjusted as appropriate  - Keep care items and personal belongings within reach  - Initiate and maintain comfort rounds  - Make Fall Risk Sign visible to staff  - Offer Toileting every 2 Hours, in advance of need  - Initiate/Maintain be alarm  - Obtain necessary fall risk management equipment  - Apply yellow socks and bracelet for high fall risk patients  - Consider moving patient to room near nurses station  Outcome: Progressing     Problem: DISCHARGE PLANNING  Goal: Discharge to home or other facility with appropriate resources  Description: INTERVENTIONS:  - Identify barriers to discharge w/patient and caregiver  - Arrange for needed discharge resources and transportation as appropriate  - Identify discharge learning needs (meds, wound care, etc.)  - Refer to Case Management Department for coordinating discharge planning if the patient needs post-hospital services based on physician/advanced practitioner order or complex needs related to functional status, cognitive ability, or social support system  Outcome: Progressing     Problem: Knowledge Deficit  Goal: Patient/family/caregiver demonstrates understanding of disease process, treatment plan, medications, and discharge instructions  Description: Complete learning assessment and assess knowledge base. Interventions:  - Provide teaching at level of understanding  - Provide teaching via preferred learning methods  Outcome: Progressing     Problem: Nutrition/Hydration-ADULT  Goal: Nutrient/Hydration intake appropriate for improving, restoring or maintaining nutritional needs  Description: Monitor and assess patient's nutrition/hydration status for malnutrition. Collaborate with interdisciplinary team and initiate plan and interventions as ordered. Monitor patient's weight and dietary intake as ordered or per policy.  Utilize nutrition screening tool and intervene as necessary. Determine patient's food preferences and provide high-protein, high-caloric foods as appropriate.      INTERVENTIONS:  - Monitor oral intake, urinary output, labs, and treatment plans  - Assess nutrition and hydration status and recommend course of action  - Evaluate amount of meals eaten  - Allow adequate time for meals  - Recommend/ encourage appropriate diets, oral nutritional supplements, and vitamin/mineral supplements  - Assess need for intravenous fluids  - Provide specific nutrition/hydration education as appropriate  - Include patient/family/caregiver in decisions related to nutrition  Outcome: Progressing     Problem: MUSCULOSKELETAL - ADULT  Goal: Maintain proper alignment of affected body part  Description: INTERVENTIONS:  - Support, maintain and protect limb and body alignment  - Provide patient/ family with appropriate education  Outcome: Progressing     Problem: SKIN/TISSUE INTEGRITY - ADULT  Goal: Incision(s), wounds(s) or drain site(s) healing without S/S of infection  Description: INTERVENTIONS  - Assess and document dressing, incision, wound bed, drain sites and surrounding tissue  - Provide patient and family education  - Perform skin care/dressing changes per order  Outcome: Progressing     Problem: INFECTION - ADULT  Goal: Absence or prevention of progression during hospitalization  Description: INTERVENTIONS:  - Assess and monitor for signs and symptoms of infection  - Monitor lab/diagnostic results  - Monitor all insertion sites, i.e. indwelling lines, tubes, and drains  - Monitor endotracheal if appropriate and nasal secretions for changes in amount and color  - [place] appropriate cooling/warming therapies per order  - Administer medications as ordered  - Instruct and encourage patient and family to use good hand hygiene technique  - Identify and instruct in appropriate isolation precautions for identified infection/condition  Outcome: Progressing

## 2023-08-02 NOTE — PROGRESS NOTES
Progress Note - Infectious Disease   Lieutenant Adam Brown 46 y.o. male MRN: [id number]  Unit/Bed#: E2 [facility] [place]-01 Encounter: [id number]      Impression/Plan:    1. Right knee PJI. Patient had aspiration in outpatient orthopedic surgery office which showed MSSA on Synovasure. Unclear if bacteria was introduced to knee during quadriceps repair, if it entered through skin breakdown from his plaque psoriasis, or from a wound on the R knee in 5/2023 after a fall. His infection is complicated by patient being on chronic methotrexate. Patient is now s/p R total knee arthroplasty, I&D down to and including bone, and insertion of biodegradable drug delivery device abx spacer (stimulan) 7/28/23.  All hardware and previous sutures from quadriceps repair removed. Gross purulence was noted and sinus tract was noted in deft in quadriceps tendon to the anterior knee. Multiple operative cultures growing MSSA. -continue IV Cefazolin 2g every 8 hours  -Recommend a 6-week course of IV cefazolin, through 9/7/2023  -Ortho planning for two-stage exchange. After 6 weeks IV antibiotics they are planning for a 2-week antibiotic free holiday, followed by reaspiration and then reimplantation of hardware if infection free  -Weekly CBC with differential and BMP on IV antibiotics  -Okay for PICC line placement  -ID follow up scheduled 8/16/23  -monitor vitals  -serial R knee exams  -local surgical site care per orthopedic surgery  -continue follow up with orthopedic surgery  - arranging home infusion     2. Falls. Patient with fall after his R TKA revision which resulted in a quadriceps tear and required surgical repair. He additionally fell in 5/2023 which resulted in a wound to his knee. Traumatic injury could be source of his bacterial infection above.     3. Rheumatoid arthritis and plaque psoriasis. Patient on chronic methotrexate. This is risk factor for complications with healing and infection.  [facility] is following for medical management.  -continue follow up with [facility]     Above management plan discussed with patient at bedside. ID will follow. Antibiotics:  Postop day 5 Cefazolin    Subjective: The patient well. He has some mild right knee pain but has been working with PT and is ambulating with the walker. No issues with the PICC. No fever, chills, nausea, vomiting. Objective:  Vitals:  Temp:  [97.1 °F (36.2 °C)-98.1 °F (36.7 °C)] 97.1 °F (36.2 °C)  HR:  [78-85] 78  Resp:  [18-20] 20  BP: (116-118)/(64-79) 116/76  SpO2:  [96 %-98 %] 96 %  Temp (24hrs), Av.6 °F (36.4 °C), Min:97.1 °F (36.2 °C), Max:98.1 °F (36.7 °C)  Current: Temperature: (!) 97.1 °F (36.2 °C)    Physical Exam:   General Appearance:  Alert, interactive, nontoxic, no acute distress. Throat: Oropharynx moist without lesions. Lungs:   Clear to auscultation bilaterally; no wheezes, rhonchi or rales; respirations unlabored   Heart:  RRR; no murmur, rub or gallop   Abdomen:   Soft, non-tender, non-distended, positive bowel sounds. Extremities:  Right knee in immobilizer brace. Right upper extremity PICC in place   Skin: No new rashes or lesions. No draining wounds noted. Labs:    All pertinent labs and imaging studies were personally reviewed  Results from last 7 days   Lab Units 23  0612 23  0500 23  0438   WBC Thousand/uL 5.56 7.21 7.08   HEMOGLOBIN g/dL 8.7* 8.8* 8.8*   PLATELETS Thousands/uL 461* 407* 431*     Results from last 7 days   Lab Units 23  0612 23  0500 23  0438   SODIUM mmol/L 136 133* 136   POTASSIUM mmol/L 4.0 3.6 3.4*   CHLORIDE mmol/L 98 96 98   CO2 mmol/L  31   BUN mg/dL 11 10 8   CREATININE mg/dL 0.62 0.72 0.73   EGFR ml/min/1.73sq m 115 108 108   CALCIUM mg/dL 9.2 9.2 9.1                       Micro:  Results from last 7 days   Lab Units 23  1407 23  1347 23  1344   GRAM STAIN RESULT  Rare Polys  No bacteria seen 3+ Polys  No bacteria seen Rare Polys  No bacteria seen  No Polys or Bacteria seen  Rare Polys  No bacteria seen       Imaging:  Pertinent imaging in PACS reviewed

## 2023-08-02 NOTE — PROGRESS NOTES
Progress Note - Orthopedics   Esperanza Brown 46 y.o. male MRN: [id number]  Unit/Bed#: E2 -01      Subjective:    46 y.o.male POD 5 right revision TKA with articulating antibiotic spacer. No acute overnight events, no new complaints. Patient doing well. He rates his pain at a 4/10 today. He denies fevers, chills, CP, SOB, N/V, or numbness or tingling.      Labs:  0   Lab Value Date/Time    HCT 28.1 (L) 08/02/2023 0612    HCT 27.4 (L) 07/31/2023 0500    HCT 27.5 (L) 07/30/2023 0438    HGB 8.7 (L) 08/02/2023 0612    HGB 8.8 (L) 07/31/2023 0500    HGB 8.8 (L) 07/30/2023 0438    INR 1.07 07/21/2023 0846    WBC 5.56 08/02/2023 0612    WBC 7.21 07/31/2023 0500    WBC 7.08 07/30/2023 0438    ESR 48 (H) 06/18/2023 1108    .2 (H) 06/18/2023 1108       Meds:    Current Facility-Administered Medications:   •  acetaminophen (TYLENOL) tablet 650 mg, 650 mg, Oral, Q4H PRN, Kp Trevizo PA-C, 650 mg at 08/01/23 1755  •  acetaminophen (TYLENOL) tablet 975 mg, 975 mg, Oral, Q8H [address], Kp Trevizo PA-C, 975 mg at 08/02/23 8854  •  ascorbic acid (VITAMIN C) tablet 500 mg, 500 mg, Oral, BID With Meals, Kp Trevizo PA-C, 500 mg at 08/02/23 2677  •  aspirin (ECOTRIN LOW STRENGTH) EC tablet 81 mg, 81 mg, Oral, BID, Diane Ramey PA-C, 81 mg at 08/02/23 1867  •  busPIRone (BUSPAR) tablet 7.5 mg, 7.5 mg, Oral, BID, Kp Trevizo PA-C, 7.5 mg at 08/02/23 9870  •  calcium carbonate (TUMS) chewable tablet 1,000 mg, 1,000 mg, Oral, Daily PRN, Diane Ramey PA-C  •  ceFAZolin (ANCEF) IVPB (premix in dextrose) 2,000 mg 50 mL, 2,000 mg, Intravenous, Q8H, Diane Ramey PA-C, Last Rate: 100 mL/hr at 08/02/23 0600, 2,000 mg at 08/02/23 0600  •  docusate sodium (COLACE) capsule 100 mg, 100 mg, Oral, BID, Kp Trevizo PA-C, 100 mg at 41/85/54 0010  •  folic acid (FOLVITE) tablet 1 mg, 1 mg, Oral, Daily, Kp Trevizo PA-C, 1 mg at 08/02/23 5800  •  gabapentin (NEURONTIN) capsule 300 mg, 300 mg, Oral, HS, Chaitanya Quinteros PA-C, 300 mg at 08/01/23 2236  •  methocarbamol (ROBAXIN) tablet 500 mg, 500 mg, Oral, Q6H PRN, Fei Villanueva PA-C, 500 mg at 08/01/23 1755  •  methotrexate tablet 15 mg, 15 mg, Oral, Weekly, Marisol Trevizo PA-C, 15 mg at 07/30/23 5290  •  metoprolol succinate (TOPROL-XL) 24 hr tablet 100 mg, 100 mg, Oral, Daily, Marisol Trevizo PA-C, 100 mg at 08/02/23 5443  •  multivitamin-minerals (CENTRUM) tablet 1 tablet, 1 tablet, Oral, Daily, Chaitanya Quinteros PA-C, 1 tablet at 08/02/23 8191  •  nortriptyline (PAMELOR) capsule 100 mg, 100 mg, Oral, HS, Juani Johnson PA-C, 100 mg at 08/01/23 2236  •  ondansetron (ZOFRAN) injection 4 mg, 4 mg, Intravenous, Q6H PRN, Chaitanya Quinteros PA-C  •  oxyCODONE (ROXICODONE) immediate release tablet 10 mg, 10 mg, Oral, Q4H PRN, Chaitanya Quinteros PA-C, 10 mg at 08/02/23 7394  •  oxyCODONE (ROXICODONE) IR tablet 5 mg, 5 mg, Oral, Q4H PRN, Chaitanya Quinteros PA-C, 5 mg at 08/01/23 1640  •  pantoprazole (PROTONIX) EC tablet 40 mg, 40 mg, Oral, Daily Before Breakfast, Chaitanya Quinteros PA-C, 40 mg at 08/02/23 6125  •  polyethylene glycol (MIRALAX) packet 17 g, 17 g, Oral, Daily, Elvia Abel PA-C, 17 g at 08/02/23 0810  •  senna (SENOKOT) tablet 8.6 mg, 1 tablet, Oral, Daily, Chaitanya Quinteros PA-C, 8.6 mg at 08/02/23 8342  •  simethicone (MYLICON) chewable tablet 80 mg, 80 mg, Oral, 4x Daily PRN, Chaitanya Quinteros PA-C  •  venlafaxine [facility] 24 hr capsule 150 mg, 150 mg, Oral, HS, Marisol Chirag Trevizo PA-C, 150 mg at 08/01/23 4726    Blood Culture:   No results found for: "BLOODCX"    Wound Culture:   No results found for: "WOUNDCULT"    Ins and Outs:  I/O last 24 hours: In: 480 [P.O.:480]  Out: 1830 [Urine:1830]          Physical:  Vitals:    08/02/23 0723   BP: 116/76   Pulse: 78   Resp: 20   Temp: (!) 97.1 °F (36.2 °C)   SpO2: 96%     Musculoskeletal: right Lower Extremity  • Dressing C/D/I with KI appropriately placed.   · No significant TTP over the knee  · Sensation intact over the toes  · Motor intact to +FHL/EHL, +ankle dorsi/plantar flexion  · Digits warm and well perfused  · Capillary refill < 2 seconds    Assessment:    48 y.o.male POD 5 right revision TKA with articulating antibiotic spacer. Patient doing well. Plan:  • WBAT RLE in knee immobilizer. Keep knee immobilizer in place at all times. • Infection  ? Several cultures were taken in the operating room which show Staph aureus. ? ID is recommending 6 weeks of IV ancef. We continue to appreciate all ID recommendations. ? Patient had PICC line placed yesterday. • Will monitor for ABLA and administer IVF/prbc as indicated for greater than 2 gram Hgb drop or Hgb < 7. Hgb this morning is 8.7. No signs of bleeding in the operative extremity. Will continue to monitor. • PT/OT  • Pain control  • DVT ppx with aspirin 81 mg BID  • Dispo: Patient will remain inpatient until 8/3 to arrange for home nursing and IV antibiotic delivery.       Diego Fournier PA-C

## 2023-08-02 NOTE — PLAN OF CARE
Problem: MOBILITY - ADULT  Goal: Maintain or return to baseline ADL function  Description: INTERVENTIONS:  -  Assess patient's ability to carry out ADLs; assess patient's baseline for ADL function and identify physical deficits which impact ability to perform ADLs (bathing, care of mouth/teeth, toileting, grooming, dressing, etc.)  - Assess/evaluate cause of self-care deficits   - Assess range of motion  - Assess patient's mobility; develop plan if impaired  - Assess patient's need for assistive devices and provide as appropriate  - Encourage maximum independence but intervene and supervise when necessary  - Involve family in performance of ADLs  - Assess for home care needs following discharge   - Consider OT consult to assist with ADL evaluation and planning for discharge  - Provide patient education as appropriate  Outcome: Progressing  Goal: Maintains/Returns to pre admission functional level  Description: INTERVENTIONS:  - Perform BMAT or MOVE assessment daily.   - Set and communicate daily mobility goal to care team and patient/family/caregiver.    - Collaborate with rehabilitation services on mobility goals if consulted  - Remind patient to turn and reposition every 2 hours while in bed and to weight shift every 15 minutes while in chair.   - Dangle patient 3 times a day  - Stand patient 3 times a day  - Ambulate patient 2 times a day  - Out of bed to chair 2 times a day   - Out of bed for meals  - Out of bed for toileting  - Record patient progress and toleration of activity level   Outcome: Progressing     Problem: PAIN - ADULT  Goal: Verbalizes/displays adequate comfort level or baseline comfort level  Description: Interventions:  - Encourage patient to monitor pain and request assistance  - Assess pain using appropriate pain scale  - Administer analgesics based on type and severity of pain and evaluate response  - Implement non-pharmacological measures as appropriate and evaluate response  - Consider cultural and social influences on pain and pain management  - Notify physician/advanced practitioner if interventions unsuccessful or patient reports new pain  Outcome: Progressing     Problem: SAFETY ADULT  Goal: Maintain or return to baseline ADL function  Description: INTERVENTIONS:  -  Assess patient's ability to carry out ADLs; assess patient's baseline for ADL function and identify physical deficits which impact ability to perform ADLs (bathing, care of mouth/teeth, toileting, grooming, dressing, etc.)  - Assess/evaluate cause of self-care deficits   - Assess range of motion  - Assess patient's mobility; develop plan if impaired  - Assess patient's need for assistive devices and provide as appropriate  - Encourage maximum independence but intervene and supervise when necessary  - Involve family in performance of ADLs  - Assess for home care needs following discharge   - Consider OT consult to assist with ADL evaluation and planning for discharge  - Provide patient education as appropriate  Outcome: Progressing  Goal: Maintains/Returns to pre admission functional level  Description: INTERVENTIONS:  - Perform BMAT or MOVE assessment daily.   - Set and communicate daily mobility goal to care team and patient/family/caregiver.    - Collaborate with rehabilitation services on mobility goals if consulted  - Remind patient to turn and reposition every 2 hours while in bed and to weight shift every 15 minutes while in chair.   - Dangle patient 3 times a day  - Stand patient 3 times a day  - Ambulate patient 2 times a day  - Out of bed to chair 2 times a day   - Out of bed for meals  - Out of bed for toileting  - Record patient progress and toleration of activity level   Outcome: Progressing  Goal: Patient will remain free of falls  Description: INTERVENTIONS:  - Educate patient/family on patient safety including physical limitations  - Instruct patient to call for assistance with activity   - Consult OT/PT to assist with strengthening/mobility   - Keep Call bell within reach  - Keep bed low and locked with side rails adjusted as appropriate  - Keep care items and personal belongings within reach  - Initiate and maintain comfort rounds  - Make Fall Risk Sign visible to staff  - Offer Toileting every 2 Hours, in advance of need  - Initiate/Maintain Bed alarm  - Apply yellow socks and bracelet for high fall risk patients  - Consider moving patient to room near nurses station  Outcome: Progressing     Problem: DISCHARGE PLANNING  Goal: Discharge to home or other facility with appropriate resources  Description: INTERVENTIONS:  - Identify barriers to discharge w/patient and caregiver  - Arrange for needed discharge resources and transportation as appropriate  - Identify discharge learning needs (meds, wound care, etc.)  - Refer to Case Management Department for coordinating discharge planning if the patient needs post-hospital services based on physician/advanced practitioner order or complex needs related to functional status, cognitive ability, or social support system  Outcome: Progressing     Problem: Knowledge Deficit  Goal: Patient/family/caregiver demonstrates understanding of disease process, treatment plan, medications, and discharge instructions  Description: Complete learning assessment and assess knowledge base. Interventions:  - Provide teaching at level of understanding  - Provide teaching via preferred learning methods  Outcome: Progressing     Problem: Knowledge Deficit  Goal: Patient/family/caregiver demonstrates understanding of disease process, treatment plan, medications, and discharge instructions  Description: Complete learning assessment and assess knowledge base.   Interventions:  - Provide teaching at level of understanding  - Provide teaching via preferred learning methods  Outcome: Progressing     Problem: INFECTION - ADULT  Goal: Absence or prevention of progression during hospitalization  Description: INTERVENTIONS:  - Assess and monitor for signs and symptoms of infection  - Monitor lab/diagnostic results  - Monitor all insertion sites, i.e. indwelling lines, tubes, and drains  - Monitor endotracheal if appropriate and nasal secretions for changes in amount and color  - [place] appropriate cooling/warming therapies per order  - Administer medications as ordered  - Instruct and encourage patient and family to use good hand hygiene technique  - Identify and instruct in appropriate isolation precautions for identified infection/condition  Outcome: Progressing     Problem: Nutrition/Hydration-ADULT  Goal: Nutrient/Hydration intake appropriate for improving, restoring or maintaining nutritional needs  Description: Monitor and assess patient's nutrition/hydration status for malnutrition. Collaborate with interdisciplinary team and initiate plan and interventions as ordered. Monitor patient's weight and dietary intake as ordered or per policy. Utilize nutrition screening tool and intervene as necessary. Determine patient's food preferences and provide high-protein, high-caloric foods as appropriate.      INTERVENTIONS:  - Monitor oral intake, urinary output, labs, and treatment plans  - Assess nutrition and hydration status and recommend course of action  - Evaluate amount of meals eaten  - Allow adequate time for meals  - Recommend/ encourage appropriate diets, oral nutritional supplements, and vitamin/mineral supplements  - Assess need for intravenous fluids  - Provide specific nutrition/hydration education as appropriate  - Include patient/family/caregiver in decisions related to nutrition  Outcome: Progressing     Problem: MUSCULOSKELETAL - ADULT  Goal: Maintain proper alignment of affected body part  Description: INTERVENTIONS:  - Support, maintain and protect limb and body alignment  - Provide patient/ family with appropriate education  Outcome: Progressing

## 2023-08-03 ENCOUNTER — TELEPHONE (OUTPATIENT)
Dept: INFECTIOUS DISEASES | Facility: CLINIC | Age: 48
End: 2023-08-03

## 2023-08-03 VITALS
HEIGHT: 71 IN | OXYGEN SATURATION: 96 % | HEART RATE: 73 BPM | SYSTOLIC BLOOD PRESSURE: 107 MMHG | RESPIRATION RATE: 20 BRPM | DIASTOLIC BLOOD PRESSURE: 74 MMHG | TEMPERATURE: 97.8 F | WEIGHT: 183.86 LBS | BODY MASS INDEX: 25.74 KG/M2

## 2023-08-03 DIAGNOSIS — T84.53XA INFECTION ASSOCIATED WITH INTERNAL RIGHT KNEE PROSTHESIS, INITIAL ENCOUNTER (HCC): Primary | ICD-10-CM

## 2023-08-03 PROCEDURE — NC001 PR NO CHARGE

## 2023-08-03 PROCEDURE — 99024 POSTOP FOLLOW-UP VISIT: CPT | Performed by: PHYSICIAN ASSISTANT

## 2023-08-03 RX ADMIN — CEFAZOLIN SODIUM 2000 MG: 2 SOLUTION INTRAVENOUS at 05:45

## 2023-08-03 RX ADMIN — MULTIPLE VITAMINS W/ MINERALS TAB 1 TABLET: TAB ORAL at 08:55

## 2023-08-03 RX ADMIN — OXYCODONE HYDROCHLORIDE AND ACETAMINOPHEN 500 MG: 500 TABLET ORAL at 07:38

## 2023-08-03 RX ADMIN — OXYCODONE HYDROCHLORIDE 5 MG: 5 TABLET ORAL at 05:45

## 2023-08-03 RX ADMIN — DOCUSATE SODIUM 100 MG: 100 CAPSULE, LIQUID FILLED ORAL at 08:55

## 2023-08-03 RX ADMIN — PANTOPRAZOLE SODIUM 40 MG: 40 TABLET, DELAYED RELEASE ORAL at 07:38

## 2023-08-03 RX ADMIN — ACETAMINOPHEN 325MG 975 MG: 325 TABLET ORAL at 05:45

## 2023-08-03 RX ADMIN — ASPIRIN 81 MG: 81 TABLET, COATED ORAL at 08:55

## 2023-08-03 RX ADMIN — SENNOSIDES 8.6 MG: 8.6 TABLET, FILM COATED ORAL at 08:55

## 2023-08-03 RX ADMIN — BUSPIRONE HYDROCHLORIDE 7.5 MG: 5 TABLET ORAL at 08:55

## 2023-08-03 RX ADMIN — FOLIC ACID 1 MG: 1 TABLET ORAL at 08:55

## 2023-08-03 NOTE — PROGRESS NOTES
OPAT NOTE    Supervising/Discharge physician:Cory     Diagnosis:Rt knee PJI    Drug:Cefazolin     Dose/Frequency:2gQ8    Labs/Frequency:CBCD Cre weekly    End Date:9/7    Infusion/[facility] contact:[facility]     Next appointment:8/16        MA assigned: Jovani

## 2023-08-03 NOTE — DISCHARGE SUMMARY
ORTHOPEDICS DISCHARGE SUMMARY  Jie Brown 46 y.o. 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 year old male that presented to the office with signs and symptoms of a right knee infected joint arthroplasty. They tried and failed conservative treatment measures and wished to proceed with surgical intervention in the form of a revision of the right knee arthroplasty with I&D and placement of an antibiotic spacer. The risks, benefits, and complications of the procedure were discussed with the patient and informed consent was obtained. Hospital Course: The patient was admitted to the hospital on 7/28/2023 and underwent an uncomplicated right total knee revision with I&D and antibiotic spacer placement. They were transferred to the floor after a brief stay in the post-anesthesia care unit. Their pain was well managed with IV and oral pain medications. They began therapy on post operative day #1. Aspirin was also started for DVT prophylaxis post operatively. Hemovac drain was removed on POD 3. A PICC line was placed for the patient for outpatient antibiotic therapy. On discharge date pt was cleared by PT and the medicine team and determined to be safe for discharge with home health serivices.   Daily discussion was had with the patient, nursing staff, orthopaedic team, and family members if present. All questions were answered to the patients satisifaction. Plan:  ID following, plan for 6 weeks of IV antibiotics, 2-week antibiotic holiday followed by aspiration and then planned second stage versus repeat spacer if continued infection. 0   Lab Value Date/Time    HGB 8.7 (L) 08/02/2023 0612    HGB 8.8 (L) 07/31/2023 0500    HGB 8.8 (L) 07/30/2023 0438    HGB 8.9 (L) 07/29/2023 0506    HGB 10.1 (L) 07/21/2023 0846    HGB 13.1 06/18/2023 1108       Greater than 2 gram Hgb drop which qualifies for diagnosis of acute blood loss anemia. Vital signs remained stable and pt was resuscitated with IVF as needed   Body mass index is 25.64 kg/m². Discharge Instructions: The patient was discharged weight bearing as tolerated to the right lower extremity in the knee immobilizer. Aspirin will be continued for 28 days. Continue PT/OT. Take pain medications as instructed. Discharge Medications: For the complete list of discharge medications, please refer to the patient's medication reconciliation.

## 2023-08-03 NOTE — NURSING NOTE
AVS reviewed with patient. Antibiotics sent with belongings. Patient accompanied to vehicle via wheelchair by Edinson Dye.

## 2023-08-03 NOTE — QUICK NOTE
Patient was discharged prior to me arriving to his room. I performed chart check this morning and will maintain current antibiotic plan below. Morning vitals stable. Home care to be provided by [facility]. He is scheduled for ongoing infectious disease follow up as below. Impression/Plan:  1. Right knee PJI. Patient had aspiration in outpatient orthopedic surgery office which showed MSSA on Synovasure. Unclear if bacteria was introduced to knee during quadriceps repair, if it entered through skin breakdown from his plaque psoriasis, or from a wound on the R knee in 5/2023 after a fall. His infection is complicated by patient being on chronic methotrexate. Patient is now s/p R total knee arthroplasty, I&D down to and including bone, and insertion of biodegradable drug delivery device abx spacer (stimulan) 7/28/23.  All hardware and previous sutures from quadriceps repair removed. Gross purulence was noted and sinus tract was noted in deft in quadriceps tendon to the anterior knee. Multiple operative cultures growing MSSA. The patient has been receiving IV cefazolin which he is tolerating without difficulty. PICC has been inserted. He will continue the antibiotic to finish 6 full weeks of antibiotic treatment.  -continue IV Cefazolin through 9/7/2023 to finish 6 weeks of IV antibiotic  -weekly CBCD and creatinine while on IV antibiotics  -monitor vitals  -serial R knee exams  -local surgical site care per orthopedic surgery  -continue follow up with orthopedic surgery  -PICC to be removed after final dose of IV antibiotic on 9/7/2023  -patient to follow up in the outpatient ID office after discharge, appointment is on 8/16/2023 at 9:30am with Raffy LAYTON     2. Falls. Patient with fall after his R TKA revision which resulted in a quadriceps tear and required surgical repair. He additionally fell in 5/2023 which resulted in a wound to his knee.  Traumatic injury could be source of his bacterial infection above.     3. Rheumatoid arthritis and plaque psoriasis. Patient on chronic methotrexate. This is risk factor for complications with healing and infection.  [facility] following for medical management.  -continue follow up with [facility]

## 2023-08-03 NOTE — TELEPHONE ENCOUNTER
Called and spoke with patient today. Went over antibiotic plan with patient. Informed patient weekly lab orders were faxed to [facility] and they will draw them. Also reminded patient of follow up appointment. Informed patient if he has any further questions or concerns to call the office. Patient verbalizes understanding at this time.

## 2023-08-03 NOTE — PROGRESS NOTES
Progress Note - Orthopedics   Ratna Brown 46 y.o. male MRN: [id number]  Unit/Bed#: E2 -01      Subjective:    46 y.o.male POD 6 right revision TKA with articulating antibiotic spacer for PJI. No acute overnight events, no new complaints. Patient doing well. He has maintained his KI without issues. He states that his pain is overall well controlled. He denies fevers, chills, CP, SOB, N/V, or numbness or tingling. He has his PICC antibiotic infusions set up and is anticipating d/c to home today.     Labs:  0   Lab Value Date/Time    HCT 28.1 (L) 08/02/2023 0612    HCT 27.4 (L) 07/31/2023 0500    HCT 27.5 (L) 07/30/2023 0438    HGB 8.7 (L) 08/02/2023 0612    HGB 8.8 (L) 07/31/2023 0500    HGB 8.8 (L) 07/30/2023 0438    INR 1.07 07/21/2023 0846    WBC 5.56 08/02/2023 0612    WBC 7.21 07/31/2023 0500    WBC 7.08 07/30/2023 0438    ESR 48 (H) 06/18/2023 1108    .2 (H) 06/18/2023 1108       Meds:    Current Facility-Administered Medications:   •  acetaminophen (TYLENOL) tablet 650 mg, 650 mg, Oral, Q4H PRN, Chaparro Trevizo PA-C, 650 mg at 08/01/23 1755  •  acetaminophen (TYLENOL) tablet 975 mg, 975 mg, Oral, Q8H [address], Chaparro Trevizo PA-C, 975 mg at 08/03/23 8222  •  ascorbic acid (VITAMIN C) tablet 500 mg, 500 mg, Oral, BID With Meals, Chaparro Trevizo PA-C, 500 mg at 08/03/23 7895  •  aspirin (ECOTRIN LOW STRENGTH) EC tablet 81 mg, 81 mg, Oral, BID, Chaparro Trevizo PA-C, 81 mg at 08/02/23 1711  •  busPIRone (BUSPAR) tablet 7.5 mg, 7.5 mg, Oral, BID, Chaparro Trevioz PA-C, 7.5 mg at 08/02/23 1711  •  calcium carbonate (TUMS) chewable tablet 1,000 mg, 1,000 mg, Oral, Daily PRN, Sydni Morris PA-C  •  ceFAZolin (ANCEF) IVPB (premix in dextrose) 2,000 mg 50 mL, 2,000 mg, Intravenous, Q8H, Sydni Morris PA-C, Last Rate: 100 mL/hr at 08/03/23 0545, 2,000 mg at 08/03/23 0545  •  docusate sodium (COLACE) capsule 100 mg, 100 mg, Oral, BID, Chaparro Trevizo PA-C, 100 mg at 70/90/76 1363  •  folic acid (FOLVITE) tablet 1 mg, 1 mg, Oral, Daily, Syed Trevizo PA-C, 1 mg at 08/02/23 0708  •  gabapentin (NEURONTIN) capsule 300 mg, 300 mg, Oral, HS, Brittney James PA-C, 300 mg at 08/02/23 2114  •  methocarbamol (ROBAXIN) tablet 500 mg, 500 mg, Oral, Q6H PRN, Karn Frankel, PA-C, 500 mg at 08/02/23 1328  •  methotrexate tablet 15 mg, 15 mg, Oral, Weekly, Syed Trevizo PA-C, 15 mg at 07/30/23 5990  •  metoprolol succinate (TOPROL-XL) 24 hr tablet 100 mg, 100 mg, Oral, Daily, Syed Trevizo PA-C, 100 mg at 08/02/23 7713  •  multivitamin-minerals (CENTRUM) tablet 1 tablet, 1 tablet, Oral, Daily, Brittney James PA-C, 1 tablet at 08/02/23 9123  •  nortriptyline (PAMELOR) capsule 100 mg, 100 mg, Oral, HS, Juani Johnson PA-C, 100 mg at 08/02/23 2114  •  ondansetron [facility]) injection 4 mg, 4 mg, Intravenous, Q6H PRN, Brittney James PA-C  •  oxyCODONE (ROXICODONE) immediate release tablet 10 mg, 10 mg, Oral, Q4H PRN, Brittney James PA-C, 10 mg at 08/02/23 2113  •  oxyCODONE (ROXICODONE) IR tablet 5 mg, 5 mg, Oral, Q4H PRN, Brittney James PA-C, 5 mg at 08/03/23 0588  •  pantoprazole (PROTONIX) EC tablet 40 mg, 40 mg, Oral, Daily Before Breakfast, Brittney James PA-C, 40 mg at 08/03/23 8016  •  polyethylene glycol (MIRALAX) packet 17 g, 17 g, Oral, Daily, Elvia Abel PA-C, 17 g at 08/02/23 0810  •  senna (SENOKOT) tablet 8.6 mg, 1 tablet, Oral, Daily, Brittney James PA-C, 8.6 mg at 08/02/23 2299  •  simethicone (MYLICON) chewable tablet 80 mg, 80 mg, Oral, 4x Daily PRN, Brittney James PA-C  •  venlafaxine [facility]) 24 hr capsule 150 mg, 150 mg, Oral, HS, Brittney James PA-C, 150 mg at 08/02/23 2114    Blood Culture:   No results found for: "BLOODCX"    Wound Culture:   No results found for: "WOUNDCULT"    Ins and Outs:  I/O last 24 hours:   In: 240 [P.O.:240]  Out: 1180 [Urine:1180]          Physical:  Vitals:    08/03/23 0654   BP: 107/74   Pulse: 73   Resp: 20   Temp: 97.8 °F (36.6 °C) SpO2: 96%     Musculoskeletal: right Lower Extremity  • Dressing C/D/I with KI appropriately placed. · No significant TTP over the knee  · Sensation intact over the toes  · Motor intact to +FHL/EHL, +ankle dorsi/plantar flexion  · Digits warm and well perfused  · Capillary refill < 2 seconds    Assessment:    48 y.o.male POD 6 right revision TKA with articulating antibiotic spacer. Patient doing well. Plan:  • WBAT RLE in knee immobilizer. Keep knee immobilizer in place at all times, plan on KI x 2 weeks for soft tissue protection 2/2 sinus tract excision anterior knee  • Infection  ? Several cultures were taken in the operating room which show Staph aureus (MSSA)  ? ID is recommending 6 weeks of IV ancef. We continue to appreciate all ID recommendations. ? Patient had PICC line placed and outpatient infusions are set up.  ? Plan for 6 weeks of IV antibiotics, 2-week antibiotic holiday followed by aspiration and then planned second stage versus repeat spacer if continued infection  • Will monitor for ABLA and administer IVF/prbc as indicated for greater than 2 gram Hgb drop or Hgb < 7. Hgb this morning is 8.7. No signs of bleeding in the operative extremity. Will continue to monitor. • HV drain removed 7/31. • PT/OT  • Pain control  • DVT ppx with aspirin 81 mg BID  • Dispo: Patient will stable for d/c to home today as his infusions for abx via PICC have been set up. • Follow up outpatient with Dr. Senait Blankenship 10-14 days post op.       Vega Christian PA-C

## 2023-08-04 NOTE — UTILIZATION REVIEW
NOTIFICATION OF ADMISSION DISCHARGE   This is a Notification of Discharge from [facility]. Please be advised that this patient has been discharge from our facility. Below you will find the admission and discharge date and time including the patient’s disposition. UTILIZATION REVIEW CONTACT:  Monica Simons MA  Utilization   Network Utilization Review Department  Phone: [phone] x carefully listen to the prompts. All voicemails are confidential.  Email: [email or web address]     ADMISSION INFORMATION  PRESENTATION DATE: 7/28/2023  9:07 AM  OBERVATION ADMISSION DATE:   INPATIENT ADMISSION DATE: 7/28/23  4:39 PM   DISCHARGE DATE: 8/3/2023  9:00 AM   DISPOSITION:Home/Self Care    IMPORTANT INFORMATION:  Send all requests for admission clinical reviews, approved or denied determinations and any other requests to dedicated fax number below belonging to the campus where the patient is receiving treatment.  List of dedicated fax numbers:  [place] DENIALS (Administrative/Medical Necessity) [phone] [address] [facility] (Maternity/NICU/Pediatrics) [phone]   [facility] [phone]   [facility] [phone] [address] [facility] [phone]   [address] [place] [address] [phone]   [facility] [phone]   [address] [place] [address] [address] [place] [address] [phone]   [address] [facility] [phone] [address] [facility] [phone]   [address] [facility] [address] [facility] [phone]

## 2023-08-10 ENCOUNTER — OFFICE VISIT (OUTPATIENT)
Dept: OBGYN CLINIC | Facility: MEDICAL CENTER | Age: 48
End: 2023-08-10

## 2023-08-10 VITALS
HEART RATE: 73 BPM | SYSTOLIC BLOOD PRESSURE: 107 MMHG | WEIGHT: 183.86 LBS | DIASTOLIC BLOOD PRESSURE: 69 MMHG | HEIGHT: 71 IN | BODY MASS INDEX: 25.74 KG/M2

## 2023-08-10 DIAGNOSIS — Z96.651 STATUS POST REVISION OF TOTAL KNEE REPLACEMENT, RIGHT: ICD-10-CM

## 2023-08-10 DIAGNOSIS — T84.53XA INFECTION OF TOTAL RIGHT KNEE REPLACEMENT, INITIAL ENCOUNTER (HCC): Primary | ICD-10-CM

## 2023-08-10 PROCEDURE — 99024 POSTOP FOLLOW-UP VISIT: CPT | Performed by: STUDENT IN AN ORGANIZED HEALTH CARE EDUCATION/TRAINING PROGRAM

## 2023-08-10 RX ORDER — CEFAZOLIN SODIUM 2 G/50ML
2000 SOLUTION INTRAVENOUS ONCE
OUTPATIENT
Start: 2023-08-10 | End: 2023-08-10

## 2023-08-10 RX ORDER — GABAPENTIN 300 MG/1
300 CAPSULE ORAL ONCE
OUTPATIENT
Start: 2023-08-10 | End: 2023-08-10

## 2023-08-10 RX ORDER — CHLORHEXIDINE GLUCONATE 0.12 MG/ML
15 RINSE ORAL ONCE
OUTPATIENT
Start: 2023-08-10 | End: 2023-08-10

## 2023-08-10 RX ORDER — SODIUM CHLORIDE, SODIUM LACTATE, POTASSIUM CHLORIDE, CALCIUM CHLORIDE 600; 310; 30; 20 MG/100ML; MG/100ML; MG/100ML; MG/100ML
125 INJECTION, SOLUTION INTRAVENOUS CONTINUOUS
OUTPATIENT
Start: 2023-08-10

## 2023-08-10 RX ORDER — ASCORBIC ACID 500 MG
500 TABLET ORAL 2 TIMES DAILY
Qty: 60 TABLET | Refills: 1 | Status: SHIPPED | OUTPATIENT
Start: 2023-08-10 | End: 2023-08-18 | Stop reason: ALTCHOICE

## 2023-08-10 RX ORDER — ACETAMINOPHEN 325 MG/1
975 TABLET ORAL ONCE
OUTPATIENT
Start: 2023-08-10 | End: 2023-08-10

## 2023-08-10 RX ORDER — MULTIVIT-MIN/IRON FUM/FOLIC AC 7.5 MG-4
1 TABLET ORAL DAILY
Qty: 30 TABLET | Refills: 1 | Status: SHIPPED | OUTPATIENT
Start: 2023-08-10

## 2023-08-10 RX ORDER — TRANEXAMIC ACID 10 MG/ML
1000 INJECTION, SOLUTION INTRAVENOUS ONCE
OUTPATIENT
Start: 2023-08-10 | End: 2023-08-10

## 2023-08-10 RX ORDER — FOLIC ACID 1 MG/1
1 TABLET ORAL DAILY
Qty: 30 TABLET | Refills: 1 | Status: SHIPPED | OUTPATIENT
Start: 2023-08-10

## 2023-08-10 RX ORDER — MELATONIN
2000 DAILY
Qty: 60 TABLET | Refills: 1 | Status: SHIPPED | OUTPATIENT
Start: 2023-08-10

## 2023-08-10 RX ORDER — CHLORHEXIDINE GLUCONATE 4 G/100ML
SOLUTION TOPICAL DAILY PRN
OUTPATIENT
Start: 2023-08-10

## 2023-08-10 NOTE — PROGRESS NOTES
Subjective:Patient seen and examined. Pain controlled. He presents today wearing his knee immobilizer and ambulating with his rolling walker. He continues with his ACE wrap and Mepilex dressing. Incision without drainage. Denies fevers or chills    Physical Exam:  Incision: CDI with sutures intact, no erythema or drainage noted. ROM: 5-30* (pre-op ROM 50-75)  5/5 IP/Q/HS/TA/GS, 2+ DP/PT, SILT DP/SP/S/S/TN      Assessment/Plan:  45 y/o male s/p R knee I&D, explant, articulating antibiotic spacer for R knee PJI 7/28    - continue multi-modal pain control   - Weight bearing status: WBAT  - DVT ppx: Aspirin 81mg twice daily x 4 weeks  - Continue with IV antibiotics via PICC with I.D, he is on Cefazolin. - Incision care: Sutures removed today and steri-strips placed  - We will plan on re-aspirating the knee on 9/26/23 and sending for synovasure to make sure his infection is cleared.   We will then plan on doing his revision at least 2 weeks after that, we will plan for 10/18//23 for second stage vs repeat I&D spacer pending aspirate results  - KI removed today  - PT/OT  - F/U 3 weeks      Scribe Attestation    I,:  Suzy Busch PA-C am acting as a scribe while in the presence of the attending physician.:       I,:  Vince Madrigal DO personally performed the services described in this documentation    as scribed in my presence.:

## 2023-08-11 DIAGNOSIS — M25.561 ACUTE PAIN OF RIGHT KNEE: ICD-10-CM

## 2023-08-11 DIAGNOSIS — M25.461 EFFUSION OF RIGHT KNEE: ICD-10-CM

## 2023-08-11 DIAGNOSIS — Z96.651 HISTORY OF REVISION OF TOTAL REPLACEMENT OF RIGHT KNEE JOINT: ICD-10-CM

## 2023-08-11 RX ORDER — HYDROCODONE BITARTRATE AND ACETAMINOPHEN 5; 325 MG/1; MG/1
1 TABLET ORAL EVERY 6 HOURS PRN
Qty: 30 TABLET | Refills: 0 | Status: SHIPPED | OUTPATIENT
Start: 2023-08-11 | End: 2023-08-18 | Stop reason: ALTCHOICE

## 2023-08-11 NOTE — TELEPHONE ENCOUNTER
Patient called in requesting this med be sent to pharmacy sent to ortho pod to run protocol refill appropriately and send  to delegates accordingly. Patient was also asking for a script for medication flexeral stated it was discussed at his ov yesterday. if so please place order ortho team.    Call completed TF    Medication hydrocodone     Quantity 30     Pharmacy medicine shop    PCP/Speciality janet/ [facility]    Enough for 3 days -No

## 2023-08-14 LAB
FUNGUS SPEC CULT: NORMAL

## 2023-08-15 ENCOUNTER — TELEPHONE (OUTPATIENT)
Dept: OTHER | Facility: HOSPITAL | Age: 48
End: 2023-08-15

## 2023-08-15 DIAGNOSIS — T84.53XD INFECTION ASSOCIATED WITH INTERNAL RIGHT KNEE PROSTHESIS, SUBSEQUENT ENCOUNTER: Primary | ICD-10-CM

## 2023-08-15 RX ORDER — CYCLOBENZAPRINE HCL 5 MG
5 TABLET ORAL 3 TIMES DAILY PRN
Qty: 90 TABLET | Refills: 0 | Status: SHIPPED | OUTPATIENT
Start: 2023-08-15

## 2023-08-16 ENCOUNTER — OFFICE VISIT (OUTPATIENT)
Dept: INFECTIOUS DISEASES | Facility: CLINIC | Age: 48
End: 2023-08-16
Payer: COMMERCIAL

## 2023-08-16 VITALS
TEMPERATURE: 97.2 F | DIASTOLIC BLOOD PRESSURE: 65 MMHG | HEART RATE: 53 BPM | RESPIRATION RATE: 18 BRPM | SYSTOLIC BLOOD PRESSURE: 98 MMHG | BODY MASS INDEX: 25.62 KG/M2 | WEIGHT: 183 LBS | OXYGEN SATURATION: 99 % | HEIGHT: 71 IN

## 2023-08-16 DIAGNOSIS — I10 HYPERTENSION, UNSPECIFIED TYPE: ICD-10-CM

## 2023-08-16 DIAGNOSIS — T84.53XA INFECTION OF PROSTHETIC RIGHT KNEE JOINT (HCC): Primary | ICD-10-CM

## 2023-08-16 DIAGNOSIS — L40.0 PLAQUE PSORIASIS: ICD-10-CM

## 2023-08-16 PROCEDURE — 99214 OFFICE O/P EST MOD 30 MIN: CPT | Performed by: PHYSICIAN ASSISTANT

## 2023-08-16 NOTE — PROGRESS NOTES
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 Patient initially underwent outpatient aspiration of the knee with Synovasure showing MSSA. He also has h/o right quadricepts repair. Patient was taken to the OR on 7/28/2023 where he underwent R total knee arthroplasty, I&D down to and including bone, and insertion of biodegradable drug delivery device abx spacer. All hardware and previous sutures from quadriceps repair removed. Gross purulence was noted and sinus tract was noted in deft in quadriceps tendon to the anterior knee. Multiple operative cultures growing MSSA. Of note, patient also on methotrexate for RA with plaque psoriasis. Patient was discharged home to complete a total of 6 weeks of IV cefazolin with plans for reimplantation after aspiration. Patient doing well on the cefazolin. He his labs remain stable. R knee appears to be healing well. Plan:   - continue cefazolin 2 grams IV every 8 hours as ordered through 9/7/2023 to complete a total of 6 weeks  - continue weekly CBCD, Cr  - continue follow up with Ortho  - RTO in 2-3 weeks    Plaque psoriasis  Rheumatoid arthritis and plaque psoriasis. Patient on chronic methotrexate. This is risk factor for complications with healing and infection    HTN (hypertension)  Patient with h/o HTN on lisinopril and metoprolol. He is now having low BP with associated dizziness. Today in our office he is 98/65. Patient unfortunately unable to get a hold of his PCP - busy signal when calling. Suggest to patient that he needs this addressed and either needs to find a new PCP ASAP or go to the ED for evaluation. Diagnoses and all orders for this visit:    Infection of prosthetic right knee joint ([address])    Plaque psoriasis    Hypertension, unspecified type    Other orders  -     ceFAZolin (ANCEF) IV syringe;  Inject 2,000 mg into a catheter in a vein every 8 (eight) hours          Subjective:      Patient ID: Reza Mccarty Angela Garcia is a 46 y.o. male. HPI  45 y/o male presents for office follow up today regarding R knee PJI. Patient initially underwent outpatient aspiration of the knee with Synovasure showing MSSA. He also has h/o right quadricepts repair. Patient was taken to the OR on 7/28/2023 where he underwent R total knee arthroplasty, I&D down to and including bone, and insertion of biodegradable drug delivery device abx spacer. All hardware and previous sutures from quadriceps repair removed. Gross purulence was noted and sinus tract was noted in deft in quadriceps tendon to the anterior knee. Multiple operative cultures growing MSSA. Of note, patient also on methotrexate for RA with plaque psoriasis. Patient was discharged home to complete a total of 6 weeks of IV cefazolin with plans for reimplantation after aspiration. Patient doing well on the cefazolin. His PICC line continues to function well. His knee is improving. He does c/o some dizziness and low BP. He has been unable to get ahold of his PCP to discuss. The following portions of the patient's history were reviewed and updated as appropriate: allergies, current medications, past family history, past medical history, past social history, past surgical history and problem list.    Review of Systems   Constitutional: Negative for chills and fever. Respiratory: Negative for cough and shortness of breath. Gastrointestinal: Negative for abdominal pain, diarrhea, nausea and vomiting. Skin: Negative for rash. Psychiatric/Behavioral: Negative for behavioral problems and confusion. Objective:      BP 98/65   Pulse (!) 53   Temp (!) 97.2 °F (36.2 °C)   Resp 18   Ht 5' 11" (1.803 m)   Wt 83 kg (183 lb)   SpO2 99%   BMI 25.52 kg/m²          Physical Exam  Vitals reviewed. Constitutional:       General: He is not in acute distress. Appearance: Normal appearance. He is not ill-appearing, toxic-appearing or diaphoretic.    HENT: Head: Normocephalic and atraumatic. Eyes:      General: No scleral icterus. Right eye: No discharge. Left eye: No discharge. Conjunctiva/sclera: Conjunctivae normal.   Cardiovascular:      Rate and Rhythm: Normal rate. Pulmonary:      Effort: Pulmonary effort is normal. No respiratory distress. Breath sounds: Normal breath sounds. No stridor. No wheezing, rhonchi or rales. Chest:      Chest wall: No tenderness. Abdominal:      General: Bowel sounds are normal. There is no distension. Palpations: Abdomen is soft. Tenderness: There is no abdominal tenderness. Musculoskeletal:      Comments: R knee incision healing well. Mild swelling noted. Skin:     General: Skin is warm and dry. Coloration: Skin is not jaundiced or pale. Findings: No erythema or rash. Comments: PICC insertion site without erythema, drainage or tenderness   Neurological:      Mental Status: He is alert and oriented to person, place, and time.    Psychiatric:         Mood and Affect: Mood normal.         Behavior: Behavior normal.             Labs:   8/15/2023  Wbc: 4.5  Hgb: 9.5  Plt: 427  Cr: 0.74

## 2023-08-16 NOTE — ASSESSMENT & PLAN NOTE
Patient with h/o HTN on lisinopril and metoprolol. He is now having low BP with associated dizziness. Today in our office he is 98/65. Patient unfortunately unable to get a hold of his PCP - busy signal when calling. Suggest to patient that he needs this addressed and either needs to find a new PCP ASAP or go to the ED for evaluation.

## 2023-08-16 NOTE — ASSESSMENT & PLAN NOTE
Rheumatoid arthritis and plaque psoriasis. Patient on chronic methotrexate.  This is risk factor for complications with healing and infection

## 2023-08-16 NOTE — ASSESSMENT & PLAN NOTE
R knee PJI. Patient initially underwent outpatient aspiration of the knee with Synovasure showing MSSA. He also has h/o right quadricepts repair. Patient was taken to the OR on 7/28/2023 where he underwent R total knee arthroplasty, I&D down to and including bone, and insertion of biodegradable drug delivery device abx spacer. All hardware and previous sutures from quadriceps repair removed. Gross purulence was noted and sinus tract was noted in deft in quadriceps tendon to the anterior knee. Multiple operative cultures growing MSSA. Of note, patient also on methotrexate for RA with plaque psoriasis. Patient was discharged home to complete a total of 6 weeks of IV cefazolin with plans for reimplantation after aspiration. Patient doing well on the cefazolin. He his labs remain stable. R knee appears to be healing well.      Plan:   - continue cefazolin 2 grams IV every 8 hours as ordered through 9/7/2023 to complete a total of 6 weeks  - continue weekly CBCD, Cr  - continue follow up with Ortho  - RTO in 2-3 weeks

## 2023-08-18 ENCOUNTER — OFFICE VISIT (OUTPATIENT)
Dept: FAMILY MEDICINE CLINIC | Facility: CLINIC | Age: 48
End: 2023-08-18
Payer: COMMERCIAL

## 2023-08-18 VITALS
WEIGHT: 177.4 LBS | SYSTOLIC BLOOD PRESSURE: 128 MMHG | DIASTOLIC BLOOD PRESSURE: 82 MMHG | HEART RATE: 81 BPM | BODY MASS INDEX: 24.84 KG/M2 | HEIGHT: 71 IN | TEMPERATURE: 97.5 F | OXYGEN SATURATION: 99 %

## 2023-08-18 DIAGNOSIS — Z12.11 SCREENING FOR COLORECTAL CANCER: ICD-10-CM

## 2023-08-18 DIAGNOSIS — Z76.89 ESTABLISHING CARE WITH NEW DOCTOR, ENCOUNTER FOR: ICD-10-CM

## 2023-08-18 DIAGNOSIS — I10 PRIMARY HYPERTENSION: Primary | ICD-10-CM

## 2023-08-18 DIAGNOSIS — T84.53XD INFECTION ASSOCIATED WITH INTERNAL RIGHT KNEE PROSTHESIS, SUBSEQUENT ENCOUNTER: ICD-10-CM

## 2023-08-18 DIAGNOSIS — Z13.6 SCREENING FOR CARDIOVASCULAR CONDITION: ICD-10-CM

## 2023-08-18 DIAGNOSIS — Z00.00 ANNUAL PHYSICAL EXAM: ICD-10-CM

## 2023-08-18 DIAGNOSIS — F32.5 MAJOR DEPRESSIVE DISORDER IN FULL REMISSION, UNSPECIFIED WHETHER RECURRENT (HCC): ICD-10-CM

## 2023-08-18 DIAGNOSIS — Z12.5 PROSTATE CANCER SCREENING: ICD-10-CM

## 2023-08-18 DIAGNOSIS — Z12.12 SCREENING FOR COLORECTAL CANCER: ICD-10-CM

## 2023-08-18 PROCEDURE — 99386 PREV VISIT NEW AGE 40-64: CPT | Performed by: NURSE PRACTITIONER

## 2023-08-18 RX ORDER — VENLAFAXINE HYDROCHLORIDE 150 MG/1
150 CAPSULE, EXTENDED RELEASE ORAL
Qty: 90 CAPSULE | Refills: 1 | Status: SHIPPED | OUTPATIENT
Start: 2023-08-18

## 2023-08-18 RX ORDER — VENLAFAXINE HYDROCHLORIDE 150 MG/1
150 CAPSULE, EXTENDED RELEASE ORAL
COMMUNITY
Start: 2023-08-18 | End: 2023-08-18 | Stop reason: SDUPTHER

## 2023-08-18 NOTE — ASSESSMENT & PLAN NOTE
Patient currently following with ID and orhto. Currently on cefazolin every 8 hours through PICC. To continue with routine follow-ups as scheduled.

## 2023-08-18 NOTE — PROGRESS NOTES
[address] [facility]    NAME: Yaniv Brown  AGE: 46 y.o. SEX: male  : 1975     DATE: 2023     Assessment and Plan:     Problem List Items Addressed This Visit        Cardiovascular and Mediastinum    HTN (hypertension) - Primary     Patient currently on metoprolol 100 mg daily, was discontinued off of lisinopril due to decreased blood pressure. Blood pressure controlled in office today. Advised patient to monitor blood pressure at home, and if he gets abnormally high readings, to return to office for medication evaluation. Musculoskeletal and Integument    Infection of prosthetic right knee joint [facility])     Patient currently following with ID and orhto. Currently on cefazolin every 8 hours through PICC. To continue with routine follow-ups as scheduled. Other    Major depressive disorder in full remission ([address])     Stable on current dose of Effexor. Medications refilled. Relevant Medications    venlafaxine (EFFEXOR-XR) 150 mg 24 hr capsule   Other Visit Diagnoses     Screening for cardiovascular condition        Relevant Orders    Lipid panel    Screening for colorectal cancer        Relevant Orders    Ambulatory referral to Gastroenterology    Establishing care with new doctor, encounter for        Annual physical exam        Prostate cancer screening        Relevant Orders    PSA, Total Screen          Immunizations and preventive care screenings were discussed with patient today. Appropriate education was printed on patient's after visit summary. Discussed risks and benefits of prostate cancer screening. We discussed the controversial history of PSA screening for prostate cancer in the [facility] as well as the risk of over detection and over treatment of prostate cancer by way of PSA screening.   The patient understands that PSA blood testing is an imperfect way to screen for prostate cancer and that elevated PSA levels in the blood may also be caused by infection, inflammation, prostatic trauma or manipulation, urological procedures, or by benign prostatic enlargement. The role of the digital rectal examination in prostate cancer screening was also discussed and I discussed with him that there is large interobserver variability in the findings of digital rectal examination. Counseling:  Alcohol/drug use: discussed moderation in alcohol intake, the recommendations for healthy alcohol use, and avoidance of illicit drug use. Dental Health: discussed importance of regular tooth brushing, flossing, and dental visits. Injury prevention: discussed safety/seat belts, safety helmets, smoke detectors, carbon dioxide detectors, and smoking near bedding or upholstery. Sexual health: discussed sexually transmitted diseases, partner selection, use of condoms, avoidance of unintended pregnancy, and contraceptive alternatives. · Exercise: the importance of regular exercise/physical activity was discussed. Recommend exercise 3-5 times per week for at least 30 minutes. Depression Screening and Follow-up Plan: Patient was screened for depression during today's encounter. They screened negative with a PHQ-2 score of 1. Tobacco Cessation Counseling: The patient is sincerely urged to quit consumption of tobacco. He is ready to quit tobacco.         Return in about 3 months (around 11/18/2023) for Veoh. Chief Complaint:     Chief Complaint   Patient presents with   • Harvinder Taveras due, hasn't had one done ever. History of Present Illness:     Adult Annual Physical   Patient here for a comprehensive physical exam. The patient reports no problems. Patient presents to the office for establishment of care. Patient was previously seen by Cristiano Grand provider in [place], [place]. Patient previously saw PCP 3 months ago.   Patient with history of hypertension, depression, psoriasis (on Methotrexate). Follow with Rheumatologist.    Patient recently hospitalized on 7/28 for infection of total right knee replacement. As per patient, initial right knee replacement was completed in the late 1990s, then had a revision a few years ago. Patient states 2 months ago he fell at home landing on his right knee, developed pain, redness, and swelling that would not go away. Was evaluated by orthopedics and admitted a few weeks ago. Had hardware removed from right knee, currently has a spacer, and is scheduled to have new hardware put in place on 10/18/2023. Patient currently has PICC line in right upper extremity, receiving IV Ancef. Patient has visiting nurses and is about to start physical therapy at home. Patient currently ambulatory with a walker. Diet and Physical Activity  · Diet/Nutrition: well balanced diet. · Exercise: no formal exercise. Depression Screening  PHQ-2/9 Depression Screening    Little interest or pleasure in doing things: 0 - not at all  Feeling down, depressed, or hopeless: 1 - several days  PHQ-2 Score: 1  PHQ-2 Interpretation: Negative depression screen       General Health  · Sleep: gets 7-8 hours of sleep on average. · Hearing: normal - bilateral.  · Vision: no vision problems. · Dental: regular dental visits and brushes teeth twice daily.  Health  · Symptoms include: none     Review of Systems:     Review of Systems   Constitutional: Negative for activity change, appetite change, chills, diaphoresis, fever and unexpected weight change. HENT: Negative for ear pain and sore throat. Eyes: Negative for photophobia and visual disturbance. Respiratory: Negative for cough, chest tightness, shortness of breath and wheezing. Cardiovascular: Negative for chest pain and palpitations. Gastrointestinal: Negative for abdominal pain, blood in stool, constipation, diarrhea, nausea and vomiting.    Endocrine: Negative for polydipsia, polyphagia and polyuria. Genitourinary: Negative for decreased urine volume, dysuria, flank pain, frequency, hematuria and urgency. Musculoskeletal: Positive for arthralgias (right knee) and joint swelling. Negative for back pain, neck pain and neck stiffness. Skin: Negative for color change and rash. Neurological: Negative for dizziness, syncope, weakness, light-headedness, numbness and headaches. Hematological: Negative for adenopathy. Psychiatric/Behavioral: Negative for confusion and dysphoric mood. The patient is not nervous/anxious.        Past Medical History:     Past Medical History:   Diagnosis Date   • Anxiety    • Arthritis    • Crutches as ambulation aid 2023   • Depression    • Difficult intubation 2023   • Hypertension    • Limited jaw ROM    • Migraine    • PONV (postoperative nausea and vomiting)    • PONV (postoperative nausea and vomiting) 2023   • Psoriasis    • RA (rheumatoid arthritis) ([address] )       Past Surgical History:     Past Surgical History:   Procedure Laterality Date   • JOINT REPLACEMENT     • NM REVJ TOT KNEE ARTHRP [facility] Right 2023    Procedure: ARTHROPLASTY KNEE TOTAL REVISION;  Surgeon: Giuseppe Bear DO;  Location: [facility];  Service: Orthopedics   • REPLACEMENT TOTAL KNEE Right     x2      Family History:     Family History   Problem Relation Age of Onset   • Breast cancer Mother    • Completed Suicide  Father    • Asthma Sister       Social History:     Social History     Socioeconomic History   • Marital status: Single     Spouse name: None   • Number of children: None   • Years of education: None   • Highest education level: None   Occupational History   • None   Tobacco Use   • Smoking status: Every Day     Packs/day: 1.00     Years: 10.00     Total pack years: 10.00     Types: Cigarettes     Start date: 2000     Last attempt to quit: 2000     Years since quittin.7   • Smokeless tobacco: Current     Types: Chew   • Tobacco comments:     Last chewed tobacco 7/27/23   Vaping Use   • Vaping Use: Never used   Substance and Sexual Activity   • Alcohol use: Yes     Alcohol/week: 3.0 standard drinks of alcohol     Types: 3 Standard drinks or equivalent per week     Comment: rarely   • Drug use: Never   • Sexual activity: Not Currently     Partners: Female     Birth control/protection: Condom Male   Other Topics Concern   • None   Social History Narrative   • None     Social Determinants of Health     Financial Resource Strain: Not on file   Food Insecurity: No Food Insecurity (8/1/2023)    Hunger Vital Sign    • Worried About Running Out of Food in the Last Year: Never true    • Ran Out of Food in the Last Year: Never true   Transportation Needs: No Transportation Needs (8/1/2023)    PRAPARE - Transportation    • Lack of Transportation (Medical): No    • Lack of Transportation (Non-Medical):  No   Physical Activity: Not on file   Stress: Not on file   Social Connections: Not on file   Intimate Partner Violence: Not on file   Housing Stability: Low Risk  (8/1/2023)    Housing Stability Vital Sign    • Unable to Pay for Housing in the Last Year: No    • Number of Places Lived in the Last Year: 1    • Unstable Housing in the Last Year: No      Current Medications:     Current Outpatient Medications   Medication Sig Dispense Refill   • acetaminophen (TYLENOL) 500 mg tablet Take 2 tablets (1,000 mg total) by mouth every 8 (eight) hours 60 tablet 0   • ascorbic acid (VITAMIN C) 500 MG tablet Take 1 tablet (500 mg total) by mouth 2 (two) times a day 60 tablet 1   • aspirin (ECOTRIN LOW STRENGTH) 81 mg EC tablet Take 1 tablet (81 mg total) by mouth 2 (two) times a day 60 tablet 0   • busPIRone (BUSPAR) 15 mg tablet TAKE 1/2 TABLET (7.5 MG) BY ORAL ROUTE 2 TIMES PER DAY     • ceFAZolin (ANCEF) IV syringe Inject 2,000 mg into a catheter in a vein every 8 (eight) hours     • celecoxib (CeleBREX) 200 mg capsule Take 1 capsule (200 mg total) by mouth 2 (two) times a day 60 capsule 0   • cholecalciferol (VITAMIN D3) 1,000 units tablet Take 2 tablets (2,000 Units total) by mouth daily 60 tablet 1   • cyclobenzaprine (FLEXERIL) 5 mg tablet Take 1 tablet (5 mg total) by mouth 3 (three) times a day as needed for muscle spasms 90 tablet 0   • ferrous sulfate 324 (65 Fe) mg Take 1 tablet (324 mg total) by mouth 2 (two) times a day before meals 60 tablet 1   • folic acid (FOLVITE) 1 mg tablet Take 1 tablet (1 mg total) by mouth daily 30 tablet 1   • methotrexate 2.5 mg tablet Take 6 tablets by mouth once a week     • metoprolol succinate (TOPROL-XL) 100 mg 24 hr tablet daily     • Multiple Vitamins-Minerals (multivitamin with minerals) tablet Take 1 tablet by mouth daily 30 tablet 1   • nortriptyline (PAMELOR) 50 mg capsule Take by mouth daily at bedtime     • venlafaxine (EFFEXOR-XR) 150 mg 24 hr capsule Take 1 capsule (150 mg total) by mouth daily at bedtime 90 capsule 1   • lisinopril (ZESTRIL) 10 mg tablet Take 10 mg by mouth daily (Patient not taking: Reported on 8/18/2023)       No current facility-administered medications for this visit. Allergies:     No Known Allergies   Physical Exam:     /82 (BP Location: Left arm, Patient Position: Sitting, Cuff Size: Standard)   Pulse 81   Temp 97.5 °F (36.4 °C) (Tympanic)   Ht 5' 11" (1.803 m)   Wt 80.5 kg (177 lb 6.4 oz)   SpO2 99%   BMI 24.74 kg/m²     Physical Exam  Vitals reviewed. Constitutional:       General: He is not in acute distress. Appearance: Normal appearance. He is not ill-appearing. HENT:      Head: Normocephalic and atraumatic. Right Ear: Tympanic membrane, ear canal and external ear normal.      Left Ear: Tympanic membrane, ear canal and external ear normal.      Nose: Nose normal.      Mouth/Throat:      Mouth: Mucous membranes are moist.      Pharynx: Oropharynx is clear.    Eyes:      Conjunctiva/sclera: Conjunctivae normal.      Pupils: Pupils are equal, round, and reactive to light. Neck:      Vascular: No carotid bruit. Cardiovascular:      Rate and Rhythm: Normal rate and regular rhythm. Pulses: Normal pulses. Heart sounds: Normal heart sounds. No murmur heard. Pulmonary:      Effort: Pulmonary effort is normal.      Breath sounds: Normal breath sounds. No wheezing. Abdominal:      General: Bowel sounds are normal.      Palpations: Abdomen is soft. There is no mass. Tenderness: There is no abdominal tenderness. There is no right CVA tenderness or left CVA tenderness. Musculoskeletal:      Cervical back: Normal range of motion and neck supple. Right knee: Swelling present. Legs:       Comments: Intact incision noted. Steri-strips in place. No s/s infection noted, swelling noted to area. Lymphadenopathy:      Cervical: No cervical adenopathy. Skin:     General: Skin is warm and dry. Capillary Refill: Capillary refill takes less than 2 seconds. Neurological:      General: No focal deficit present. Mental Status: He is alert and oriented to person, place, and time.    Psychiatric:         Mood and Affect: Mood normal.         Behavior: Behavior normal.          Asiya Alexandra, [address] [facility]

## 2023-08-18 NOTE — ASSESSMENT & PLAN NOTE
Patient currently on metoprolol 100 mg daily, was discontinued off of lisinopril due to decreased blood pressure. Blood pressure controlled in office today. Advised patient to monitor blood pressure at home, and if he gets abnormally high readings, to return to office for medication evaluation.

## 2023-08-21 LAB
FUNGUS SPEC CULT: NORMAL

## 2023-08-23 ENCOUNTER — TELEPHONE (OUTPATIENT)
Dept: INFECTIOUS DISEASES | Facility: CLINIC | Age: 48
End: 2023-08-23

## 2023-08-23 NOTE — TELEPHONE ENCOUNTER
Called [facility] today and spoke with Mame Betancourt. Informed Mame Betancourt I was calling regarding patients weekly labs. Mame Betancourt states labs were drawn today and she will fax results to office once they receive them.

## 2023-08-28 ENCOUNTER — OFFICE VISIT (OUTPATIENT)
Dept: INFECTIOUS DISEASES | Facility: CLINIC | Age: 48
End: 2023-08-28
Payer: COMMERCIAL

## 2023-08-28 VITALS
RESPIRATION RATE: 17 BRPM | DIASTOLIC BLOOD PRESSURE: 80 MMHG | BODY MASS INDEX: 25.1 KG/M2 | OXYGEN SATURATION: 97 % | HEART RATE: 80 BPM | TEMPERATURE: 97.2 F | WEIGHT: 180 LBS | SYSTOLIC BLOOD PRESSURE: 118 MMHG

## 2023-08-28 DIAGNOSIS — L40.0 PLAQUE PSORIASIS: ICD-10-CM

## 2023-08-28 DIAGNOSIS — T84.53XA INFECTION OF PROSTHETIC RIGHT KNEE JOINT (HCC): Primary | ICD-10-CM

## 2023-08-28 LAB
FUNGUS SPEC CULT: NORMAL

## 2023-08-28 PROCEDURE — 99213 OFFICE O/P EST LOW 20 MIN: CPT | Performed by: PHYSICIAN ASSISTANT

## 2023-08-28 NOTE — ASSESSMENT & PLAN NOTE
R knee PJI. Patient initially underwent outpatient aspiration of the knee with Synovasure showing MSSA. He also has h/o right quadricepts repair. Patient was taken to the OR on 7/28/2023 where he underwent R total knee arthroplasty, I&D down to and including bone, and insertion of biodegradable drug delivery device abx spacer. All hardware and previous sutures from quadriceps repair removed.  Gross purulence was noted and sinus tract was noted in deft in quadriceps tendon to the anterior knee. Multiple operative cultures growing MSSA. Of note, patient also on methotrexate for RA with plaque psoriasis. Patient was discharged home to complete a total of 6 weeks of IV cefazolin with plans for reimplantation after aspiration.     Patient continue to do well on the cefazolin. Knee appears to be healing well.   Labs remain stable.      Plan:   - continue cefazolin 2 grams IV every 8 hours as ordered through 9/7/2023 to complete a total of 6 weeks  - continue weekly CBCD, Cr  - PICC can be removed 9/8/2023  - continue follow up with Ortho with plans for aspiration and reimplantation  -no further ID follow up scheduled at this time

## 2023-08-28 NOTE — PROGRESS NOTES
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 Patient initially underwent outpatient aspiration of the knee with Synovasure showing MSSA. He also has h/o right quadricepts repair. Patient was taken to the OR on 7/28/2023 where he underwent R total knee arthroplasty, I&D down to and including bone, and insertion of biodegradable drug delivery device abx spacer. All hardware and previous sutures from quadriceps repair removed.  Gross purulence was noted and sinus tract was noted in deft in quadriceps tendon to the anterior knee. Multiple operative cultures growing MSSA. Of note, patient also on methotrexate for RA with plaque psoriasis. Patient was discharged home to complete a total of 6 weeks of IV cefazolin with plans for reimplantation after aspiration.     Patient continue to do well on the cefazolin. Knee appears to be healing well. Labs remain stable.      Plan:   - continue cefazolin 2 grams IV every 8 hours as ordered through 9/7/2023 to complete a total of 6 weeks  - continue weekly CBCD, Cr  - PICC can be removed 9/8/2023  - continue follow up with Ortho with plans for aspiration and reimplantation  -no further ID follow up scheduled at this time    Plaque psoriasis  Rheumatoid arthritis and plaque psoriasis. Patient on chronic methotrexate. This is risk factor for complications with healing and infection       Diagnoses and all orders for this visit:    Infection of prosthetic right knee joint ([address])  -     Discontinue PICC line; Future    Plaque psoriasis          Subjective:      Patient ID: Sarah Merrill is a 46 y.o. male. HPI  45 y/o male presents for office follow up today regarding R knee PJI. Patient remains on IV cefazolin. He continues to do well. He has no new complaints today. His PICC line is functioning well.    The following portions of the patient's history were reviewed and updated as appropriate: allergies, current medications, past family history, past medical history, past social history, past surgical history and problem list.    Review of Systems   Constitutional: Negative for chills and fever. Respiratory: Negative for cough and shortness of breath. Gastrointestinal: Negative for abdominal pain, diarrhea, nausea and vomiting. Skin: Negative for rash. Psychiatric/Behavioral: Negative for behavioral problems and confusion. Objective:      /80   Pulse 80   Temp (!) 97.2 °F (36.2 °C)   Resp 17   Wt 81.6 kg (180 lb)   SpO2 97%   BMI 25.10 kg/m²          Physical Exam  Vitals reviewed. Constitutional:       General: He is not in acute distress. Appearance: Normal appearance. He is not ill-appearing, toxic-appearing or diaphoretic. HENT:      Head: Normocephalic and atraumatic. Eyes:      General: No scleral icterus. Right eye: No discharge. Left eye: No discharge. Conjunctiva/sclera: Conjunctivae normal.   Cardiovascular:      Rate and Rhythm: Normal rate. Pulmonary:      Effort: Pulmonary effort is normal. No respiratory distress. Breath sounds: Normal breath sounds. No stridor. No wheezing, rhonchi or rales. Chest:      Chest wall: No tenderness. Abdominal:      General: Bowel sounds are normal. There is no distension. Palpations: Abdomen is soft. Tenderness: There is no abdominal tenderness. Musculoskeletal:      Comments: R knee incision healing well   Skin:     General: Skin is warm and dry. Coloration: Skin is not jaundiced or pale. Findings: No erythema or rash. Comments: PICC insertion site without erythema, drainage   Neurological:      Mental Status: He is alert and oriented to person, place, and time.    Psychiatric:         Mood and Affect: Mood normal.         Behavior: Behavior normal.             Labs:   8/23/2023  Cr: 0.77  Wbc: 4.8  Hgb: 10.9  Plt: 372

## 2023-09-12 DIAGNOSIS — F32.5 MAJOR DEPRESSIVE DISORDER IN FULL REMISSION, UNSPECIFIED WHETHER RECURRENT (HCC): ICD-10-CM

## 2023-09-13 RX ORDER — VENLAFAXINE HYDROCHLORIDE 150 MG/1
150 CAPSULE, EXTENDED RELEASE ORAL
Qty: 90 CAPSULE | Refills: 0 | Status: SHIPPED | OUTPATIENT
Start: 2023-09-13

## 2023-09-13 RX ORDER — NORTRIPTYLINE HYDROCHLORIDE 50 MG/1
50 CAPSULE ORAL
Qty: 90 CAPSULE | Refills: 1 | Status: SHIPPED | OUTPATIENT
Start: 2023-09-13

## 2023-09-14 ENCOUNTER — TELEPHONE (OUTPATIENT)
Dept: OBGYN CLINIC | Facility: HOSPITAL | Age: 48
End: 2023-09-14

## 2023-09-14 NOTE — TELEPHONE ENCOUNTER
Caller: Patient    Doctor: Clare Peña    Reason for call: Returning Ivon's call.  Advised will have her call patient back    Call back#: [phone]

## 2023-09-21 ENCOUNTER — OFFICE VISIT (OUTPATIENT)
Dept: OBGYN CLINIC | Facility: MEDICAL CENTER | Age: 48
End: 2023-09-21
Payer: COMMERCIAL

## 2023-09-21 VITALS
BODY MASS INDEX: 25.2 KG/M2 | HEIGHT: 71 IN | HEART RATE: 70 BPM | WEIGHT: 180 LBS | DIASTOLIC BLOOD PRESSURE: 87 MMHG | SYSTOLIC BLOOD PRESSURE: 128 MMHG

## 2023-09-21 DIAGNOSIS — T84.53XA INFECTION OF TOTAL RIGHT KNEE REPLACEMENT, INITIAL ENCOUNTER (HCC): Primary | ICD-10-CM

## 2023-09-21 DIAGNOSIS — Z96.651 STATUS POST REVISION OF TOTAL KNEE REPLACEMENT, RIGHT: ICD-10-CM

## 2023-09-21 PROCEDURE — 20610 DRAIN/INJ JOINT/BURSA W/O US: CPT | Performed by: STUDENT IN AN ORGANIZED HEALTH CARE EDUCATION/TRAINING PROGRAM

## 2023-09-21 PROCEDURE — 99024 POSTOP FOLLOW-UP VISIT: CPT | Performed by: STUDENT IN AN ORGANIZED HEALTH CARE EDUCATION/TRAINING PROGRAM

## 2023-09-21 NOTE — PROGRESS NOTES
Subjective:Patient seen and examined. Pain controlled. Progressing very well. He is ambulating with a rolling walker when out of the house. Incision without drainage. Denies fevers or chills. Physical Exam:  Incision:CDI, no erythema or drainage noted. ROM:3-95 (pre-op ROM 50-75)  5-10 degree extensor lag  No effusion present  5/5 IP/Q/HS/TA/GS, 2+ DP/PT, SILT DP/SP/S/S/TN    Assessment/Plan:  45 y/o male s/p R knee I&D, explant, articulating antibiotic spacer for R knee PJI 7/28    - continue multi-modal pain control   - Weight bearing status: WBAT  - DVT ppx: Aspirin 81mg twice daily x 4 weeks: completed  - Incision care: No restrictions  -  Aspiration of the right knee was attempted today- discussed no effusion present today which is a sign that infection has cleared now that he has been off abx since 9/7  -  Plan for second stage revision on 10/18//23  - PT/OT  - F/U 2 weeks after surgery on 10/18/2023    Large joint arthrocentesis: R knee  Universal Protocol:  Consent: Verbal consent obtained. Risks and benefits: risks, benefits and alternatives were discussed  Consent given by: patient  Time out: Immediately prior to procedure a "time out" was called to verify the correct patient, procedure, equipment, support staff and site/side marked as required.   Timeout called at: 9/21/2023 3:33 PM.  Patient understanding: patient states understanding of the procedure being performed  Site marked: the operative site was marked  Patient identity confirmed: verbally with patient    Supporting Documentation  Indications: pain   Procedure Details  Location: knee - R knee  Preparation: Patient was prepped and draped in the usual sterile fashion  Needle size: 22 G  Ultrasound guidance: no  Approach: anterolateral    Aspirate amount: 0 mL    Patient tolerance: patient tolerated the procedure well with no immediate complications  Dressing:  Sterile dressing applied          Scribe Attestari    I,:  Estrada Hinkle am acting as a scribe while in the presence of the attending physician.:       I,:  Chalice Spatz, DO personally performed the services described in this documentation    as scribed in my presence.:

## 2023-09-21 NOTE — LETTER
September 21, 2023     Patient: Betty Muñoz  YOB: 1975  Date of Visit: 9/21/2023      To Whom it May Concern:    Marcelo Moore is under my professional care. Michael Singh was seen in my office on 9/21/2023. Michael Singh may return to work with limitations of taking ten minute sit breaks every hour . If you have any questions or concerns, please don't hesitate to call.          Sincerely,          Vivek Moreland DO        CC: No Recipients
Yes

## 2023-09-25 ENCOUNTER — OFFICE VISIT (OUTPATIENT)
Dept: LAB | Facility: HOSPITAL | Age: 48
End: 2023-09-25
Payer: COMMERCIAL

## 2023-09-25 ENCOUNTER — APPOINTMENT (OUTPATIENT)
Dept: LAB | Facility: HOSPITAL | Age: 48
End: 2023-09-25
Payer: COMMERCIAL

## 2023-09-25 DIAGNOSIS — Z01.818 PRE-OP EVALUATION: ICD-10-CM

## 2023-09-25 DIAGNOSIS — T84.53XA INFECTION OF TOTAL RIGHT KNEE REPLACEMENT, INITIAL ENCOUNTER (HCC): ICD-10-CM

## 2023-09-25 DIAGNOSIS — Z96.651 STATUS POST REVISION OF TOTAL KNEE REPLACEMENT, RIGHT: Primary | ICD-10-CM

## 2023-09-25 DIAGNOSIS — Z13.6 SCREENING FOR CARDIOVASCULAR CONDITION: ICD-10-CM

## 2023-09-25 DIAGNOSIS — Z96.651 STATUS POST REVISION OF TOTAL KNEE REPLACEMENT, RIGHT: ICD-10-CM

## 2023-09-25 DIAGNOSIS — Z12.5 PROSTATE CANCER SCREENING: ICD-10-CM

## 2023-09-25 LAB
ALBUMIN SERPL BCP-MCNC: 4.4 G/DL (ref 3.5–5)
ALP SERPL-CCNC: 100 U/L (ref 34–104)
ALT SERPL W P-5'-P-CCNC: 17 U/L (ref 7–52)
ANION GAP SERPL CALCULATED.3IONS-SCNC: 7 MMOL/L
APTT PPP: 28 SECONDS (ref 23–37)
AST SERPL W P-5'-P-CCNC: 17 U/L (ref 13–39)
ATRIAL RATE: 62 BPM
BASOPHILS # BLD AUTO: 0.02 THOUSANDS/ÂΜL (ref 0–0.1)
BASOPHILS NFR BLD AUTO: 0 % (ref 0–1)
BILIRUB SERPL-MCNC: 0.46 MG/DL (ref 0.2–1)
BUN SERPL-MCNC: 25 MG/DL (ref 5–25)
CALCIUM SERPL-MCNC: 10.1 MG/DL (ref 8.4–10.2)
CHLORIDE SERPL-SCNC: 101 MMOL/L (ref 96–108)
CHOLEST SERPL-MCNC: 239 MG/DL
CO2 SERPL-SCNC: 30 MMOL/L (ref 21–32)
CREAT SERPL-MCNC: 0.91 MG/DL (ref 0.6–1.3)
EOSINOPHIL # BLD AUTO: 0.33 THOUSAND/ÂΜL (ref 0–0.61)
EOSINOPHIL NFR BLD AUTO: 7 % (ref 0–6)
ERYTHROCYTE [DISTWIDTH] IN BLOOD BY AUTOMATED COUNT: 15.9 % (ref 11.6–15.1)
GFR SERPL CREATININE-BSD FRML MDRD: 97 ML/MIN/1.73SQ M
GLUCOSE P FAST SERPL-MCNC: 88 MG/DL (ref 65–99)
HCT VFR BLD AUTO: 37.8 % (ref 36.5–49.3)
HDLC SERPL-MCNC: 39 MG/DL
HGB BLD-MCNC: 12.1 G/DL (ref 12–17)
IMM GRANULOCYTES # BLD AUTO: 0.02 THOUSAND/UL (ref 0–0.2)
IMM GRANULOCYTES NFR BLD AUTO: 0 % (ref 0–2)
INR PPP: 1.01 (ref 0.84–1.19)
LDLC SERPL CALC-MCNC: 153 MG/DL (ref 0–100)
LYMPHOCYTES # BLD AUTO: 0.91 THOUSANDS/ÂΜL (ref 0.6–4.47)
LYMPHOCYTES NFR BLD AUTO: 19 % (ref 14–44)
MCH RBC QN AUTO: 29.5 PG (ref 26.8–34.3)
MCHC RBC AUTO-ENTMCNC: 32 G/DL (ref 31.4–37.4)
MCV RBC AUTO: 92 FL (ref 82–98)
MONOCYTES # BLD AUTO: 0.39 THOUSAND/ÂΜL (ref 0.17–1.22)
MONOCYTES NFR BLD AUTO: 8 % (ref 4–12)
NEUTROPHILS # BLD AUTO: 3.1 THOUSANDS/ÂΜL (ref 1.85–7.62)
NEUTS SEG NFR BLD AUTO: 66 % (ref 43–75)
NONHDLC SERPL-MCNC: 200 MG/DL
NRBC BLD AUTO-RTO: 0 /100 WBCS
P AXIS: 16 DEGREES
PLATELET # BLD AUTO: 350 THOUSANDS/UL (ref 149–390)
PMV BLD AUTO: 9.4 FL (ref 8.9–12.7)
POTASSIUM SERPL-SCNC: 4.1 MMOL/L (ref 3.5–5.3)
PR INTERVAL: 170 MS
PROT SERPL-MCNC: 8.5 G/DL (ref 6.4–8.4)
PROTHROMBIN TIME: 13.9 SECONDS (ref 11.6–14.5)
QRS AXIS: -3 DEGREES
QRSD INTERVAL: 98 MS
QT INTERVAL: 416 MS
QTC INTERVAL: 422 MS
RBC # BLD AUTO: 4.1 MILLION/UL (ref 3.88–5.62)
RETICS # AUTO: ABNORMAL 10*3/UL (ref 14356–105094)
RETICS # CALC: 2.27 % (ref 0.37–1.87)
SODIUM SERPL-SCNC: 138 MMOL/L (ref 135–147)
T WAVE AXIS: 1 DEGREES
TRIGL SERPL-MCNC: 234 MG/DL
VENTRICULAR RATE: 62 BPM
WBC # BLD AUTO: 4.77 THOUSAND/UL (ref 4.31–10.16)

## 2023-09-25 PROCEDURE — 85025 COMPLETE CBC W/AUTO DIFF WBC: CPT

## 2023-09-25 PROCEDURE — 93010 ELECTROCARDIOGRAM REPORT: CPT | Performed by: INTERNAL MEDICINE

## 2023-09-25 PROCEDURE — 93005 ELECTROCARDIOGRAM TRACING: CPT

## 2023-09-25 PROCEDURE — 80053 COMPREHEN METABOLIC PANEL: CPT

## 2023-09-25 PROCEDURE — 86900 BLOOD TYPING SEROLOGIC ABO: CPT | Performed by: STUDENT IN AN ORGANIZED HEALTH CARE EDUCATION/TRAINING PROGRAM

## 2023-09-25 PROCEDURE — 83036 HEMOGLOBIN GLYCOSYLATED A1C: CPT

## 2023-09-25 PROCEDURE — 82728 ASSAY OF FERRITIN: CPT

## 2023-09-25 PROCEDURE — 86901 BLOOD TYPING SEROLOGIC RH(D): CPT | Performed by: STUDENT IN AN ORGANIZED HEALTH CARE EDUCATION/TRAINING PROGRAM

## 2023-09-25 PROCEDURE — 85045 AUTOMATED RETICULOCYTE COUNT: CPT

## 2023-09-25 PROCEDURE — 83550 IRON BINDING TEST: CPT

## 2023-09-25 PROCEDURE — 36415 COLL VENOUS BLD VENIPUNCTURE: CPT

## 2023-09-25 PROCEDURE — G0103 PSA SCREENING: HCPCS

## 2023-09-25 PROCEDURE — 85730 THROMBOPLASTIN TIME PARTIAL: CPT

## 2023-09-25 PROCEDURE — 86850 RBC ANTIBODY SCREEN: CPT | Performed by: STUDENT IN AN ORGANIZED HEALTH CARE EDUCATION/TRAINING PROGRAM

## 2023-09-25 PROCEDURE — 85610 PROTHROMBIN TIME: CPT

## 2023-09-25 PROCEDURE — 80061 LIPID PANEL: CPT

## 2023-09-25 PROCEDURE — 83540 ASSAY OF IRON: CPT

## 2023-09-26 ENCOUNTER — LAB REQUISITION (OUTPATIENT)
Dept: LAB | Facility: HOSPITAL | Age: 48
End: 2023-09-26
Payer: COMMERCIAL

## 2023-09-26 DIAGNOSIS — Z01.818 ENCOUNTER FOR OTHER PREPROCEDURAL EXAMINATION: ICD-10-CM

## 2023-09-26 LAB
ABO GROUP BLD: NORMAL
BLD GP AB SCN SERPL QL: NEGATIVE
EST. AVERAGE GLUCOSE BLD GHB EST-MCNC: 105 MG/DL
FERRITIN SERPL-MCNC: 171 NG/ML (ref 24–336)
HBA1C MFR BLD: 5.3 %
IRON SATN MFR SERPL: 26 % (ref 15–50)
IRON SERPL-MCNC: 83 UG/DL (ref 50–212)
PSA SERPL-MCNC: 0.51 NG/ML (ref 0–4)
RH BLD: POSITIVE
SPECIMEN EXPIRATION DATE: NORMAL
TIBC SERPL-MCNC: 325 UG/DL (ref 250–450)
UIBC SERPL-MCNC: 242 UG/DL (ref 155–355)

## 2023-09-27 ENCOUNTER — ANESTHESIA EVENT (OUTPATIENT)
Dept: PERIOP | Facility: HOSPITAL | Age: 48
End: 2023-09-27
Payer: COMMERCIAL

## 2023-09-27 NOTE — PRE-PROCEDURE INSTRUCTIONS
Pre-Surgery Instructions:   Medication Instructions   • acetaminophen (TYLENOL) 500 mg tablet Uses PRN- OK to take day of surgery   • ascorbic acid (VITAMIN C) 500 MG tablet Hold day of surgery. • aspirin (ECOTRIN LOW STRENGTH) 81 mg EC tablet Instructions provided by MD   • busPIRone (BUSPAR) 15 mg tablet Take day of surgery. • cholecalciferol (VITAMIN D3) 1,000 units tablet Hold day of surgery. • ferrous sulfate 324 (65 Fe) mg Hold day of surgery. • folic acid (FOLVITE) 1 mg tablet Hold day of surgery. • methotrexate 2.5 mg tablet takes Sundays-will not take DOS   • metoprolol succinate (TOPROL-XL) 100 mg 24 hr tablet Take day of surgery. • Multiple Vitamins-Minerals (multivitamin with minerals) tablet Hold day of surgery. • nortriptyline (PAMELOR) 50 mg capsule Take night before surgery   • venlafaxine (EFFEXOR-XR) 150 mg 24 hr capsule Take night before surgery   Ortho review. All questions addressed. Medication instructions for day surgery reviewed. Please use only a sip of water to take your instructed medications. Avoid all over the counter vitamins, supplements and NSAIDS for one week prior to surgery per anesthesia guidelines. Tylenol is ok to take as needed. You will receive a call one business day prior to surgery with an arrival time and hospital directions. If your surgery is scheduled on a Monday, the hospital will be calling you on the Friday prior to your surgery. If you have not heard from anyone by 8pm, please call the hospital supervisor through the hospital  at [phone]. Viridiana Peraza [phone]). Do not eat or drink anything after midnight the night before your surgery, including candy, mints, lifesavers, or chewing gum. Do not drink alcohol 24hrs before your surgery. Try not to smoke at least 24hrs before your surgery.        Follow the pre surgery showering instructions as listed in the [facility] Surgical Experience Booklet” or otherwise provided by your surgeon's office. Do not shave the surgical area 24 hours before surgery. Do not apply any lotions, creams, including makeup, cologne, deodorant, or perfumes after showering on the day of your surgery. No contact lenses, eye make-up, or artificial eyelashes. Remove nail polish, including gel polish, and any artificial, gel, or acrylic nails if possible. Remove all jewelry including rings and body piercing jewelry. Wear causal clothing that is easy to take on and off. Consider your type of surgery. Keep any valuables, jewelry, piercings at home. Please bring any specially ordered equipment (sling, braces) if indicated. Arrange for a responsible person to drive you to and from the hospital on the day of your surgery. Visitor Guidelines discussed. Call the surgeon's office with any new illnesses, exposures, or additional questions prior to surgery. Please reference your [facility] Surgical Experience Booklet” for additional information to prepare for your upcoming surgery.

## 2023-10-03 ENCOUNTER — OFFICE VISIT (OUTPATIENT)
Dept: FAMILY MEDICINE CLINIC | Facility: CLINIC | Age: 48
End: 2023-10-03
Payer: COMMERCIAL

## 2023-10-03 VITALS
HEIGHT: 71 IN | BODY MASS INDEX: 26.18 KG/M2 | WEIGHT: 187 LBS | HEART RATE: 84 BPM | TEMPERATURE: 96.9 F | SYSTOLIC BLOOD PRESSURE: 130 MMHG | OXYGEN SATURATION: 97 % | DIASTOLIC BLOOD PRESSURE: 78 MMHG

## 2023-10-03 DIAGNOSIS — Z96.651 STATUS POST REVISION OF TOTAL REPLACEMENT OF RIGHT KNEE: ICD-10-CM

## 2023-10-03 DIAGNOSIS — E78.2 MODERATE MIXED HYPERLIPIDEMIA NOT REQUIRING STATIN THERAPY: ICD-10-CM

## 2023-10-03 DIAGNOSIS — I10 PRIMARY HYPERTENSION: ICD-10-CM

## 2023-10-03 DIAGNOSIS — T84.53XD INFECTION ASSOCIATED WITH INTERNAL RIGHT KNEE PROSTHESIS, SUBSEQUENT ENCOUNTER: ICD-10-CM

## 2023-10-03 DIAGNOSIS — Z01.818 PRE-OP EXAMINATION: Primary | ICD-10-CM

## 2023-10-03 PROCEDURE — 99214 OFFICE O/P EST MOD 30 MIN: CPT | Performed by: NURSE PRACTITIONER

## 2023-10-03 NOTE — ASSESSMENT & PLAN NOTE
Patient completed course of IV cefazolin. PICC removed. Patient cleared by ID. Continues to follow-up with Ortho. Has planned surgery on 10/18/2023 revision of prior right knee arthroplasty.

## 2023-10-03 NOTE — H&P (VIEW-ONLY)
[address]    NAME: Yovany Brown  AGE: 46 y.o. SEX: male  : 1975     DATE: 10/3/2023    Chief Complaint: Pre-operative Evaluation     Surgery: Arthroplasty Knee Total Revision  Anticipated Date of Surgery: 10/18/2023  Referring Provider: Dr. Gerber Morrell     History of Present Illness:     Fabricio Cherry is a 46 y.o. male who presents to the office today for a preoperative consultation at the request of surgeon Dr. Gerber Morrell who plans on performing Arthroplasty Knee Total Revision on 10/18/2023. Planned anesthesia is general. Patient has a bleeding risk of: no recent abnormal bleeding. Patient does not have objections to receiving blood products if needed. Current anti-platelet/anti-coagulation medications that the patient is prescribed includes: Aspirin     Assessment of Chronic Conditions:   - Hypertension: controlled on Metoprolol     Assessment of Cardiac Risk:  · Denies unstable or severe angina or MI in the last 6 weeks or history of stent placement in the last year   · Denies decompensated heart failure (e.g. New onset heart failure, NYHA functional class IV heart failure, or worsening existing heart failure)  · Denies significant arrhythmias such as high grade AV block, symptomatic ventricular arrhythmia, newly recognized ventricular tachycardia, supraventricular tachycardia with resting heart rate >100, or symptomatic bradycardia  · Denies severe heart valve disease including aortic stenosis or symptomatic mitral stenosis     Exercise Capacity:  · Able to walk 4 blocks without symptoms?: Yes  · Able to walk 2 flights without symptoms?: Yes    Prior Anesthesia Reactions: No     Personal history of venous thromboembolic disease? No    History of steroid use for >2 weeks within last year?  No    STOP-BANG Sleep Apnea Screening Questionnaire:         Review of Systems:     Review of Systems   Constitutional: Negative for chills, diaphoresis, fatigue and fever. HENT: Negative for congestion, sore throat and trouble swallowing. Eyes: Negative for photophobia and visual disturbance. Respiratory: Negative for cough, chest tightness and shortness of breath. Cardiovascular: Negative for chest pain and palpitations. Gastrointestinal: Negative for abdominal pain, blood in stool, diarrhea, nausea and vomiting. Genitourinary: Negative for decreased urine volume and hematuria. Musculoskeletal: Positive for arthralgias (right knee) and joint swelling. Negative for back pain and gait problem. Skin: Negative for color change and rash. Neurological: Negative for dizziness, seizures, weakness, light-headedness, numbness and headaches. Hematological: Negative for adenopathy. Does not bruise/bleed easily. Psychiatric/Behavioral: Negative for confusion.        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)   • Prediabetes   • Anemia   • Mild protein-calorie malnutrition (HCC)   • Major depressive disorder in full remission ([address])   • Moderate mixed hyperlipidemia not requiring statin therapy       Allergies:     No Known Allergies    Physical Exam:       Current Outpatient Medications:   •  acetaminophen (TYLENOL) 500 mg tablet, Take 2 tablets (1,000 mg total) by mouth every 8 (eight) hours, Disp: 60 tablet, Rfl: 0  •  ascorbic acid (VITAMIN C) 500 MG tablet, Take 1 tablet (500 mg total) by mouth 2 (two) times a day, Disp: 60 tablet, Rfl: 1  •  busPIRone (BUSPAR) 15 mg tablet, TAKE 1/2 TABLET (7.5 MG) BY ORAL ROUTE 2 TIMES PER DAY, Disp: , Rfl:   •  celecoxib (CeleBREX) 200 mg capsule, Take 1 capsule (200 mg total) by mouth 2 (two) times a day, Disp: 60 capsule, Rfl: 0  •  cholecalciferol (VITAMIN D3) 1,000 units tablet, Take 2 tablets (2,000 Units total) by mouth daily, Disp: 60 tablet, Rfl: 1  •  cyclobenzaprine (FLEXERIL) 5 mg tablet, Take 1 tablet (5 mg total) by mouth 3 (three) times a day as needed for muscle spasms, Disp: 90 tablet, Rfl: 0  •  ferrous sulfate 324 (65 Fe) mg, Take 1 tablet (324 mg total) by mouth 2 (two) times a day before meals, Disp: 60 tablet, Rfl: 1  •  folic acid (FOLVITE) 1 mg tablet, Take 1 tablet (1 mg total) by mouth daily, Disp: 30 tablet, Rfl: 1  •  methotrexate 2.5 mg tablet, Take 6 tablets by mouth once a week, Disp: , Rfl:   •  metoprolol succinate (TOPROL-XL) 100 mg 24 hr tablet, Take 100 mg by mouth daily, Disp: , Rfl:   •  Multiple Vitamins-Minerals (multivitamin with minerals) tablet, Take 1 tablet by mouth daily, Disp: 30 tablet, Rfl: 1  •  nortriptyline (PAMELOR) 50 mg capsule, Take 1 capsule (50 mg total) by mouth daily at bedtime (Patient taking differently: Take 100 mg by mouth daily at bedtime), Disp: 90 capsule, Rfl: 1  •  venlafaxine (EFFEXOR-XR) 150 mg 24 hr capsule, Take 1 capsule (150 mg total) by mouth daily at bedtime, Disp: 90 capsule, Rfl: 0  •  aspirin (ECOTRIN LOW STRENGTH) 81 mg EC tablet, Take 1 tablet (81 mg total) by mouth 2 (two) times a day (Patient not taking: Reported on 10/3/2023), Disp: 60 tablet, Rfl: 0    Past Medical History:       Past Medical History:   Diagnosis Date   • Anxiety    • Arthritis    • Crutches as ambulation aid 07/25/2023   • Depression    • Difficult intubation 07/28/2023   • Hypertension    • Limited jaw ROM    • Migraine    • PONV (postoperative nausea and vomiting) 07/28/2023   • Psoriasis    • RA (rheumatoid arthritis) ([address])    • Walker as ambulation aid         Past Surgical History:   Procedure Laterality Date   • JOINT REPLACEMENT     • UT REVJ TOT KNEE ARTHRP [facility] Right 07/28/2023    Procedure: ARTHROPLASTY KNEE TOTAL REVISION;  Surgeon: Abraham Augustin DO;  Location: [facility];  Service: Orthopedics   • REPLACEMENT TOTAL KNEE Right     x2        Family History   Problem Relation Age of Onset   • Breast cancer Mother    • Completed Suicide  Father    • Asthma Sister         Social History     Socioeconomic History   • Marital status: Single     Spouse name: Not on file   • Number of children: Not on file   • Years of education: Not on file   • Highest education level: Not on file   Occupational History   • Not on file   Tobacco Use   • Smoking status: Former     Packs/day: 1.00     Years: 10.00     Total pack years: 10.00     Types: Cigarettes     Start date: 2000     Quit date: 2000     Years since quittin.8   • Smokeless tobacco: Current     Types: Chew   • Tobacco comments:     Last chewed tobacco 23   Vaping Use   • Vaping Use: Never used   Substance and Sexual Activity   • Alcohol use: Yes     Comment: rarely   • Drug use: Never   • Sexual activity: Not Currently     Partners: Female     Birth control/protection: Condom Male   Other Topics Concern   • Not on file   Social History Narrative   • Not on file     Social Determinants of Health     Financial Resource Strain: Not on file   Food Insecurity: No Food Insecurity (2023)    Hunger Vital Sign    • Worried About Running Out of Food in the Last Year: Never true    • Ran Out of Food in the Last Year: Never true   Transportation Needs: No Transportation Needs (2023)    PRAPARE - Transportation    • Lack of Transportation (Medical): No    • Lack of Transportation (Non-Medical):  No   Physical Activity: Not on file   Stress: Not on file   Social Connections: Not on file   Intimate Partner Violence: Not on file   Housing Stability: Low Risk  (2023)    Housing Stability Vital Sign    • Unable to Pay for Housing in the Last Year: No    • Number of Places Lived in the Last Year: 1    • Unstable Housing in the Last Year: No Physical Exam:     /78 (BP Location: Left arm, Patient Position: Sitting, Cuff Size: Standard)   Pulse 84   Temp (!) 96.9 °F (36.1 °C) (Tympanic)   Ht 5' 11" (1.803 m)   Wt 84.8 kg (187 lb)   SpO2 97%   BMI 26.08 kg/m²     Physical Exam  Vitals reviewed. Constitutional:       General: He is not in acute distress. Appearance: Normal appearance. He is not ill-appearing. HENT:      Head: Normocephalic and atraumatic. Right Ear: Tympanic membrane, ear canal and external ear normal.      Left Ear: Tympanic membrane, ear canal and external ear normal.      Nose: Nose normal.      Mouth/Throat:      Mouth: Mucous membranes are moist.      Pharynx: Oropharynx is clear. Eyes:      Extraocular Movements: Extraocular movements intact. Conjunctiva/sclera: Conjunctivae normal.      Pupils: Pupils are equal, round, and reactive to light. Neck:      Vascular: No carotid bruit. Cardiovascular:      Rate and Rhythm: Normal rate and regular rhythm. Pulses: Normal pulses. Heart sounds: Normal heart sounds. No murmur heard. Pulmonary:      Effort: Pulmonary effort is normal.      Breath sounds: Normal breath sounds. Abdominal:      General: Bowel sounds are normal.      Palpations: Abdomen is soft. Tenderness: There is no abdominal tenderness. Musculoskeletal:      Cervical back: Normal range of motion and neck supple. Right knee: Swelling present. No bony tenderness or crepitus. No tenderness. Right lower leg: No edema. Left lower leg: No edema. Lymphadenopathy:      Cervical: No cervical adenopathy. Skin:     General: Skin is warm and dry. Capillary Refill: Capillary refill takes less than 2 seconds. Neurological:      General: No focal deficit present. Mental Status: He is alert and oriented to person, place, and time.    Psychiatric:         Mood and Affect: Mood normal.         Behavior: Behavior normal.          Data:     Pre-operative work-up  CBC:   Results from last 6 Months   Lab Units 09/25/23  1225   WBC Thousand/uL 4.77   RBC Million/uL 4.10   HEMOGLOBIN g/dL 12.1   HEMATOCRIT % 37.8   MCV fL 92   MCH pg 29.5   MCHC g/dL 32.0   RDW % 15.9*   MPV fL 9.4   PLATELETS Thousands/uL 350   NRBC AUTO /100 WBCs 0   NEUTROS PCT % 66   LYMPHS PCT % 19   MONOS PCT % 8   EOS PCT % 7*   BASOS PCT % 0   NEUTROS ABS Thousands/µL 3.10   LYMPHS ABS Thousands/µL 0.91   MONOS ABS Thousand/µL 0.39   EOS ABS Thousand/µL 0.33     Chemistry Profile:   Results from last 6 Months   Lab Units 09/25/23  1225   POTASSIUM mmol/L 4.1   CHLORIDE mmol/L 101   CO2 mmol/L 30   BUN mg/dL 25   CREATININE mg/dL 0.91   GLUCOSE FASTING mg/dL 88   CALCIUM mg/dL 10.1   AST U/L 17   ALT U/L 17   ALK PHOS U/L 100   EGFR ml/min/1.73sq m 97     Coagulation Studies:   Results from last 6 Months   Lab Units 09/25/23  1225   PROTIME seconds 13.9   INR  1.01   PTT seconds 28     Endocrine Studies:   Results from last 6 Months   Lab Units 09/25/23  1225   HEMOGLOBIN A1C % 5.3       EKG: EKG: normal EKG, normal sinus rhythm, unchanged from previous tracings. Chest x-ray: n/a    Previous cardiopulmonary studies within the past year:  · Echocardiogram: n/a  · Cardiac Catheterization: n/a  · Stress Test: n/a  · Pulmonary Function Testing: n/a      Assessment & Recommendations:     1. Pre-op examination      Exam completed, patient had preop labs/ECG completed. Cleared for procedure. 2. Infection associated with internal right knee prosthesis, subsequent encounter        3. Status post revision of total replacement of right knee        4. Moderate mixed hyperlipidemia not requiring statin therapy  Lipid Panel with Direct LDL reflex      5. Primary hypertension            Pre-Op Evaluation Assessment  46 y.o. male with planned surgery: Arthroplasty Knee Total Revision. Known risk factors for perioperative complications: None.       Cardiac Risk Estimation: per the Revised Cardiac Risk Index (Circ. 100:1043, 1999), the patient's risk factors for cardiac complications include n/a, putting him in: RCI RISK CLASS I (0 risk factors, risk of major cardiac compl. appr. 0.5%). Current medications which may produce withdrawal symptoms if withheld perioperatively: none. Pre-Op Evaluation Plan  1. Further preoperative workup as follows:   - None; no further preoperative work-up is required    2. Change in medication regimen before surgery:   - Patient has been instructed to avoid herbs or non-directed vitamins the week prior to surgery to ensure no drug interactions with perioperative surgical and anesthetic medications. - Patient has been instructed to avoid aspirin containing medications or non-steroidal anti-inflammatory drugs for the week preceding surgery. - Regarding anti-platelet agents: hold Aspirin for a week prior to surgery. 3. Prophylaxis for cardiac events with perioperative beta-blockers: not indicated. 4. Patient requires further consultation with: None    Clearance  Patient is CLEARED for surgery without any additional cardiac testing. 1 [address]  [address]  Phone#  [phone]  Fax#  [phone]    Portions of the record may have been created with voice recognition software.  Occasional wrong word or "sound a like" substitutions may have occurred due to the inherent limitations of voice recognition software.  Read the chart carefully and recognize, using context, where substitutions have occurred.

## 2023-10-03 NOTE — PROGRESS NOTES
[facility] [address]    NAME: Betty Brown  AGE: 46 y.o. SEX: male  : 1975     DATE: 10/3/2023    Chief Complaint: Pre-operative Evaluation     Surgery: Arthroplasty Knee Total Revision  Anticipated Date of Surgery: 10/18/2023  Referring Provider: Dr. Ree Grier     History of Present Illness:     Sarah Merrill is a 46 y.o. male who presents to the office today for a preoperative consultation at the request of surgeon Dr. Ree Grier who plans on performing Arthroplasty Knee Total Revision on 10/18/2023. Planned anesthesia is general. Patient has a bleeding risk of: no recent abnormal bleeding. Patient does not have objections to receiving blood products if needed. Current anti-platelet/anti-coagulation medications that the patient is prescribed includes: Aspirin     Assessment of Chronic Conditions:   - Hypertension: controlled on Metoprolol     Assessment of Cardiac Risk:  · Denies unstable or severe angina or MI in the last 6 weeks or history of stent placement in the last year   · Denies decompensated heart failure (e.g. New onset heart failure, NYHA functional class IV heart failure, or worsening existing heart failure)  · Denies significant arrhythmias such as high grade AV block, symptomatic ventricular arrhythmia, newly recognized ventricular tachycardia, supraventricular tachycardia with resting heart rate >100, or symptomatic bradycardia  · Denies severe heart valve disease including aortic stenosis or symptomatic mitral stenosis     Exercise Capacity:  · Able to walk 4 blocks without symptoms?: Yes  · Able to walk 2 flights without symptoms?: Yes    Prior Anesthesia Reactions: No     Personal history of venous thromboembolic disease? No    History of steroid use for >2 weeks within last year?  No    STOP-BANG Sleep Apnea Screening Questionnaire:         Review of Systems:     Review of Systems   Constitutional: Negative for chills, diaphoresis, fatigue and fever. HENT: Negative for congestion, sore throat and trouble swallowing. Eyes: Negative for photophobia and visual disturbance. Respiratory: Negative for cough, chest tightness and shortness of breath. Cardiovascular: Negative for chest pain and palpitations. Gastrointestinal: Negative for abdominal pain, blood in stool, diarrhea, nausea and vomiting. Genitourinary: Negative for decreased urine volume and hematuria. Musculoskeletal: Positive for arthralgias (right knee) and joint swelling. Negative for back pain and gait problem. Skin: Negative for color change and rash. Neurological: Negative for dizziness, seizures, weakness, light-headedness, numbness and headaches. Hematological: Negative for adenopathy. Does not bruise/bleed easily. Psychiatric/Behavioral: Negative for confusion.        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)   • Prediabetes   • Anemia   • Mild protein-calorie malnutrition (HCC)   • Major depressive disorder in full remission ([address])   • Moderate mixed hyperlipidemia not requiring statin therapy       Allergies:     No Known Allergies    Physical Exam:       Current Outpatient Medications:   •  acetaminophen (TYLENOL) 500 mg tablet, Take 2 tablets (1,000 mg total) by mouth every 8 (eight) hours, Disp: 60 tablet, Rfl: 0  •  ascorbic acid (VITAMIN C) 500 MG tablet, Take 1 tablet (500 mg total) by mouth 2 (two) times a day, Disp: 60 tablet, Rfl: 1  •  busPIRone (BUSPAR) 15 mg tablet, TAKE 1/2 TABLET (7.5 MG) BY ORAL ROUTE 2 TIMES PER DAY, Disp: , Rfl:   •  celecoxib (CeleBREX) 200 mg capsule, Take 1 capsule (200 mg total) by mouth 2 (two) times a day, Disp: 60 capsule, Rfl: 0  •  cholecalciferol (VITAMIN D3) 1,000 units tablet, Take 2 tablets (2,000 Units total) by mouth daily, Disp: 60 tablet, Rfl: 1  •  cyclobenzaprine (FLEXERIL) 5 mg tablet, Take 1 tablet (5 mg total) by mouth 3 (three) times a day as needed for muscle spasms, Disp: 90 tablet, Rfl: 0  •  ferrous sulfate 324 (65 Fe) mg, Take 1 tablet (324 mg total) by mouth 2 (two) times a day before meals, Disp: 60 tablet, Rfl: 1  •  folic acid (FOLVITE) 1 mg tablet, Take 1 tablet (1 mg total) by mouth daily, Disp: 30 tablet, Rfl: 1  •  methotrexate 2.5 mg tablet, Take 6 tablets by mouth once a week, Disp: , Rfl:   •  metoprolol succinate (TOPROL-XL) 100 mg 24 hr tablet, Take 100 mg by mouth daily, Disp: , Rfl:   •  Multiple Vitamins-Minerals (multivitamin with minerals) tablet, Take 1 tablet by mouth daily, Disp: 30 tablet, Rfl: 1  •  nortriptyline (PAMELOR) 50 mg capsule, Take 1 capsule (50 mg total) by mouth daily at bedtime (Patient taking differently: Take 100 mg by mouth daily at bedtime), Disp: 90 capsule, Rfl: 1  •  venlafaxine (EFFEXOR-XR) 150 mg 24 hr capsule, Take 1 capsule (150 mg total) by mouth daily at bedtime, Disp: 90 capsule, Rfl: 0  •  aspirin (ECOTRIN LOW STRENGTH) 81 mg EC tablet, Take 1 tablet (81 mg total) by mouth 2 (two) times a day (Patient not taking: Reported on 10/3/2023), Disp: 60 tablet, Rfl: 0    Past Medical History:       Past Medical History:   Diagnosis Date   • Anxiety    • Arthritis    • Crutches as ambulation aid 07/25/2023   • Depression    • Difficult intubation 07/28/2023   • Hypertension    • Limited jaw ROM    • Migraine    • PONV (postoperative nausea and vomiting) 07/28/2023   • Psoriasis    • RA (rheumatoid arthritis) ([address])    • Walker as ambulation aid         Past Surgical History:   Procedure Laterality Date   • JOINT REPLACEMENT     • DC REVJ TOT KNEE ARTHRP [facility] Right 07/28/2023    Procedure: ARTHROPLASTY KNEE TOTAL REVISION;  Surgeon: Joann Barreto DO;  Location: [facility];  Service: Orthopedics   • REPLACEMENT TOTAL KNEE Right     x2        Family History   Problem Relation Age of Onset   • Breast cancer Mother    • Completed Suicide  Father    • Asthma Sister         Social History     Socioeconomic History   • Marital status: Single     Spouse name: Not on file   • Number of children: Not on file   • Years of education: Not on file   • Highest education level: Not on file   Occupational History   • Not on file   Tobacco Use   • Smoking status: Former     Packs/day: 1.00     Years: 10.00     Total pack years: 10.00     Types: Cigarettes     Start date: 2000     Quit date: 2000     Years since quittin.8   • Smokeless tobacco: Current     Types: Chew   • Tobacco comments:     Last chewed tobacco 23   Vaping Use   • Vaping Use: Never used   Substance and Sexual Activity   • Alcohol use: Yes     Comment: rarely   • Drug use: Never   • Sexual activity: Not Currently     Partners: Female     Birth control/protection: Condom Male   Other Topics Concern   • Not on file   Social History Narrative   • Not on file     Social Determinants of Health     Financial Resource Strain: Not on file   Food Insecurity: No Food Insecurity (2023)    Hunger Vital Sign    • Worried About Running Out of Food in the Last Year: Never true    • Ran Out of Food in the Last Year: Never true   Transportation Needs: No Transportation Needs (2023)    PRAPARE - Transportation    • Lack of Transportation (Medical): No    • Lack of Transportation (Non-Medical):  No   Physical Activity: Not on file   Stress: Not on file   Social Connections: Not on file   Intimate Partner Violence: Not on file   Housing Stability: Low Risk  (2023)    Housing Stability Vital Sign    • Unable to Pay for Housing in the Last Year: No    • Number of Places Lived in the Last Year: 1    • Unstable Housing in the Last Year: No Physical Exam:     /78 (BP Location: Left arm, Patient Position: Sitting, Cuff Size: Standard)   Pulse 84   Temp (!) 96.9 °F (36.1 °C) (Tympanic)   Ht 5' 11" (1.803 m)   Wt 84.8 kg (187 lb)   SpO2 97%   BMI 26.08 kg/m²     Physical Exam  Vitals reviewed. Constitutional:       General: He is not in acute distress. Appearance: Normal appearance. He is not ill-appearing. HENT:      Head: Normocephalic and atraumatic. Right Ear: Tympanic membrane, ear canal and external ear normal.      Left Ear: Tympanic membrane, ear canal and external ear normal.      Nose: Nose normal.      Mouth/Throat:      Mouth: Mucous membranes are moist.      Pharynx: Oropharynx is clear. Eyes:      Extraocular Movements: Extraocular movements intact. Conjunctiva/sclera: Conjunctivae normal.      Pupils: Pupils are equal, round, and reactive to light. Neck:      Vascular: No carotid bruit. Cardiovascular:      Rate and Rhythm: Normal rate and regular rhythm. Pulses: Normal pulses. Heart sounds: Normal heart sounds. No murmur heard. Pulmonary:      Effort: Pulmonary effort is normal.      Breath sounds: Normal breath sounds. Abdominal:      General: Bowel sounds are normal.      Palpations: Abdomen is soft. Tenderness: There is no abdominal tenderness. Musculoskeletal:      Cervical back: Normal range of motion and neck supple. Right knee: Swelling present. No bony tenderness or crepitus. No tenderness. Right lower leg: No edema. Left lower leg: No edema. Lymphadenopathy:      Cervical: No cervical adenopathy. Skin:     General: Skin is warm and dry. Capillary Refill: Capillary refill takes less than 2 seconds. Neurological:      General: No focal deficit present. Mental Status: He is alert and oriented to person, place, and time.    Psychiatric:         Mood and Affect: Mood normal.         Behavior: Behavior normal.          Data:     Pre-operative work-up  CBC:   Results from last 6 Months   Lab Units 09/25/23  1225   WBC Thousand/uL 4.77   RBC Million/uL 4.10   HEMOGLOBIN g/dL 12.1   HEMATOCRIT % 37.8   MCV fL 92   MCH pg 29.5   MCHC g/dL 32.0   RDW % 15.9*   MPV fL 9.4   PLATELETS Thousands/uL 350   NRBC AUTO /100 WBCs 0   NEUTROS PCT % 66   LYMPHS PCT % 19   MONOS PCT % 8   EOS PCT % 7*   BASOS PCT % 0   NEUTROS ABS Thousands/µL 3.10   LYMPHS ABS Thousands/µL 0.91   MONOS ABS Thousand/µL 0.39   EOS ABS Thousand/µL 0.33     Chemistry Profile:   Results from last 6 Months   Lab Units 09/25/23  1225   POTASSIUM mmol/L 4.1   CHLORIDE mmol/L 101   CO2 mmol/L 30   BUN mg/dL 25   CREATININE mg/dL 0.91   GLUCOSE FASTING mg/dL 88   CALCIUM mg/dL 10.1   AST U/L 17   ALT U/L 17   ALK PHOS U/L 100   EGFR ml/min/1.73sq m 97     Coagulation Studies:   Results from last 6 Months   Lab Units 09/25/23  1225   PROTIME seconds 13.9   INR  1.01   PTT seconds 28     Endocrine Studies:   Results from last 6 Months   Lab Units 09/25/23  1225   HEMOGLOBIN A1C % 5.3       EKG: EKG: normal EKG, normal sinus rhythm, unchanged from previous tracings. Chest x-ray: n/a    Previous cardiopulmonary studies within the past year:  · Echocardiogram: n/a  · Cardiac Catheterization: n/a  · Stress Test: n/a  · Pulmonary Function Testing: n/a      Assessment & Recommendations:     1. Pre-op examination      Exam completed, patient had preop labs/ECG completed. Cleared for procedure. 2. Infection associated with internal right knee prosthesis, subsequent encounter        3. Status post revision of total replacement of right knee        4. Moderate mixed hyperlipidemia not requiring statin therapy  Lipid Panel with Direct LDL reflex      5. Primary hypertension            Pre-Op Evaluation Assessment  46 y.o. male with planned surgery: Arthroplasty Knee Total Revision. Known risk factors for perioperative complications: None.       Cardiac Risk Estimation: per the Revised Cardiac Risk Index (Circ. 100:1043, 1999), the patient's risk factors for cardiac complications include n/a, putting him in: RCI RISK CLASS I (0 risk factors, risk of major cardiac compl. appr. 0.5%). Current medications which may produce withdrawal symptoms if withheld perioperatively: none. Pre-Op Evaluation Plan  1. Further preoperative workup as follows:   - None; no further preoperative work-up is required    2. Change in medication regimen before surgery:   - Patient has been instructed to avoid herbs or non-directed vitamins the week prior to surgery to ensure no drug interactions with perioperative surgical and anesthetic medications. - Patient has been instructed to avoid aspirin containing medications or non-steroidal anti-inflammatory drugs for the week preceding surgery. - Regarding anti-platelet agents: hold Aspirin for a week prior to surgery. 3. Prophylaxis for cardiac events with perioperative beta-blockers: not indicated. 4. Patient requires further consultation with: None    Clearance  Patient is CLEARED for surgery without any additional cardiac testing. [address]  [address]  Phone#  [phone]  Fax#  [phone]    Portions of the record may have been created with voice recognition software.  Occasional wrong word or "sound a like" substitutions may have occurred due to the inherent limitations of voice recognition software.  Read the chart carefully and recognize, using context, where substitutions have occurred.

## 2023-10-03 NOTE — ASSESSMENT & PLAN NOTE
Revision of right knee arthroplasty scheduled for 10/18/2023. To follow-up in office as needed. To continue with scheduled visits with orthopedics as planned.

## 2023-10-03 NOTE — ASSESSMENT & PLAN NOTE
Recent blood work reviewed with patient as well as ASCVD risk score. At this time, patient does not wish to be started on statins. Encouraged diet modification plus management of weight. This includes a dietary reduction in total cholesterol and saturated fats. The addition to diet of plant stanols/sterols, as well as increased consumption of dietary fiber, complex carbohydrates, and unsaturated fats. Aerobic exercise is also recommended to help reduce LDL. Diets like a Mediterranean diet can improve lipid profile.

## 2023-10-10 ENCOUNTER — TELEPHONE (OUTPATIENT)
Age: 48
End: 2023-10-10

## 2023-10-10 DIAGNOSIS — T84.53XA INFECTION OF TOTAL RIGHT KNEE REPLACEMENT, INITIAL ENCOUNTER (HCC): Primary | ICD-10-CM

## 2023-10-10 RX ORDER — HYDROCODONE BITARTRATE AND ACETAMINOPHEN 5; 325 MG/1; MG/1
1 TABLET ORAL EVERY 6 HOURS PRN
Qty: 42 TABLET | Refills: 0 | Status: ON HOLD | OUTPATIENT
Start: 2023-10-10

## 2023-10-10 NOTE — TELEPHONE ENCOUNTER
Called and spoke with pt and  relayed [facility] msg. Advised pt to ice, elevate and rest leg as much as he can and take prescription as prescribed. Pt is taking tylenol and advil. Further advised to monitor how much tylenol pt is taking since hydrocodone has tylenol in it. Pt stated he will stop OTC tylenol.

## 2023-10-10 NOTE — TELEPHONE ENCOUNTER
Caller: Patient    Doctor: Corrine Caceres    Reason for call:     Patient calling with message for the Dr about his right knee. He thinks he walked too much at work and now his knee is moving left to right and he cannot put hardly any pressure and using a walker. He is asking for any pain relief to assist with pain until his surgery on 10/18/23. He uses the [facility] in [place], [place] on file.     Call back#: [phone]

## 2023-10-18 ENCOUNTER — ANESTHESIA (OUTPATIENT)
Dept: PERIOP | Facility: HOSPITAL | Age: 48
End: 2023-10-18
Payer: COMMERCIAL

## 2023-10-18 ENCOUNTER — HOSPITAL ENCOUNTER (INPATIENT)
Facility: HOSPITAL | Age: 48
LOS: 6 days | Discharge: HOME HEALTH | DRG: 326 | End: 2023-10-24
Attending: STUDENT IN AN ORGANIZED HEALTH CARE EDUCATION/TRAINING PROGRAM | Admitting: STUDENT IN AN ORGANIZED HEALTH CARE EDUCATION/TRAINING PROGRAM
Payer: COMMERCIAL

## 2023-10-18 ENCOUNTER — APPOINTMENT (OUTPATIENT)
Dept: RADIOLOGY | Facility: HOSPITAL | Age: 48
DRG: 326 | End: 2023-10-18
Payer: COMMERCIAL

## 2023-10-18 DIAGNOSIS — Z96.651 STATUS POST REVISION OF TOTAL KNEE REPLACEMENT, RIGHT: ICD-10-CM

## 2023-10-18 DIAGNOSIS — T84.53XA INFECTION OF TOTAL RIGHT KNEE REPLACEMENT, INITIAL ENCOUNTER (HCC): ICD-10-CM

## 2023-10-18 DIAGNOSIS — T84.53XD INFECTION ASSOCIATED WITH INTERNAL RIGHT KNEE PROSTHESIS, SUBSEQUENT ENCOUNTER: Primary | ICD-10-CM

## 2023-10-18 PROCEDURE — C1713 ANCHOR/SCREW BN/BN,TIS/BN: HCPCS | Performed by: STUDENT IN AN ORGANIZED HEALTH CARE EDUCATION/TRAINING PROGRAM

## 2023-10-18 PROCEDURE — 87147 CULTURE TYPE IMMUNOLOGIC: CPT | Performed by: STUDENT IN AN ORGANIZED HEALTH CARE EDUCATION/TRAINING PROGRAM

## 2023-10-18 PROCEDURE — C1776 JOINT DEVICE (IMPLANTABLE): HCPCS | Performed by: STUDENT IN AN ORGANIZED HEALTH CARE EDUCATION/TRAINING PROGRAM

## 2023-10-18 PROCEDURE — 87205 SMEAR GRAM STAIN: CPT | Performed by: STUDENT IN AN ORGANIZED HEALTH CARE EDUCATION/TRAINING PROGRAM

## 2023-10-18 PROCEDURE — 87186 SC STD MICRODIL/AGAR DIL: CPT | Performed by: STUDENT IN AN ORGANIZED HEALTH CARE EDUCATION/TRAINING PROGRAM

## 2023-10-18 PROCEDURE — 0JBN0ZX EXCISION OF RIGHT LOWER LEG SUBCUTANEOUS TISSUE AND FASCIA, OPEN APPROACH, DIAGNOSTIC: ICD-10-PCS | Performed by: STUDENT IN AN ORGANIZED HEALTH CARE EDUCATION/TRAINING PROGRAM

## 2023-10-18 PROCEDURE — 0SRC0J9 REPLACEMENT OF RIGHT KNEE JOINT WITH SYNTHETIC SUBSTITUTE, CEMENTED, OPEN APPROACH: ICD-10-PCS | Performed by: STUDENT IN AN ORGANIZED HEALTH CARE EDUCATION/TRAINING PROGRAM

## 2023-10-18 PROCEDURE — 27447 TOTAL KNEE ARTHROPLASTY: CPT | Performed by: PHYSICIAN ASSISTANT

## 2023-10-18 PROCEDURE — 27447 TOTAL KNEE ARTHROPLASTY: CPT | Performed by: STUDENT IN AN ORGANIZED HEALTH CARE EDUCATION/TRAINING PROGRAM

## 2023-10-18 PROCEDURE — 87176 TISSUE HOMOGENIZATION CULTR: CPT | Performed by: STUDENT IN AN ORGANIZED HEALTH CARE EDUCATION/TRAINING PROGRAM

## 2023-10-18 PROCEDURE — 87070 CULTURE OTHR SPECIMN AEROBIC: CPT | Performed by: STUDENT IN AN ORGANIZED HEALTH CARE EDUCATION/TRAINING PROGRAM

## 2023-10-18 PROCEDURE — 0SPC0EZ REMOVAL OF ARTICULATING SPACER FROM RIGHT KNEE JOINT, OPEN APPROACH: ICD-10-PCS | Performed by: STUDENT IN AN ORGANIZED HEALTH CARE EDUCATION/TRAINING PROGRAM

## 2023-10-18 PROCEDURE — C9290 INJ, BUPIVACAINE LIPOSOME: HCPCS | Performed by: ANESTHESIOLOGY

## 2023-10-18 PROCEDURE — 11982 REMOVE DRUG IMPLANT DEVICE: CPT | Performed by: PHYSICIAN ASSISTANT

## 2023-10-18 PROCEDURE — 73560 X-RAY EXAM OF KNEE 1 OR 2: CPT

## 2023-10-18 PROCEDURE — 11982 REMOVE DRUG IMPLANT DEVICE: CPT | Performed by: STUDENT IN AN ORGANIZED HEALTH CARE EDUCATION/TRAINING PROGRAM

## 2023-10-18 DEVICE — ATTUNE KNEE SYSTEM REVISION PRESSFIT STEM 14X60MM
Type: IMPLANTABLE DEVICE | Site: KNEE | Status: FUNCTIONAL
Brand: ATTUNE

## 2023-10-18 DEVICE — SMARTSET HIGH PERFORMANCE MV MEDIUM VISCOSITY BONE CEMENT 40G
Type: IMPLANTABLE DEVICE | Site: KNEE | Status: FUNCTIONAL
Brand: SMARTSET

## 2023-10-18 DEVICE — ATTUNE KNEE SYSTEM REVISION PRESSFIT STEM 16X110MM
Type: IMPLANTABLE DEVICE | Site: KNEE | Status: FUNCTIONAL
Brand: ATTUNE

## 2023-10-18 DEVICE — ATTUNE KNEE SYSTEM REVISION CRS FEMORAL CEMENTED RIGHT SIZE 6
Type: IMPLANTABLE DEVICE | Site: KNEE | Status: FUNCTIONAL
Brand: ATTUNE

## 2023-10-18 DEVICE — ATTUNE KNEE SYSTEM REVISION CRS ROTATING PLATFORM INSERT AOX SIZE 6 12MM
Type: IMPLANTABLE DEVICE | Site: KNEE | Status: FUNCTIONAL
Brand: ATTUNE

## 2023-10-18 DEVICE — ATTUNE KNEE SYSTEM REVISION DISTAL FEMORAL AUGMENT 8MM CEMENTED SIZE 6
Type: IMPLANTABLE DEVICE | Site: KNEE | Status: FUNCTIONAL
Brand: ATTUNE

## 2023-10-18 DEVICE — STIMULAN® RAPID CURE PROVIDED STERILE FOR SINGLE PATIENT USE. STIMULAN® RAPID CURE CONTAINS CALCIUM SULFATE POWDER AND MIXING SOLUTION IN PRE-MEASURED QUANTITIES SO THAT WHEN MIXED TOGETHER IN A STERILE MIXING BOWL, THE RESULTANT PASTE IS TO BE DIGITALLY PACKED INTO OPEN BONE VOID/GAP TO SET INSITU OR PLACED INTO THE MOULD PROVIDED, THE MIXTURE SETS TO FORM BEADS. THE BIODEGRADABLE, RADIOPAQUE BEADS ARE RESORBED IN APPROXIMATELY 30 – 60 DAYS WHEN USED IN ACCORDANCE WITH THE DEVICE LABELLING. STIMULAN® RAPID CURE IS MANUFACTURED FROM SYNTHETIC IMPLANT GRADE CALCIUM SULFATE DIHYDRATE(CASO4.2H2O) THAT RESORBS AND IS REPLACED WITH BONE DURING THE HEALING PROCESS. ALSO, AS THE BONE VOID FILLER BEADS ARE BIODEGRADABLE AND BIOCOMPATIBLE, THEY MAY BE USED AT AN INFECTED SITE.
Type: IMPLANTABLE DEVICE | Site: KNEE | Status: FUNCTIONAL
Brand: STIMULAN® RAPID CURE

## 2023-10-18 DEVICE — ATTUNE KNEE SYSTEM REVISION TIBIAL SLEEVE POROCOAT PARTIALLY COATED 37MM
Type: IMPLANTABLE DEVICE | Site: KNEE | Status: FUNCTIONAL
Brand: ATTUNE

## 2023-10-18 DEVICE — ATTUNE PATELLA MEDIALIZED DOME 29MM CEMENTED AOX
Type: IMPLANTABLE DEVICE | Site: KNEE | Status: FUNCTIONAL
Brand: ATTUNE

## 2023-10-18 DEVICE — ATTUNE KNEE SYSTEM REVISION DISTAL FEMORAL AUGMENT 12MM CEMENTED SIZE 6
Type: IMPLANTABLE DEVICE | Site: KNEE | Status: FUNCTIONAL
Brand: ATTUNE

## 2023-10-18 DEVICE — ATTUNE KNEE SYSTEM REVISION ROTATING PLATFORM TIBIAL BASE CEMENTED SIZE 5
Type: IMPLANTABLE DEVICE | Site: KNEE | Status: FUNCTIONAL
Brand: ATTUNE

## 2023-10-18 RX ORDER — ACETAMINOPHEN 325 MG/1
975 TABLET ORAL EVERY 8 HOURS
Status: DISCONTINUED | OUTPATIENT
Start: 2023-10-18 | End: 2023-10-24 | Stop reason: HOSPADM

## 2023-10-18 RX ORDER — SODIUM CHLORIDE, SODIUM LACTATE, POTASSIUM CHLORIDE, CALCIUM CHLORIDE 600; 310; 30; 20 MG/100ML; MG/100ML; MG/100ML; MG/100ML
100 INJECTION, SOLUTION INTRAVENOUS CONTINUOUS
Status: DISCONTINUED | OUTPATIENT
Start: 2023-10-18 | End: 2023-10-19

## 2023-10-18 RX ORDER — SODIUM CHLORIDE, SODIUM LACTATE, POTASSIUM CHLORIDE, CALCIUM CHLORIDE 600; 310; 30; 20 MG/100ML; MG/100ML; MG/100ML; MG/100ML
100 INJECTION, SOLUTION INTRAVENOUS CONTINUOUS
Status: DISCONTINUED | OUTPATIENT
Start: 2023-10-18 | End: 2023-10-23

## 2023-10-18 RX ORDER — BUPIVACAINE HYDROCHLORIDE 5 MG/ML
INJECTION, SOLUTION EPIDURAL; INTRACAUDAL
Status: COMPLETED | OUTPATIENT
Start: 2023-10-18 | End: 2023-10-18

## 2023-10-18 RX ORDER — CELECOXIB 200 MG/1
200 CAPSULE ORAL 2 TIMES DAILY
Qty: 60 CAPSULE | Refills: 0 | Status: SHIPPED | OUTPATIENT
Start: 2023-10-18

## 2023-10-18 RX ORDER — ALBUTEROL SULFATE 2.5 MG/3ML
2.5 SOLUTION RESPIRATORY (INHALATION) EVERY 4 HOURS PRN
Status: DISCONTINUED | OUTPATIENT
Start: 2023-10-18 | End: 2023-10-18 | Stop reason: HOSPADM

## 2023-10-18 RX ORDER — METOPROLOL SUCCINATE 50 MG/1
100 TABLET, EXTENDED RELEASE ORAL DAILY
Status: DISCONTINUED | OUTPATIENT
Start: 2023-10-19 | End: 2023-10-24 | Stop reason: HOSPADM

## 2023-10-18 RX ORDER — TOBRAMYCIN 1.2 G/30ML
INJECTION, POWDER, LYOPHILIZED, FOR SOLUTION INTRAVENOUS AS NEEDED
Status: DISCONTINUED | OUTPATIENT
Start: 2023-10-18 | End: 2023-10-18 | Stop reason: HOSPADM

## 2023-10-18 RX ORDER — OXYCODONE HYDROCHLORIDE 10 MG/1
10 TABLET ORAL EVERY 4 HOURS PRN
Status: DISCONTINUED | OUTPATIENT
Start: 2023-10-18 | End: 2023-10-24 | Stop reason: HOSPADM

## 2023-10-18 RX ORDER — BUPIVACAINE HYDROCHLORIDE 7.5 MG/ML
INJECTION, SOLUTION INTRASPINAL AS NEEDED
Status: DISCONTINUED | OUTPATIENT
Start: 2023-10-18 | End: 2023-10-18

## 2023-10-18 RX ORDER — CEFAZOLIN SODIUM 2 G/50ML
2000 SOLUTION INTRAVENOUS ONCE
Status: COMPLETED | OUTPATIENT
Start: 2023-10-18 | End: 2023-10-18

## 2023-10-18 RX ORDER — PROPOFOL 10 MG/ML
INJECTION, EMULSION INTRAVENOUS CONTINUOUS PRN
Status: DISCONTINUED | OUTPATIENT
Start: 2023-10-18 | End: 2023-10-18

## 2023-10-18 RX ORDER — HYDROCODONE BITARTRATE AND ACETAMINOPHEN 5; 325 MG/1; MG/1
1 TABLET ORAL EVERY 4 HOURS PRN
Qty: 42 TABLET | Refills: 0 | Status: SHIPPED | OUTPATIENT
Start: 2023-10-18 | End: 2023-11-07 | Stop reason: SDUPTHER

## 2023-10-18 RX ORDER — HYDROMORPHONE HCL/PF 1 MG/ML
0.5 SYRINGE (ML) INJECTION ONCE
Status: COMPLETED | OUTPATIENT
Start: 2023-10-18 | End: 2023-10-18

## 2023-10-18 RX ORDER — SODIUM CHLORIDE, SODIUM LACTATE, POTASSIUM CHLORIDE, CALCIUM CHLORIDE 600; 310; 30; 20 MG/100ML; MG/100ML; MG/100ML; MG/100ML
125 INJECTION, SOLUTION INTRAVENOUS CONTINUOUS
Status: DISCONTINUED | OUTPATIENT
Start: 2023-10-18 | End: 2023-10-18

## 2023-10-18 RX ORDER — ONDANSETRON 2 MG/ML
4 INJECTION INTRAMUSCULAR; INTRAVENOUS EVERY 6 HOURS PRN
Status: DISCONTINUED | OUTPATIENT
Start: 2023-10-18 | End: 2023-10-24 | Stop reason: HOSPADM

## 2023-10-18 RX ORDER — ONDANSETRON 2 MG/ML
4 INJECTION INTRAMUSCULAR; INTRAVENOUS ONCE AS NEEDED
Status: DISCONTINUED | OUTPATIENT
Start: 2023-10-18 | End: 2023-10-18 | Stop reason: HOSPADM

## 2023-10-18 RX ORDER — GABAPENTIN 300 MG/1
300 CAPSULE ORAL
Status: DISCONTINUED | OUTPATIENT
Start: 2023-10-18 | End: 2023-10-24 | Stop reason: HOSPADM

## 2023-10-18 RX ORDER — CEFAZOLIN SODIUM 2 G/50ML
2000 SOLUTION INTRAVENOUS EVERY 8 HOURS
Status: DISCONTINUED | OUTPATIENT
Start: 2023-10-18 | End: 2023-10-24 | Stop reason: HOSPADM

## 2023-10-18 RX ORDER — HYDROMORPHONE HCL/PF 1 MG/ML
0.5 SYRINGE (ML) INJECTION
Status: DISCONTINUED | OUTPATIENT
Start: 2023-10-18 | End: 2023-10-18 | Stop reason: HOSPADM

## 2023-10-18 RX ORDER — ONDANSETRON 4 MG/1
4 TABLET, ORALLY DISINTEGRATING ORAL EVERY 6 HOURS PRN
Qty: 20 TABLET | Refills: 0 | Status: SHIPPED | OUTPATIENT
Start: 2023-10-18 | End: 2024-02-20

## 2023-10-18 RX ORDER — ACETAMINOPHEN 325 MG/1
975 TABLET ORAL ONCE
Status: COMPLETED | OUTPATIENT
Start: 2023-10-18 | End: 2023-10-18

## 2023-10-18 RX ORDER — AMOXICILLIN 250 MG
1 CAPSULE ORAL DAILY
Qty: 30 TABLET | Refills: 0 | Status: SHIPPED | OUTPATIENT
Start: 2023-10-18 | End: 2024-02-20

## 2023-10-18 RX ORDER — GABAPENTIN 300 MG/1
300 CAPSULE ORAL ONCE
Status: COMPLETED | OUTPATIENT
Start: 2023-10-18 | End: 2023-10-18

## 2023-10-18 RX ORDER — LIDOCAINE HYDROCHLORIDE 10 MG/ML
INJECTION, SOLUTION EPIDURAL; INFILTRATION; INTRACAUDAL; PERINEURAL AS NEEDED
Status: DISCONTINUED | OUTPATIENT
Start: 2023-10-18 | End: 2023-10-18

## 2023-10-18 RX ORDER — ONDANSETRON 2 MG/ML
INJECTION INTRAMUSCULAR; INTRAVENOUS AS NEEDED
Status: DISCONTINUED | OUTPATIENT
Start: 2023-10-18 | End: 2023-10-18

## 2023-10-18 RX ORDER — ACETAMINOPHEN 500 MG
1000 TABLET ORAL EVERY 8 HOURS
Qty: 60 TABLET | Refills: 0 | Status: SHIPPED | OUTPATIENT
Start: 2023-10-18 | End: 2024-02-20

## 2023-10-18 RX ORDER — BUSPIRONE HYDROCHLORIDE 15 MG/1
7.5 TABLET ORAL 2 TIMES DAILY
Status: DISCONTINUED | OUTPATIENT
Start: 2023-10-18 | End: 2023-10-24 | Stop reason: HOSPADM

## 2023-10-18 RX ORDER — PANTOPRAZOLE SODIUM 40 MG/1
40 TABLET, DELAYED RELEASE ORAL
Status: DISCONTINUED | OUTPATIENT
Start: 2023-10-19 | End: 2023-10-24 | Stop reason: HOSPADM

## 2023-10-18 RX ORDER — CYCLOBENZAPRINE HCL 5 MG
5 TABLET ORAL 3 TIMES DAILY PRN
Status: DISCONTINUED | OUTPATIENT
Start: 2023-10-18 | End: 2023-10-24 | Stop reason: HOSPADM

## 2023-10-18 RX ORDER — NORTRIPTYLINE HYDROCHLORIDE 25 MG/1
100 CAPSULE ORAL
Status: DISCONTINUED | OUTPATIENT
Start: 2023-10-18 | End: 2023-10-24 | Stop reason: HOSPADM

## 2023-10-18 RX ORDER — SIMETHICONE 80 MG
80 TABLET,CHEWABLE ORAL 4 TIMES DAILY PRN
Status: DISCONTINUED | OUTPATIENT
Start: 2023-10-18 | End: 2023-10-24 | Stop reason: HOSPADM

## 2023-10-18 RX ORDER — TRANEXAMIC ACID 10 MG/ML
1000 INJECTION, SOLUTION INTRAVENOUS ONCE
Status: COMPLETED | OUTPATIENT
Start: 2023-10-18 | End: 2023-10-18

## 2023-10-18 RX ORDER — CHLORHEXIDINE GLUCONATE ORAL RINSE 1.2 MG/ML
15 SOLUTION DENTAL ONCE
Status: COMPLETED | OUTPATIENT
Start: 2023-10-18 | End: 2023-10-18

## 2023-10-18 RX ORDER — SCOLOPAMINE TRANSDERMAL SYSTEM 1 MG/1
1 PATCH, EXTENDED RELEASE TRANSDERMAL
Status: DISCONTINUED | OUTPATIENT
Start: 2023-10-18 | End: 2023-10-18

## 2023-10-18 RX ORDER — MIDAZOLAM HYDROCHLORIDE 2 MG/2ML
INJECTION, SOLUTION INTRAMUSCULAR; INTRAVENOUS
Status: COMPLETED | OUTPATIENT
Start: 2023-10-18 | End: 2023-10-18

## 2023-10-18 RX ORDER — FENTANYL CITRATE 50 UG/ML
INJECTION, SOLUTION INTRAMUSCULAR; INTRAVENOUS
Status: COMPLETED | OUTPATIENT
Start: 2023-10-18 | End: 2023-10-18

## 2023-10-18 RX ORDER — VANCOMYCIN HYDROCHLORIDE 1 G/20ML
INJECTION, POWDER, LYOPHILIZED, FOR SOLUTION INTRAVENOUS AS NEEDED
Status: DISCONTINUED | OUTPATIENT
Start: 2023-10-18 | End: 2023-10-18 | Stop reason: HOSPADM

## 2023-10-18 RX ORDER — FENTANYL CITRATE/PF 50 MCG/ML
25 SYRINGE (ML) INJECTION
Status: DISCONTINUED | OUTPATIENT
Start: 2023-10-18 | End: 2023-10-18 | Stop reason: HOSPADM

## 2023-10-18 RX ORDER — ASCORBIC ACID 500 MG
500 TABLET ORAL 2 TIMES DAILY
Status: DISCONTINUED | OUTPATIENT
Start: 2023-10-18 | End: 2023-10-24 | Stop reason: HOSPADM

## 2023-10-18 RX ORDER — FOLIC ACID 1 MG/1
1 TABLET ORAL DAILY
Status: DISCONTINUED | OUTPATIENT
Start: 2023-10-19 | End: 2023-10-24 | Stop reason: HOSPADM

## 2023-10-18 RX ORDER — SENNOSIDES 8.6 MG
1 TABLET ORAL DAILY
Status: DISCONTINUED | OUTPATIENT
Start: 2023-10-19 | End: 2023-10-24 | Stop reason: HOSPADM

## 2023-10-18 RX ORDER — DOCUSATE SODIUM 100 MG/1
100 CAPSULE, LIQUID FILLED ORAL 2 TIMES DAILY
Status: DISCONTINUED | OUTPATIENT
Start: 2023-10-18 | End: 2023-10-24 | Stop reason: HOSPADM

## 2023-10-18 RX ORDER — VENLAFAXINE HYDROCHLORIDE 150 MG/1
150 CAPSULE, EXTENDED RELEASE ORAL
Status: DISCONTINUED | OUTPATIENT
Start: 2023-10-18 | End: 2023-10-24 | Stop reason: HOSPADM

## 2023-10-18 RX ORDER — CELECOXIB 100 MG/1
200 CAPSULE ORAL 2 TIMES DAILY
Status: DISCONTINUED | OUTPATIENT
Start: 2023-10-18 | End: 2023-10-24 | Stop reason: HOSPADM

## 2023-10-18 RX ORDER — OXYCODONE HYDROCHLORIDE 5 MG/1
5 TABLET ORAL EVERY 4 HOURS PRN
Status: DISCONTINUED | OUTPATIENT
Start: 2023-10-18 | End: 2023-10-24 | Stop reason: HOSPADM

## 2023-10-18 RX ORDER — CALCIUM CARBONATE 500 MG/1
1000 TABLET, CHEWABLE ORAL DAILY PRN
Status: DISCONTINUED | OUTPATIENT
Start: 2023-10-18 | End: 2023-10-24 | Stop reason: HOSPADM

## 2023-10-18 RX ORDER — ACETAMINOPHEN 325 MG/1
650 TABLET ORAL EVERY 4 HOURS PRN
Status: DISCONTINUED | OUTPATIENT
Start: 2023-10-18 | End: 2023-10-24 | Stop reason: HOSPADM

## 2023-10-18 RX ORDER — DOXYCYCLINE 100 MG/1
100 CAPSULE ORAL 2 TIMES DAILY
Qty: 28 CAPSULE | Refills: 0 | Status: SHIPPED | OUTPATIENT
Start: 2023-10-18 | End: 2023-11-01

## 2023-10-18 RX ADMIN — BUPIVACAINE 20 ML: 13.3 INJECTION, SUSPENSION, LIPOSOMAL INFILTRATION at 13:51

## 2023-10-18 RX ADMIN — LIDOCAINE HYDROCHLORIDE 20 MG: 10 INJECTION, SOLUTION EPIDURAL; INFILTRATION; INTRACAUDAL; PERINEURAL at 15:02

## 2023-10-18 RX ADMIN — NORTRIPTYLINE HYDROCHLORIDE 100 MG: 25 CAPSULE ORAL at 21:03

## 2023-10-18 RX ADMIN — SCOPALAMINE 1 PATCH: 1 PATCH, EXTENDED RELEASE TRANSDERMAL at 13:41

## 2023-10-18 RX ADMIN — BUPIVACAINE HYDROCHLORIDE IN DEXTROSE 1.6 ML: 7.5 INJECTION, SOLUTION SUBARACHNOID at 15:00

## 2023-10-18 RX ADMIN — BUPIVACAINE HYDROCHLORIDE 7 ML: 5 INJECTION, SOLUTION EPIDURAL; INTRACAUDAL; PERINEURAL at 13:51

## 2023-10-18 RX ADMIN — HYDROMORPHONE HYDROCHLORIDE 0.5 MG: 1 INJECTION, SOLUTION INTRAMUSCULAR; INTRAVENOUS; SUBCUTANEOUS at 23:08

## 2023-10-18 RX ADMIN — MIDAZOLAM 1 MG: 1 INJECTION INTRAMUSCULAR; INTRAVENOUS at 15:11

## 2023-10-18 RX ADMIN — DOCUSATE SODIUM 100 MG: 100 CAPSULE, LIQUID FILLED ORAL at 21:04

## 2023-10-18 RX ADMIN — CHLORHEXIDINE GLUCONATE 15 ML: 1.2 RINSE ORAL at 12:25

## 2023-10-18 RX ADMIN — BUSPIRONE HYDROCHLORIDE 7.5 MG: 15 TABLET ORAL at 21:04

## 2023-10-18 RX ADMIN — HYDROMORPHONE HYDROCHLORIDE 0.5 MG: 1 INJECTION, SOLUTION INTRAMUSCULAR; INTRAVENOUS; SUBCUTANEOUS at 18:12

## 2023-10-18 RX ADMIN — CEFAZOLIN SODIUM 2000 MG: 2 SOLUTION INTRAVENOUS at 22:21

## 2023-10-18 RX ADMIN — ACETAMINOPHEN 975 MG: 325 TABLET, FILM COATED ORAL at 20:30

## 2023-10-18 RX ADMIN — FENTANYL CITRATE 100 MCG: 50 INJECTION, SOLUTION INTRAMUSCULAR; INTRAVENOUS at 13:51

## 2023-10-18 RX ADMIN — TRANEXAMIC ACID 1000 MG: 10 INJECTION, SOLUTION INTRAVENOUS at 14:48

## 2023-10-18 RX ADMIN — CEFAZOLIN SODIUM 2000 MG: 2 SOLUTION INTRAVENOUS at 14:48

## 2023-10-18 RX ADMIN — SODIUM CHLORIDE, SODIUM LACTATE, POTASSIUM CHLORIDE, AND CALCIUM CHLORIDE 125 ML/HR: .6; .31; .03; .02 INJECTION, SOLUTION INTRAVENOUS at 12:50

## 2023-10-18 RX ADMIN — CELECOXIB 200 MG: 100 CAPSULE ORAL at 21:02

## 2023-10-18 RX ADMIN — MIDAZOLAM 1 MG: 1 INJECTION INTRAMUSCULAR; INTRAVENOUS at 15:07

## 2023-10-18 RX ADMIN — SODIUM CHLORIDE, SODIUM LACTATE, POTASSIUM CHLORIDE, AND CALCIUM CHLORIDE: .6; .31; .03; .02 INJECTION, SOLUTION INTRAVENOUS at 17:00

## 2023-10-18 RX ADMIN — ACETAMINOPHEN 975 MG: 325 TABLET, FILM COATED ORAL at 12:24

## 2023-10-18 RX ADMIN — OXYCODONE HYDROCHLORIDE 10 MG: 10 TABLET ORAL at 20:30

## 2023-10-18 RX ADMIN — OXYCODONE HYDROCHLORIDE 5 MG: 5 TABLET ORAL at 19:12

## 2023-10-18 RX ADMIN — MORPHINE SULFATE 2 MG: 2 INJECTION, SOLUTION INTRAMUSCULAR; INTRAVENOUS at 20:19

## 2023-10-18 RX ADMIN — SODIUM CHLORIDE, SODIUM LACTATE, POTASSIUM CHLORIDE, AND CALCIUM CHLORIDE 100 ML/HR: .6; .31; .03; .02 INJECTION, SOLUTION INTRAVENOUS at 20:25

## 2023-10-18 RX ADMIN — OXYCODONE HYDROCHLORIDE 5 MG: 5 TABLET ORAL at 23:43

## 2023-10-18 RX ADMIN — PHENYLEPHRINE HYDROCHLORIDE 20 MCG/MIN: 10 INJECTION, SOLUTION INTRAVENOUS at 15:17

## 2023-10-18 RX ADMIN — VENLAFAXINE HYDROCHLORIDE 150 MG: 150 CAPSULE, EXTENDED RELEASE ORAL at 21:04

## 2023-10-18 RX ADMIN — GABAPENTIN 300 MG: 300 CAPSULE ORAL at 21:04

## 2023-10-18 RX ADMIN — GABAPENTIN 300 MG: 300 CAPSULE ORAL at 12:25

## 2023-10-18 RX ADMIN — ONDANSETRON 4 MG: 2 INJECTION INTRAMUSCULAR; INTRAVENOUS at 14:53

## 2023-10-18 RX ADMIN — PROPOFOL 120 MCG/KG/MIN: 10 INJECTION, EMULSION INTRAVENOUS at 15:02

## 2023-10-18 RX ADMIN — OXYCODONE HYDROCHLORIDE AND ACETAMINOPHEN 500 MG: 500 TABLET ORAL at 21:03

## 2023-10-18 RX ADMIN — MORPHINE SULFATE 2 MG: 2 INJECTION, SOLUTION INTRAMUSCULAR; INTRAVENOUS at 22:21

## 2023-10-18 RX ADMIN — MIDAZOLAM 2 MG: 1 INJECTION INTRAMUSCULAR; INTRAVENOUS at 13:52

## 2023-10-18 RX ADMIN — ASPIRIN 81 MG: 81 TABLET, COATED ORAL at 21:03

## 2023-10-18 NOTE — ANESTHESIA PROCEDURE NOTES
Peripheral Block    Patient location during procedure: holding area  Start time: 10/18/2023 1:50 PM  Reason for block: at surgeon's request and post-op pain management  Staffing  Performed by: Cristiano Hess DO  Authorized by: Cristiano Hess DO    Preanesthetic Checklist  Completed: patient identified, IV checked, site marked, risks and benefits discussed, surgical consent, monitors and equipment checked, pre-op evaluation and timeout performed  Peripheral Block  Patient position: supine  Prep: ChloraPrep  Block type: Adductor Canal  Laterality: right  Injection technique: single-shot  Procedures: ultrasound guided, Ultrasound guidance required for the procedure to increase accuracy and safety of medication placement and decrease risk of complications.   Ultrasound permanent image savedbupivacaine (PF) (MARCAINE) 0.5 % injection 20 mL - Perineural   7 mL - 10/18/2023 1:51:00 PM  bupivacaine liposomal (EXPAREL) 1.3 % injection 20 mL - Perineural   20 mL - 10/18/2023 1:51:00 PM  fentanyl citrate (PF) 100 MCG/2ML 50 mcg - Intravenous   100 mcg - 10/18/2023 1:51:00 PM  midazolam (VERSED) injection 0.5 mg - Intravenous   2 mg - 10/18/2023 1:52:00 PM  Needle  Needle type: Stimuplex   Needle gauge: 20 G  Needle length: 4 in  Needle localization: ultrasound guidance  Catheter type: open end  Assessment  Injection assessment: frequent aspiration, injected with ease, negative aspiration, no paresthesia on injection, no symptoms of intraneural/intravenous injection, negative for heart rate change, needle tip visualized at all times and incremental injection  Paresthesia pain: none  patient tolerated the procedure well with no immediate complications

## 2023-10-18 NOTE — ANESTHESIA PROCEDURE NOTES
Spinal Block    Patient location during procedure: OR  Start time: 10/18/2023 3:00 PM  Reason for block: primary anesthetic  Staffing  Performed by: Nani Parra CRNA  Authorized by: Magdalena Ivory DO    Preanesthetic Checklist  Completed: patient identified, IV checked, site marked, risks and benefits discussed, surgical consent, monitors and equipment checked, pre-op evaluation and timeout performed  Spinal Block  Patient position: sitting  Prep: ChloraPrep  Patient monitoring: heart rate, continuous pulse ox and frequent blood pressure checks  Approach: midline  Location: L3-4  Injection technique: single-shot  Needle  Needle type: pencil-tip   Needle gauge: 25 G  Needle length: 10 cm  Assessment  Injection Assessment:  negative aspiration for heme, no paresthesia on injection and positive aspiration for clear CSF.   Post-procedure:  site cleaned

## 2023-10-18 NOTE — DISCHARGE INSTR - AVS FIRST PAGE
Dr. Shay Conti Knee Replacement    What to Expect/Activity  It is normal to have some discomfort in your knee for several days to weeks. You are weight bearing as tolerated to your operative leg with assist devices. Please use crutches/walker when ambulating until your follow-up  Swelling and discomfort in the knee is normal for several days after surgery. For the first 2-3 days, use ice around the knee to help. Use for 20-30 minutes every 1-2 hours for 48 hours, while awake. You may continue beyond 48 hours as needed. Place one or two pillows underneath your calf, not your knee, to reduce swelling. Physical therapy on your own at home should start as soon as possible (see below). Please perform heel slides and extension exercises on your own as well (see diagram). Please use incentive spirometer 10 times per hour while awake (see diagram). Dressing/Wound Care/Bathing  You may remove your toe-to-groin dressing 24 hours after surgery. There will be a surgical dressing over your incision that stays in place for 7 days after surgery. You may start showering 24 hours after surgery, the surgical dressing will remain in place. Please pat the dressing dry. If you notice the dressing appears saturated or is starting to come off, please replace with dry dressing. You can keep the dressing in place until follow-up in the office. Do not place any creams, ointments or gels on or around the incision. No baths, swimming or submerging until cleared by Dr. Dorothy Ghosh may resume your usual medications.   Please take the following medications:  Anti-coagulation (blood clot prevention) - aspirin 81mg twice for 4 weeks  Pain medication:  Narcotic: Take as directed  NSAID/Anti-inflammatory: Take as directed  Tylenol 1000mg every 8 hours  Zofran (ondasetron) - 4mg every 8 hours as needed for nausea  Stool softeners (senna/colace) - take daily to prevent constipation as narcotic pain medication causes constipation  Antibiotic - take as directed if prescribed   If you have questions or pain concerns, please contact the office. Pain medication cannot remove all post-operative pain. Follow up/Call if:  The findings of your surgery will be explained to you and your family immediately after surgery. However, in the post-operative period, during recovery from anesthesia you may not fully remember or fully understand what was said. This will be again gone over when you return for your post-op appointment.   Please contact Dr. Asad Crouch office if you experience the following:  Excessive bleeding (bleeding through your dressing)  Fever greater than 101 degrees F after 48 hours (low grade fevers the day or two after surgery are normal)  Persistent nausea or vomiting  Decreased sensation or discoloration of the operative limb  Pain or swelling that is getting worse and not better with medication    Dr. Higinio Mays: [phone]

## 2023-10-18 NOTE — PHYSICAL THERAPY NOTE
Physical Therapy Cancellation Note       10/18/23 1645   PT Last Visit   PT Visit Date 10/18/23   Note Type   Note type Cancelled Session   Cancel Reasons Other   Additional Comments Patient currently in OR and scheduled to be out at 17:30. Spoke with BALTAZAR Yost, who states that patient is to be seen POD 1. Reese Campa

## 2023-10-18 NOTE — ANESTHESIA PREPROCEDURE EVALUATION
Procedure:  ARTHROPLASTY KNEE TOTAL REVISION (Right: Knee)    Relevant Problems   ANESTHESIA   (+) Difficult intubation   (+) PONV (postoperative nausea and vomiting)      CARDIO   (+) HTN (hypertension)   (+) Migraine   (+) Moderate mixed hyperlipidemia not requiring statin therapy      HEMATOLOGY   (+) Anemia      NEURO/PSYCH   (+) Anxiety   (+) Major depressive disorder in full remission (HCC)   (+) Migraine        Physical Exam    Airway    Mallampati score: III  TM Distance: <3 FB  Neck ROM: limited     Dental   Comment: Severe overbite, limited HM distance     Cardiovascular      Pulmonary      Other Findings      Anesthesia Plan  ASA Score- 2     Anesthesia Type- spinal with ASA Monitors. Additional Monitors:     Airway Plan:     Comment: Adductor block with exparel  Scopolamine patch. Plan Factors-    Chart reviewed. Existing labs reviewed. Patient summary reviewed. Patient is not a current smoker. Induction-     Postoperative Plan- Plan for postoperative opioid use. Informed Consent- Anesthetic plan and risks discussed with patient and spouse. I personally reviewed this patient with the CRNA. Discussed and agreed on the Anesthesia Plan with the CRNA. Anh Seay

## 2023-10-18 NOTE — INTERVAL H&P NOTE
H&P reviewed. After examining the patient I find no changes in the patients condition since the H&P had been written. Plan for right TKA revision.

## 2023-10-18 NOTE — ANESTHESIA POSTPROCEDURE EVALUATION
Post-Op Assessment Note    CV Status:  Stable    Pain management: adequate     Mental Status:  Awake and sleepy   Hydration Status:  Euvolemic   PONV Controlled:  Controlled   Airway Patency:  Patent   Two or more mitigation strategies used for obstructive sleep apnea   Post Op Vitals Reviewed: Yes      Staff: CRNA         There were no known notable events for this encounter.     /77 (10/18/23 1746)    Temp      Pulse 65 (10/18/23 1746)   Resp 16 (10/18/23 1746)    SpO2 99 % (10/18/23 1746)

## 2023-10-19 LAB
ANION GAP SERPL CALCULATED.3IONS-SCNC: 11 MMOL/L
BUN SERPL-MCNC: 18 MG/DL (ref 5–25)
CALCIUM SERPL-MCNC: 9.3 MG/DL (ref 8.4–10.2)
CHLORIDE SERPL-SCNC: 99 MMOL/L (ref 96–108)
CO2 SERPL-SCNC: 22 MMOL/L (ref 21–32)
CREAT SERPL-MCNC: 0.86 MG/DL (ref 0.6–1.3)
ERYTHROCYTE [DISTWIDTH] IN BLOOD BY AUTOMATED COUNT: 14.6 % (ref 11.6–15.1)
GFR SERPL CREATININE-BSD FRML MDRD: 101 ML/MIN/1.73SQ M
GLUCOSE SERPL-MCNC: 104 MG/DL (ref 65–140)
HCT VFR BLD AUTO: 33.4 % (ref 36.5–49.3)
HGB BLD-MCNC: 10.5 G/DL (ref 12–17)
MCH RBC QN AUTO: 28.8 PG (ref 26.8–34.3)
MCHC RBC AUTO-ENTMCNC: 31.4 G/DL (ref 31.4–37.4)
MCV RBC AUTO: 92 FL (ref 82–98)
PLATELET # BLD AUTO: 121 THOUSANDS/UL (ref 149–390)
PMV BLD AUTO: 10.5 FL (ref 8.9–12.7)
POTASSIUM SERPL-SCNC: 4.2 MMOL/L (ref 3.5–5.3)
RBC # BLD AUTO: 3.65 MILLION/UL (ref 3.88–5.62)
SODIUM SERPL-SCNC: 132 MMOL/L (ref 135–147)
WBC # BLD AUTO: 8.53 THOUSAND/UL (ref 4.31–10.16)

## 2023-10-19 PROCEDURE — 97163 PT EVAL HIGH COMPLEX 45 MIN: CPT

## 2023-10-19 PROCEDURE — 90471 IMMUNIZATION ADMIN: CPT | Performed by: INTERNAL MEDICINE

## 2023-10-19 PROCEDURE — 80048 BASIC METABOLIC PNL TOTAL CA: CPT | Performed by: STUDENT IN AN ORGANIZED HEALTH CARE EDUCATION/TRAINING PROGRAM

## 2023-10-19 PROCEDURE — 97116 GAIT TRAINING THERAPY: CPT

## 2023-10-19 PROCEDURE — 99221 1ST HOSP IP/OBS SF/LOW 40: CPT | Performed by: INTERNAL MEDICINE

## 2023-10-19 PROCEDURE — 99024 POSTOP FOLLOW-UP VISIT: CPT | Performed by: PHYSICIAN ASSISTANT

## 2023-10-19 PROCEDURE — 90686 IIV4 VACC NO PRSV 0.5 ML IM: CPT | Performed by: INTERNAL MEDICINE

## 2023-10-19 PROCEDURE — 97167 OT EVAL HIGH COMPLEX 60 MIN: CPT

## 2023-10-19 PROCEDURE — 85027 COMPLETE CBC AUTOMATED: CPT | Performed by: STUDENT IN AN ORGANIZED HEALTH CARE EDUCATION/TRAINING PROGRAM

## 2023-10-19 PROCEDURE — 90715 TDAP VACCINE 7 YRS/> IM: CPT | Performed by: INTERNAL MEDICINE

## 2023-10-19 RX ORDER — BENZOCAINE/MENTHOL 6 MG-10 MG
LOZENGE MUCOUS MEMBRANE 2 TIMES DAILY
Status: DISCONTINUED | OUTPATIENT
Start: 2023-10-19 | End: 2023-10-24 | Stop reason: HOSPADM

## 2023-10-19 RX ADMIN — TETANUS TOXOID, REDUCED DIPHTHERIA TOXOID AND ACELLULAR PERTUSSIS VACCINE, ADSORBED 0.5 ML: 5; 2.5; 8; 8; 2.5 SUSPENSION INTRAMUSCULAR at 10:33

## 2023-10-19 RX ADMIN — OXYCODONE HYDROCHLORIDE 10 MG: 10 TABLET ORAL at 15:46

## 2023-10-19 RX ADMIN — ACETAMINOPHEN 975 MG: 325 TABLET, FILM COATED ORAL at 03:43

## 2023-10-19 RX ADMIN — OXYCODONE HYDROCHLORIDE 10 MG: 10 TABLET ORAL at 19:45

## 2023-10-19 RX ADMIN — DOCUSATE SODIUM 100 MG: 100 CAPSULE, LIQUID FILLED ORAL at 17:33

## 2023-10-19 RX ADMIN — ASPIRIN 81 MG: 81 TABLET, COATED ORAL at 17:33

## 2023-10-19 RX ADMIN — OXYCODONE HYDROCHLORIDE AND ACETAMINOPHEN 500 MG: 500 TABLET ORAL at 08:57

## 2023-10-19 RX ADMIN — FOLIC ACID 1 MG: 1 TABLET ORAL at 08:56

## 2023-10-19 RX ADMIN — OXYCODONE HYDROCHLORIDE 10 MG: 10 TABLET ORAL at 11:10

## 2023-10-19 RX ADMIN — HYDROCORTISONE: 1 CREAM TOPICAL at 19:59

## 2023-10-19 RX ADMIN — DOCUSATE SODIUM 100 MG: 100 CAPSULE, LIQUID FILLED ORAL at 08:56

## 2023-10-19 RX ADMIN — OXYCODONE HYDROCHLORIDE 10 MG: 10 TABLET ORAL at 06:21

## 2023-10-19 RX ADMIN — NORTRIPTYLINE HYDROCHLORIDE 100 MG: 25 CAPSULE ORAL at 21:27

## 2023-10-19 RX ADMIN — OXYCODONE HYDROCHLORIDE AND ACETAMINOPHEN 500 MG: 500 TABLET ORAL at 17:33

## 2023-10-19 RX ADMIN — GABAPENTIN 300 MG: 300 CAPSULE ORAL at 21:27

## 2023-10-19 RX ADMIN — VENLAFAXINE HYDROCHLORIDE 150 MG: 150 CAPSULE, EXTENDED RELEASE ORAL at 21:27

## 2023-10-19 RX ADMIN — OXYCODONE HYDROCHLORIDE 10 MG: 10 TABLET ORAL at 01:56

## 2023-10-19 RX ADMIN — SODIUM CHLORIDE, SODIUM LACTATE, POTASSIUM CHLORIDE, AND CALCIUM CHLORIDE 100 ML/HR: .6; .31; .03; .02 INJECTION, SOLUTION INTRAVENOUS at 06:22

## 2023-10-19 RX ADMIN — BUSPIRONE HYDROCHLORIDE 7.5 MG: 15 TABLET ORAL at 08:56

## 2023-10-19 RX ADMIN — CEFAZOLIN SODIUM 2000 MG: 2 SOLUTION INTRAVENOUS at 21:27

## 2023-10-19 RX ADMIN — PANTOPRAZOLE SODIUM 40 MG: 40 TABLET, DELAYED RELEASE ORAL at 06:21

## 2023-10-19 RX ADMIN — BUSPIRONE HYDROCHLORIDE 7.5 MG: 15 TABLET ORAL at 17:33

## 2023-10-19 RX ADMIN — ACETAMINOPHEN 975 MG: 325 TABLET, FILM COATED ORAL at 11:15

## 2023-10-19 RX ADMIN — CEFAZOLIN SODIUM 2000 MG: 2 SOLUTION INTRAVENOUS at 15:45

## 2023-10-19 RX ADMIN — MORPHINE SULFATE 2 MG: 2 INJECTION, SOLUTION INTRAMUSCULAR; INTRAVENOUS at 12:30

## 2023-10-19 RX ADMIN — ASPIRIN 81 MG: 81 TABLET, COATED ORAL at 08:56

## 2023-10-19 RX ADMIN — OXYCODONE HYDROCHLORIDE 5 MG: 5 TABLET ORAL at 08:56

## 2023-10-19 RX ADMIN — CELECOXIB 200 MG: 100 CAPSULE ORAL at 08:56

## 2023-10-19 RX ADMIN — ACETAMINOPHEN 975 MG: 325 TABLET, FILM COATED ORAL at 19:45

## 2023-10-19 RX ADMIN — SENNOSIDES 8.6 MG: 8.6 TABLET, FILM COATED ORAL at 08:56

## 2023-10-19 RX ADMIN — MULTIPLE VITAMINS W/ MINERALS TAB 1 TABLET: TAB ORAL at 08:57

## 2023-10-19 RX ADMIN — INFLUENZA VIRUS VACCINE 0.5 ML: 15; 15; 15; 15 SUSPENSION INTRAMUSCULAR at 10:33

## 2023-10-19 RX ADMIN — CELECOXIB 200 MG: 100 CAPSULE ORAL at 17:33

## 2023-10-19 RX ADMIN — CEFAZOLIN SODIUM 2000 MG: 2 SOLUTION INTRAVENOUS at 06:22

## 2023-10-19 RX ADMIN — OXYCODONE HYDROCHLORIDE 5 MG: 5 TABLET ORAL at 03:43

## 2023-10-19 NOTE — UTILIZATION REVIEW
NOTIFICATION OF INPATIENT ADMISSION   AUTHORIZATION REQUEST   SERVICING FACILITY:   [address] [place] [address]  [address] [facility]Third [address] [address]  Tax ID: [id number]  NPI: [id number] ATTENDING PROVIDER:  Attending Name and NPI#: Garima Morse [[id number]]  Address: [address] [facility],Third [address] [address]  Phone: [phone]   ADMISSION INFORMATION:  Place of Service: [address] [place] [address]  Place of Service Code: 21  Inpatient Admission Date/Time: 10/18/23  6:36 PM  Discharge Date/Time: No discharge date for patient encounter. Admitting Diagnosis Code/Description:  Infection of total right knee replacement, initial encounter [facility]) [T84.53XA]  Status post revision of total knee replacement, right [P89.149]     UTILIZATION REVIEW CONTACT:  Zuleima Hernadez Utilization   Network Utilization Review Department  Phone: [phone]  Fax [phone]  Email: [email or web address]  Contact for approvals/pending authorizations, clinical reviews, and discharge. PHYSICIAN ADVISORY SERVICES:  Medical Necessity Denial & [facility] Review  Phone: [phone]  Fax: [phone]  Email: [email or web address]     DISCHARGE SUPPORT TEAM:  For Patients Discharge Needs & Updates  Phone: [phone] opt. 2 Fax: [phone]  Email: [email or web address]

## 2023-10-19 NOTE — CONSULTS
Dear Dr Enrike Chauhan,      At your kind request I evaluated David Black in medical consultation following revision of a right total knee replacement. As you know, the patient is a 46 y.o. male who had a previous right total knee replacement. Unfortunately, this got infected. He underwent explant surgery sometime ago. After his infection was adequately treated, he was scheduled for revision surgery. This was completed without difficulty yesterday. Postoperatively, the patient has done well. His pain control is currently adequate. He has had no chest pain, shortness of breath, nausea, etc.    The patient's past medical history is positive for hypertension. This has been well controlled with metoprolol. He has mild hypercholesterolemia but has not required pharmacologic therapy. He has psoriasis which is being treated with methotrexate he has a history of depression which is currently in remission. He has no history of diabetes, cardiac disease, COPD, peptic ulcer disease, kidney disease, etc.    The patient's medications at the time of admission include:  Buspirone 7.5 mg twice daily  Vitamin D 2000 units daily  Cyclobenzaprine 5 mg 3 times daily  Methotrexate 15 mg weekly  Metoprolol 100 mg daily  Multivitamins 1 daily  Nortriptyline 50 mg at bedtime  Venlafaxine  mg at bedtime  Celecoxib 200 mg twice daily    The patient has no known allergies    Family history is noncontributory    Social history reveals that the patient is single. He is a former smoker but quit more than 20 years ago. He does not imbibe ethanol nor does she use illicit drugs. Review of systems was taken in detail including 12 systems. This was unrevealing except as mentioned above. The patient has not had his influenza vaccine this year but intends to get it while he is in the hospital.  He is also due for a tetanus booster.       Vitals:    10/19/23 0733   BP: 103/69   Pulse: 83   Resp: 22   Temp: (!) 97.2 °F (36.2 °C) SpO2: 97%         Physical Exam:  The patient is a well-developed, well-nourished man who appears in no distress. Head is atraumatic and normocephalic. ENT examination is within normal limits. Eyes show the pupils to be equal, round, and reactive to light. Extraocular movements are intact. Neck is supple. Carotids are full without bruits. There is no lymphadenopathy or goiter. Lungs are clear to auscultation and percussion. There is no wheezing, rales, or rhonchi. Cardiac exam reveals a regular rhythm. I heard no murmur, gallop, or rub. The abdomen is soft with active bowel sounds. There is no mass, tenderness, organomegaly. Extremities showed no clubbing, cyanosis, or edema. Right leg is wrapped. There is no calf tenderness. Neurologic examination revealed the patient to be alert and oriented. No focal sign is present. CBC:   Lab Results   Component Value Date    WBC 8.53 10/19/2023    HGB 10.5 (L) 10/19/2023    HCT 33.4 (L) 10/19/2023    MCV 92 10/19/2023     (L) 10/19/2023    RBC 3.65 (L) 10/19/2023    MCH 28.8 10/19/2023    MCHC 31.4 10/19/2023    RDW 14.6 10/19/2023    MPV 10.5 10/19/2023   , CMP:   Lab Results   Component Value Date    SODIUM 132 (L) 10/19/2023    K 4.2 10/19/2023    CL 99 10/19/2023    CO2 22 10/19/2023    BUN 18 10/19/2023    CREATININE 0.86 10/19/2023    CALCIUM 9.3 10/19/2023    EGFR 101 10/19/2023       Assessment:   1. Status post right total knee revision  2. Hypertension  3. Mild hyperlipidemia  4. Depression, in remission  5. Mild postoperative blood loss anemia  6. Mild thrombocytopenia  7. Psoriasis, well controlled on methotrexate    Recommendations: The patient appears to be doing well. Metoprolol and his antidepressants have been resumed. His hemoglobin and platelet count will be rechecked tomorrow. For the moment, no specific intervention is needed on either of these issues.     Thank you for the opportunity of participating in [facility] Kevin's care. We will follow him with you during his stay.     Sincerely,  Ivan Durand MD

## 2023-10-19 NOTE — UTILIZATION REVIEW
Initial Clinical Review    Elective inpatient  surgical procedure    Age/Sex: 46 y.o. male  Surgery Date: 10/18/23  Procedure: Right - ARTHROPLASTY KNEE TOTAL REVISION  Right - INSERTION OF BIODEGRADABLE DRUG DELIVERY DEVICE [facility]  Anesthesia: Spinal   Operative Findings: Pre-op ROM 5-90  No effusion  No purulence   Well-healed previous sinus tract through quadriceps tendon   Well-fixed antibiotic spacer  Post-op ROM 0-120    POD#1 Progress Note:   10/19/23 POD #1 right total knee revision, insertion of biodegradable drug delivery device. Today fatigued. Pain of right knee 6 to 7/10. On exam: right leg wrapped. H&H 10.5/33. 4.   platelets 668. Pre op 9/25/23 HUH 12.1/37.8, platelets 255. Plan is continued Ancef, pain control and has required IV analgesia. PT/OT    10/19/23 per medicine - patient has PMH of hpt on metoprolol; hypercholesterolemia not on medications; psoriasis on methotrexate; depression in remission. Has mild post operative anemia and mild thrombocytopenia. Plan is continue metoprolol and antidepressants. Check hgb and platelets tomorrow.       Admission Orders: Date/Time/Statement:   Admission Orders (From admission, onward)       Ordered        10/18/23 1836  Inpatient Admission  Once                          Orders Placed This Encounter   Procedures    Inpatient Admission     Standing Status:   Standing     Number of Occurrences:   1     Order Specific Question:   Level of Care     Answer:   Med Surg [16]     Order Specific Question:   Estimated length of stay     Answer:   Inpatient Only Surgery     Vital Signs: /79   Pulse 94   Temp (!) 97.2 °F (36.2 °C) (Temporal)   Resp 22   Ht 5' 11" (1.803 m)   Wt 80.5 kg (177 lb 6.4 oz)   SpO2 92%   BMI 24.74 kg/m²   0/19/23 08:59:02 -- 91 -- 102/70 81 97 % -- --   10/19/23 07:33:52 97.2 °F (36.2 °C) Abnormal  83 22 103/69 80 97 % None (Room air) Sitting   10/19/23 0345 -- 67 -- 107/70 82 -- -- --   10/18/23 2308 -- 87 -- 136/87 103 -- -- --   10/18/23 22:26:39 -- 89 -- 139/91 107 94 % -- --   10/18/23 2104 -- 99 -- 132/81 98 -- -- --   10/18/23 2019 -- 86 -- 141/88 106 -- -- --   10/18/23 20:18:30 -- 86 -- 141/88 106 99 % -- --   10/18/23 1800 -- 62 15 121/89 100 100 %       Pertinent Labs/Diagnostic Test Results:   XR knee right 1 or 2 views    (Results Pending)       Results from last 7 days   Lab Units 10/19/23  0205   WBC Thousand/uL 8.53   HEMOGLOBIN g/dL 10.5*   HEMATOCRIT % 33.4*   PLATELETS Thousands/uL 121*     Results from last 7 days   Lab Units 10/19/23  0205   SODIUM mmol/L 132*   POTASSIUM mmol/L 4.2   CHLORIDE mmol/L 99   CO2 mmol/L 22   ANION GAP mmol/L 11   BUN mg/dL 18   CREATININE mg/dL 0.86   EGFR ml/min/1.73sq m 101   CALCIUM mg/dL 9.3     Results from last 7 days   Lab Units 10/19/23  0205   GLUCOSE RANDOM mg/dL 104     Results from last 7 days   Lab Units 10/18/23  1554   GRAM STAIN RESULT  No Polys or Bacteria seen  No Polys or Bacteria seen       Diet: Regular   Mobility: up with assistance. PT/OT  DVT Prophylaxis: Bilateral SCDs. Medications/Pain Control:   Scheduled Medications:  acetaminophen, 975 mg, Oral, Q8H  ascorbic acid, 500 mg, Oral, BID  aspirin, 81 mg, Oral, BID  busPIRone, 7.5 mg, Oral, BID  cefazolin, 2,000 mg, Intravenous, Q8H  celecoxib, 200 mg, Oral, BID  docusate sodium, 100 mg, Oral, BID  folic acid, 1 mg, Oral, Daily  gabapentin, 300 mg, Oral, HS  metoprolol succinate, 100 mg, Oral, Daily  multivitamin-minerals, 1 tablet, Oral, Daily  nortriptyline, 100 mg, Oral, HS  pantoprazole, 40 mg, Oral, Daily Before Breakfast  senna, 1 tablet, Oral, Daily  venlafaxine, 150 mg, Oral, HS    HYDROmorphone (DILAUDID) injection 0.5 mg  Dose: 0.5 mg  Freq: Once Route: IV  Start: 10/18/23 2315 End: 10/18/23 2308     tetanus-diphtheria-acellular pertussis (BOOSTRIX) IM injection 0.5 mL  Dose: 0.5 mL  Freq:  Once Route: IM  Start: 10/19/23 0945 End: 10/19/23 1033     Tranexamic Acid-NaCl ([place]) 1000-0.7 MG/100ML-% injection 1,000 mg  Dose: 1,000 mg  Freq: Once Route: IV  Start: 10/18/23 1215 End: 10/18/23 1448       Continuous IV Infusions:  lactated ringers, 100 mL/hr, Intravenous, Continuous      PRN Meds:  acetaminophen, 650 mg, Oral, Q4H PRN  calcium carbonate, 1,000 mg, Oral, Daily PRN  cyclobenzaprine, 5 mg, Oral, TID PRN  lactated ringers, 1,000 mL, Intravenous, Once PRN   And  lactated ringers, 1,000 mL, Intravenous, Once PRN  morphine injection, 2 mg, Intravenous, Q2H PRN x 2 10/18/23   ondansetron, 4 mg, Intravenous, Q6H PRN  oxyCODONE, 10 mg, Oral, Q4H PRN x 1 10/18, x 2 10/19   oxyCODONE, 5 mg, Oral, Q4H PRN x 2 10/18, x 2 10/19/23   simethicone, 80 mg, Oral, 4x Daily PRN  sodium chloride, 1,000 mL, Intravenous, Once PRN   And  sodium chloride, 1,000 mL, Intravenous, Once PRN    Neuro vascular checks every 4 hours. Wbat bilateral LE  Incentive spirometry     Network Utilization Review Department  ATTENTION: Please call with any questions or concerns to [phone] and carefully listen to the prompts so that you are directed to the right person. All voicemails are confidential.   For Discharge needs, contact Care Management DC Support Team at [phone] opt. 2  Send all requests for admission clinical reviews, approved or denied determinations and any other requests to dedicated fax number below belonging to the campus where the patient is receiving treatment.  List of dedicated fax numbers for the Facilities:  [place] DENIALS (Administrative/Medical Necessity) [phone]   DISCHARGE SUPPORT TEAM (NETWORK) [id number] Lico Zavala (Maternity/NICU/Pediatrics) [address] [place] [address] [phone] [address] [phone]   [facility] [address] [address] [facility] [address] [facility]  [address]  [phone]

## 2023-10-19 NOTE — PLAN OF CARE
Problem: MOBILITY - ADULT  Goal: Maintain or return to baseline ADL function  Description: INTERVENTIONS:  -  Assess patient's ability to carry out ADLs; assess patient's baseline for ADL function and identify physical deficits which impact ability to perform ADLs (bathing, care of mouth/teeth, toileting, grooming, dressing, etc.)  - Assess/evaluate cause of self-care deficits   - Assess range of motion  - Assess patient's mobility; develop plan if impaired  - Assess patient's need for assistive devices and provide as appropriate  - Encourage maximum independence but intervene and supervise when necessary  - Involve family in performance of ADLs  - Assess for home care needs following discharge   - Consider OT consult to assist with ADL evaluation and planning for discharge  - Provide patient education as appropriate  Outcome: Progressing  Goal: Maintains/Returns to pre admission functional level  Description: INTERVENTIONS:  - Perform BMAT or MOVE assessment daily.   - Set and communicate daily mobility goal to care team and patient/family/caregiver. - Collaborate with rehabilitation services on mobility goals if consulted  - Perform Range of Motion 3 times a day. - Reposition patient every 2 hours.   - Dangle patient 2 times a day  - Stand patient 2 times a day  - Ambulate patient 2 times a day  - Out of bed to chair 2 times a day   - Out of bed for meals 2 times a day  - Out of bed for toileting  - Record patient progress and toleration of activity level   Outcome: Progressing     Problem: PAIN - ADULT  Goal: Verbalizes/displays adequate comfort level or baseline comfort level  Description: Interventions:  - Encourage patient to monitor pain and request assistance  - Assess pain using appropriate pain scale  - Administer analgesics based on type and severity of pain and evaluate response  - Implement non-pharmacological measures as appropriate and evaluate response  - Consider cultural and social influences on pain and pain management  - Notify physician/advanced practitioner if interventions unsuccessful or patient reports new pain  Outcome: Progressing     Problem: INFECTION - ADULT  Goal: Absence or prevention of progression during hospitalization  Description: INTERVENTIONS:  - Assess and monitor for signs and symptoms of infection  - Monitor lab/diagnostic results  - Monitor all insertion sites, i.e. indwelling lines, tubes, and drains  - Monitor endotracheal if appropriate and nasal secretions for changes in amount and color  - [place] appropriate cooling/warming therapies per order  - Administer medications as ordered  - Instruct and encourage patient and family to use good hand hygiene technique  - Identify and instruct in appropriate isolation precautions for identified infection/condition  Outcome: Progressing  Goal: Absence of fever/infection during neutropenic period  Description: INTERVENTIONS:  - Monitor WBC    Outcome: Progressing     Problem: SAFETY ADULT  Goal: Patient will remain free of falls  Description: INTERVENTIONS:  - Educate patient/family on patient safety including physical limitations  - Instruct patient to call for assistance with activity   - Consult OT/PT to assist with strengthening/mobility   - Keep Call bell within reach  - Keep bed low and locked with side rails adjusted as appropriate  - Keep care items and personal belongings within reach  - Initiate and maintain comfort rounds  - Make Fall Risk Sign visible to staff  - Offer Toileting every 2 Hours, in advance of need  - Initiate/Maintain bed alarm  - Obtain necessary fall risk management equipment:   - Apply yellow socks and bracelet for high fall risk patients  - Consider moving patient to room near nurses station  Outcome: Progressing     Problem: DISCHARGE PLANNING  Goal: Discharge to home or other facility with appropriate resources  Description: INTERVENTIONS:  - Identify barriers to discharge w/patient and caregiver  - Arrange for needed discharge resources and transportation as appropriate  - Identify discharge learning needs (meds, wound care, etc.)  - Arrange for interpretive services to assist at discharge as needed  - Refer to Case Management Department for coordinating discharge planning if the patient needs post-hospital services based on physician/advanced practitioner order or complex needs related to functional status, cognitive ability, or social support system  Outcome: Progressing     Problem: Knowledge Deficit  Goal: Patient/family/caregiver demonstrates understanding of disease process, treatment plan, medications, and discharge instructions  Description: Complete learning assessment and assess knowledge base.   Interventions:  - Provide teaching at level of understanding  - Provide teaching via preferred learning methods  Outcome: Progressing

## 2023-10-19 NOTE — PLAN OF CARE
Problem: MOBILITY - ADULT  Goal: Maintain or return to baseline ADL function  Description: INTERVENTIONS:  -  Assess patient's ability to carry out ADLs; assess patient's baseline for ADL function and identify physical deficits which impact ability to perform ADLs (bathing, care of mouth/teeth, toileting, grooming, dressing, etc.)  - Assess/evaluate cause of self-care deficits   - Assess range of motion  - Assess patient's mobility; develop plan if impaired  - Assess patient's need for assistive devices and provide as appropriate  - Encourage maximum independence but intervene and supervise when necessary  - Involve family in performance of ADLs  - Assess for home care needs following discharge   - Consider OT consult to assist with ADL evaluation and planning for discharge  - Provide patient education as appropriate  Outcome: Progressing  Goal: Maintains/Returns to pre admission functional level  Description: INTERVENTIONS:  - Perform BMAT or MOVE assessment daily.   - Set and communicate daily mobility goal to care team and patient/family/caregiver. - Collaborate with rehabilitation services on mobility goals if consulted  - Perform Range of Motion 3 times a day. - Reposition patient every 2 hours.   - Dangle patient 2 times a day  - Stand patient 2 times a day  - Ambulate patient 2 times a day  - Out of bed to chair 2 times a day   - Out of bed for meals 2 times a day  - Out of bed for toileting  - Record patient progress and toleration of activity level   Outcome: Progressing     Problem: PAIN - ADULT  Goal: Verbalizes/displays adequate comfort level or baseline comfort level  Description: Interventions:  - Encourage patient to monitor pain and request assistance  - Assess pain using appropriate pain scale  - Administer analgesics based on type and severity of pain and evaluate response  - Implement non-pharmacological measures as appropriate and evaluate response  - Consider cultural and social influences on pain and pain management  - Notify physician/advanced practitioner if interventions unsuccessful or patient reports new pain  Outcome: Progressing     Problem: INFECTION - ADULT  Goal: Absence or prevention of progression during hospitalization  Description: INTERVENTIONS:  - Assess and monitor for signs and symptoms of infection  - Monitor lab/diagnostic results  - Monitor all insertion sites, i.e. indwelling lines, tubes, and drains  - Monitor endotracheal if appropriate and nasal secretions for changes in amount and color  - [place] appropriate cooling/warming therapies per order  - Administer medications as ordered  - Instruct and encourage patient and family to use good hand hygiene technique  - Identify and instruct in appropriate isolation precautions for identified infection/condition  Outcome: Progressing  Goal: Absence of fever/infection during neutropenic period  Description: INTERVENTIONS:  - Monitor WBC    Outcome: Progressing     Problem: SAFETY ADULT  Goal: Patient will remain free of falls  Description: INTERVENTIONS:  - Educate patient/family on patient safety including physical limitations  - Instruct patient to call for assistance with activity   - Consult OT/PT to assist with strengthening/mobility   - Keep Call bell within reach  - Keep bed low and locked with side rails adjusted as appropriate  - Keep care items and personal belongings within reach  - Initiate and maintain comfort rounds  - Make Fall Risk Sign visible to staff  - Offer Toileting every 2 Hours, in advance of need  - Initiate/Maintain bed alarm  - Obtain necessary fall risk management equipment:   - Apply yellow socks and bracelet for high fall risk patients  - Consider moving patient to room near nurses station  Outcome: Progressing     Problem: DISCHARGE PLANNING  Goal: Discharge to home or other facility with appropriate resources  Description: INTERVENTIONS:  - Identify barriers to discharge w/patient and caregiver  - Arrange for needed discharge resources and transportation as appropriate  - Identify discharge learning needs (meds, wound care, etc.)  - Arrange for interpretive services to assist at discharge as needed  - Refer to Case Management Department for coordinating discharge planning if the patient needs post-hospital services based on physician/advanced practitioner order or complex needs related to functional status, cognitive ability, or social support system  Outcome: Progressing     Problem: Knowledge Deficit  Goal: Patient/family/caregiver demonstrates understanding of disease process, treatment plan, medications, and discharge instructions  Description: Complete learning assessment and assess knowledge base.   Interventions:  - Provide teaching at level of understanding  - Provide teaching via preferred learning methods  Outcome: Progressing     Problem: Potential for Falls  Goal: Patient will remain free of falls  Description: INTERVENTIONS:  - Educate patient/family on patient safety including physical limitations  - Instruct patient to call for assistance with activity   - Consult OT/PT to assist with strengthening/mobility   - Keep Call bell within reach  - Keep bed low and locked with side rails adjusted as appropriate  - Keep care items and personal belongings within reach  - Initiate and maintain comfort rounds  - Make Fall Risk Sign visible to staff  - Offer Toileting every 2 Hours, in advance of need  - Initiate/Maintain bed alarm  - Obtain necessary fall risk management equipment:   - Apply yellow socks and bracelet for high fall risk patients  - Consider moving patient to room near nurses station  Outcome: Progressing

## 2023-10-19 NOTE — PROGRESS NOTES
Progress Note - Orthopedics   Tino Brown 46 y.o. 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 No signs of bleeding in the operative extremity at this time. Will continue to monitor. Aspirin 81mg bid/ SCDs for DVT prophylaxis. Patient should be discharged with aspirin 81mg twice daily x1 month. Ancef q8 hours until cultures return due to his history of infection. Will then plan on doxycycline for 2 weeks post operatively. Follow up on intra-operative cultures. Pillow under achilles, not knee for extension. Patient is stable from an orthopedic standpoint. He will require follow-up with Dr. Dnaiel Quevedo in the office in 10-14 days      Subjective: Patient seen and examined at the bedside this morning. He states that last night he had a lot of pain, but that has significantly improved this morning. He denies any distal numbness and tingling. No acute overnight events. He has not yet worked with physical therapy. Vitals: Blood pressure 120/79, pulse 94, temperature (!) 97.2 °F (36.2 °C), temperature source Temporal, resp. rate 22, height 5' 11" (1.803 m), weight 80.5 kg (177 lb 6.4 oz), SpO2 92 %. ,Body mass index is 24.74 kg/m². Intake/Output Summary (Last 24 hours) at 10/19/2023 1404  Last data filed at 10/19/2023 1400  Gross per 24 hour   Intake 2623 ml   Output 575 ml   Net 2048 ml       Invasive Devices       Peripheral Intravenous Line  Duration             Peripheral IV 10/18/23 Distal;Right;Upper;Ventral (anterior) Arm 1 day                    Ortho Exam:   Right knee:  Drsg: mepilex and ACE C/D/I. Thigh soft and compressible.  sensation grossly intact L4, L5, S1, palpable pedal pulse, EHL/AT/GS intact      Lab, Imaging and other studies: CBC:   Lab Results   Component Value Date    WBC 8.53 10/19/2023    HGB 10.5 (L) 10/19/2023    HCT 33.4 (L) 10/19/2023    MCV 92 10/19/2023     (L) 10/19/2023    RBC 3.65 (L) 10/19/2023    MCH 28.8 10/19/2023    MCHC 31.4 10/19/2023    RDW 14.6 10/19/2023    MPV 10.5 10/19/2023     CMP:   Lab Results   Component Value Date    SODIUM 132 (L) 10/19/2023    CL 99 10/19/2023    CO2 22 10/19/2023    BUN 18 10/19/2023    CREATININE 0.86 10/19/2023    CALCIUM 9.3 10/19/2023    EGFR 101 10/19/2023                 Barnabas Bernheim, PA-C

## 2023-10-19 NOTE — CASE MANAGEMENT
Case Management Assessment & Discharge Planning Note    Patient name [facility]omy [facility]  Location [address] [place] 221/-98 MRN [id number]  : 1975 Date 10/19/2023       Current Admission Date: 10/18/2023  Current Admission Diagnosis:Status post revision of total replacement of right knee   Patient Active Problem List    Diagnosis Date Noted    Moderate mixed hyperlipidemia not requiring statin therapy 10/03/2023    Major depressive disorder in full remission ([address]) 2023    Mild protein-calorie malnutrition ([address]) 2023    Prediabetes 2023    Anemia 2023    Difficult intubation 2023    PONV (postoperative nausea and vomiting) 2023    Infection of prosthetic right knee joint ([address]) 2023    Anxiety 2023    HTN (hypertension) 2023    Migraine 2023    History of arthroplasty of right knee 2023    Plaque psoriasis 2023    Status post revision of total replacement of right knee 2023      LOS (days): 1  Geometric Mean LOS (GMLOS) (days):   Days to GMLOS:     OBJECTIVE:    Risk of Unplanned Readmission Score: 10.54         Current admission status: Inpatient       Preferred Pharmacy:   [address], [address] [place] [id number]  Phone: [phone] Fax: [phone]    Primary Care Provider: DAVID Bee    Primary Insurance: [facility]  Secondary Insurance:     ASSESSMENT:  Miguel Proxies    There are no active Health Care Proxies on file.        Advance Directives  Does patient have a [address]?: No  Was patient offered paperwork?: Yes (declined)  Does patient currently have a Health Care decision maker?: Yes, please see Health Care Proxy section  Does patient have Advance Directives?: No  Was patient offered paperwork?: Yes (declined)  Primary Contact: Kristen Stinson,          Readmission Root Cause  30 Day Readmission: No    Patient Information  Admitted from[de-identified] Home  Mental Status: Alert  During Assessment patient was accompanied by: Not accompanied during assessment  Assessment information provided by[de-identified] Patient  Primary Caregiver: Self  Support Systems: Family members, [address] of Residence: [facility] do you live in?: [place] entry access options.  Select all that apply.: Stairs  Number of steps to enter home.: 10  Type of Current Residence: 2 story home  Upon entering residence, is there a bedroom on the main floor (no further steps)?: No  A bedroom is located on the following floor levels of residence (select all that apply):: 2nd Floor  Upon entering residence, is there a bathroom on the main floor (no further steps)?: Yes  Number of steps to 2nd floor from main floor: One Flight  In the last 12 months, was there a time when you were not able to pay the mortgage or rent on time?: No  In the last 12 months, how many places have you lived?: 1  In the last 12 months, was there a time when you did not have a steady place to sleep or slept in a shelter (including now)?: No  Homeless/housing insecurity resource given?: N/A  Living Arrangements: Lives w/ Extended Family    Activities of Daily Living Prior to Admission  Functional Status: Independent  Completes ADLs independently?: Yes  Ambulates independently?: Yes  Does patient use assisted devices?: No  Does patient currently own DME?: Yes  What DME does the patient currently own?: Stefanie Aly, Bedside Commode, Walker  Does patient have a history of Outpatient Therapy (PT/OT)?: Yes  Does the patient have a history of Short-Term Rehab?: No  Does patient have a history of HHC?: Yes ([facility])  Does patient currently have [facility]?: No         Patient Information Continued  Income Source: Employed  Does patient have prescription coverage?: Yes  Within the past 12 months, you worried that your food would run out before you got the money to buy more.: Never true  Within the past 12 months, the food you bought just didn't last and you didn't have money to get more.: Never true  Food insecurity resource given?: N/A  Does patient receive dialysis treatments?: No  Does patient have a history of substance abuse?: No  Does patient have a history of Mental Health Diagnosis?: Yes  Is patient receiving treatment for mental health?: Yes  Has patient received inpatient treatment related to mental health in the last 2 years?: No         Means of Transportation  Means of Transport to Appts[de-identified] Drives Self  In the past 12 months, has lack of transportation kept you from medical appointments or from getting medications?: No  In the past 12 months, has lack of transportation kept you from meetings, work, or from getting things needed for daily living?: No  Was application for public transport provided?: N/A        DISCHARGE DETAILS:    Discharge planning discussed with[de-identified] Patient  Freedom of Choice: Yes     CM contacted family/caregiver?: No- see comments (Pt is alert and oriented)  Were Treatment Team discharge recommendations reviewed with patient/caregiver?: Yes  Did patient/caregiver verbalize understanding of patient care needs?: Yes  Were patient/caregiver advised of the risks associated with not following Treatment Team discharge recommendations?: Yes         [address]         Is the patient interested in [facility] at discharge?: No    DME Referral Provided  Referral made for DME?: No    Other Referral/Resources/Interventions Provided:  Interventions: Outpatient OT, Outpatient PT         Treatment Team Recommendation: Home  Discharge Destination Plan[de-identified] Home  Transport at Discharge : Family                                      Additional Comments: Met with pt to discuss role of CM and any needs prior to discharge. Pt lives w/ family in [facility] with 10 PARVEEN. Indp PTA. Pt owns walker, crutches, shower chair, BSC. Pt is employed and drives self. Pt prefers to use the [facility] in [place]. Hx OP PT and HH - [facility] ([facility]). Pt has had no IP psych stays in the last 2 years. No hx STR/DA. Sister will transport at discharge.

## 2023-10-19 NOTE — PLAN OF CARE
Problem: PHYSICAL THERAPY ADULT  Goal: Performs mobility at highest level of function for planned discharge setting. See evaluation for individualized goals. Description: Treatment/Interventions: Functional transfer training, LE strengthening/ROM, Elevations, Therapeutic exercise, Endurance training, Patient/family training, Equipment eval/education, Bed mobility, Gait training, Spoke to nursing, OT          See flowsheet documentation for full assessment, interventions and recommendations. Note: Prognosis: Good  Problem List: Decreased strength, Decreased range of motion, Decreased endurance, Impaired balance, Decreased mobility, Pain, Orthopedic restrictions  Assessment: Patient is a 53y/o M who presented with infection of R TKA now POD 1 revision of R TKA. Patient resides with spouse in a home with 1 PARVEEN. He is independent at baseline and has been using a RW. Current medical status includes pain, fall risk, bed/chair alarm, LE swelling, decreased knee extension, decreased strength, balance, endurance and mobility. Patient was received supine in bed. BP stable. Patient performed all mobility at a supervision level. Educated on positioning of LE in bed, use of ice and sequencing with RW. Anticipate progression during hospital stay. Recommending level 3 resources. The patient's AM-PAC Basic Mobility Inpatient Short Form Raw Score is 18. A Raw score of greater than 17 suggests the patient may benefit from discharge to home. Please also refer to the recommendation of the Physical Therapist for safe discharge planning. Barriers to Discharge: None  Barriers to Discharge Comments: Will need stair trial prior to discharge home       See flowsheet documentation for full assessment.

## 2023-10-19 NOTE — OP NOTE
OPERATIVE REPORT  PATIENT NAME: Staci Brown  : 1975  MRN: [id number]  Pt Location:  [facility] OR ROOM 02    Surgery Date: 10/18/2023    Surgeon(s) and Role:     * Darinel Huerta DO - Primary     * Ya Taylor PA-C - Assisting     Preop Diagnosis:  Infection of total right knee replacement, initial encounter [facility]) [facility]  Status post revision of total knee replacement, right [Z96.651]    Post-Op Diagnosis Codes:     * Infection of total right knee replacement, initial encounter ([address]) [facility]     * Status post revision of total knee replacement, right [Z96.651]    Procedure(s):  Right - ARTHROPLASTY KNEE TOTAL REVISION  Right - INSERTION OF BIODEGRADABLE DRUG DELIVERY DEVICE (STIMULAN)    Specimens:  ID Type Source Tests Collected by Time Destination   A : #1 Tissue for culture from right knee Tissue Soft Tissue, Other CULTURE, TISSUE AND GRAM STAIN Darinel Huerta, DO 10/18/2023 1552    B : #2 Tissue for culture from right knee Tissue Soft Tissue, Other CULTURE, TISSUE AND GRAM STAIN Darinel Huerta, DO 10/18/2023 1554    C : #3 Tissue for culture from right knee Tissue Soft Tissue, Other CULTURE, TISSUE AND GRAM STAIN Darinel Huerta, DO 10/18/2023 1554        Estimated Blood Loss:   50 cc    Drains:  * No LDAs found *    Anesthesia Type:   Choice     Operative Indications:  Infection of total right knee replacement, initial encounter ([address]) [T84.53XA]  Status post revision of total knee replacement, right [Z96.651]  Patient is a 49-year-old male who has a history of right knee periprosthetic joint infection status post previous revision with also history of quadriceps tendon repair. He is status post right knee I&D with antibiotic spacer placement on 2023. He completed 6 weeks of IV antibiotics and was then seen 2 weeks afterwards for planned aspiration. At that time he had no fluid in the knee to be aspirated.   We then plan for second stage revision total knee arthroplasty as he needed a varus valgus constrained implant. His range of motion has improved to approximately 5-90 degrees from a previous range of motion of 50-75. The patient has elected to proceed with right TKA revision. Risks and benefits of surgery to include but not limited to bleeding, infection, damage to surrounding structures, hardware failure, instability, fracture, dislocation, need for further surgery, continued pain, stiffness, blood clots, stroke, and heart attack was discussed with the patient. We discussed that we will again perform an extensive irrigation debridement with adjuvant antiseptic solutions and hand crafted antibiotic stimulan beads. Operative Findings:  Pre-op ROM 5-90  No effusion  No purulence   Well-healed previous sinus tract through quadriceps tendon   Well-fixed antibiotic spacer  Post-op ROM 0-120    Implant Name Type Inv.  Item Serial No.  Lot No. LRB No. Used Action   CEMENT BONE SMART SET GRAY MED VISC - [id number]  CEMENT BONE SMART SET GRAY MED VISC  DEPUY [id number] Right 1 Implanted   CEMENT BONE SMART SET GRAY MED VISC - [id number]  CEMENT BONE SMART SET GRAY MED VISC  [id number]  Right 1 Implanted   CEMENT BONE SMART SET GRAY MED VISC - [id number]  CEMENT BONE SMART SET GRAY MED VISC  DEPUY [id number] Right 1 Implanted   FILLER SYNTHETIC STIMULAN RAPID CURE 10ML - [id number]  FILLER SYNTHETIC STIMULAN RAPID CURE 10ML  BIOCOMPOSITES [id number] Right 1 Implanted   CEMENT BONE SIMPLEX P FULL DOSE - [id number]  CEMENT BONE SIMPLEX P FULL DOSE  MALKA [employer]O [id number] Right 3 Explanted   COMPONENT FEM SZ 6 NRW RT CMNT CR ATTUNE - [id number]  COMPONENT FEM SZ 6 NRW RT CMNT CR ATTUNE  DEPUY [id number] Right 1 Explanted   FILLER SYNTHETIC STIMULAN RAPID CURE 10ML - [id number]  FILLER SYNTHETIC STIMULAN RAPID CURE 10ML  BIOCOMPOSITES [id number] Right 1 Explanted   INSERT TIB SZ 6 10MM CR ATTUNE ALPOLY - [id number]  INSERT TIB SZ 6 10MM CR ATTUNE ALPOLY  DEPUY [id number] Right 1 Explanted   BASEPLATE TIB REVISION SZ 5 ROTPLT ATTUNE - [id number]  BASEPLATE TIB REVISION SZ 5 ROTPLT ATTUNE  DEPUY [id number] Right 1 Implanted   SLEEVE TIB REVISION 37MM ATTUNE - [id number]  SLEEVE TIB REVISION 37MM ATTUNE  DEPUY [id number] Right 1 Implanted   KNEE SYSTEM REV PRESSFIT STEM 14 X 60MM ATTUNE - [id number]  KNEE SYSTEM REV PRESSFIT STEM 14 X 60MM ATTUNE  DEPUY [id number] Right 1 Implanted   AUG DSTL FEM SZ 6 8MM ATTUNE - [id number]  AUG DSTL FEM SZ 6 8MM ATTUNE  DEPUY [id number] Right 1 Implanted   COMPONENT PATELLA 29MM MEDIAL DOMED CMNT ATTUNE - [id number]  COMPONENT PATELLA 29MM MEDIAL DOMED CMNT ATTUNE  DEPUY [id number] Right 1 Implanted   KNEE SYSTEM REV PRESSFIT STEM 16 X 110MM ATTUNE - [id number]  KNEE SYSTEM REV PRESSFIT STEM 16 X 110MM ATTUNE  DEPUY [id number] Right 1 Implanted   KNEE SYSTEM REV CRS FEM SZ 6 RT CMT ATTUNE - [id number]  KNEE SYSTEM REV CRS FEM SZ 6 RT CMT ATTUNE  DEPUY [id number] Right 1 Implanted   Revision Distal Femoral Augment    DEPUY [id number] Right 1 Implanted   KNEE SYSTEM REV CRS RP INSRT SZ 6 12MM ATTUNE - [id number]  KNEE SYSTEM REV CRS RP INSRT SZ 6 12MM ATTUNE  DEPUY [id number] Right 1 Implanted       Complications:   None    Knee Technique: Suture (direct) Repair  Knee Approach: Medial Parapatellar    Procedure and Technique:  Patient was seen in the preoperative holding area. Informed consent was confirmed and all questions were answered. Operative site was confirmed and marked. Patient was taken to the operating room and transferred to the operating room table. Spinal anesthesia was performed. The patient was then placed supine and all bony prominences were well-padded. Right lower extremity was prepped and draped in usual sterile fashion with chlorhexidine scrub. Patient was given perioperative antibiotics prior to incision and SCDs were placed on the non-operative leg. A formal time-out was performed identifying the patient and confirmed operative site.   The knee was exsanguinated and a pneumatic tourniquet was inflated at 250 mmHg. The patient's previous anterior knee incision was resected in its entirety with scar down to the level of the extensor mechanism. A medial flap was created along with a small lateral flap. A medial parapatellar approach was utilized to enter the knee. Upon performing the arthrotomy there was no effusion. We performed a medial peel and extensive incision and drainage of the knee down to and including bone. We resected scar from the medial and lateral extensor mechanism to re-establish the gutters. We then inspected the implants and found his components to be well fixed. At this time we used combination of osteotomes to loosen the cement implant interface and remove the femoral component with no bone loss. At this time we turned our attention to the tibial component. Retractors were carefully placed around the knee and the keel of the all poly tibia was cut through with a sagittal saw. We remove the articulating portion and then with a combination of cement splitters, osteotomes and back scratcher's we removed all previous cement from the tibia. Three soft tissue specimens were obtained for culture from the synovium, behind the femoral component and to from beneath the tibial component. At this time we started to remove retained cement at the tibia and femoral bone surfaces. This was removed with a combination of cement splitters, osteotomes, back scratcher's and pituitary rongeur. Once all cement was removed we reestablished the tibial canal.  We reamed up to a size 14 reamer with excellent chatter. At this time the tibia was broached sequentially up to a size 37 sleeve with excellent rotational stability. We then trialed the tibia and found a size 5 attune revision tibia to be appropriate size. This was locked into place rotationally and the keel was punched. At this time our attention was drawn back to the femur.   The canal was reestablished and femur was reamed up to a size 16 with excellent chatter. At this time a cut through trial was assembled with a size 6 attune revision femur. The femoral box was then deepened. This was impacted into place and rotation was confirmed with a combination of the transepicondylar axis and Whitesides line. At this time the knee was trialed with semiconstrained poly's and found to have excellent stability and full motion with a 10 mm attune revision poly to restore the joint line using the landmarks of patella, meniscal scar and fibular head. We evaluated the cut through trial at this time and found a 12 mm distal lateral augment and a 8 mm distal medial augment to be appropriate to restore the joint line. We then trialed the knee with the augments in place with excellent stability and range of motion. At this time we evaluated the patella was evaluated. The patella at approximately 13 mm of bone stock left at the tendinous portion. We made a small freshening cut above that level the flattened surface. We decided to use a 29 mm patellar button to decrease the thickness of the anterior knee is much as possible due to his previous limitations in flexion. We placed the patellar guide and drilled the lug holes. We then trialed the 29 mm patella. The knee was then taken through motion and found to have excellent stability. The trials were then removed from the knee. At this time a thorough debridement of all devitalized tissue and scar was performed around the knee. The knee was irrigated with Irrisept solution and this was allowed to sit for 3 minutes. The knee was then irrigated with normal saline solution while the final components were assembled on the back table. The femoral and tibial components were impacted and tightened to specification. The bone surfaces were dried and the tibial component was press-fit and cemented at the epiphysis. Peripheral cement was removed.   Next the femur was impacted into place also cementing at the epiphysis. Excess cement was removed. At this time the real 10 mm revision poly was placed. Knee was held in extension. The tourniquet was released at 93 minutes and all bleeders were coagulated. The knee was irrigated with the remainder of the 3 L of normal saline solution. The joint local was injected in the posterior capsule and around the tissues of the knee. The knee was taken through a final range of motion and found to have excellent stability and patellar tracking. All instrument and sponge counts were correct x2. The arthrotomy was closed with #1 Stratafix suture. Subcutaneous tissues were closed with 2-0 Vicryl. The skin was closed with 3-0 Stratafix followed by Dermabond. A sterile Mepilex was placed along with a thigh foot Ace wrap. Patient was awoken from anesthesia and taken to recovery room in stable condition.         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  - multi-modal pain control  - ancef q8 hrs until culture results final, doxycycline x 2 weeks at discharge  - DVT ppx: aspirin 81mg BID x 4 weeks  - PT/OT  - WBAT  - ROM as tolerated, pillow/blankets under achilles not behind knee while in bed  - f/u I/O cultures  - f/u 10-14 days       I was present for the entire procedure, A qualified resident physician was not available and A physician assistant was required during the procedure for retraction tissue handling,dissection and suturing     Patient Disposition:  PACU           SIGNATURE: Gerber Morrell DO  DATE: October 18, 2023  TIME: 8:43 PM

## 2023-10-19 NOTE — OCCUPATIONAL THERAPY NOTE
Occupational Therapy Evaluation     Patient Name: Joshua Combs  RBBWA'H Date: 10/19/2023  Problem List  Principal Problem:    Status post revision of total replacement of right knee    Past Medical History  Past Medical History:   Diagnosis Date    Anxiety     Arthritis     Crutches as ambulation aid 07/25/2023    Depression     Difficult intubation 07/28/2023    Hypertension     Limited jaw ROM     Migraine     PONV (postoperative nausea and vomiting) 07/28/2023    Psoriasis     RA (rheumatoid arthritis) ([address])     Sigifredo Gulling as ambulation aid      Past Surgical History  Past Surgical History:   Procedure Laterality Date    JOINT REPLACEMENT      DC REVJ TOT KNEE ARTHRP Joseph Bergman Right 07/28/2023    Procedure: ARTHROPLASTY KNEE TOTAL REVISION;  Surgeon: Scotty Conner DO;  Location: [facility];  Service: Orthopedics    REPLACEMENT TOTAL KNEE Right     x2           10/19/23 1029   OT Last Visit   OT Visit Date 10/19/23   Note Type   Note type Evaluation   Pain Assessment   Pain Assessment Tool 0-10   Pain Score 6   Pain Location/Orientation Orientation: Right   Patient's Stated Pain Goal No pain   Hospital Pain Intervention(s) Ambulation/increased activity;Repositioned   Restrictions/Precautions   Weight Bearing Precautions Per Order Yes   RLE Weight Bearing Per Order WBAT   Other Precautions Fall Risk;Pain;WBS;Chair Alarm   Home Living   Type of [address] [place] [address] Two level; Able to live on main level with bedroom/bathroom  (1 PARVEEN)   Bathroom Shower/Tub Tub/shower unit   [employer] Raised   Bathroom Equipment Grab bars in shower; Shower chair   Home Equipment Walker   Additional Comments RW PTA   Prior Function   Level of [place] Independent with ADLs; Independent with functional mobility; Needs assistance with IADLS   Lives With [facility] Help From Family   IADLs Independent with medication management; Family/Friend/Other provides meals; Independent with driving   Falls in the last 6 months 1 to 4   Lifestyle   Autonomy Independent at baseline   Reciprocal Relationships Lives with family   Intrinsic Gratification Reading   General   Additional Pertinent History POD#1 total knee revision   Family/Caregiver Present No   Subjective   Subjective Pt received semi-supine in bed, pt agreeable to session. Pt requesting to put on underwear   ADL   Eating Assistance 7  Independent   Grooming Assistance 7  Independent   UB Bathing Assistance 7  Independent   LB Bathing Assistance 5  Supervision/Setup   UB Dressing Assistance 7  Independent   LB Dressing Assistance 7  Independent   LB Dressing Deficit Thread RLE into underwear; Thread LLE into underwear; Thread RLE into pants; Thread LLE into pants;Pull up over hips  (in long-sit position in bed, pt able to bridge hips to pull over hips)   Toileting Assistance  5  Supervision/Setup   Additional Comments Pt educated on LB dressing compensatory strategies   Bed Mobility   Supine to Sit 5  Supervision   Additional items HOB elevated   Transfers   Sit to Stand 5  Supervision   Stand to Sit 5  Supervision   Additional Comments BP stable throughout   Functional Mobility   Functional Mobility 5  Supervision   Additional Comments Functional household distance   Additional items Rolling walker   Balance   Static Sitting Normal   Dynamic Sitting Good   Static Standing Good   Dynamic Standing Fair +   Ambulatory Fair +   Activity Tolerance   Activity Tolerance Patient tolerated treatment well   Medical Staff Made Aware PT Sharon   Nurse Made Aware ERROL Burton   RUE Assessment   RUE Assessment WFL   LUE Assessment   LUE Assessment WFL   Cognition   Overall Cognitive Status WFL   Arousal/Participation Alert   Attention Within functional limits   Orientation Level Oriented X4   Memory Within functional limits   Following Commands Follows all commands and directions without difficulty   Assessment   Prognosis Good   Assessment Pt is a 46 y.o. male seen for OT evaluation at [facility], admitted 10/18/2023, pt is POD#1Status post revision of total replacement of right knee. OT completed extensive review of pt's medical and social history. Comorbidities affecting pt's functional performance at time of assessment include: anxiety, HTN, h/o TKR infection & ABX spacer, h/o migraines. Prior to admission, pt was living with family in a 2  with 1 PARVEEN & a first floor s/u. Pt was independent with ADLs, a for IADLs, & used a RW PTA. Upon evaluation, pt presents to OT at functional baseline. Based on findings, pt is of high complexity. The patient's raw score on the AM-PAC Daily Activity inpatient short form is 23, standardized score is 51.12, greater than 39.4. Patients at this level are likely to benefit from DC to home. Please refer to the recommendation of the Occupational Therapist for safe DC planning. At this time, OT recommendations at time of discharge are no OT needs. No further acute OT needs indicated at this time - Recommend pt continue to be OOB for meals, ambulation to/from BR, perform self care tasks, and mobility in hallway with nursing. D/C from OT caseload with above recommendations.    Goals   Patient Goals Pt wants to go home   Plan   OT Frequency Eval only   Discharge Recommendation   UB Rehab Resource Intensity Level No skilled rehabilitation needs   AM-PAC Daily Activity Inpatient   Lower Body Dressing 4   Bathing 3   Toileting 4   Upper Body Dressing 4   Grooming 4   Eating 4   Daily Activity Raw Score 23   Daily Activity Standardized Score (Calc for Raw Score >=11) 51.12   AM-PAC Applied Cognition Inpatient   Following a Speech/Presentation 4   Understanding Ordinary Conversation 4   Taking Medications 4   Remembering Where Things Are Placed or Put Away 4   Remembering List of 4-5 Errands 4   Taking Care of Complicated Tasks 4   Applied Cognition Raw Score 24   Applied Cognition Standardized Score 62.21   End of Consult   Education Provided Yes Patient Position at End of Consult Bedside chair;Bed/Chair alarm activated; All needs within reach   Nurse Communication Nurse aware of consult     Sydney Barrios, OTR/L

## 2023-10-19 NOTE — PHYSICAL THERAPY NOTE
PHYSICAL THERAPY EVAL/TX  Physical Therapy Evaluation    Performed at least 2 patient identifiers during session:  Patient Active Problem List   Diagnosis    Anxiety    HTN (hypertension)    Migraine    Plaque psoriasis    Status post revision of total replacement of right knee    History of arthroplasty of right knee    Difficult intubation    PONV (postoperative nausea and vomiting)    Infection of prosthetic right knee joint ([address])    Prediabetes    Anemia    Mild protein-calorie malnutrition ([address])    Major depressive disorder in full remission ([address])    Moderate mixed hyperlipidemia not requiring statin therapy       Past Medical History:   Diagnosis Date    Anxiety     Arthritis     Crutches as ambulation aid 07/25/2023    Depression     Difficult intubation 07/28/2023    Hypertension     Limited jaw ROM     Migraine     PONV (postoperative nausea and vomiting) 07/28/2023    Psoriasis     RA (rheumatoid arthritis) ([address])     Walker as ambulation aid        Past Surgical History:   Procedure Laterality Date    JOINT REPLACEMENT      AR REVJ TOT KNEE ARTHRP Merlin Iron Right 07/28/2023    Procedure: ARTHROPLASTY KNEE TOTAL REVISION;  Surgeon: Marylu Askew DO;  Location: [facility];  Service: Orthopedics    REPLACEMENT TOTAL KNEE Right     x2        10/19/23 1008   PT Last Visit   PT Visit Date 10/19/23   Note Type   Note type Evaluation   Pain Assessment   Pain Assessment Tool 0-10   Pain Score 6   Pain Location/Orientation Orientation: Right;Location: Knee   Hospital Pain Intervention(s) Medication (See MAR); Cold applied   Restrictions/Precautions   Weight Bearing Precautions Per Order Yes   RLE Weight Bearing Per Order WBAT   Other Precautions Pain; Fall Risk;WBS; Bed Alarm; Chair Alarm   Home Living   Type of [address] [place] [address] Two level; Able to live on main level with bedroom/bathroom  (1STE)   Bathroom Shower/Tub Tub/shower unit   Bathroom Toilet Raised   Bathroom Equipment Grab bars in shower; Shower chair   Home Equipment Walker   Additional Comments Using RW prior to surgery   Prior Function   Level of [place] Independent with ADLs; Independent with functional mobility; Needs assistance with IADLS   Lives With [facility] Help From Family   IADLs Independent with driving;Family/Friend/Other provides meals; Independent with medication management   Falls in the last 6 months 1 to 4   General   Family/Caregiver Present No   Cognition   Overall Cognitive Status WFL   Arousal/Participation Alert   Orientation Level Oriented X4   Memory Within functional limits   Following Commands Follows all commands and directions without difficulty   Subjective   Subjective "I haven't been up yet."   LLE Assessment   LLE Assessment WFL   Bed Mobility   Supine to Sit 5  Supervision   Transfers   Sit to Stand 5  Supervision   Additional items Armrests   Stand to Sit 5  Supervision   Additional items Armrests   Additional Comments Verbal cues for hand placement. BP taken in supine, sitting and standing. Stable. Ambulation/Elevation   Ambulation/Elevation Additional Comments Refer to treatment session for additional mobility   Balance   Static Sitting Normal   Dynamic Sitting Good   Static Standing Fair +   Dynamic Standing Fair +   Ambulatory Fair +   Endurance Deficit   Endurance Deficit No   Activity Tolerance   Activity Tolerance Patient tolerated treatment well   Medical Staff Made Aware Sarah ORTIZ   Nurse Made Aware RNMicheal   Assessment   Prognosis Good   Problem List Decreased strength;Decreased range of motion;Decreased endurance; Impaired balance;Decreased mobility;Pain;Orthopedic restrictions   Assessment Patient is a 51y/o M who presented with infection of R TKA now POD 1 revision of R TKA. Patient resides with spouse in a home with 1 PARVEEN. He is independent at baseline and has been using a RW.  Current medical status includes pain, fall risk, bed/chair alarm, LE swelling, decreased knee extension, decreased strength, balance, endurance and mobility. Patient was received supine in bed. BP stable. Patient performed all mobility at a supervision level. Educated on positioning of LE in bed, use of ice and sequencing with RW. Anticipate progression during hospital stay. Recommending level 3 resources. The patient's AM-PAC Basic Mobility Inpatient Short Form Raw Score is 18. A Raw score of greater than 17 suggests the patient may benefit from discharge to home. Please also refer to the recommendation of the Physical Therapist for safe discharge planning. Barriers to Discharge None   Barriers to Discharge Comments Will need stair trial prior to discharge home   Goals   Patient Goals To go home   STG Expiration Date 10/21/23   Short Term Goal #1 1. perform supine<>sit with HOB flat without the use of bedrails ind 2. Perform sit<>stand transfers mod I 3. Ambulate 300ft with a RW mod I level 4. Ascend/descend 1 curb step mod i level with RW   PT Treatment Day 1   Plan   Treatment/Interventions Functional transfer training;LE strengthening/ROM; Elevations; Therapeutic exercise; Endurance training;Patient/family training;Equipment eval/education; Bed mobility;Gait training;Spoke to nursing;OT   PT Frequency Twice a day   Discharge Recommendation   UB Rehab Resource Intensity Level III (Minimum Resource Intensity)   AM-PAC Basic Mobility Inpatient   Turning in Flat Bed Without Bedrails 3   Lying on Back to Sitting on Edge of Flat Bed Without Bedrails 3   Moving Bed to Chair 3   Standing Up From Chair Using Arms 3   Walk in Room 3   Climb 3-5 Stairs With Railing 3   Basic Mobility Inpatient Raw Score 18   Basic Mobility Standardized Score 41.05   Highest Level Of Mobility   JH-HLM Goal 6: Walk 10 steps or more   JH-HLM Achieved 7: Walk 25 feet or more   Additional Treatment Session   Start Time 1020   End Time 1030   Treatment Assessment Ambulated 100ft with a RW with supervision. Decreased heel strike on the R, increased knee flexion RLE, antalgic gait   Equipment Use RW   End of Consult   Patient Position at End of Consult Bedside chair;Bed/Chair alarm activated; All needs within reach     Erica Campa, PT             Patient Name: Jett Jim  BXSMA'R Date: 10/19/2023

## 2023-10-20 LAB
ANION GAP SERPL CALCULATED.3IONS-SCNC: 10 MMOL/L
BUN SERPL-MCNC: 11 MG/DL (ref 5–25)
CALCIUM SERPL-MCNC: 8.9 MG/DL (ref 8.4–10.2)
CHLORIDE SERPL-SCNC: 99 MMOL/L (ref 96–108)
CO2 SERPL-SCNC: 26 MMOL/L (ref 21–32)
CREAT SERPL-MCNC: 0.77 MG/DL (ref 0.6–1.3)
ERYTHROCYTE [DISTWIDTH] IN BLOOD BY AUTOMATED COUNT: 14.6 % (ref 11.6–15.1)
GFR SERPL CREATININE-BSD FRML MDRD: 105 ML/MIN/1.73SQ M
GLUCOSE SERPL-MCNC: 101 MG/DL (ref 65–140)
HCT VFR BLD AUTO: 30.8 % (ref 36.5–49.3)
HCV AB SER QL: NORMAL
HGB BLD-MCNC: 10 G/DL (ref 12–17)
HIV 1+2 AB+HIV1 P24 AG SERPL QL IA: NORMAL
HIV 2 AB SERPL QL IA: NORMAL
HIV1 AB SERPL QL IA: NORMAL
HIV1 P24 AG SERPL QL IA: NORMAL
MCH RBC QN AUTO: 28.8 PG (ref 26.8–34.3)
MCHC RBC AUTO-ENTMCNC: 32.5 G/DL (ref 31.4–37.4)
MCV RBC AUTO: 89 FL (ref 82–98)
PLATELET # BLD AUTO: 392 THOUSANDS/UL (ref 149–390)
PMV BLD AUTO: 8.5 FL (ref 8.9–12.7)
POTASSIUM SERPL-SCNC: 3.8 MMOL/L (ref 3.5–5.3)
RBC # BLD AUTO: 3.47 MILLION/UL (ref 3.88–5.62)
SODIUM SERPL-SCNC: 135 MMOL/L (ref 135–147)
WBC # BLD AUTO: 6.44 THOUSAND/UL (ref 4.31–10.16)

## 2023-10-20 PROCEDURE — 80048 BASIC METABOLIC PNL TOTAL CA: CPT | Performed by: STUDENT IN AN ORGANIZED HEALTH CARE EDUCATION/TRAINING PROGRAM

## 2023-10-20 PROCEDURE — 97116 GAIT TRAINING THERAPY: CPT

## 2023-10-20 PROCEDURE — 99024 POSTOP FOLLOW-UP VISIT: CPT | Performed by: PHYSICIAN ASSISTANT

## 2023-10-20 PROCEDURE — 86803 HEPATITIS C AB TEST: CPT | Performed by: INTERNAL MEDICINE

## 2023-10-20 PROCEDURE — 99232 SBSQ HOSP IP/OBS MODERATE 35: CPT | Performed by: FAMILY MEDICINE

## 2023-10-20 PROCEDURE — 85027 COMPLETE CBC AUTOMATED: CPT | Performed by: STUDENT IN AN ORGANIZED HEALTH CARE EDUCATION/TRAINING PROGRAM

## 2023-10-20 PROCEDURE — 87389 HIV-1 AG W/HIV-1&-2 AB AG IA: CPT | Performed by: INTERNAL MEDICINE

## 2023-10-20 PROCEDURE — 02HV33Z INSERTION OF INFUSION DEVICE INTO SUPERIOR VENA CAVA, PERCUTANEOUS APPROACH: ICD-10-PCS | Performed by: STUDENT IN AN ORGANIZED HEALTH CARE EDUCATION/TRAINING PROGRAM

## 2023-10-20 RX ADMIN — GABAPENTIN 300 MG: 300 CAPSULE ORAL at 19:53

## 2023-10-20 RX ADMIN — OXYCODONE HYDROCHLORIDE 10 MG: 10 TABLET ORAL at 17:41

## 2023-10-20 RX ADMIN — BUSPIRONE HYDROCHLORIDE 7.5 MG: 15 TABLET ORAL at 17:38

## 2023-10-20 RX ADMIN — FOLIC ACID 1 MG: 1 TABLET ORAL at 08:03

## 2023-10-20 RX ADMIN — CELECOXIB 200 MG: 100 CAPSULE ORAL at 17:39

## 2023-10-20 RX ADMIN — MORPHINE SULFATE 2 MG: 2 INJECTION, SOLUTION INTRAMUSCULAR; INTRAVENOUS at 03:18

## 2023-10-20 RX ADMIN — NORTRIPTYLINE HYDROCHLORIDE 100 MG: 25 CAPSULE ORAL at 19:53

## 2023-10-20 RX ADMIN — OXYCODONE HYDROCHLORIDE AND ACETAMINOPHEN 500 MG: 500 TABLET ORAL at 08:03

## 2023-10-20 RX ADMIN — OXYCODONE HYDROCHLORIDE 10 MG: 10 TABLET ORAL at 01:40

## 2023-10-20 RX ADMIN — HYDROCORTISONE: 1 CREAM TOPICAL at 08:04

## 2023-10-20 RX ADMIN — DOCUSATE SODIUM 100 MG: 100 CAPSULE, LIQUID FILLED ORAL at 17:39

## 2023-10-20 RX ADMIN — DOCUSATE SODIUM 100 MG: 100 CAPSULE, LIQUID FILLED ORAL at 08:03

## 2023-10-20 RX ADMIN — CEFAZOLIN SODIUM 2000 MG: 2 SOLUTION INTRAVENOUS at 15:19

## 2023-10-20 RX ADMIN — ASPIRIN 81 MG: 81 TABLET, COATED ORAL at 17:39

## 2023-10-20 RX ADMIN — PANTOPRAZOLE SODIUM 40 MG: 40 TABLET, DELAYED RELEASE ORAL at 06:33

## 2023-10-20 RX ADMIN — ACETAMINOPHEN 975 MG: 325 TABLET, FILM COATED ORAL at 03:56

## 2023-10-20 RX ADMIN — OXYCODONE HYDROCHLORIDE 10 MG: 10 TABLET ORAL at 22:10

## 2023-10-20 RX ADMIN — OXYCODONE HYDROCHLORIDE AND ACETAMINOPHEN 500 MG: 500 TABLET ORAL at 17:38

## 2023-10-20 RX ADMIN — VENLAFAXINE HYDROCHLORIDE 150 MG: 150 CAPSULE, EXTENDED RELEASE ORAL at 19:53

## 2023-10-20 RX ADMIN — MULTIPLE VITAMINS W/ MINERALS TAB 1 TABLET: TAB ORAL at 08:03

## 2023-10-20 RX ADMIN — OXYCODONE HYDROCHLORIDE 10 MG: 10 TABLET ORAL at 08:03

## 2023-10-20 RX ADMIN — ASPIRIN 81 MG: 81 TABLET, COATED ORAL at 08:03

## 2023-10-20 RX ADMIN — CEFAZOLIN SODIUM 2000 MG: 2 SOLUTION INTRAVENOUS at 22:04

## 2023-10-20 RX ADMIN — HYDROCORTISONE: 1 CREAM TOPICAL at 17:39

## 2023-10-20 RX ADMIN — SENNOSIDES 8.6 MG: 8.6 TABLET, FILM COATED ORAL at 08:03

## 2023-10-20 RX ADMIN — ACETAMINOPHEN 975 MG: 325 TABLET, FILM COATED ORAL at 11:40

## 2023-10-20 RX ADMIN — CEFAZOLIN SODIUM 2000 MG: 2 SOLUTION INTRAVENOUS at 06:33

## 2023-10-20 RX ADMIN — ACETAMINOPHEN 975 MG: 325 TABLET, FILM COATED ORAL at 19:53

## 2023-10-20 RX ADMIN — OXYCODONE HYDROCHLORIDE 10 MG: 10 TABLET ORAL at 12:35

## 2023-10-20 RX ADMIN — CELECOXIB 200 MG: 100 CAPSULE ORAL at 08:03

## 2023-10-20 RX ADMIN — BUSPIRONE HYDROCHLORIDE 7.5 MG: 15 TABLET ORAL at 08:03

## 2023-10-20 NOTE — PLAN OF CARE
Problem: PAIN - ADULT  Goal: Verbalizes/displays adequate comfort level or baseline comfort level  Description: Interventions:  - Encourage patient to monitor pain and request assistance  - Assess pain using appropriate pain scale  - Administer analgesics based on type and severity of pain and evaluate response  - Implement non-pharmacological measures as appropriate and evaluate response  - Consider cultural and social influences on pain and pain management  - Notify physician/advanced practitioner if interventions unsuccessful or patient reports new pain  Outcome: Progressing     Problem: INFECTION - ADULT  Goal: Absence or prevention of progression during hospitalization  Description: INTERVENTIONS:  - Assess and monitor for signs and symptoms of infection  - Monitor lab/diagnostic results  - Monitor all insertion sites, i.e. indwelling lines, tubes, and drains  - Monitor endotracheal if appropriate and nasal secretions for changes in amount and color  - Round [place] appropriate cooling/warming therapies per order  - Administer medications as ordered  - Instruct and encourage patient and family to use good hand hygiene technique  - Identify and instruct in appropriate isolation precautions for identified infection/condition  Outcome: Progressing

## 2023-10-20 NOTE — PHYSICAL THERAPY NOTE
PHYSICAL THERAPY NOTE       10/20/23 1218   PT Last Visit   PT Visit Date 10/20/23   Note Type   Note Type Treatment   Pain Assessment   Pain Assessment Tool 0-10   Pain Score 6   Pain Location/Orientation Orientation: Right;Location: Knee   Hospital Pain Intervention(s) Ambulation/increased activity;Repositioned;Cold applied   Restrictions/Precautions   Weight Bearing Precautions Per Order Yes   RLE Weight Bearing Per Order WBAT   Other Precautions Pain; Fall Risk; Chair Alarm; Bed Alarm   General   Chart Reviewed Yes   Response to Previous Treatment Patient with no complaints from previous session.    Family/Caregiver Present No   Cognition   Overall Cognitive Status WFL   Arousal/Participation Alert   Attention Within functional limits   Orientation Level Oriented X4   Memory Within functional limits   Following Commands Follows all commands and directions without difficulty   Subjective   Subjective "I am due for pain medication soon."   Bed Mobility   Supine to Sit 5  Supervision   Additional items HOB elevated   Transfers   Sit to Stand 5  Supervision   Additional items Armrests   Stand to Sit 5  Supervision   Additional items Armrests   Ambulation/Elevation   Gait pattern Step to  (Decreased stance on the R, antalgic gait, decreased heel strike on the R)   Gait Assistance 5  Supervision   Assistive Device Rolling walker   Distance 200ft   Stair Management Assistance 5  Supervision   Stair Management Technique One rail R;Nonreciprocal  (2 hands on one railing)   Number of Stairs 7   Balance   Static Sitting Good   Dynamic Sitting Good   Static Standing Fair +   Dynamic Standing Fair +   Ambulatory Fair +   Endurance Deficit   Endurance Deficit No   Activity Tolerance   Activity Tolerance Patient tolerated treatment well   Nurse Made Aware Danita NORTON   Assessment   Prognosis Good   Problem List Decreased strength;Decreased range of motion;Decreased endurance; Impaired balance;Decreased mobility;Pain;Orthopedic restrictions   Assessment Patient was received supine in bed. Agreeable to session. Higher pain levels today. Patient performed all mobility at a supervision level. He was able to ambulate in the hallway and perform steps. Thoroughly educated on positioning of LE in bed, use of ice, proper amb and stair technique. Recommending level 3 resources. Low intensity. The patient's AM-[facility] Basic Mobility Inpatient Short Form Raw Score is 20. A Raw score of greater than 17 suggests the patient may benefit from discharge to home. Please also refer to the recommendation of the Physical Therapist for safe discharge planning. Barriers to Discharge None   Goals   Patient Goals To go home tomorrow   STG Expiration Date 10/21/23   PT Treatment Day 2   Plan   Treatment/Interventions Functional transfer training;LE strengthening/ROM; Elevations; Therapeutic exercise; Endurance training;Patient/family training;Equipment eval/education; Bed mobility; Compensatory technique education;Gait training;Spoke to nursing   Progress Progressing toward goals   PT Frequency Twice a day   Discharge Recommendation   UB Rehab Resource Intensity Level III (Minimum Resource Intensity)   AM-PAC Basic Mobility Inpatient   Turning in Flat Bed Without Bedrails 4   Lying on Back to Sitting on Edge of Flat Bed Without Bedrails 4   Moving Bed to Chair 3   Standing Up From Chair Using Arms 3   Walk in Room 3   Climb 3-5 Stairs With Railing 3   Basic Mobility Inpatient Raw Score 20   Basic Mobility Standardized Score 43.99   Highest Level Of Mobility   JH-HLM Goal 6: Walk 10 steps or more   JH-HLM Achieved 7: Walk 25 feet or more   Education   Education Provided Mobility training;Assistive device   Patient Demonstrates acceptance/verbal understanding   End of Consult   Patient Position at End of Consult Bedside chair;Bed/Chair alarm activated; All needs within reach   Erica LOVE Lokesh          Patient Name: Demetrio Hinton  [id number] Date: 10/20/2023

## 2023-10-20 NOTE — PROGRESS NOTES
Progress Note - Orthopedics   Staci Brown 46 y.o. 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 No signs of bleeding in the operative extremity at this time. Will continue to monitor. Aspirin 81mg bid/ SCDs for DVT prophylaxis. Patient should be discharged with aspirin 81mg twice daily x1 month. Ancef q8 hours until cultures return due to his history of infection. Will then plan on doxycycline for 2 weeks post operatively. Cultures currently are growing Staph aureus as preliminary result. Pillow under achilles, not knee for extension. Patient is stable from an orthopedic standpoint. Mostly likely discharge home tomorrow. He will require follow-up with Dr. Kandis Stanley in the office in 10-14 days      Subjective: Patient seen and examined at the bedside this morning. Pain is improving. No issues overnight. Denies any fevers, chills or sweats. Vitals: Blood pressure 107/71, pulse 80, temperature (!) 97 °F (36.1 °C), temperature source Temporal, resp. rate (!) 24, height 5' 11" (1.803 m), weight 80.5 kg (177 lb 6.4 oz), SpO2 99 %. ,Body mass index is 24.74 kg/m². Intake/Output Summary (Last 24 hours) at 10/20/2023 0830  Last data filed at 10/20/2023 0740  Gross per 24 hour   Intake 360 ml   Output 400 ml   Net -40 ml       Invasive Devices       Peripheral Intravenous Line  Duration             Peripheral IV 10/18/23 Distal;Right;Upper;Ventral (anterior) Arm 1 day                    Ortho Exam:   Right knee:  Drsg: mepilex and ACE C/D/I. Thigh soft and compressible.  sensation grossly intact L4, L5, S1, palpable pedal pulse, EHL/AT/GS intact      Lab, Imaging and other studies: CBC:   Lab Results   Component Value Date    WBC 6.44 10/20/2023    HGB 10.0 (L) 10/20/2023    HCT 30.8 (L) 10/20/2023    MCV 89 10/20/2023     (H) 10/20/2023 RBC 3.47 (L) 10/20/2023    MCH 28.8 10/20/2023    MCHC 32.5 10/20/2023    RDW 14.6 10/20/2023    MPV 8.5 (L) 10/20/2023     CMP:   Lab Results   Component Value Date    SODIUM 135 10/20/2023    CL 99 10/20/2023    CO2 26 10/20/2023    BUN 11 10/20/2023    CREATININE 0.77 10/20/2023    CALCIUM 8.9 10/20/2023    EGFR 105 10/20/2023                 Arabella Rosales PA-C

## 2023-10-20 NOTE — ASSESSMENT & PLAN NOTE
46 y.o. male who had a previous right total knee replacement s/p Arthroplasty Knee Total Revision on 10/18/2023.    Currently admitted under orthopedic service

## 2023-10-20 NOTE — PROGRESS NOTES
[facility]  Progress Note  Name: Marisa Rollins I  MRN: [id number]  Unit/Bed#: -01 I Date of Admission: 10/18/2023   Date of Service: 10/20/2023 I Hospital Day: 2    Assessment/Plan   * Status post revision of total replacement of right knee  Assessment & Plan  46 y.o. male who had a previous right total knee replacement s/p Arthroplasty Knee Total Revision on 10/18/2023. Currently admitted under orthopedic service    Moderate mixed hyperlipidemia not requiring statin therapy  Assessment & Plan  Not on any statin therapy    Major depressive disorder in full remission (HCC)  Assessment & Plan  Continue buspirone, Effexor, nortriptyline    Plaque psoriasis  Assessment & Plan  At home patient is on methotrexate, currently taking Celebrex twice daily    HTN (hypertension)  Assessment & Plan  Resume home medication metoprolol succinate 100 mg daily with holding parameters           VTE Pharmacologic Prophylaxis:   Pharmacologic:  Aspirin 81 mg BID  Mechanical VTE Prophylaxis in Place: Yes    Patient Centered Rounds: I have performed bedside rounds with nursing staff today. Current Length of Stay: 2 day(s)    Current Patient Status: Inpatient   Certification Statement: The patient will continue to require additional inpatient hospital stay due to as per primary service    Discharge Plan: patient is stable from medicine service for discharge    Code Status: Level 1 - Full Code      Subjective:     Pleasant patient, not in any distress. Pain well controlled on current regimen. Objective:     Vitals:   Temp (24hrs), Av.1 °F (36.2 °C), Min:97 °F (36.1 °C), Max:97.2 °F (36.2 °C)    Temp:  [97 °F (36.1 °C)-97.2 °F (36.2 °C)] 97 °F (36.1 °C)  HR:  [] 80  Resp:  [24] 24  BP: (102-133)/(70-90) 107/71  SpO2:  [92 %-100 %] 99 %  Body mass index is 24.74 kg/m². Input and Output Summary (last 24 hours):        Intake/Output Summary (Last 24 hours) at 10/20/2023 0845  Last data filed at 10/20/2023 0740  Gross per 24 hour   Intake 360 ml   Output 400 ml   Net -40 ml       Physical Exam:     Physical Exam  Vitals and nursing note reviewed. Constitutional:       General: He is not in acute distress. Appearance: Normal appearance. HENT:      Head: Normocephalic and atraumatic. Right Ear: External ear normal.      Left Ear: External ear normal.      Nose: Nose normal.   Eyes:      Extraocular Movements: Extraocular movements intact. Pulmonary:      Effort: Pulmonary effort is normal.   Musculoskeletal:      Cervical back: Normal range of motion. Neurological:      Mental Status: He is alert. Mental status is at baseline.    Psychiatric:         Mood and Affect: Mood normal.            Additional Data:     Labs:    Results from last 7 days   Lab Units 10/20/23  0529   WBC Thousand/uL 6.44   HEMOGLOBIN g/dL 10.0*   HEMATOCRIT % 30.8*   PLATELETS Thousands/uL 392*     Results from last 7 days   Lab Units 10/20/23  0529   SODIUM mmol/L 135   POTASSIUM mmol/L 3.8   CHLORIDE mmol/L 99   CO2 mmol/L 26   BUN mg/dL 11   CREATININE mg/dL 0.77   ANION GAP mmol/L 10   CALCIUM mg/dL 8.9   GLUCOSE RANDOM mg/dL 101                              Recent Cultures (last 7 days):     Results from last 7 days   Lab Units 10/18/23  1554 10/18/23  1552   GRAM STAIN RESULT  No Polys or Bacteria seen  No Polys or Bacteria seen Rare Polys  No organisms seen       Last 24 Hours Medication List:   Current Facility-Administered Medications   Medication Dose Route Frequency Provider Last Rate    acetaminophen  650 mg Oral Q4H PRN Damaris George, PA-C      acetaminophen  975 mg Oral Q8H Damaris George, PA-C      ascorbic acid  500 mg Oral BID Damaris George, PA-C      aspirin  81 mg Oral BID Damaris George, PA-C      busPIRone  7.5 mg Oral BID Damaris George, PA-C      calcium carbonate  1,000 mg Oral Daily PRN Damaris George, PA-C      cefazolin  2,000 mg Intravenous Q8H Damaris George, PA-C 2,000 mg (10/20/23 5986)    celecoxib  200 mg Oral BID Gruber Kasal, PA-C      cyclobenzaprine  5 mg Oral TID PRN Gruber Kasal, PA-C      docusate sodium  100 mg Oral BID Gruber Kasal, PA-C      folic acid  1 mg Oral Daily Gruber Kasal, PA-C      gabapentin  300 mg Oral HS Gruber Kasal, PA-C      hydrocortisone   Topical BID Dev Duncandella Ayon, PA-C      lactated ringers  100 mL/hr Intravenous Continuous Gruber Kasal, PA-C Stopped (10/19/23 1014)    metoprolol succinate  100 mg Oral Daily Gruber Kasal, PA-C      morphine injection  2 mg Intravenous Q2H PRN Gruber Kasal, PA-C      multivitamin-minerals  1 tablet Oral Daily Gruber Kasal, PA-C      nortriptyline  100 mg Oral HS Gruber Kasal, PA-C      ondansetron  4 mg Intravenous Q6H PRN Gruber Kasal, PA-C      oxyCODONE  10 mg Oral Q4H PRN Gruber Kasal, PA-C      oxyCODONE  5 mg Oral Q4H PRN Gruber Kasal, PA-C      pantoprazole  40 mg Oral Daily Before Breakfast Gruber Kasal, PA-C      senna  1 tablet Oral Daily Gruber Kasal, PA-C      simethicone  80 mg Oral 4x Daily PRN Gruber Kasal, PA-C      venlafaxine  150 mg Oral HS Gruber Kasal, PA-C          Today, Patient Was Seen By: Becca Rai MD    ** Please Note: Dictation voice to text software may have been used in the creation of this document.  **

## 2023-10-20 NOTE — PLAN OF CARE
Problem: PHYSICAL THERAPY ADULT  Goal: Performs mobility at highest level of function for planned discharge setting. See evaluation for individualized goals. Description: Treatment/Interventions: Functional transfer training, LE strengthening/ROM, Elevations, Therapeutic exercise, Endurance training, Patient/family training, Equipment eval/education, Bed mobility, Compensatory technique education, Gait training, Spoke to nursing          See flowsheet documentation for full assessment, interventions and recommendations. Note: Prognosis: Good  Problem List: Decreased strength, Decreased range of motion, Decreased endurance, Impaired balance, Decreased mobility, Pain, Orthopedic restrictions  Assessment: Patient was received supine in bed. Agreeable to session. Higher pain levels today. Patient performed all mobility at a supervision level. He was able to ambulate in the hallway and perform steps. Thoroughly educated on positioning of LE in bed, use of ice, proper amb and stair technique. Recommending level 3 resources. Low intensity. The patient's AM-PAC Basic Mobility Inpatient Short Form Raw Score is 20. A Raw score of greater than 17 suggests the patient may benefit from discharge to home. Please also refer to the recommendation of the Physical Therapist for safe discharge planning. Barriers to Discharge: None  Barriers to Discharge Comments: Will need stair trial prior to discharge home       See flowsheet documentation for full assessment.

## 2023-10-20 NOTE — PLAN OF CARE
Problem: MOBILITY - ADULT  Goal: Maintain or return to baseline ADL function  Description: INTERVENTIONS:  -  Assess patient's ability to carry out ADLs; assess patient's baseline for ADL function and identify physical deficits which impact ability to perform ADLs (bathing, care of mouth/teeth, toileting, grooming, dressing, etc.)  - Assess/evaluate cause of self-care deficits   - Assess range of motion  - Assess patient's mobility; develop plan if impaired  - Assess patient's need for assistive devices and provide as appropriate  - Encourage maximum independence but intervene and supervise when necessary  - Involve family in performance of ADLs  - Assess for home care needs following discharge   - Consider OT consult to assist with ADL evaluation and planning for discharge  - Provide patient education as appropriate  Outcome: Progressing  Goal: Maintains/Returns to pre admission functional level  Description: INTERVENTIONS:  - Perform BMAT or MOVE assessment daily.   - Set and communicate daily mobility goal to care team and patient/family/caregiver. - Collaborate with rehabilitation services on mobility goals if consulted  - Perform Range of Motion 3 times a day. - Reposition patient every 2 hours.   - Dangle patient 2 times a day  - Stand patient 2 times a day  - Ambulate patient 2 times a day  - Out of bed to chair 2 times a day   - Out of bed for meals 2 times a day  - Out of bed for toileting  - Record patient progress and toleration of activity level   Outcome: Progressing     Problem: PAIN - ADULT  Goal: Verbalizes/displays adequate comfort level or baseline comfort level  Description: Interventions:  - Encourage patient to monitor pain and request assistance  - Assess pain using appropriate pain scale  - Administer analgesics based on type and severity of pain and evaluate response  - Implement non-pharmacological measures as appropriate and evaluate response  - Consider cultural and social influences on pain and pain management  - Notify physician/advanced practitioner if interventions unsuccessful or patient reports new pain  Outcome: Progressing     Problem: INFECTION - ADULT  Goal: Absence or prevention of progression during hospitalization  Description: INTERVENTIONS:  - Assess and monitor for signs and symptoms of infection  - Monitor lab/diagnostic results  - Monitor all insertion sites, i.e. indwelling lines, tubes, and drains  - Monitor endotracheal if appropriate and nasal secretions for changes in amount and color  - [place] appropriate cooling/warming therapies per order  - Administer medications as ordered  - Instruct and encourage patient and family to use good hand hygiene technique  - Identify and instruct in appropriate isolation precautions for identified infection/condition  Outcome: Progressing  Goal: Absence of fever/infection during neutropenic period  Description: INTERVENTIONS:  - Monitor WBC    Outcome: Progressing     Problem: SAFETY ADULT  Goal: Patient will remain free of falls  Description: INTERVENTIONS:  - Educate patient/family on patient safety including physical limitations  - Instruct patient to call for assistance with activity   - Consult OT/PT to assist with strengthening/mobility   - Keep Call bell within reach  - Keep bed low and locked with side rails adjusted as appropriate  - Keep care items and personal belongings within reach  - Initiate and maintain comfort rounds  - Make Fall Risk Sign visible to staff  - Offer Toileting every 2 Hours, in advance of need  - Initiate/Maintain bed alarm  - Obtain necessary fall risk management equipment:   - Apply yellow socks and bracelet for high fall risk patients  - Consider moving patient to room near nurses station  Outcome: Progressing     Problem: DISCHARGE PLANNING  Goal: Discharge to home or other facility with appropriate resources  Description: INTERVENTIONS:  - Identify barriers to discharge w/patient and caregiver  - Arrange for needed discharge resources and transportation as appropriate  - Identify discharge learning needs (meds, wound care, etc.)  - Arrange for interpretive services to assist at discharge as needed  - Refer to Case Management Department for coordinating discharge planning if the patient needs post-hospital services based on physician/advanced practitioner order or complex needs related to functional status, cognitive ability, or social support system  Outcome: Progressing     Problem: Knowledge Deficit  Goal: Patient/family/caregiver demonstrates understanding of disease process, treatment plan, medications, and discharge instructions  Description: Complete learning assessment and assess knowledge base.   Interventions:  - Provide teaching at level of understanding  - Provide teaching via preferred learning methods  Outcome: Progressing     Problem: Potential for Falls  Goal: Patient will remain free of falls  Description: INTERVENTIONS:  - Educate patient/family on patient safety including physical limitations  - Instruct patient to call for assistance with activity   - Consult OT/PT to assist with strengthening/mobility   - Keep Call bell within reach  - Keep bed low and locked with side rails adjusted as appropriate  - Keep care items and personal belongings within reach  - Initiate and maintain comfort rounds  - Make Fall Risk Sign visible to staff  - Offer Toileting every 2 Hours, in advance of need  - Initiate/Maintain bed alarm  - Obtain necessary fall risk management equipment:   - Apply yellow socks and bracelet for high fall risk patients  - Consider moving patient to room near nurses station  Outcome: Progressing

## 2023-10-21 PROBLEM — D62 ABLA (ACUTE BLOOD LOSS ANEMIA): Status: ACTIVE | Noted: 2023-10-21

## 2023-10-21 LAB
ANION GAP SERPL CALCULATED.3IONS-SCNC: 8 MMOL/L
BACTERIA TISS AEROBE CULT: ABNORMAL
BUN SERPL-MCNC: 10 MG/DL (ref 5–25)
CALCIUM SERPL-MCNC: 8.9 MG/DL (ref 8.4–10.2)
CHLORIDE SERPL-SCNC: 100 MMOL/L (ref 96–108)
CO2 SERPL-SCNC: 27 MMOL/L (ref 21–32)
CREAT SERPL-MCNC: 0.7 MG/DL (ref 0.6–1.3)
ERYTHROCYTE [DISTWIDTH] IN BLOOD BY AUTOMATED COUNT: 14.7 % (ref 11.6–15.1)
GFR SERPL CREATININE-BSD FRML MDRD: 110 ML/MIN/1.73SQ M
GLUCOSE SERPL-MCNC: 101 MG/DL (ref 65–140)
GRAM STN SPEC: ABNORMAL
HCT VFR BLD AUTO: 28.9 % (ref 36.5–49.3)
HGB BLD-MCNC: 9.2 G/DL (ref 12–17)
MCH RBC QN AUTO: 28.8 PG (ref 26.8–34.3)
MCHC RBC AUTO-ENTMCNC: 31.8 G/DL (ref 31.4–37.4)
MCV RBC AUTO: 91 FL (ref 82–98)
PLATELET # BLD AUTO: 418 THOUSANDS/UL (ref 149–390)
PMV BLD AUTO: 8.6 FL (ref 8.9–12.7)
POTASSIUM SERPL-SCNC: 3.6 MMOL/L (ref 3.5–5.3)
RBC # BLD AUTO: 3.19 MILLION/UL (ref 3.88–5.62)
SODIUM SERPL-SCNC: 135 MMOL/L (ref 135–147)
WBC # BLD AUTO: 5.1 THOUSAND/UL (ref 4.31–10.16)

## 2023-10-21 PROCEDURE — 80048 BASIC METABOLIC PNL TOTAL CA: CPT | Performed by: STUDENT IN AN ORGANIZED HEALTH CARE EDUCATION/TRAINING PROGRAM

## 2023-10-21 PROCEDURE — 99024 POSTOP FOLLOW-UP VISIT: CPT | Performed by: PHYSICIAN ASSISTANT

## 2023-10-21 PROCEDURE — 99232 SBSQ HOSP IP/OBS MODERATE 35: CPT | Performed by: INTERNAL MEDICINE

## 2023-10-21 PROCEDURE — 85027 COMPLETE CBC AUTOMATED: CPT | Performed by: STUDENT IN AN ORGANIZED HEALTH CARE EDUCATION/TRAINING PROGRAM

## 2023-10-21 RX ADMIN — DOCUSATE SODIUM 100 MG: 100 CAPSULE, LIQUID FILLED ORAL at 09:01

## 2023-10-21 RX ADMIN — PANTOPRAZOLE SODIUM 40 MG: 40 TABLET, DELAYED RELEASE ORAL at 05:39

## 2023-10-21 RX ADMIN — OXYCODONE HYDROCHLORIDE AND ACETAMINOPHEN 500 MG: 500 TABLET ORAL at 17:24

## 2023-10-21 RX ADMIN — SENNOSIDES 8.6 MG: 8.6 TABLET, FILM COATED ORAL at 09:01

## 2023-10-21 RX ADMIN — OXYCODONE HYDROCHLORIDE 10 MG: 10 TABLET ORAL at 18:10

## 2023-10-21 RX ADMIN — NORTRIPTYLINE HYDROCHLORIDE 100 MG: 25 CAPSULE ORAL at 19:52

## 2023-10-21 RX ADMIN — DOCUSATE SODIUM 100 MG: 100 CAPSULE, LIQUID FILLED ORAL at 17:24

## 2023-10-21 RX ADMIN — VENLAFAXINE HYDROCHLORIDE 150 MG: 150 CAPSULE, EXTENDED RELEASE ORAL at 19:52

## 2023-10-21 RX ADMIN — HYDROCORTISONE: 1 CREAM TOPICAL at 09:01

## 2023-10-21 RX ADMIN — MULTIPLE VITAMINS W/ MINERALS TAB 1 TABLET: TAB ORAL at 09:01

## 2023-10-21 RX ADMIN — BUSPIRONE HYDROCHLORIDE 7.5 MG: 15 TABLET ORAL at 17:24

## 2023-10-21 RX ADMIN — CEFAZOLIN SODIUM 2000 MG: 2 SOLUTION INTRAVENOUS at 15:23

## 2023-10-21 RX ADMIN — OXYCODONE HYDROCHLORIDE AND ACETAMINOPHEN 500 MG: 500 TABLET ORAL at 09:01

## 2023-10-21 RX ADMIN — ASPIRIN 81 MG: 81 TABLET, COATED ORAL at 09:01

## 2023-10-21 RX ADMIN — CELECOXIB 200 MG: 100 CAPSULE ORAL at 09:00

## 2023-10-21 RX ADMIN — ACETAMINOPHEN 975 MG: 325 TABLET, FILM COATED ORAL at 19:52

## 2023-10-21 RX ADMIN — OXYCODONE HYDROCHLORIDE 10 MG: 10 TABLET ORAL at 05:42

## 2023-10-21 RX ADMIN — HYDROCORTISONE: 1 CREAM TOPICAL at 17:24

## 2023-10-21 RX ADMIN — ACETAMINOPHEN 975 MG: 325 TABLET, FILM COATED ORAL at 05:40

## 2023-10-21 RX ADMIN — GABAPENTIN 300 MG: 300 CAPSULE ORAL at 19:52

## 2023-10-21 RX ADMIN — BUSPIRONE HYDROCHLORIDE 7.5 MG: 15 TABLET ORAL at 09:00

## 2023-10-21 RX ADMIN — CEFAZOLIN SODIUM 2000 MG: 2 SOLUTION INTRAVENOUS at 05:39

## 2023-10-21 RX ADMIN — OXYCODONE HYDROCHLORIDE 10 MG: 10 TABLET ORAL at 23:45

## 2023-10-21 RX ADMIN — FOLIC ACID 1 MG: 1 TABLET ORAL at 09:01

## 2023-10-21 RX ADMIN — CEFAZOLIN SODIUM 2000 MG: 2 SOLUTION INTRAVENOUS at 23:45

## 2023-10-21 RX ADMIN — ASPIRIN 81 MG: 81 TABLET, COATED ORAL at 17:24

## 2023-10-21 RX ADMIN — OXYCODONE HYDROCHLORIDE 10 MG: 10 TABLET ORAL at 12:41

## 2023-10-21 RX ADMIN — ACETAMINOPHEN 975 MG: 325 TABLET, FILM COATED ORAL at 12:41

## 2023-10-21 RX ADMIN — CELECOXIB 200 MG: 100 CAPSULE ORAL at 17:24

## 2023-10-21 NOTE — ASSESSMENT & PLAN NOTE
Patient noted drop of hemoglobin more than 2 g. Likely due to underlying recent surgery. No active bleeding noted otherwise. Currently hemoglobin appears to be stable. We will recommend to continue to trend hemoglobin every day.

## 2023-10-21 NOTE — PROGRESS NOTES
Orthopedics  Tee Brown 46 y.o. male MRN: [id number]  Unit/Bed#: -01        Subjective:    46 y.o.male seen and examined at the bedside. No acute events overnight. Notes 5/10 pain in the right knee, improved from yesterday. Has been keeping the pillow underneath the achilles. Denies any increase in pain, and denies any RLE paresthesias.           Objective:    Labs:  0   Lab Value Date/Time    HCT 28.9 (L) 10/21/2023 0538    HCT 30.8 (L) 10/20/2023 0529    HCT 33.4 (L) 10/19/2023 0205    HGB 9.2 (L) 10/21/2023 0538    HGB 10.0 (L) 10/20/2023 0529    HGB 10.5 (L) 10/19/2023 0205    INR 1.01 09/25/2023 1225    WBC 5.10 10/21/2023 0538    WBC 6.44 10/20/2023 0529    WBC 8.53 10/19/2023 0205    ESR 48 (H) 06/18/2023 1108    .2 (H) 06/18/2023 1108       Meds:    Current Facility-Administered Medications:     acetaminophen (TYLENOL) tablet 650 mg, 650 mg, Oral, Q4H PRN, Maria Luz Manuel PA-C    acetaminophen (TYLENOL) tablet 975 mg, 975 mg, Oral, Q8H, Bulmaro Trevizo PA-C, 975 mg at 10/21/23 0540    ascorbic acid (VITAMIN C) tablet 500 mg, 500 mg, Oral, BID, Bulmaro Trevizo PA-C, 500 mg at 10/20/23 1738    aspirin (ECOTRIN LOW STRENGTH) EC tablet 81 mg, 81 mg, Oral, BID, Bulmaro Trevizo PA-C, 81 mg at 10/20/23 1739    busPIRone (BUSPAR) tablet 7.5 mg, 7.5 mg, Oral, BID, Bulmaro Trevizo PA-C, 7.5 mg at 10/20/23 1738    calcium carbonate (TUMS) chewable tablet 1,000 mg, 1,000 mg, Oral, Daily PRN, Maria Luz Manuel PA-C    ceFAZolin (ANCEF) IVPB (premix in dextrose) 2,000 mg 50 mL, 2,000 mg, Intravenous, Q8H, Maria Luz Manuel PA-C, Last Rate: 100 mL/hr at 10/21/23 0539, 2,000 mg at 10/21/23 0539    celecoxib (CeleBREX) capsule 200 mg, 200 mg, Oral, BID, Bulmaro Trevizo PA-C, 200 mg at 10/20/23 1739    cyclobenzaprine (FLEXERIL) tablet 5 mg, 5 mg, Oral, TID PRN, Maria Luz Manuel PA-C    docusate sodium (COLACE) capsule 100 mg, 100 mg, Oral, BID, Bulmaro Trevizo PA-C, 100 mg at 10/20/23 1731 folic acid (FOLVITE) tablet 1 mg, 1 mg, Oral, Daily, Kalyani Trevizo PA-C, 1 mg at 10/20/23 0803    gabapentin (NEURONTIN) capsule 300 mg, 300 mg, Oral, HS, Kalyani Trevizo PA-C, 300 mg at 10/20/23 1953    hydrocortisone 1 % cream, , Topical, BID, Rosalva Cody PA-C, Given at 10/20/23 1739    lactated ringers infusion, 100 mL/hr, Intravenous, Continuous, Mayda Sweet PA-C, Stopped at 10/19/23 1014    metoprolol succinate (TOPROL-XL) 24 hr tablet 100 mg, 100 mg, Oral, Daily, Kalyani Trevizo PA-C    multivitamin-minerals (CENTRUM) tablet 1 tablet, 1 tablet, Oral, Daily, Mayda Sweet PA-C, 1 tablet at 10/20/23 0803    nortriptyline (PAMELOR) capsule 100 mg, 100 mg, Oral, HS, Mayda Sweet PA-C, 100 mg at 10/20/23 1953    ondansetron [facility]) injection 4 mg, 4 mg, Intravenous, Q6H PRN, Mayda Sweet PA-C    oxyCODONE (ROXICODONE) immediate release tablet 10 mg, 10 mg, Oral, Q4H PRN, Kalyani Trevizo PA-C, 10 mg at 10/21/23 0542    oxyCODONE (ROXICODONE) IR tablet 5 mg, 5 mg, Oral, Q4H PRN, Mayda Sweet PA-C, 5 mg at 10/19/23 0856    pantoprazole (PROTONIX) EC tablet 40 mg, 40 mg, Oral, Daily Before Breakfast, Mayda Sweet PA-C, 40 mg at 10/21/23 0539    senna (SENOKOT) tablet 8.6 mg, 1 tablet, Oral, Daily, Mayda Sweet PA-C, 8.6 mg at 10/20/23 0803    simethicone (MYLICON) chewable tablet 80 mg, 80 mg, Oral, 4x Daily PRN, Mayda Sweet PA-C    venlafaxine [facility] 24 hr capsule 150 mg, 150 mg, Oral, AYDE, Kalyani Trevizo PA-C, 150 mg at 10/20/23 1953    Blood Culture:   No results found for: "BLOODCX"    Wound Culture:   No results found for: "WOUNDCULT"    Ins and Outs:  I/O last 24 hours: In: -   Out: 280 [Urine:280]      Orthopedic Physical Exam:    Vitals:    10/21/23 0722   BP: 105/71   Pulse: 74   Resp: 16   Temp: (!) 97.3 °F (36.3 °C)   SpO2: (!) 84%   Lying in bed, comfortable at rest, breathing unlabored, NAD.   right Lower Extremity extremity:  RLE is rested in extension, with a pillow underneath the achilles. ACE wrap is in place. The Mepilex Dressing is c/d/I without any saturation. Thigh is soft. 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 Birgit Woodson 10/18/2023). Plan:    WBAT RLE  Up and out of bed as tolerated  OR Cx + S aureus. ID consult placed, await IV antibiotic plan. Order for PICC already placed, will assist in arranging this weekend. Consent for PICC completed today, and placed in patient's chart. DVT prophylaxis - ASA 81mg BID.   Patient to be discharged with ASA 81mg BID x 1 month  Analgesics as needed  Pillow underneath achilles, not behind the knee while in bed  PT/OT  Dispo: Ortho will follow  Patient noted to have acute blood loss anemia due to a drop in Hbg of > 2.0g from preop levels, will monitor vital signs and resuscitate with IV fluids as needed H 9.2 this AM.          Kumar Dillard PA-C

## 2023-10-21 NOTE — PLAN OF CARE
Problem: MOBILITY - ADULT  Goal: Maintain or return to baseline ADL function  Description: INTERVENTIONS:  -  Assess patient's ability to carry out ADLs; assess patient's baseline for ADL function and identify physical deficits which impact ability to perform ADLs (bathing, care of mouth/teeth, toileting, grooming, dressing, etc.)  - Assess/evaluate cause of self-care deficits   - Assess range of motion  - Assess patient's mobility; develop plan if impaired  - Assess patient's need for assistive devices and provide as appropriate  - Encourage maximum independence but intervene and supervise when necessary  - Involve family in performance of ADLs  - Assess for home care needs following discharge   - Consider OT consult to assist with ADL evaluation and planning for discharge  - Provide patient education as appropriate  Outcome: Progressing  Goal: Maintains/Returns to pre admission functional level  Description: INTERVENTIONS:  - Perform BMAT or MOVE assessment daily.   - Set and communicate daily mobility goal to care team and patient/family/caregiver. - Collaborate with rehabilitation services on mobility goals if consulted  - Perform Range of Motion 3 times a day. - Reposition patient every 2 hours.   - Dangle patient 2 times a day  - Stand patient 2 times a day  - Ambulate patient 2 times a day  - Out of bed to chair 2 times a day   - Out of bed for meals 2 times a day  - Out of bed for toileting  - Record patient progress and toleration of activity level   Outcome: Progressing     Problem: PAIN - ADULT  Goal: Verbalizes/displays adequate comfort level or baseline comfort level  Description: Interventions:  - Encourage patient to monitor pain and request assistance  - Assess pain using appropriate pain scale  - Administer analgesics based on type and severity of pain and evaluate response  - Implement non-pharmacological measures as appropriate and evaluate response  - Consider cultural and social influences on pain and pain management  - Notify physician/advanced practitioner if interventions unsuccessful or patient reports new pain  Outcome: Progressing     Problem: INFECTION - ADULT  Goal: Absence or prevention of progression during hospitalization  Description: INTERVENTIONS:  - Assess and monitor for signs and symptoms of infection  - Monitor lab/diagnostic results  - Monitor all insertion sites, i.e. indwelling lines, tubes, and drains  - Monitor endotracheal if appropriate and nasal secretions for changes in amount and color  - [place] appropriate cooling/warming therapies per order  - Administer medications as ordered  - Instruct and encourage patient and family to use good hand hygiene technique  - Identify and instruct in appropriate isolation precautions for identified infection/condition  Outcome: Progressing  Goal: Absence of fever/infection during neutropenic period  Description: INTERVENTIONS:  - Monitor WBC    Outcome: Progressing     Problem: SAFETY ADULT  Goal: Patient will remain free of falls  Description: INTERVENTIONS:  - Educate patient/family on patient safety including physical limitations  - Instruct patient to call for assistance with activity   - Consult OT/PT to assist with strengthening/mobility   - Keep Call bell within reach  - Keep bed low and locked with side rails adjusted as appropriate  - Keep care items and personal belongings within reach  - Initiate and maintain comfort rounds  - Make Fall Risk Sign visible to staff  - Offer Toileting every 2 Hours, in advance of need  - Initiate/Maintain bed alarm  - Obtain necessary fall risk management equipment:   - Apply yellow socks and bracelet for high fall risk patients  - Consider moving patient to room near nurses station  Outcome: Progressing     Problem: DISCHARGE PLANNING  Goal: Discharge to home or other facility with appropriate resources  Description: INTERVENTIONS:  - Identify barriers to discharge w/patient and caregiver  - Arrange for needed discharge resources and transportation as appropriate  - Identify discharge learning needs (meds, wound care, etc.)  - Arrange for interpretive services to assist at discharge as needed  - Refer to Case Management Department for coordinating discharge planning if the patient needs post-hospital services based on physician/advanced practitioner order or complex needs related to functional status, cognitive ability, or social support system  Outcome: Progressing     Problem: Knowledge Deficit  Goal: Patient/family/caregiver demonstrates understanding of disease process, treatment plan, medications, and discharge instructions  Description: Complete learning assessment and assess knowledge base. Interventions:  - Provide teaching at level of understanding  - Provide teaching via preferred learning methods  Outcome: Progressing     Problem: Potential for Falls  Goal: Patient will remain free of falls  Description: INTERVENTIONS:  - Educate patient/family on patient safety including physical limitations  - Instruct patient to call for assistance with activity   - Consult OT/PT to assist with strengthening/mobility   - Keep Call bell within reach  - Keep bed low and locked with side rails adjusted as appropriate  - Keep care items and personal belongings within reach  - Initiate and maintain comfort rounds  - Make Fall Risk Sign visible to staff  - Offer Toileting every 2 Hours, in advance of need  - Initiate/Maintain bed alarm  - Obtain necessary fall risk management equipment:   - Apply yellow socks and bracelet for high fall risk patients  - Consider moving patient to room near nurses station  Outcome: Progressing     Problem: Nutrition/Hydration-ADULT  Goal: Nutrient/Hydration intake appropriate for improving, restoring or maintaining nutritional needs  Description: Monitor and assess patient's nutrition/hydration status for malnutrition.  Collaborate with interdisciplinary team and initiate plan and interventions as ordered. Monitor patient's weight and dietary intake as ordered or per policy. Utilize nutrition screening tool and intervene as necessary. Determine patient's food preferences and provide high-protein, high-caloric foods as appropriate.      INTERVENTIONS:  - Monitor oral intake, urinary output, labs, and treatment plans  - Assess nutrition and hydration status and recommend course of action  - Evaluate amount of meals eaten  - Assist patient with eating if necessary   - Allow adequate time for meals  - Recommend/ encourage appropriate diets, oral nutritional supplements, and vitamin/mineral supplements  - Order, calculate, and assess calorie counts as needed  - Recommend, monitor, and adjust tube feedings and TPN/PPN based on assessed needs  - Assess need for intravenous fluids  - Provide specific nutrition/hydration education as appropriate  - Include patient/family/caregiver in decisions related to nutrition  Outcome: Progressing

## 2023-10-21 NOTE — CONSULTS
Consultation - Infectious Disease   Xiomymt Manzomaryse 46 y.o. 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 Plan:  1) PJI - Pt. unfortunately with positive tissue cultures from stage II of his two-stage arthroplasty. Agree with plan for at least 6 weeks of intravenous therapy followed by at least 6 months of oral therapy, with potential transition to lifelong suppression. Fortunately, MSSA isolated from cultures is relatively sensitive, so there are many p.o. options. Will manage intravenous portion of patient's therapy, however will arrange for transition back to [facility] infectious diseases for further follow-up given that patient is known to them, and follow-up at [facility] would be far more convenient for this patient who resides in [facility].    ===> Will CONTINUE CEFAZOLIN 2g IV Q8H for approximately 6 weeks postoperatively. Will monitor weekly laboratory studies to assess for toxicity.    ===> PRESCRIPTION FOR OUTPATIENT ANTIBIOTIC THERAPY AND CONCOMITANT LABORATORY MONITORING PLACED IN PATIENT'S PAPER CHART AT THE NURSES' STATION.    ===> Patient to resume follow-up with [facility] infectious diseases after completion of intravenous therapy. 2) RA - Pt. with underlying autoimmune disease, ongoing immunomodulatory therapy for treatment of same which will place him at risk for persistent/recrudescent infection.   Suspect the patient will need prolonged, if not lifelong suppressive therapy after completing IV therapy but will of course defer to patient's outpatient ID provider's.      ===> Discussed w/ 1' team   ===> Will sign off, but please do not hesitate to contact us with further questions, or if a change in the patient's clinical status warrants.    ===> Patient does NOT need to schedule f/u with ID on d/c UNLESS a change in clinical status or a recrudescence of symptoms warrants, but was given our contact information should any issues with prescribed post-d/c treatment arise. History of Present Illness   Physician Requesting Consult: Meek Nicholson DO    HPI:   Briefly, this 53-year-old man with rheumatoid arthritis, hypertension, and psoriasis was on chronic immunomodulatory therapy with methotrexate presented to the [address] [place] [address] emergency department on 10/18 for the second stage of a planned two-stage revision of his right total knee arthroplasty. He had his initial knee replacement in 1998 and this was revised in 2016. Subsequently, he developed right knee pain and tenderness which was evaluated as an outpatient in July 2023. He underwent removal his prosthesis and placement of an antibiotic spacer at that time. He was also noted to have purulent drainage and a sinus tract as well as involvement of prior suture material from a previous quadriceps tendon repair. Per review of records from his previous hospitalization all infected material from the quadriceps repair (as well as the prior knee arthroplasty was removed at that time. He was treated with a 6-week course of intravenous cefazolin and symptomatically recovered well. Unfortunately, synovial cultures taken in the operating room during this hospitalization (i.e. stage II of his two-stage removal and replacement) are now growing the same isolate of methicillin-susceptible Staphylococcus aureus as was isolated previously. Infectious diseases is consulted today for management of outpatient antibiotic therapy. Patient had previously followed with [facility] infectious diseases, and had his prior surgeries at the [facility] location, however presented to [facility] due to more schedule availability. He plans to resume infectious diseases follow-up with [facility] infectious diseases postdischarge. Postoperative x-rays shows a well-seated knee prosthesis. Laboratory studies done at time of admission including white blood cell count, creatinine were unremarkable. Inpatient consult to Infectious Diseases  Consult performed by: Chago Marin MD  Consult ordered by: Aniceto Jacinto PA-C          Review of Systems  A complete review of systems was done and is negative except as per the HPI.      Historical Information   Past Medical History:   Diagnosis Date    Anxiety     Arthritis     Crutches as ambulation aid 2023    Depression     Difficult intubation 2023    Hypertension     Limited jaw ROM     Migraine     PONV (postoperative nausea and vomiting) 2023    Psoriasis     RA (rheumatoid arthritis) ([address])     Walker as ambulation aid      Past Surgical History:   Procedure Laterality Date    JOINT REPLACEMENT      [place] REVJ TOT KNEE ARTHRP [facility] Right 2023    Procedure: ARTHROPLASTY KNEE TOTAL REVISION;  Surgeon: Scotty Conner DO;  Location: [facility] Main OR;  Service: Orthopedics    [place] REVJ TOT KNEE ARTHRP [facility] Right 10/18/2023    Procedure: ARTHROPLASTY KNEE TOTAL REVISION;  Surgeon: Scotty Conner DO;  Location:  MAIN OR;  Service: Orthopedics    REPLACEMENT TOTAL KNEE Right     x2     Social History   Social History     Substance and Sexual Activity   Alcohol Use Yes    Comment: rarely     Social History     Substance and Sexual Activity   Drug Use Never     E-Cigarette/Vaping    E-Cigarette Use Never User      E-Cigarette/Vaping Substances    Nicotine No     THC No     CBD No     Flavoring No     Other No     Unknown No      Social History     Tobacco Use   Smoking Status Former    Packs/day: 1.00    Years: 10.00    Total pack years: 10.00    Types: Cigarettes    Start date: 2000    Quit date: 2000    Years since quittin.9   Smokeless Tobacco Current    Types: Chew   Tobacco Comments    Last chewed tobacco 23 Family History: non-contributory    Meds/Allergies   all current active meds have been reviewed    No Known Allergies    Objective       Intake/Output Summary (Last 24 hours) at 10/21/2023 1033  Last data filed at 10/21/2023 0901  Gross per 24 hour   Intake --   Output 200 ml   Net -200 ml       Invasive Devices:   Peripheral IV 10/18/23 Distal;Right;Upper;Ventral (anterior) Arm (Active)   Site Assessment WDL 10/20/23 2300   Dressing Type Transparent 10/20/23 2300   Line Status Flushed;Saline locked; Infusing 10/20/23 2300   Dressing Status Dry;Clean; Intact 10/20/23 2300   Dressing Change Due 10/22/23 10/20/23 2300   Reason Not Rotated Not due 10/20/23 2300       Physical Exam  Gen: AA, NAD, conversant, VS reviewed  ENT: MMM  CV: No m/r/g, S1, S2  Pulm: Lungs CTAB, no respiratory distress  ABD: S/NT/DT  Skin: No rash on exposed skin  Psych: Oriented, nl. affect   EXT: Right knee surgical dressing clean, dry, intact    Lab Results: I have personally reviewed pertinent labs. Imaging Studies: I have personally reviewed pertinent reports. and I have personally reviewed pertinent films in PACS  EKG, Pathology, and Other Studies: I have personally reviewed pertinent reports.

## 2023-10-21 NOTE — PLAN OF CARE
Problem: MOBILITY - ADULT  Goal: Maintain or return to baseline ADL function  Description: INTERVENTIONS:  -  Assess patient's ability to carry out ADLs; assess patient's baseline for ADL function and identify physical deficits which impact ability to perform ADLs (bathing, care of mouth/teeth, toileting, grooming, dressing, etc.)  - Assess/evaluate cause of self-care deficits   - Assess range of motion  - Assess patient's mobility; develop plan if impaired  - Assess patient's need for assistive devices and provide as appropriate  - Encourage maximum independence but intervene and supervise when necessary  - Involve family in performance of ADLs  - Assess for home care needs following discharge   - Consider OT consult to assist with ADL evaluation and planning for discharge  - Provide patient education as appropriate  Outcome: Progressing  Goal: Maintains/Returns to pre admission functional level  Description: INTERVENTIONS:  - Perform BMAT or MOVE assessment daily.   - Set and communicate daily mobility goal to care team and patient/family/caregiver. - Collaborate with rehabilitation services on mobility goals if consulted  - Perform Range of Motion 3 times a day. - Reposition patient every 2 hours.   - Dangle patient 2 times a day  - Stand patient 2 times a day  - Ambulate patient 2 times a day  - Out of bed to chair 2 times a day   - Out of bed for meals 2 times a day  - Out of bed for toileting  - Record patient progress and toleration of activity level   Outcome: Progressing     Problem: PAIN - ADULT  Goal: Verbalizes/displays adequate comfort level or baseline comfort level  Description: Interventions:  - Encourage patient to monitor pain and request assistance  - Assess pain using appropriate pain scale  - Administer analgesics based on type and severity of pain and evaluate response  - Implement non-pharmacological measures as appropriate and evaluate response  - Consider cultural and social influences on pain and pain management  - Notify physician/advanced practitioner if interventions unsuccessful or patient reports new pain  Outcome: Progressing     Problem: INFECTION - ADULT  Goal: Absence or prevention of progression during hospitalization  Description: INTERVENTIONS:  - Assess and monitor for signs and symptoms of infection  - Monitor lab/diagnostic results  - Monitor all insertion sites, i.e. indwelling lines, tubes, and drains  - Monitor endotracheal if appropriate and nasal secretions for changes in amount and color  - [place] appropriate cooling/warming therapies per order  - Administer medications as ordered  - Instruct and encourage patient and family to use good hand hygiene technique  - Identify and instruct in appropriate isolation precautions for identified infection/condition  Outcome: Progressing  Goal: Absence of fever/infection during neutropenic period  Description: INTERVENTIONS:  - Monitor WBC    Outcome: Progressing     Problem: SAFETY ADULT  Goal: Patient will remain free of falls  Description: INTERVENTIONS:  - Educate patient/family on patient safety including physical limitations  - Instruct patient to call for assistance with activity   - Consult OT/PT to assist with strengthening/mobility   - Keep Call bell within reach  - Keep bed low and locked with side rails adjusted as appropriate  - Keep care items and personal belongings within reach  - Initiate and maintain comfort rounds  - Make Fall Risk Sign visible to staff  - Offer Toileting every 2 Hours, in advance of need  - Initiate/Maintain bed alarm  - Obtain necessary fall risk management equipment:   - Apply yellow socks and bracelet for high fall risk patients  - Consider moving patient to room near nurses station  Outcome: Progressing     Problem: DISCHARGE PLANNING  Goal: Discharge to home or other facility with appropriate resources  Description: INTERVENTIONS:  - Identify barriers to discharge w/patient and caregiver  - Arrange for needed discharge resources and transportation as appropriate  - Identify discharge learning needs (meds, wound care, etc.)  - Arrange for interpretive services to assist at discharge as needed  - Refer to Case Management Department for coordinating discharge planning if the patient needs post-hospital services based on physician/advanced practitioner order or complex needs related to functional status, cognitive ability, or social support system  Outcome: Progressing     Problem: Knowledge Deficit  Goal: Patient/family/caregiver demonstrates understanding of disease process, treatment plan, medications, and discharge instructions  Description: Complete learning assessment and assess knowledge base. Interventions:  - Provide teaching at level of understanding  - Provide teaching via preferred learning methods  Outcome: Progressing     Problem: Potential for Falls  Goal: Patient will remain free of falls  Description: INTERVENTIONS:  - Educate patient/family on patient safety including physical limitations  - Instruct patient to call for assistance with activity   - Consult OT/PT to assist with strengthening/mobility   - Keep Call bell within reach  - Keep bed low and locked with side rails adjusted as appropriate  - Keep care items and personal belongings within reach  - Initiate and maintain comfort rounds  - Make Fall Risk Sign visible to staff  - Offer Toileting every 2 Hours, in advance of need  - Initiate/Maintain bed alarm  - Obtain necessary fall risk management equipment:   - Apply yellow socks and bracelet for high fall risk patients  - Consider moving patient to room near nurses station  Outcome: Progressing     Problem: Nutrition/Hydration-ADULT  Goal: Nutrient/Hydration intake appropriate for improving, restoring or maintaining nutritional needs  Description: Monitor and assess patient's nutrition/hydration status for malnutrition.  Collaborate with interdisciplinary team and initiate plan and interventions as ordered. Monitor patient's weight and dietary intake as ordered or per policy. Utilize nutrition screening tool and intervene as necessary. Determine patient's food preferences and provide high-protein, high-caloric foods as appropriate.      INTERVENTIONS:  - Monitor oral intake, urinary output, labs, and treatment plans  - Assess nutrition and hydration status and recommend course of action  - Evaluate amount of meals eaten  - Assist patient with eating if necessary   - Allow adequate time for meals  - Recommend/ encourage appropriate diets, oral nutritional supplements, and vitamin/mineral supplements  - Order, calculate, and assess calorie counts as needed  - Recommend, monitor, and adjust tube feedings and TPN/PPN based on assessed needs  - Assess need for intravenous fluids  - Provide specific nutrition/hydration education as appropriate  - Include patient/family/caregiver in decisions related to nutrition  Outcome: Progressing

## 2023-10-21 NOTE — ASSESSMENT & PLAN NOTE
Patient with history of right TKR status post PCI with MSSA completed course of antibiotic. Follows up with ID outpatient. Currently s/p right TKA revision for second stage PCI on 10/18/2023. Orthopedic team primary. OR cultures again coming with Staph aureus likely MSSA.     Continue Ancef  ID consulted  PICC line pending  PT OT

## 2023-10-21 NOTE — PROGRESS NOTES
[facility]  Progress Note  Name: Demetrio Hinton I  MRN: [id number]  Unit/Bed#: -01 I Date of Admission: 10/18/2023   Date of Service: 10/21/2023 I Hospital Day: 3    Assessment/Plan   * Status post revision of total replacement of right knee  Assessment & Plan  Patient with history of right TKR status post PCI with MSSA completed course of antibiotic. Follows up with ID outpatient. Currently s/p right TKA revision for second stage PCI on 10/18/2023. Orthopedic team primary. OR cultures again coming with Staph aureus likely MSSA. Continue Ancef  ID consulted  Will recommend to postpone PICC line until cleared by ID  Would recommend speak to ID regarding need for blood cultures prior to PICC line placement  DVT prophylaxis as per primary team  Pain management as per primary team  PT OT    ABLA (acute blood loss anemia)  Assessment & Plan  Patient noted drop of hemoglobin more than 2 g. Likely due to underlying recent surgery. No active bleeding noted otherwise. Currently hemoglobin appears to be stable. We will recommend to continue to trend hemoglobin every day. Moderate mixed hyperlipidemia not requiring statin therapy  Assessment & Plan  Not on any statin therapy    Major depressive disorder in full remission (HCC)  Assessment & Plan  Continue buspirone, Effexor, nortriptyline    Plaque psoriasis  Assessment & Plan  At home patient is on methotrexate, currently taking Celebrex twice daily    HTN (hypertension)  Assessment & Plan  Continue home medication Toprol- mg daily           VTE Pharmacologic Prophylaxis:   Pharmacologic:  Aspirin 81 mg BID  Mechanical VTE Prophylaxis in Place: Yes    Patient Centered Rounds: I have performed bedside rounds with nursing staff today.      Current Length of Stay: 3 day(s)    Current Patient Status: Inpatient   Certification Statement: The patient will continue to require additional inpatient hospital stay due to as per primary service    Discharge Plan: patient is stable from medicine service for discharge    Code Status: Level 1 - Full Code      Subjective:   Patient was seen and examined by me. Communicated clearly. No particular overnight event reported. Hemodynamically stable and afebrile. Patient feels better overall. Objective:     Vitals:   Temp (24hrs), Av.4 °F (36.3 °C), Min:97.3 °F (36.3 °C), Max:97.5 °F (36.4 °C)    Temp:  [97.3 °F (36.3 °C)-97.5 °F (36.4 °C)] 97.3 °F (36.3 °C)  HR:  [74-93] 74  Resp:  [16] 16  BP: (105-114)/(68-72) 105/71  SpO2:  [84 %-100 %] 84 %  Body mass index is 24.74 kg/m². Input and Output Summary (last 24 hours): Intake/Output Summary (Last 24 hours) at 10/21/2023 1413  Last data filed at 10/21/2023 0901  Gross per 24 hour   Intake --   Output 200 ml   Net -200 ml       Physical Exam:     Physical Exam  Vitals and nursing note reviewed. Constitutional:       General: He is not in acute distress. Appearance: Normal appearance. HENT:      Head: Normocephalic and atraumatic. Right Ear: External ear normal.      Left Ear: External ear normal.      Nose: Nose normal.   Eyes:      General: No scleral icterus. Right eye: No discharge. Left eye: No discharge. Extraocular Movements: Extraocular movements intact. Cardiovascular:      Rate and Rhythm: Normal rate and regular rhythm. Pulses: Normal pulses. Heart sounds: Normal heart sounds. Pulmonary:      Effort: Pulmonary effort is normal. No respiratory distress. Breath sounds: Normal breath sounds. No wheezing or rales. Chest:      Chest wall: No tenderness. Abdominal:      General: Abdomen is flat. Bowel sounds are normal. There is no distension. Palpations: Abdomen is soft. Tenderness: There is no abdominal tenderness. Musculoskeletal:         General: Swelling and tenderness present. No deformity. Normal range of motion.       Cervical back: Normal range of motion and neck supple. Right lower leg: No edema. Left lower leg: No edema. Comments: Rt knee swollen, tender left knee with dressing intact noted. Skin:     General: Skin is warm. Coloration: Skin is not jaundiced or pale. Findings: No rash. Neurological:      General: No focal deficit present. Mental Status: He is alert and oriented to person, place, and time. Mental status is at baseline. Cranial Nerves: No cranial nerve deficit. Motor: No weakness.    Psychiatric:         Mood and Affect: Mood normal.         Behavior: Behavior normal.            Additional Data:     Labs:    Results from last 7 days   Lab Units 10/21/23  0538   WBC Thousand/uL 5.10   HEMOGLOBIN g/dL 9.2*   HEMATOCRIT % 28.9*   PLATELETS Thousands/uL 418*     Results from last 7 days   Lab Units 10/21/23  0538   SODIUM mmol/L 135   POTASSIUM mmol/L 3.6   CHLORIDE mmol/L 100   CO2 mmol/L 27   BUN mg/dL 10   CREATININE mg/dL 0.70   ANION GAP mmol/L 8   CALCIUM mg/dL 8.9   GLUCOSE RANDOM mg/dL 101                              Recent Cultures (last 7 days):     Results from last 7 days   Lab Units 10/18/23  1554 10/18/23  1552   GRAM STAIN RESULT  No Polys or Bacteria seen  No Polys or Bacteria seen Rare Polys  No organisms seen       Last 24 Hours Medication List:   Current Facility-Administered Medications   Medication Dose Route Frequency Provider Last Rate    acetaminophen  650 mg Oral Q4H PRN Romero Diesel, PA-C      acetaminophen  975 mg Oral Q8H Romero Diesel, PA-C      ascorbic acid  500 mg Oral BID Romero Diesel, PA-C      aspirin  81 mg Oral BID Romero Diesel, PA-C      busPIRone  7.5 mg Oral BID Romero Diesel, PA-C      calcium carbonate  1,000 mg Oral Daily PRN Romero Diesel, PA-C      cefazolin  2,000 mg Intravenous Q8H Romero Diesel, PA-C 2,000 mg (10/21/23 0539)    celecoxib  200 mg Oral BID Romero Diesel, PA-C      cyclobenzaprine  5 mg Oral TID PRN Romero Diesel, PA-C docusate sodium  100 mg Oral BID Irene Root, PA-C      folic acid  1 mg Oral Daily Irene Root, PA-C      gabapentin  300 mg Oral HS Irene Root, PA-C      hydrocortisone   Topical BID Annamary Kuster Gabo, PA-C      lactated ringers  100 mL/hr Intravenous Continuous Irene Root, PA-C Stopped (10/19/23 1014)    metoprolol succinate  100 mg Oral Daily Irene Root, PA-C      multivitamin-minerals  1 tablet Oral Daily Irene Root, PA-C      nortriptyline  100 mg Oral HS Irene Root, PA-C      ondansetron  4 mg Intravenous Q6H PRN Irene Root, PA-C      oxyCODONE  10 mg Oral Q4H PRN Irene Root, PA-C      oxyCODONE  5 mg Oral Q4H PRN Irene Root, PA-C      pantoprazole  40 mg Oral Daily Before Breakfast Irene Root, PA-C      senna  1 tablet Oral Daily Irene Root, PA-C      simethicone  80 mg Oral 4x Daily PRN Irene Root, PA-C      venlafaxine  150 mg Oral HS Irene Root, PA-C          Today, Patient Was Seen By: Sanjeev Davis MD    ** Please Note: Dictation voice to text software may have been used in the creation of this document.  **

## 2023-10-22 PROBLEM — M00.061 STAPHYLOCOCCAL ARTHRITIS OF RIGHT KNEE (HCC): Status: ACTIVE | Noted: 2023-05-31

## 2023-10-22 LAB
ANION GAP SERPL CALCULATED.3IONS-SCNC: 10 MMOL/L
BUN SERPL-MCNC: 9 MG/DL (ref 5–25)
CALCIUM SERPL-MCNC: 8.8 MG/DL (ref 8.4–10.2)
CHLORIDE SERPL-SCNC: 100 MMOL/L (ref 96–108)
CO2 SERPL-SCNC: 25 MMOL/L (ref 21–32)
CREAT SERPL-MCNC: 0.71 MG/DL (ref 0.6–1.3)
ERYTHROCYTE [DISTWIDTH] IN BLOOD BY AUTOMATED COUNT: 14.8 % (ref 11.6–15.1)
GFR SERPL CREATININE-BSD FRML MDRD: 109 ML/MIN/1.73SQ M
GLUCOSE SERPL-MCNC: 84 MG/DL (ref 65–140)
HCT VFR BLD AUTO: 30.7 % (ref 36.5–49.3)
HGB BLD-MCNC: 9.6 G/DL (ref 12–17)
MCH RBC QN AUTO: 28.4 PG (ref 26.8–34.3)
MCHC RBC AUTO-ENTMCNC: 31.3 G/DL (ref 31.4–37.4)
MCV RBC AUTO: 91 FL (ref 82–98)
PLATELET # BLD AUTO: 489 THOUSANDS/UL (ref 149–390)
PMV BLD AUTO: 9.3 FL (ref 8.9–12.7)
POTASSIUM SERPL-SCNC: 3.8 MMOL/L (ref 3.5–5.3)
RBC # BLD AUTO: 3.38 MILLION/UL (ref 3.88–5.62)
SODIUM SERPL-SCNC: 135 MMOL/L (ref 135–147)
WBC # BLD AUTO: 4.5 THOUSAND/UL (ref 4.31–10.16)

## 2023-10-22 PROCEDURE — 99232 SBSQ HOSP IP/OBS MODERATE 35: CPT | Performed by: INTERNAL MEDICINE

## 2023-10-22 PROCEDURE — 85027 COMPLETE CBC AUTOMATED: CPT | Performed by: STUDENT IN AN ORGANIZED HEALTH CARE EDUCATION/TRAINING PROGRAM

## 2023-10-22 PROCEDURE — 99024 POSTOP FOLLOW-UP VISIT: CPT | Performed by: PHYSICIAN ASSISTANT

## 2023-10-22 PROCEDURE — 80048 BASIC METABOLIC PNL TOTAL CA: CPT | Performed by: STUDENT IN AN ORGANIZED HEALTH CARE EDUCATION/TRAINING PROGRAM

## 2023-10-22 RX ADMIN — CEFAZOLIN SODIUM 2000 MG: 2 SOLUTION INTRAVENOUS at 05:18

## 2023-10-22 RX ADMIN — CELECOXIB 200 MG: 100 CAPSULE ORAL at 17:46

## 2023-10-22 RX ADMIN — OXYCODONE HYDROCHLORIDE 10 MG: 10 TABLET ORAL at 03:48

## 2023-10-22 RX ADMIN — FOLIC ACID 1 MG: 1 TABLET ORAL at 09:17

## 2023-10-22 RX ADMIN — OXYCODONE HYDROCHLORIDE AND ACETAMINOPHEN 500 MG: 500 TABLET ORAL at 17:46

## 2023-10-22 RX ADMIN — CELECOXIB 200 MG: 100 CAPSULE ORAL at 09:17

## 2023-10-22 RX ADMIN — DOCUSATE SODIUM 100 MG: 100 CAPSULE, LIQUID FILLED ORAL at 17:46

## 2023-10-22 RX ADMIN — VENLAFAXINE HYDROCHLORIDE 150 MG: 150 CAPSULE, EXTENDED RELEASE ORAL at 22:03

## 2023-10-22 RX ADMIN — OXYCODONE HYDROCHLORIDE AND ACETAMINOPHEN 500 MG: 500 TABLET ORAL at 09:17

## 2023-10-22 RX ADMIN — ACETAMINOPHEN 975 MG: 325 TABLET, FILM COATED ORAL at 05:19

## 2023-10-22 RX ADMIN — ASPIRIN 81 MG: 81 TABLET, COATED ORAL at 17:46

## 2023-10-22 RX ADMIN — OXYCODONE HYDROCHLORIDE 10 MG: 10 TABLET ORAL at 09:22

## 2023-10-22 RX ADMIN — CEFAZOLIN SODIUM 2000 MG: 2 SOLUTION INTRAVENOUS at 22:05

## 2023-10-22 RX ADMIN — HYDROCORTISONE 1 APPLICATION: 1 CREAM TOPICAL at 09:16

## 2023-10-22 RX ADMIN — GABAPENTIN 300 MG: 300 CAPSULE ORAL at 22:03

## 2023-10-22 RX ADMIN — OXYCODONE HYDROCHLORIDE 10 MG: 10 TABLET ORAL at 16:16

## 2023-10-22 RX ADMIN — HYDROCORTISONE: 1 CREAM TOPICAL at 17:47

## 2023-10-22 RX ADMIN — OXYCODONE HYDROCHLORIDE 10 MG: 10 TABLET ORAL at 20:42

## 2023-10-22 RX ADMIN — NORTRIPTYLINE HYDROCHLORIDE 100 MG: 25 CAPSULE ORAL at 22:03

## 2023-10-22 RX ADMIN — ACETAMINOPHEN 975 MG: 325 TABLET, FILM COATED ORAL at 11:57

## 2023-10-22 RX ADMIN — BUSPIRONE HYDROCHLORIDE 7.5 MG: 15 TABLET ORAL at 09:17

## 2023-10-22 RX ADMIN — METOPROLOL SUCCINATE 100 MG: 50 TABLET, EXTENDED RELEASE ORAL at 09:16

## 2023-10-22 RX ADMIN — PANTOPRAZOLE SODIUM 40 MG: 40 TABLET, DELAYED RELEASE ORAL at 05:18

## 2023-10-22 RX ADMIN — SENNOSIDES 8.6 MG: 8.6 TABLET, FILM COATED ORAL at 09:17

## 2023-10-22 RX ADMIN — BUSPIRONE HYDROCHLORIDE 7.5 MG: 15 TABLET ORAL at 17:46

## 2023-10-22 RX ADMIN — CEFAZOLIN SODIUM 2000 MG: 2 SOLUTION INTRAVENOUS at 14:14

## 2023-10-22 RX ADMIN — DOCUSATE SODIUM 100 MG: 100 CAPSULE, LIQUID FILLED ORAL at 09:17

## 2023-10-22 RX ADMIN — MULTIPLE VITAMINS W/ MINERALS TAB 1 TABLET: TAB ORAL at 09:17

## 2023-10-22 RX ADMIN — ASPIRIN 81 MG: 81 TABLET, COATED ORAL at 09:17

## 2023-10-22 RX ADMIN — ACETAMINOPHEN 975 MG: 325 TABLET, FILM COATED ORAL at 20:42

## 2023-10-22 NOTE — PLAN OF CARE
Problem: MOBILITY - ADULT  Goal: Maintain or return to baseline ADL function  Description: INTERVENTIONS:  -  Assess patient's ability to carry out ADLs; assess patient's baseline for ADL function and identify physical deficits which impact ability to perform ADLs (bathing, care of mouth/teeth, toileting, grooming, dressing, etc.)  - Assess/evaluate cause of self-care deficits   - Assess range of motion  - Assess patient's mobility; develop plan if impaired  - Assess patient's need for assistive devices and provide as appropriate  - Encourage maximum independence but intervene and supervise when necessary  - Involve family in performance of ADLs  - Assess for home care needs following discharge   - Consider OT consult to assist with ADL evaluation and planning for discharge  - Provide patient education as appropriate  Outcome: Progressing  Goal: Maintains/Returns to pre admission functional level  Description: INTERVENTIONS:  - Perform BMAT or MOVE assessment daily.   - Set and communicate daily mobility goal to care team and patient/family/caregiver. - Collaborate with rehabilitation services on mobility goals if consulted  - Perform Range of Motion 3 times a day. - Reposition patient every 2 hours.   - Dangle patient 2 times a day  - Stand patient 2 times a day  - Ambulate patient 2 times a day  - Out of bed to chair 2 times a day   - Out of bed for meals 2 times a day  - Out of bed for toileting  - Record patient progress and toleration of activity level   Outcome: Progressing     Problem: PAIN - ADULT  Goal: Verbalizes/displays adequate comfort level or baseline comfort level  Description: Interventions:  - Encourage patient to monitor pain and request assistance  - Assess pain using appropriate pain scale  - Administer analgesics based on type and severity of pain and evaluate response  - Implement non-pharmacological measures as appropriate and evaluate response  - Consider cultural and social influences on pain and pain management  - Notify physician/advanced practitioner if interventions unsuccessful or patient reports new pain  Outcome: Progressing     Problem: INFECTION - ADULT  Goal: Absence or prevention of progression during hospitalization  Description: INTERVENTIONS:  - Assess and monitor for signs and symptoms of infection  - Monitor lab/diagnostic results  - Monitor all insertion sites, i.e. indwelling lines, tubes, and drains  - Monitor endotracheal if appropriate and nasal secretions for changes in amount and color  - [place] appropriate cooling/warming therapies per order  - Administer medications as ordered  - Instruct and encourage patient and family to use good hand hygiene technique  - Identify and instruct in appropriate isolation precautions for identified infection/condition  Outcome: Progressing  Goal: Absence of fever/infection during neutropenic period  Description: INTERVENTIONS:  - Monitor WBC    Outcome: Progressing     Problem: SAFETY ADULT  Goal: Patient will remain free of falls  Description: INTERVENTIONS:  - Educate patient/family on patient safety including physical limitations  - Instruct patient to call for assistance with activity   - Consult OT/PT to assist with strengthening/mobility   - Keep Call bell within reach  - Keep bed low and locked with side rails adjusted as appropriate  - Keep care items and personal belongings within reach  - Initiate and maintain comfort rounds  - Make Fall Risk Sign visible to staff  - Offer Toileting every 2 Hours, in advance of need  - Initiate/Maintain bed alarm  - Obtain necessary fall risk management equipment:   - Apply yellow socks and bracelet for high fall risk patients  - Consider moving patient to room near nurses station  Outcome: Progressing     Problem: DISCHARGE PLANNING  Goal: Discharge to home or other facility with appropriate resources  Description: INTERVENTIONS:  - Identify barriers to discharge w/patient and caregiver  - Arrange for needed discharge resources and transportation as appropriate  - Identify discharge learning needs (meds, wound care, etc.)  - Arrange for interpretive services to assist at discharge as needed  - Refer to Case Management Department for coordinating discharge planning if the patient needs post-hospital services based on physician/advanced practitioner order or complex needs related to functional status, cognitive ability, or social support system  Outcome: Progressing     Problem: Knowledge Deficit  Goal: Patient/family/caregiver demonstrates understanding of disease process, treatment plan, medications, and discharge instructions  Description: Complete learning assessment and assess knowledge base. Interventions:  - Provide teaching at level of understanding  - Provide teaching via preferred learning methods  Outcome: Progressing     Problem: Potential for Falls  Goal: Patient will remain free of falls  Description: INTERVENTIONS:  - Educate patient/family on patient safety including physical limitations  - Instruct patient to call for assistance with activity   - Consult OT/PT to assist with strengthening/mobility   - Keep Call bell within reach  - Keep bed low and locked with side rails adjusted as appropriate  - Keep care items and personal belongings within reach  - Initiate and maintain comfort rounds  - Make Fall Risk Sign visible to staff  - Offer Toileting every 2 Hours, in advance of need  - Initiate/Maintain bed alarm  - Obtain necessary fall risk management equipment:   - Apply yellow socks and bracelet for high fall risk patients  - Consider moving patient to room near nurses station  Outcome: Progressing     Problem: Nutrition/Hydration-ADULT  Goal: Nutrient/Hydration intake appropriate for improving, restoring or maintaining nutritional needs  Description: Monitor and assess patient's nutrition/hydration status for malnutrition.  Collaborate with interdisciplinary team and initiate plan and interventions as ordered. Monitor patient's weight and dietary intake as ordered or per policy. Utilize nutrition screening tool and intervene as necessary. Determine patient's food preferences and provide high-protein, high-caloric foods as appropriate.      INTERVENTIONS:  - Monitor oral intake, urinary output, labs, and treatment plans  - Assess nutrition and hydration status and recommend course of action  - Evaluate amount of meals eaten  - Assist patient with eating if necessary   - Allow adequate time for meals  - Recommend/ encourage appropriate diets, oral nutritional supplements, and vitamin/mineral supplements  - Order, calculate, and assess calorie counts as needed  - Recommend, monitor, and adjust tube feedings and TPN/PPN based on assessed needs  - Assess need for intravenous fluids  - Provide specific nutrition/hydration education as appropriate  - Include patient/family/caregiver in decisions related to nutrition  Outcome: Progressing

## 2023-10-22 NOTE — PLAN OF CARE
Problem: MOBILITY - ADULT  Goal: Maintain or return to baseline ADL function  Description: INTERVENTIONS:  -  Assess patient's ability to carry out ADLs; assess patient's baseline for ADL function and identify physical deficits which impact ability to perform ADLs (bathing, care of mouth/teeth, toileting, grooming, dressing, etc.)  - Assess/evaluate cause of self-care deficits   - Assess range of motion  - Assess patient's mobility; develop plan if impaired  - Assess patient's need for assistive devices and provide as appropriate  - Encourage maximum independence but intervene and supervise when necessary  - Involve family in performance of ADLs  - Assess for home care needs following discharge   - Consider OT consult to assist with ADL evaluation and planning for discharge  - Provide patient education as appropriate  Outcome: Progressing  Goal: Maintains/Returns to pre admission functional level  Description: INTERVENTIONS:  - Perform BMAT or MOVE assessment daily.   - Set and communicate daily mobility goal to care team and patient/family/caregiver. - Collaborate with rehabilitation services on mobility goals if consulted  - Perform Range of Motion 3 times a day. - Reposition patient every 2 hours.   - Dangle patient 2 times a day  - Stand patient 2 times a day  - Ambulate patient 2 times a day  - Out of bed to chair 2 times a day   - Out of bed for meals 2 times a day  - Out of bed for toileting  - Record patient progress and toleration of activity level   Outcome: Progressing     Problem: PAIN - ADULT  Goal: Verbalizes/displays adequate comfort level or baseline comfort level  Description: Interventions:  - Encourage patient to monitor pain and request assistance  - Assess pain using appropriate pain scale  - Administer analgesics based on type and severity of pain and evaluate response  - Implement non-pharmacological measures as appropriate and evaluate response  - Consider cultural and social influences on pain and pain management  - Notify physician/advanced practitioner if interventions unsuccessful or patient reports new pain  Outcome: Progressing     Problem: INFECTION - ADULT  Goal: Absence or prevention of progression during hospitalization  Description: INTERVENTIONS:  - Assess and monitor for signs and symptoms of infection  - Monitor lab/diagnostic results  - Monitor all insertion sites, i.e. indwelling lines, tubes, and drains  - Monitor endotracheal if appropriate and nasal secretions for changes in amount and color  - [place] appropriate cooling/warming therapies per order  - Administer medications as ordered  - Instruct and encourage patient and family to use good hand hygiene technique  - Identify and instruct in appropriate isolation precautions for identified infection/condition  Outcome: Progressing  Goal: Absence of fever/infection during neutropenic period  Description: INTERVENTIONS:  - Monitor WBC    Outcome: Progressing     Problem: SAFETY ADULT  Goal: Patient will remain free of falls  Description: INTERVENTIONS:  - Educate patient/family on patient safety including physical limitations  - Instruct patient to call for assistance with activity   - Consult OT/PT to assist with strengthening/mobility   - Keep Call bell within reach  - Keep bed low and locked with side rails adjusted as appropriate  - Keep care items and personal belongings within reach  - Initiate and maintain comfort rounds  - Make Fall Risk Sign visible to staff  - Offer Toileting every 2 Hours, in advance of need  - Initiate/Maintain bed alarm  - Obtain necessary fall risk management equipment:   - Apply yellow socks and bracelet for high fall risk patients  - Consider moving patient to room near nurses station  Outcome: Progressing     Problem: DISCHARGE PLANNING  Goal: Discharge to home or other facility with appropriate resources  Description: INTERVENTIONS:  - Identify barriers to discharge w/patient and caregiver  - Arrange for needed discharge resources and transportation as appropriate  - Identify discharge learning needs (meds, wound care, etc.)  - Arrange for interpretive services to assist at discharge as needed  - Refer to Case Management Department for coordinating discharge planning if the patient needs post-hospital services based on physician/advanced practitioner order or complex needs related to functional status, cognitive ability, or social support system  Outcome: Progressing     Problem: Knowledge Deficit  Goal: Patient/family/caregiver demonstrates understanding of disease process, treatment plan, medications, and discharge instructions  Description: Complete learning assessment and assess knowledge base. Interventions:  - Provide teaching at level of understanding  - Provide teaching via preferred learning methods  Outcome: Progressing     Problem: Potential for Falls  Goal: Patient will remain free of falls  Description: INTERVENTIONS:  - Educate patient/family on patient safety including physical limitations  - Instruct patient to call for assistance with activity   - Consult OT/PT to assist with strengthening/mobility   - Keep Call bell within reach  - Keep bed low and locked with side rails adjusted as appropriate  - Keep care items and personal belongings within reach  - Initiate and maintain comfort rounds  - Make Fall Risk Sign visible to staff  - Offer Toileting every 2 Hours, in advance of need  - Initiate/Maintain bed alarm  - Obtain necessary fall risk management equipment:   - Apply yellow socks and bracelet for high fall risk patients  - Consider moving patient to room near nurses station  Outcome: Progressing     Problem: Nutrition/Hydration-ADULT  Goal: Nutrient/Hydration intake appropriate for improving, restoring or maintaining nutritional needs  Description: Monitor and assess patient's nutrition/hydration status for malnutrition.  Collaborate with interdisciplinary team and initiate plan and interventions as ordered. Monitor patient's weight and dietary intake as ordered or per policy. Utilize nutrition screening tool and intervene as necessary. Determine patient's food preferences and provide high-protein, high-caloric foods as appropriate.      INTERVENTIONS:  - Monitor oral intake, urinary output, labs, and treatment plans  - Assess nutrition and hydration status and recommend course of action  - Evaluate amount of meals eaten  - Assist patient with eating if necessary   - Allow adequate time for meals  - Recommend/ encourage appropriate diets, oral nutritional supplements, and vitamin/mineral supplements  - Order, calculate, and assess calorie counts as needed  - Recommend, monitor, and adjust tube feedings and TPN/PPN based on assessed needs  - Assess need for intravenous fluids  - Provide specific nutrition/hydration education as appropriate  - Include patient/family/caregiver in decisions related to nutrition  Outcome: Progressing

## 2023-10-22 NOTE — PROGRESS NOTES
[facility]  Progress Note  Name: Mario Tom I  MRN: [id number]  Unit/Bed#: MS [facility]-01 I Date of Admission: 10/18/2023   Date of Service: 10/22/2023 I Hospital Day: 4    Assessment/Plan   * Staphylococcal arthritis of right knee [facility])  Assessment & Plan  Patient with history of right TKR status post PCI with MSSA completed course of antibiotic. Follows up with ID outpatient. Currently s/p right TKA revision for second stage PCI on 10/18/2023. Orthopedic team primary. OR cultures again coming with Staph aureus likely MSSA. Continue Ancef  ID consulted  PICC line pending  PT OT    ABLA (acute blood loss anemia)  Assessment & Plan  Patient noted drop of hemoglobin more than 2 g. Likely due to underlying recent surgery. No active bleeding noted otherwise. Currently hemoglobin appears to be stable. We will recommend to continue to trend hemoglobin every day. Moderate mixed hyperlipidemia not requiring statin therapy  Assessment & Plan  Not on any statin therapy    Major depressive disorder in full remission (HCC)  Assessment & Plan  Continue buspirone, Effexor, nortriptyline    Plaque psoriasis  Assessment & Plan  At home patient is on methotrexate, currently taking Celebrex twice daily    HTN (hypertension)  Assessment & Plan  Continue home medication Toprol- mg daily      VTE Pharmacologic Prophylaxis:   per ortho    Patient Centered Rounds: I performed bedside rounds with nursing staff today. Discussions with Specialists or Other Care Team Provider:     Education and Discussions with Family / Patient: Patient declined call to . Total Time Spent on Date of Encounter in care of patient:  mins.  This time was spent on one or more of the following: performing physical exam; counseling and coordination of care; obtaining or reviewing history; documenting in the medical record; reviewing/ordering tests, medications or procedures; communicating with other healthcare professionals and discussing with patient's family/caregivers. Current Length of Stay: 4 day(s)  Current Patient Status: Inpatient   Certification Statement:   Discharge Plan: per ortho    Code Status: Level 1 - Full Code    Subjective:    Feels fine. No complaints. Pain controlled     Objective:     Vitals:   Temp (24hrs), Av.3 °F (36.3 °C), Min:97.2 °F (36.2 °C), Max:97.5 °F (36.4 °C)    Temp:  [97.2 °F (36.2 °C)-97.5 °F (36.4 °C)] 97.2 °F (36.2 °C)  HR:  [73-90] 77  Resp:  [16] 16  BP: (116-124)/(77-83) 116/80  SpO2:  [97 %-99 %] 98 %  Body mass index is 24.74 kg/m². Input and Output Summary (last 24 hours): Intake/Output Summary (Last 24 hours) at 10/22/2023 0959  Last data filed at 10/21/2023 2345  Gross per 24 hour   Intake --   Output 300 ml   Net -300 ml       Physical Exam:   Physical Exam     Gen.-Patient comfortable at rest  Neck- Supple. No thyromegaly or lymphadenopathy  Lungs-Clear bilaterally without any wheeze or rales   Heart S1-S2, regular rate and rhythm, no murmurs  Abdomen-soft nontender, no organomegaly. Bowel sounds present  Extremities-no cyanosi,  clubbing or edema  Right knee ACE bandages in place. Skin- no rash  Neuro-nonfocal       Additional Data:     Labs:  Results from last 7 days   Lab Units 10/22/23  0334   WBC Thousand/uL 4.50   HEMOGLOBIN g/dL 9.6*   HEMATOCRIT % 30.7*   PLATELETS Thousands/uL 489*     Results from last 7 days   Lab Units 10/22/23  0334   SODIUM mmol/L 135   POTASSIUM mmol/L 3.8   CHLORIDE mmol/L 100   CO2 mmol/L 25   BUN mg/dL 9   CREATININE mg/dL 0.71   ANION GAP mmol/L 10   CALCIUM mg/dL 8.8   GLUCOSE RANDOM mg/dL 84                       Lines/Drains:  Invasive Devices       Peripheral Intravenous Line  Duration             Peripheral IV 10/22/23 Distal;Dorsal (posterior); Left Forearm <1 day                          Imaging:     Recent Cultures (last 7 days):   Results from last 7 days   Lab Units 10/18/23  1554 10/18/23  155 GRAM STAIN RESULT  No Polys or Bacteria seen  No Polys or Bacteria seen Rare Polys  No organisms seen       Last 24 Hours Medication List:   Current Facility-Administered Medications   Medication Dose Route Frequency Provider Last Rate    acetaminophen  650 mg Oral Q4H PRN Rodríguez Clay PA-C      acetaminophen  975 mg Oral Q8H Rodríguez Clay PA-C      ascorbic acid  500 mg Oral BID Rodríguez Clay PA-C      aspirin  81 mg Oral BID Rodríguez Clay PA-C      busPIRone  7.5 mg Oral BID Rodríguez Clay PA-C      calcium carbonate  1,000 mg Oral Daily PRN Rodríguez Clay PA-C      cefazolin  2,000 mg Intravenous Q8H Rodríguez Clay PA-C 2,000 mg (10/22/23 0518)    celecoxib  200 mg Oral BID Rodríguez Clay PA-C      cyclobenzaprine  5 mg Oral TID PRN Rodrgíuez Clay PA-C      docusate sodium  100 mg Oral BID Rodríguez Clay PA-C      folic acid  1 mg Oral Daily Rodríguez Clay PA-C      gabapentin  300 mg Oral HS Rodríguez Clay PA-C      hydrocortisone   Topical BID Bobby Ayon PA-C      lactated ringers  100 mL/hr Intravenous Continuous Rodríguez Clya PA-C Stopped (10/19/23 1014)    metoprolol succinate  100 mg Oral Daily Rodríguez Clay PA-C      multivitamin-minerals  1 tablet Oral Daily Rodríguez Clay PA-C      nortriptyline  100 mg Oral HS Rodríguez Clay PA-C      ondansetron  4 mg Intravenous Q6H PRN Rodríguez Clay PA-C      oxyCODONE  10 mg Oral Q4H PRN Rodríguez Clay PA-C      oxyCODONE  5 mg Oral Q4H PRN Rodríguez Clay PA-C      pantoprazole  40 mg Oral Daily Before Breakfast Rodríguez Clay PA-C      senna  1 tablet Oral Daily Rodríguez Clay PA-C      simethicone  80 mg Oral 4x Daily PRN Rodríguez Clay PA-C      venlafaxine  150 mg Oral HS Rdoríguez Clay PA-C          Today, Patient Was Seen By: Alberto Espinal MD    **Please Note: This note may have been constructed using a voice recognition system. **

## 2023-10-22 NOTE — PROGRESS NOTES
Orthopedics  [facility] Ruddy Kevin 46 y.o. male MRN: [id number]  Unit/Bed#: -01        Subjective:    46 y.o.male seen and examined at the bedside. No acute events overnight. Again notes about 5/10 anterior knee pain and soreness that increases when he is out of bed. Denies any acute or severe pain in the knee. No numbness or tingling RLE.         Objective:    Labs:  0   Lab Value Date/Time    HCT 30.7 (L) 10/22/2023 0334    HCT 28.9 (L) 10/21/2023 0538    HCT 30.8 (L) 10/20/2023 0529    HGB 9.6 (L) 10/22/2023 0334    HGB 9.2 (L) 10/21/2023 0538    HGB 10.0 (L) 10/20/2023 0529    INR 1.01 09/25/2023 1225    WBC 4.50 10/22/2023 0334    WBC 5.10 10/21/2023 0538    WBC 6.44 10/20/2023 0529    ESR 48 (H) 06/18/2023 1108    .2 (H) 06/18/2023 1108       Meds:    Current Facility-Administered Medications:     acetaminophen (TYLENOL) tablet 650 mg, 650 mg, Oral, Q4H PRN, Oz Interiano PA-C    acetaminophen (TYLENOL) tablet 975 mg, 975 mg, Oral, Q8H, Oz Interiano PA-C, 975 mg at 10/22/23 4749    ascorbic acid (VITAMIN C) tablet 500 mg, 500 mg, Oral, BID, Jesus Trevizo PA-C, 500 mg at 10/22/23 2091    aspirin (ECOTRIN LOW STRENGTH) EC tablet 81 mg, 81 mg, Oral, BID, Jesus Trevizo PA-C, 81 mg at 10/22/23 4271    busPIRone (BUSPAR) tablet 7.5 mg, 7.5 mg, Oral, BID, Jesus Trevizo PA-C, 7.5 mg at 10/22/23 3847    calcium carbonate (TUMS) chewable tablet 1,000 mg, 1,000 mg, Oral, Daily PRN, Oz Interiano PA-C    ceFAZolin (ANCEF) IVPB (premix in dextrose) 2,000 mg 50 mL, 2,000 mg, Intravenous, Q8H, Oz Interiano PA-C, Last Rate: 100 mL/hr at 10/22/23 0518, 2,000 mg at 10/22/23 0518    celecoxib (CeleBREX) capsule 200 mg, 200 mg, Oral, BID, Jesus Trevizo PA-C, 200 mg at 10/22/23 0317    cyclobenzaprine (FLEXERIL) tablet 5 mg, 5 mg, Oral, TID PRN, Oz Interiano PA-C    docusate sodium (COLACE) capsule 100 mg, 100 mg, Oral, BID, Jesus Trevizo PA-C, 100 mg at 65/25/44 5902    folic acid (FOLVITE) tablet 1 mg, 1 mg, Oral, Daily, Shanta Trevizo PA-C, 1 mg at 10/22/23 8822    gabapentin (NEURONTIN) capsule 300 mg, 300 mg, Oral, HS, Shanta Trevizo PA-C, 300 mg at 10/21/23 1952    hydrocortisone 1 % cream, , Topical, BID, Loreta Cody PA-C, 1 Application at 60/37/73 4133    lactated ringers infusion, 100 mL/hr, Intravenous, Continuous, Daisy Lawrence PA-C, Stopped at 10/19/23 1014    metoprolol succinate (TOPROL-XL) 24 hr tablet 100 mg, 100 mg, Oral, Daily, Shanta Trevizo PA-C, 100 mg at 10/22/23 2775    multivitamin-minerals (CENTRUM) tablet 1 tablet, 1 tablet, Oral, Daily, Daisy Lawrence PA-C, 1 tablet at 10/22/23 3724    nortriptyline (PAMELOR) capsule 100 mg, 100 mg, Oral, HS, Daisy Lawrence PA-C, 100 mg at 10/21/23 1952    ondansetron [facility]) injection 4 mg, 4 mg, Intravenous, Q6H PRN, Daisy Lawrence PA-C    oxyCODONE (ROXICODONE) immediate release tablet 10 mg, 10 mg, Oral, Q4H PRN, Shanta Trevizo PA-C, 10 mg at 10/22/23 0348    oxyCODONE (ROXICODONE) IR tablet 5 mg, 5 mg, Oral, Q4H PRN, Daisy Lawrence PA-C, 5 mg at 10/19/23 0856    pantoprazole (PROTONIX) EC tablet 40 mg, 40 mg, Oral, Daily Before Breakfast, Daisy Lawrence PA-C, 40 mg at 10/22/23 0518    senna (SENOKOT) tablet 8.6 mg, 1 tablet, Oral, Daily, Shanta Trevizo PA-C, 8.6 mg at 10/22/23 5601    simethicone (MYLICON) chewable tablet 80 mg, 80 mg, Oral, 4x Daily PRN, Daisy Lawrence PA-C    venlafaxine [facility] 24 hr capsule 150 mg, 150 mg, Oral, HS, Shanta Trevizo PA-C, 150 mg at 10/21/23 1952    Blood Culture:   No results found for: "BLOODCX"    Wound Culture:   No results found for: "WOUNDCULT"    Ins and Outs:  I/O last 24 hours: In: -   Out: 500 [Urine:500]      Orthopedic Physical Exam:    Vitals:    10/22/23 0756   BP: 116/80   Pulse: 77   Resp: 16   Temp: (!) 97.2 °F (36.2 °C)   SpO2: 98%   Lying in bed, comfortable at rest, breathing unlabored, NAD.   right Lower Extremity extremity:  RLE is rested in extension, with a pillow underneath the achilles. ACE wrap is in place. The Mepilex Dressing is c/d/I without any saturation. Post-op knee swelling  Thigh is soft. 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 Rustam Wall 10/18/2023). Plan:    WBAT RLE  Up and out of bed as tolerated  OR Cx + S aureus. ID following, on IV Ancef for likely 6 weeks. Order for PICC placed, will be done on Monday. Consent for PICC completed and placed in patient's chart. DVT prophylaxis - ASA 81mg BID.   Patient to be discharged with ASA 81mg BID x 1 month  Analgesics as needed  Pillow underneath achilles, not behind the knee while in bed  PT/OT  Dispo: Ortho will follow  Patient noted to have acute blood loss anemia due to a drop in Hbg of > 2.0g from preop levels, will monitor vital signs and resuscitate with IV fluids as needed H 9.6 this AM.          Francesca Whitt PA-C

## 2023-10-23 ENCOUNTER — APPOINTMENT (INPATIENT)
Dept: INTERVENTIONAL RADIOLOGY/VASCULAR | Facility: HOSPITAL | Age: 48
DRG: 326 | End: 2023-10-23
Payer: COMMERCIAL

## 2023-10-23 LAB
ANION GAP SERPL CALCULATED.3IONS-SCNC: 6 MMOL/L
BUN SERPL-MCNC: 12 MG/DL (ref 5–25)
CALCIUM SERPL-MCNC: 9.1 MG/DL (ref 8.4–10.2)
CHLORIDE SERPL-SCNC: 99 MMOL/L (ref 96–108)
CO2 SERPL-SCNC: 31 MMOL/L (ref 21–32)
CREAT SERPL-MCNC: 0.74 MG/DL (ref 0.6–1.3)
ERYTHROCYTE [DISTWIDTH] IN BLOOD BY AUTOMATED COUNT: 14.9 % (ref 11.6–15.1)
GFR SERPL CREATININE-BSD FRML MDRD: 107 ML/MIN/1.73SQ M
GLUCOSE SERPL-MCNC: 120 MG/DL (ref 65–140)
HCT VFR BLD AUTO: 29.3 % (ref 36.5–49.3)
HGB BLD-MCNC: 9.4 G/DL (ref 12–17)
MCH RBC QN AUTO: 29 PG (ref 26.8–34.3)
MCHC RBC AUTO-ENTMCNC: 32.1 G/DL (ref 31.4–37.4)
MCV RBC AUTO: 90 FL (ref 82–98)
PLATELET # BLD AUTO: 497 THOUSANDS/UL (ref 149–390)
PMV BLD AUTO: 8.5 FL (ref 8.9–12.7)
POTASSIUM SERPL-SCNC: 3.7 MMOL/L (ref 3.5–5.3)
RBC # BLD AUTO: 3.24 MILLION/UL (ref 3.88–5.62)
SODIUM SERPL-SCNC: 136 MMOL/L (ref 135–147)
WBC # BLD AUTO: 5.02 THOUSAND/UL (ref 4.31–10.16)

## 2023-10-23 PROCEDURE — 99024 POSTOP FOLLOW-UP VISIT: CPT | Performed by: PHYSICIAN ASSISTANT

## 2023-10-23 PROCEDURE — 76937 US GUIDE VASCULAR ACCESS: CPT

## 2023-10-23 PROCEDURE — NC001 PR NO CHARGE: Performed by: RADIOLOGY

## 2023-10-23 PROCEDURE — 80048 BASIC METABOLIC PNL TOTAL CA: CPT | Performed by: STUDENT IN AN ORGANIZED HEALTH CARE EDUCATION/TRAINING PROGRAM

## 2023-10-23 PROCEDURE — 85027 COMPLETE CBC AUTOMATED: CPT | Performed by: STUDENT IN AN ORGANIZED HEALTH CARE EDUCATION/TRAINING PROGRAM

## 2023-10-23 PROCEDURE — 99232 SBSQ HOSP IP/OBS MODERATE 35: CPT | Performed by: INTERNAL MEDICINE

## 2023-10-23 PROCEDURE — 77001 FLUOROGUIDE FOR VEIN DEVICE: CPT

## 2023-10-23 PROCEDURE — C1751 CATH, INF, PER/CENT/MIDLINE: HCPCS

## 2023-10-23 RX ORDER — LIDOCAINE HYDROCHLORIDE 10 MG/ML
INJECTION, SOLUTION EPIDURAL; INFILTRATION; INTRACAUDAL; PERINEURAL AS NEEDED
Status: COMPLETED | OUTPATIENT
Start: 2023-10-23 | End: 2023-10-23

## 2023-10-23 RX ADMIN — ACETAMINOPHEN 975 MG: 325 TABLET, FILM COATED ORAL at 05:06

## 2023-10-23 RX ADMIN — ACETAMINOPHEN 975 MG: 325 TABLET, FILM COATED ORAL at 20:27

## 2023-10-23 RX ADMIN — HYDROCORTISONE: 1 CREAM TOPICAL at 08:21

## 2023-10-23 RX ADMIN — OXYCODONE HYDROCHLORIDE 10 MG: 10 TABLET ORAL at 18:06

## 2023-10-23 RX ADMIN — OXYCODONE HYDROCHLORIDE AND ACETAMINOPHEN 500 MG: 500 TABLET ORAL at 18:11

## 2023-10-23 RX ADMIN — PANTOPRAZOLE SODIUM 40 MG: 40 TABLET, DELAYED RELEASE ORAL at 05:06

## 2023-10-23 RX ADMIN — CEFAZOLIN SODIUM 2000 MG: 2 SOLUTION INTRAVENOUS at 15:29

## 2023-10-23 RX ADMIN — CEFAZOLIN SODIUM 2000 MG: 2 SOLUTION INTRAVENOUS at 21:53

## 2023-10-23 RX ADMIN — IOHEXOL 8 ML: 350 INJECTION, SOLUTION INTRAVENOUS at 14:40

## 2023-10-23 RX ADMIN — NORTRIPTYLINE HYDROCHLORIDE 100 MG: 25 CAPSULE ORAL at 21:51

## 2023-10-23 RX ADMIN — ASPIRIN 81 MG: 81 TABLET, COATED ORAL at 08:19

## 2023-10-23 RX ADMIN — OXYCODONE HYDROCHLORIDE AND ACETAMINOPHEN 500 MG: 500 TABLET ORAL at 08:19

## 2023-10-23 RX ADMIN — OXYCODONE HYDROCHLORIDE 10 MG: 10 TABLET ORAL at 12:34

## 2023-10-23 RX ADMIN — CYCLOBENZAPRINE HYDROCHLORIDE 5 MG: 5 TABLET, FILM COATED ORAL at 18:06

## 2023-10-23 RX ADMIN — LIDOCAINE HYDROCHLORIDE 5 ML: 10 INJECTION, SOLUTION EPIDURAL; INFILTRATION; INTRACAUDAL; PERINEURAL at 14:16

## 2023-10-23 RX ADMIN — CEFAZOLIN SODIUM 2000 MG: 2 SOLUTION INTRAVENOUS at 06:35

## 2023-10-23 RX ADMIN — GABAPENTIN 300 MG: 300 CAPSULE ORAL at 21:51

## 2023-10-23 RX ADMIN — SENNOSIDES 8.6 MG: 8.6 TABLET, FILM COATED ORAL at 08:19

## 2023-10-23 RX ADMIN — FOLIC ACID 1 MG: 1 TABLET ORAL at 08:19

## 2023-10-23 RX ADMIN — METOPROLOL SUCCINATE 100 MG: 50 TABLET, EXTENDED RELEASE ORAL at 08:19

## 2023-10-23 RX ADMIN — CELECOXIB 200 MG: 100 CAPSULE ORAL at 18:11

## 2023-10-23 RX ADMIN — DOCUSATE SODIUM 100 MG: 100 CAPSULE, LIQUID FILLED ORAL at 18:11

## 2023-10-23 RX ADMIN — CELECOXIB 200 MG: 100 CAPSULE ORAL at 08:19

## 2023-10-23 RX ADMIN — DOCUSATE SODIUM 100 MG: 100 CAPSULE, LIQUID FILLED ORAL at 08:19

## 2023-10-23 RX ADMIN — LIDOCAINE HYDROCHLORIDE 5 ML: 10 INJECTION, SOLUTION EPIDURAL; INFILTRATION; INTRACAUDAL; PERINEURAL at 14:26

## 2023-10-23 RX ADMIN — OXYCODONE HYDROCHLORIDE 10 MG: 10 TABLET ORAL at 01:38

## 2023-10-23 RX ADMIN — LIDOCAINE HYDROCHLORIDE 5 ML: 10 INJECTION, SOLUTION EPIDURAL; INFILTRATION; INTRACAUDAL; PERINEURAL at 13:48

## 2023-10-23 RX ADMIN — BUSPIRONE HYDROCHLORIDE 7.5 MG: 15 TABLET ORAL at 18:11

## 2023-10-23 RX ADMIN — MULTIPLE VITAMINS W/ MINERALS TAB 1 TABLET: TAB ORAL at 08:19

## 2023-10-23 RX ADMIN — CYCLOBENZAPRINE HYDROCHLORIDE 5 MG: 5 TABLET, FILM COATED ORAL at 08:18

## 2023-10-23 RX ADMIN — VENLAFAXINE HYDROCHLORIDE 150 MG: 150 CAPSULE, EXTENDED RELEASE ORAL at 21:51

## 2023-10-23 RX ADMIN — BUSPIRONE HYDROCHLORIDE 7.5 MG: 15 TABLET ORAL at 08:19

## 2023-10-23 RX ADMIN — OXYCODONE HYDROCHLORIDE 10 MG: 10 TABLET ORAL at 08:18

## 2023-10-23 RX ADMIN — ACETAMINOPHEN 975 MG: 325 TABLET, FILM COATED ORAL at 12:28

## 2023-10-23 RX ADMIN — ASPIRIN 81 MG: 81 TABLET, COATED ORAL at 18:11

## 2023-10-23 NOTE — CASE MANAGEMENT
Case Management Discharge Planning Note    Patient name Diamond Das [facility]  Location [address] [place] [address]/-64 MRN [id number]  : 1975 Date 10/23/2023       Current Admission Date: 10/18/2023  Current Admission Diagnosis:Staphylococcal arthritis of right knee [facility])   Patient Active Problem List    Diagnosis Date Noted    ABLA (acute blood loss anemia) 10/21/2023    Moderate mixed hyperlipidemia not requiring statin therapy 10/03/2023    Major depressive disorder in full remission ([address]) 2023    Mild protein-calorie malnutrition ([address]) 2023    Prediabetes 2023    Anemia 2023    Difficult intubation 2023    PONV (postoperative nausea and vomiting) 2023    Infection of prosthetic right knee joint ([address]) 2023    Anxiety 2023    HTN (hypertension) 2023    Migraine 2023    History of arthroplasty of right knee 2023    Plaque psoriasis 2023    Staphylococcal arthritis of right knee ([address]) 2023      LOS (days): 5  Geometric Mean LOS (GMLOS) (days):   Days to GMLOS:     OBJECTIVE:  Risk of Unplanned Readmission Score: 10.52         Current admission status: Inpatient   Preferred Pharmacy:   [id number] [place]  [address] [place] [id number]  Phone: [phone] Fax: [phone]    Primary Care Provider: DAVID Cordero    Primary Insurance: [facility]  Secondary Insurance:     DISCHARGE DETAILS:     Just saw note that pt will need PICC and IV ABX for 6 weeks. Found script on chart. Will put in referrals to [facility] and [address].

## 2023-10-23 NOTE — UTILIZATION REVIEW
Elective Surgical Continued Stay Review    Date: 10/22/23     POD#: 4  Current Patient Class: inpatient  Current Level of Care: med surg    Assessment/Plan: 46 y.o. 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 H&H 9.6/30.7. OR Cx + S aureus on exam:  RLE rested in extension. Ace wrap in place. Mepilex dressing C/D/I. Post op knee swelling. Plan is WBAT RLE. Continue IV antibiotics, follow until sensitives back, likely need 6 weeks. On asa for DVT ppx. Analgesia as needed. 10/23/23 continues with 5/10 pain. H&H 9.4/29. 3.   on exam RLE:  ACE wrap is in place. The Mepilex Dressing is c/d/I without any saturation. + swelling. Plan is WBAT RLE. Continue IV antibiotics, follow until sensitives back, likely need 6 weeks. On asa for DVT ppx. Analgesia as needed. Pertinent Labs/Diagnostic Results:   XR knee right 1 or 2 views   Final Result by Hanna Story MD (10/20 4943)   Intact appearing revised total knee arthroplasty      No acute osseous abnormality.             Workstation performed: [id number]         IR PICC line placement single lumen (preferred for home antibiotics/medications)    (Results Pending)      Results from last 7 days   Lab Units 10/23/23  0135 10/22/23  0334 10/21/23  0538 10/20/23  0529 10/19/23  0205   WBC Thousand/uL 5.02 4.50 5.10 6.44 8.53   HEMOGLOBIN g/dL 9.4* 9.6* 9.2* 10.0* 10.5*   HEMATOCRIT % 29.3* 30.7* 28.9* 30.8* 33.4*   PLATELETS Thousands/uL 497* 489* 418* 392* 121*     Results from last 7 days   Lab Units 10/23/23  0135 10/22/23  0334 10/21/23  0538 10/20/23  0529 10/19/23  0205   SODIUM mmol/L 136 135 135 135 132*   POTASSIUM mmol/L 3.7 3.8 3.6 3.8 4.2   CHLORIDE mmol/L 99 100 100 99 99   CO2 mmol/L 31 25 27 26 22   ANION GAP mmol/L 6 10 8 10 11   BUN mg/dL 12 9 10 11 18   CREATININE mg/dL 0.74 0.71 0.70 0.77 0.86   EGFR ml/min/1.73sq m 107 109 110 105 101   CALCIUM mg/dL 9.1 8.8 8.9 8.9 9.3     Results from last 7 days   Lab Units 10/23/23  0135 10/22/23  0334 10/21/23  0538 10/20/23  0529 10/19/23  0205   GLUCOSE RANDOM mg/dL 120 84 101 101 104     Results from last 7 days   Lab Units 10/20/23  0529   HEP C AB  Non-reactive     Results from last 7 days   Lab Units 10/18/23  1554 10/18/23  1552   GRAM STAIN RESULT  No Polys or Bacteria seen  No Polys or Bacteria seen Rare Polys  No organisms seen       Vital Signs:   10/23/23 08:12:21 97.2 °F (36.2 °C) Abnormal  64 12 123/73 90 100 % --   10/22/23 20:57:23 97 °F (36.1 °C) Abnormal  72 -- 123/74 90 100 % --   10/22/23 20:57:14 97 °F (36.1 °C) Abnormal  72 -- 123/74 90 100 % --   10/22/23 15:26:01 97.3 °F (36.3 °C) Abnormal  59 -- 114/69 84 96 % --   10/22/23 15:25:51 97.3 °F (36.3 °C) Abnormal  33 Abnormal  -- 114/69 84 100 % --   10/22/23 07:56:31 97.2 °F (36.2 °C) Abnormal  77 16 116/80 92 98 %        Medications:   Scheduled Medications:  acetaminophen, 975 mg, Oral, Q8H  ascorbic acid, 500 mg, Oral, BID  aspirin, 81 mg, Oral, BID  busPIRone, 7.5 mg, Oral, BID  cefazolin, 2,000 mg, Intravenous, Q8H  celecoxib, 200 mg, Oral, BID  docusate sodium, 100 mg, Oral, BID  folic acid, 1 mg, Oral, Daily  gabapentin, 300 mg, Oral, HS  hydrocortisone, , Topical, BID  metoprolol succinate, 100 mg, Oral, Daily  multivitamin-minerals, 1 tablet, Oral, Daily  nortriptyline, 100 mg, Oral, HS  pantoprazole, 40 mg, Oral, Daily Before Breakfast  senna, 1 tablet, Oral, Daily  venlafaxine, 150 mg, Oral, HS      Continuous IV Infusions:  lactated ringers, 100 mL/hr, Intravenous, Continuous      PRN Meds:  acetaminophen, 650 mg, Oral, Q4H PRN  calcium carbonate, 1,000 mg, Oral, Daily PRN  cyclobenzaprine, 5 mg, Oral, TID PRN x 1 10/23/23   ondansetron, 4 mg, Intravenous, Q6H PRN  oxyCODONE, 10 mg, Oral, Q4H PRN x 4 10/22, x 2 10/23   oxyCODONE, 5 mg, Oral, Q4H PRN  simethicone, 80 mg, Oral, 4x Daily PRN          Discharge Plan: to be determined. Network Utilization Review Department  ATTENTION: Please call with any questions or concerns to [phone] and carefully listen to the prompts so that you are directed to the right person. All voicemails are confidential.   For Discharge needs, contact Care Management DC Support Team at [phone] opt. 2  Send all requests for admission clinical reviews, approved or denied determinations and any other requests to dedicated fax number below belonging to the campus where the patient is receiving treatment.  List of dedicated fax numbers for the Facilities:  [place] DENIALS (Administrative/Medical Necessity) [phone]   DISCHARGE SUPPORT TEAM (NETWORK) [id number] [facility] (Maternity/NICU/Pediatrics) [phone]   [address] 1000 [facility] [phone]   [address] 5220 [facility] [address] [address] [facility] [address] 1300 [facility] [address] [place] [address] [phone]

## 2023-10-23 NOTE — PROGRESS NOTES
[address]  Progress Note  Name: Arturo Ham I  MRN: [id number]  Unit/Bed#: MS [facility]-01 I Date of Admission: 10/18/2023   Date of Service: 10/23/2023 I Hospital Day: 5    Assessment/Plan   * Staphylococcal arthritis of right knee [facility])  Assessment & Plan  Discharge per primary team patient with history of right TKR status post PCI with MSSA completed course of antibiotic. Follows up with ID outpatient. Currently s/p right TKA revision for second stage PCI on 10/18/2023. Orthopedic team primary. OR cultures again coming with Staph aureus likely MSSA. Continue Ancef  ID consulted  PICC line pending  PT OT  Discharge per primary team    ABLA (acute blood loss anemia)  Assessment & Plan  Recent Labs     10/21/23  0538 10/22/23  0334 10/23/23  0135   HGB 9.2* 9.6* 9.4*      Patient noted drop of hemoglobin more than 2 g. Likely due to underlying recent surgery. No active bleeding noted otherwise. Currently hemoglobin appears to be stable. Moderate mixed hyperlipidemia not requiring statin therapy  Assessment & Plan  Not on any statin therapy    Major depressive disorder in full remission (HCC)  Assessment & Plan  Continue buspirone, Effexor, nortriptyline    Plaque psoriasis  Assessment & Plan  At home patient is on methotrexate, currently taking Celebrex twice daily    HTN (hypertension)  Assessment & Plan  Blood Pressure reviewed and acceptable. Continue home medication Toprol- mg daily               VTE Pharmacologic Prophylaxis:   Low Risk (Score 0-2) - Encourage Ambulation. Patient Centered Rounds: I performed bedside rounds with nursing staff today. Discussions with Specialists or Other Care Team Provider:     Education and Discussions with Family / Patient:  per primary team .     Total Time Spent on Date of Encounter in care of patient: 30 mins.  This time was spent on one or more of the following: performing physical exam; counseling and coordination of care; obtaining or reviewing history; documenting in the medical record; reviewing/ordering tests, medications or procedures; communicating with other healthcare professionals and discussing with patient's family/caregivers. Current Length of Stay: 5 day(s)  Current Patient Status: Inpatient   Certification Statement:  per primary team   Discharge Plan: Heath Gutierrez is following this patient on consult. They are medically stable for discharge when deemed appropriate by primary service. Code Status: Level 1 - Full Code    Subjective:   Patient has some pain in his right knee, otherwise had no complaints    Objective:     Vitals:   Temp (24hrs), Av.2 °F (36.2 °C), Min:97 °F (36.1 °C), Max:97.3 °F (36.3 °C)    Temp:  [97 °F (36.1 °C)-97.3 °F (36.3 °C)] 97.2 °F (36.2 °C)  HR:  [33-72] 64  Resp:  [12] 12  BP: (114-123)/(69-74) 123/73  SpO2:  [96 %-100 %] 100 %  Body mass index is 24.74 kg/m². Input and Output Summary (last 24 hours): Intake/Output Summary (Last 24 hours) at 10/23/2023 1303  Last data filed at 10/23/2023 2381  Gross per 24 hour   Intake 240 ml   Output --   Net 240 ml       Physical Exam:   Physical Exam  Vitals and nursing note reviewed. Constitutional:       General: He is not in acute distress. Appearance: He is not diaphoretic. HENT:      Head: Normocephalic. Eyes:      General: No scleral icterus. Right eye: No discharge. Left eye: No discharge. Cardiovascular:      Rate and Rhythm: Normal rate and regular rhythm. Pulmonary:      Effort: Pulmonary effort is normal. No respiratory distress. Breath sounds: Normal breath sounds. No wheezing, rhonchi or rales. Abdominal:      General: There is no distension. Palpations: Abdomen is soft. Tenderness: There is no abdominal tenderness. There is no guarding or rebound. Musculoskeletal:      Cervical back: Normal range of motion. Right lower leg: No edema. Left lower leg: No edema. Skin:     General: Skin is warm. Neurological:      Mental Status: He is alert. Psychiatric:         Mood and Affect: Mood normal.         Behavior: Behavior normal.         Thought Content: Thought content normal.         Judgment: Judgment normal.          Additional Data:     Labs:  Results from last 7 days   Lab Units 10/23/23  0135   WBC Thousand/uL 5.02   HEMOGLOBIN g/dL 9.4*   HEMATOCRIT % 29.3*   PLATELETS Thousands/uL 497*     Results from last 7 days   Lab Units 10/23/23  0135   SODIUM mmol/L 136   POTASSIUM mmol/L 3.7   CHLORIDE mmol/L 99   CO2 mmol/L 31   BUN mg/dL 12   CREATININE mg/dL 0.74   ANION GAP mmol/L 6   CALCIUM mg/dL 9.1   GLUCOSE RANDOM mg/dL 120                       Lines/Drains:  Invasive Devices       Peripheral Intravenous Line  Duration             Peripheral IV 10/23/23 Distal;Left;Ventral (anterior) Forearm <1 day                          Imaging: No pertinent imaging reviewed.     Recent Cultures (last 7 days):   Results from last 7 days   Lab Units 10/18/23  1554 10/18/23  1552   GRAM STAIN RESULT  No Polys or Bacteria seen  No Polys or Bacteria seen Rare Polys  No organisms seen       Last 24 Hours Medication List:   Current Facility-Administered Medications   Medication Dose Route Frequency Provider Last Rate    acetaminophen  650 mg Oral Q4H PRN Arpita Murrell PA-C      acetaminophen  975 mg Oral Q8H Arpita Murrell PA-C      ascorbic acid  500 mg Oral BID Arpita Murrell PA-C      aspirin  81 mg Oral BID Arpita Murrell PA-C      busPIRone  7.5 mg Oral BID Arpita Murrell PA-C      calcium carbonate  1,000 mg Oral Daily PRN Arpita Murrell PA-C      cefazolin  2,000 mg Intravenous Q8H Arpita Murrell PA-C 2,000 mg (10/23/23 5306)    celecoxib  200 mg Oral BID Arpita Murrell PA-C      cyclobenzaprine  5 mg Oral TID PRN Arpita Murrell PA-C      docusate sodium  100 mg Oral BID Arpita Murrell PA-C      folic acid  1 mg Oral Daily Arpita Murrell PA-C gabapentin  300 mg Oral HS Simba Herb, PA-C      hydrocortisone   Topical BID Edel Kohler Glo, PA-C      lactated ringers  100 mL/hr Intravenous Continuous Simba Herb, PA-C Stopped (10/19/23 1014)    metoprolol succinate  100 mg Oral Daily Simba Herb, [place]      multivitamin-minerals  1 tablet Oral Daily Simba Herb, [place]      nortriptyline  100 mg Oral HS Simba Herb, PA-C      ondansetron  4 mg Intravenous Q6H PRN Simba Herb, PA-C      oxyCODONE  10 mg Oral Q4H PRN Simba Herb, PA-C      oxyCODONE  5 mg Oral Q4H PRN Simba Herb, PA-C      pantoprazole  40 mg Oral Daily Before Breakfast Simba Herb, PA-C      senna  1 tablet Oral Daily Simba Herb, PA-C      simethicone  80 mg Oral 4x Daily PRN Simba Herb, PA-C      venlafaxine  150 mg Oral HS Simba Herb, PA-C          Today, Patient Was Seen By: Jose Franco MD    **Please Note: This note may have been constructed using a voice recognition system. ** code: stroke

## 2023-10-23 NOTE — ASSESSMENT & PLAN NOTE
Discharge per primary team patient with history of right TKR status post PCI with MSSA completed course of antibiotic. Follows up with ID outpatient. Currently s/p right TKA revision for second stage PCI on 10/18/2023. Orthopedic team primary. OR cultures again coming with Staph aureus likely MSSA.     Continue Ancef  ID consulted  PICC line pending  PT OT  Discharge per primary team

## 2023-10-23 NOTE — PLAN OF CARE
Problem: PAIN - ADULT  Goal: Verbalizes/displays adequate comfort level or baseline comfort level  Description: Interventions:  - Encourage patient to monitor pain and request assistance  - Assess pain using appropriate pain scale  - Administer analgesics based on type and severity of pain and evaluate response  - Implement non-pharmacological measures as appropriate and evaluate response  - Consider cultural and social influences on pain and pain management  - Notify physician/advanced practitioner if interventions unsuccessful or patient reports new pain  Outcome: Progressing     Problem: INFECTION - ADULT  Goal: Absence or prevention of progression during hospitalization  Description: INTERVENTIONS:  - Assess and monitor for signs and symptoms of infection  - Monitor lab/diagnostic results  - Monitor all insertion sites, i.e. indwelling lines, tubes, and drains  - Monitor endotracheal if appropriate and nasal secretions for changes in amount and color  - [place] appropriate cooling/warming therapies per order  - Administer medications as ordered  - Instruct and encourage patient and family to use good hand hygiene technique  - Identify and instruct in appropriate isolation precautions for identified infection/condition  Outcome: Progressing

## 2023-10-23 NOTE — PLAN OF CARE
Problem: MOBILITY - ADULT  Goal: Maintain or return to baseline ADL function  Description: INTERVENTIONS:  -  Assess patient's ability to carry out ADLs; assess patient's baseline for ADL function and identify physical deficits which impact ability to perform ADLs (bathing, care of mouth/teeth, toileting, grooming, dressing, etc.)  - Assess/evaluate cause of self-care deficits   - Assess range of motion  - Assess patient's mobility; develop plan if impaired  - Assess patient's need for assistive devices and provide as appropriate  - Encourage maximum independence but intervene and supervise when necessary  - Involve family in performance of ADLs  - Assess for home care needs following discharge   - Consider OT consult to assist with ADL evaluation and planning for discharge  - Provide patient education as appropriate  Outcome: Progressing  Goal: Maintains/Returns to pre admission functional level  Description: INTERVENTIONS:  - Perform BMAT or MOVE assessment daily.   - Set and communicate daily mobility goal to care team and patient/family/caregiver. - Collaborate with rehabilitation services on mobility goals if consulted  - Perform Range of Motion 3 times a day. - Reposition patient every 2 hours.   - Dangle patient 2 times a day  - Stand patient 2 times a day  - Ambulate patient 2 times a day  - Out of bed to chair 2 times a day   - Out of bed for meals 2 times a day  - Out of bed for toileting  - Record patient progress and toleration of activity level   Outcome: Progressing     Problem: PAIN - ADULT  Goal: Verbalizes/displays adequate comfort level or baseline comfort level  Description: Interventions:  - Encourage patient to monitor pain and request assistance  - Assess pain using appropriate pain scale  - Administer analgesics based on type and severity of pain and evaluate response  - Implement non-pharmacological measures as appropriate and evaluate response  - Consider cultural and social influences on pain and pain management  - Notify physician/advanced practitioner if interventions unsuccessful or patient reports new pain  Outcome: Progressing     Problem: INFECTION - ADULT  Goal: Absence or prevention of progression during hospitalization  Description: INTERVENTIONS:  - Assess and monitor for signs and symptoms of infection  - Monitor lab/diagnostic results  - Monitor all insertion sites, i.e. indwelling lines, tubes, and drains  - Monitor endotracheal if appropriate and nasal secretions for changes in amount and color  - [place] appropriate cooling/warming therapies per order  - Administer medications as ordered  - Instruct and encourage patient and family to use good hand hygiene technique  - Identify and instruct in appropriate isolation precautions for identified infection/condition  Outcome: Progressing  Goal: Absence of fever/infection during neutropenic period  Description: INTERVENTIONS:  - Monitor WBC    Outcome: Progressing     Problem: SAFETY ADULT  Goal: Patient will remain free of falls  Description: INTERVENTIONS:  - Educate patient/family on patient safety including physical limitations  - Instruct patient to call for assistance with activity   - Consult OT/PT to assist with strengthening/mobility   - Keep Call bell within reach  - Keep bed low and locked with side rails adjusted as appropriate  - Keep care items and personal belongings within reach  - Initiate and maintain comfort rounds  - Make Fall Risk Sign visible to staff  - Offer Toileting every 2 Hours, in advance of need  - Initiate/Maintain bed alarm  - Obtain necessary fall risk management equipment:   - Apply yellow socks and bracelet for high fall risk patients  - Consider moving patient to room near nurses station  Outcome: Progressing     Problem: DISCHARGE PLANNING  Goal: Discharge to home or other facility with appropriate resources  Description: INTERVENTIONS:  - Identify barriers to discharge w/patient and caregiver  - Arrange for needed discharge resources and transportation as appropriate  - Identify discharge learning needs (meds, wound care, etc.)  - Arrange for interpretive services to assist at discharge as needed  - Refer to Case Management Department for coordinating discharge planning if the patient needs post-hospital services based on physician/advanced practitioner order or complex needs related to functional status, cognitive ability, or social support system  Outcome: Progressing     Problem: Knowledge Deficit  Goal: Patient/family/caregiver demonstrates understanding of disease process, treatment plan, medications, and discharge instructions  Description: Complete learning assessment and assess knowledge base. Interventions:  - Provide teaching at level of understanding  - Provide teaching via preferred learning methods  Outcome: Progressing     Problem: Potential for Falls  Goal: Patient will remain free of falls  Description: INTERVENTIONS:  - Educate patient/family on patient safety including physical limitations  - Instruct patient to call for assistance with activity   - Consult OT/PT to assist with strengthening/mobility   - Keep Call bell within reach  - Keep bed low and locked with side rails adjusted as appropriate  - Keep care items and personal belongings within reach  - Initiate and maintain comfort rounds  - Make Fall Risk Sign visible to staff  - Offer Toileting every 2 Hours, in advance of need  - Initiate/Maintain bed alarm  - Obtain necessary fall risk management equipment:   - Apply yellow socks and bracelet for high fall risk patients  - Consider moving patient to room near nurses station  Outcome: Progressing     Problem: Nutrition/Hydration-ADULT  Goal: Nutrient/Hydration intake appropriate for improving, restoring or maintaining nutritional needs  Description: Monitor and assess patient's nutrition/hydration status for malnutrition.  Collaborate with interdisciplinary team and initiate plan and interventions as ordered. Monitor patient's weight and dietary intake as ordered or per policy. Utilize nutrition screening tool and intervene as necessary. Determine patient's food preferences and provide high-protein, high-caloric foods as appropriate.      INTERVENTIONS:  - Monitor oral intake, urinary output, labs, and treatment plans  - Assess nutrition and hydration status and recommend course of action  - Evaluate amount of meals eaten  - Assist patient with eating if necessary   - Allow adequate time for meals  - Recommend/ encourage appropriate diets, oral nutritional supplements, and vitamin/mineral supplements  - Order, calculate, and assess calorie counts as needed  - Recommend, monitor, and adjust tube feedings and TPN/PPN based on assessed needs  - Assess need for intravenous fluids  - Provide specific nutrition/hydration education as appropriate  - Include patient/family/caregiver in decisions related to nutrition  Outcome: Progressing

## 2023-10-23 NOTE — PROCEDURES
Insert Complex Venous Access Line    Date/Time: 10/23/2023 2:33 PM    Performed by: Vidal Farley  Authorized by: Arpita Murrell PA-C    Patient location:  IR  Other Assisting Provider: Yes (comment)    Consent:     Consent obtained:  Written    Consent given by:  Patient    Risks discussed:  Arterial puncture, bleeding, incorrect placement, infection, nerve damage and pneumothorax    Alternatives discussed:  Delayed treatment  Universal protocol:     Procedure explained and questions answered to patient or proxy's satisfaction: yes      Relevant documents present and verified: yes      Test results available and properly labeled: yes      Radiology Images displayed and confirmed. If images not available, report reviewed: yes      Required blood products, implants, devices, and special equipment available: yes      Site/side marked: yes      Immediately prior to procedure, a time out was called: yes      Patient identity confirmed:  Verbally with patient  Pre-procedure details:     Hand hygiene: Hand hygiene performed prior to insertion      Sterile barrier technique: All elements of maximal sterile technique followed      Skin preparation:  ChloraPrep  Indications:     PICC line indications: long term antibiotics    Anesthesia (see MAR for exact dosages):      Anesthesia method:  Local infiltration    Local anesthetic:  Lidocaine 1% w/o epi  Procedure details:     Location:  [place]    Vessel type: vein      Laterality:  Left    Approach: percutaneous technique used      Patient position:  Flat    Procedural supplies:  Single lumen    Catheter size:  4 Fr    Landmarks identified: yes      Ultrasound guidance: yes      Ultrasound image availability:  Images available in PACS    Sterile ultrasound techniques: Sterile gel and sterile probe covers were used      Number of attempts:  1    Successful placement: yes      Total catheter length (cm):  53    Catheter out on skin (cm):  0    Arm circumference: 31  Post-procedure details:     Post-procedure:  Securement device placed and dressing applied    Assessment:  Blood return through all ports and placement verified by x-ray    Post-procedure complications: none      Patient tolerance of procedure: Tolerated well, no immediate complications    Observer: Yes      Observer name:  KATE Chiu

## 2023-10-23 NOTE — ASSESSMENT & PLAN NOTE
Recent Labs     10/21/23  0538 10/22/23  0334 10/23/23  0135   HGB 9.2* 9.6* 9.4*      Patient noted drop of hemoglobin more than 2 g. Likely due to underlying recent surgery. No active bleeding noted otherwise. Currently hemoglobin appears to be stable.

## 2023-10-23 NOTE — PROGRESS NOTES
Orthopedics  Danealysa Tariq Melquiadestaty 46 y.o. male MRN: [id number]  Unit/Bed#: -01        Subjective:    46 y.o.male seen and examined at the bedside. Lying comfortably in bed. No issues overnight. Pain is 5 out of 10 this morning. Pain medication does help. He denies any fevers, chills or sweats.         Objective:    Labs:  0   Lab Value Date/Time    HCT 29.3 (L) 10/23/2023 0135    HCT 30.7 (L) 10/22/2023 0334    HCT 28.9 (L) 10/21/2023 0538    HGB 9.4 (L) 10/23/2023 0135    HGB 9.6 (L) 10/22/2023 0334    HGB 9.2 (L) 10/21/2023 0538    INR 1.01 09/25/2023 1225    WBC 5.02 10/23/2023 0135    WBC 4.50 10/22/2023 0334    WBC 5.10 10/21/2023 0538    ESR 48 (H) 06/18/2023 1108    .2 (H) 06/18/2023 1108       Meds:    Current Facility-Administered Medications:     acetaminophen (TYLENOL) tablet 650 mg, 650 mg, Oral, Q4H PRN, Barnabas Bernheim, PA-C    acetaminophen (TYLENOL) tablet 975 mg, 975 mg, Oral, Q8H, Barnabas Bernheim, PA-C, 975 mg at 10/23/23 4725    ascorbic acid (VITAMIN C) tablet 500 mg, 500 mg, Oral, BID, Leandra Trevizo PA-C, 500 mg at 10/22/23 1746    aspirin (ECOTRIN LOW STRENGTH) EC tablet 81 mg, 81 mg, Oral, BID, Leandra Trevizo PA-C, 81 mg at 10/22/23 1746    busPIRone (BUSPAR) tablet 7.5 mg, 7.5 mg, Oral, BID, Leandra Trevizo PA-C, 7.5 mg at 10/22/23 1746    calcium carbonate (TUMS) chewable tablet 1,000 mg, 1,000 mg, Oral, Daily PRN, Yosef Bernheim, PA-C    ceFAZolin (ANCEF) IVPB (premix in dextrose) 2,000 mg 50 mL, 2,000 mg, Intravenous, Q8H, Barnabas Bernheim, PA-C, Last Rate: 100 mL/hr at 10/23/23 0635, 2,000 mg at 10/23/23 9953    celecoxib (CeleBREX) capsule 200 mg, 200 mg, Oral, BID, Leandra Trevizo PA-C, 200 mg at 10/22/23 1746    cyclobenzaprine (FLEXERIL) tablet 5 mg, 5 mg, Oral, TID PRN, Barnabas Bernheim, PA-C    docusate sodium (COLACE) capsule 100 mg, 100 mg, Oral, BID, Leandra Trevizo PA-C, 100 mg at 89/32/60 1822    folic acid (FOLVITE) tablet 1 mg, 1 mg, Oral, Daily, Amedeo Hose Jacqueline Milligan PA-C, 1 mg at 10/22/23 7896    gabapentin (NEURONTIN) capsule 300 mg, 300 mg, Oral, HS, Georgena Bernheim Ryan, PA-C, 300 mg at 10/22/23 2203    hydrocortisone 1 % cream, , Topical, BID, Leonardo Cody PA-C, Given at 10/22/23 1747    lactated ringers infusion, 100 mL/hr, Intravenous, Continuous, Arpita Murrell PA-C, Stopped at 10/19/23 1014    metoprolol succinate (TOPROL-XL) 24 hr tablet 100 mg, 100 mg, Oral, Daily, Georgena Bernheim Ryan, PA-C, 100 mg at 10/22/23 2855    multivitamin-minerals (CENTRUM) tablet 1 tablet, 1 tablet, Oral, Daily, Arpita Murrell PA-C, 1 tablet at 10/22/23 5709    nortriptyline (PAMELOR) capsule 100 mg, 100 mg, Oral, HS, Arpita Murrell PA-C, 100 mg at 10/22/23 2203    ondansetron [facility]) injection 4 mg, 4 mg, Intravenous, Q6H PRN, Arpita Murrell PA-C    oxyCODONE (ROXICODONE) immediate release tablet 10 mg, 10 mg, Oral, Q4H PRN, Georgena Bernheim Ryan, PA-C, 10 mg at 10/23/23 0138    oxyCODONE (ROXICODONE) IR tablet 5 mg, 5 mg, Oral, Q4H PRN, Arpita Murrell PA-C, 5 mg at 10/19/23 0856    pantoprazole (PROTONIX) EC tablet 40 mg, 40 mg, Oral, Daily Before Breakfast, Arpita Murrell PA-C, 40 mg at 10/23/23 3874    senna (SENOKOT) tablet 8.6 mg, 1 tablet, Oral, Daily, Georgena Bernheim Ryan, PA-C, 8.6 mg at 10/22/23 3157    simethicone (MYLICON) chewable tablet 80 mg, 80 mg, Oral, 4x Daily PRN, Arpita Murrell PA-C    venlafaxine [facility] 24 hr capsule 150 mg, 150 mg, Oral, HS, Fahad Bernheim Ryan, PA-C, 150 mg at 10/22/23 2203    Blood Culture:   No results found for: "BLOODCX"    Wound Culture:   No results found for: "WOUNDCULT"    Ins and Outs:  I/O last 24 hours: In: 120 [P.O.:120]  Out: -       Orthopedic Physical Exam:    Vitals:    10/22/23 2057   BP: 123/74   Pulse: 72   Resp:    Temp: (!) 97 °F (36.1 °C)   SpO2: 100%   Lying in bed, comfortable at rest, breathing unlabored, NAD.   right Lower Extremity extremity:  RLE is rested in extension, with a pillow underneath the achilles. ACE wrap is in place. The Mepilex Dressing is c/d/I without any saturation. Post-op knee swelling  Thigh is soft. 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 Leland Bolivar 10/18/2023). Plan:    WBAT RLE  Up and out of bed as tolerated  OR Cx + S aureus. ID following, Will be on IV Ancef for 6 weeks. Order for PICC placed, will be done on today. Consent for PICC completed and placed in patient's chart. DVT prophylaxis - ASA 81mg BID. Patient to be discharged with ASA 81mg BID x 1 month  Analgesics as needed  Pillow underneath achilles, not behind the knee while in bed  PT/OT  Dispo: Ok for discharge from ortho perspective once PICC placed. Patient noted to have acute blood loss anemia due to a drop in Hbg of > 2.0g from preop levels, will monitor vital signs and resuscitate with IV fluids as needed H 9.4 this AM.  Stable.         Marilynn Chu PA-C

## 2023-10-23 NOTE — CASE MANAGEMENT
Case Management Discharge Planning Note    Patient name Esther ann [facility]  Location [address] [place] 221/-81 MRN [id number]  : 1975 Date 10/23/2023       Current Admission Date: 10/18/2023  Current Admission Diagnosis:Staphylococcal arthritis of right knee [facility])   Patient Active Problem List    Diagnosis Date Noted    ABLA (acute blood loss anemia) 10/21/2023    Moderate mixed hyperlipidemia not requiring statin therapy 10/03/2023    Major depressive disorder in full remission ([address]) 2023    Mild protein-calorie malnutrition ([address]) 2023    Prediabetes 2023    Anemia 2023    Difficult intubation 2023    PONV (postoperative nausea and vomiting) 2023    Infection of prosthetic right knee joint ([address]) 2023    Anxiety 2023    HTN (hypertension) 2023    Migraine 2023    History of arthroplasty of right knee 2023    Plaque psoriasis 2023    Staphylococcal arthritis of right knee ([address]) 2023      LOS (days): 5  Geometric Mean LOS (GMLOS) (days):   Days to GMLOS:     OBJECTIVE:  Risk of Unplanned Readmission Score: 10.79         Current admission status: Inpatient   Preferred Pharmacy:   [facility] [address]  [address] [place] [id number]  Phone: [phone] Fax: [phone]    Primary Care Provider: DAVID Carrillo    Primary Insurance: [facility]  Secondary Insurance:     DISCHARGE DETAILS:        Spoke with pt and he is in agreement with discharge plan and to have [facility] provide home health care. Anticipate discharge home tomorrow for [facility] of IV infusion at 3p.

## 2023-10-23 NOTE — CASE MANAGEMENT
Case Management Discharge Planning Note    Patient name Yovany Nieves [facility]  Location [address] [place] 221/-13 MRN [id number]  : 1975 Date 10/23/2023       Current Admission Date: 10/18/2023  Current Admission Diagnosis:Staphylococcal arthritis of right knee [facility])   Patient Active Problem List    Diagnosis Date Noted    ABLA (acute blood loss anemia) 10/21/2023    Moderate mixed hyperlipidemia not requiring statin therapy 10/03/2023    Major depressive disorder in full remission ([address]) 2023    Mild protein-calorie malnutrition ([address]) 2023    Prediabetes 2023    Anemia 2023    Difficult intubation 2023    PONV (postoperative nausea and vomiting) 2023    Infection of prosthetic right knee joint ([address]) 2023    Anxiety 2023    HTN (hypertension) 2023    Migraine 2023    History of arthroplasty of right knee 2023    Plaque psoriasis 2023    Staphylococcal arthritis of right knee ([address]) 2023      LOS (days): 5  Geometric Mean LOS (GMLOS) (days):   Days to GMLOS:     OBJECTIVE:  Risk of Unplanned Readmission Score: 10.52         Current admission status: Inpatient   Preferred Pharmacy:   [id number] - [place] [address]  [address] [facility] [address]  Phone: [phone] Fax: [phone]    Primary Care Provider: DAVID Edwards    Primary Insurance: Jake Vaz MA [facility]  Secondary Insurance:     DISCHARGE DETAILS:                                [address]         Is the patient interested in [address] at discharge?: Yes  [address] requested[de-identified] Nursing, Physical [address] Name[de-identified] Other ([facility] [address] [facility])  [facility] External Referral Reason (only applicable if external HHA name selected): Patient has established relationship with provider  [facility] Provider[de-identified] Referring Provider  Home Health Services Needed[de-identified] Administration of IV, IM or SC Medications, Evaluate Functional Status and Safety, Gait/ADL Training, Strengthening/Theraputic Exercises to Improve Function  Homebound Criteria Met[de-identified] Uses an Assist Device (i.e. cane, walker, etc)  Supporting Clincal Findings[de-identified] Limited Endurance         Other Referral/Resources/Interventions Provided:  Interventions: [facility]         Treatment Team Recommendation: Home with [address]  Discharge Destination Plan[de-identified] Home with [address] reserved [address] and [facility] for pt to have 6 weeks IV ABX and home PT. Awaiting PICC placement and coordination of infusion times and nurse availability.

## 2023-10-23 NOTE — DISCHARGE INSTRUCTIONS
Peripherally Inserted Central Catheter     WHAT YOU NEED TO KNOW:   A PICC is an IV placed into a large blood vessel near your heart. It is usually inserted through a blood vessel in your arm. Your PICC may have multiple ports. Ports are tubes where you can inject medicine. A PICC can stay in place for several weeks or months. You may need a PICC to get nutrition, medicine, or fluids. Blood samples can be removed from your PICC and sent to the lab for tests. DISCHARGE INSTRUCTIONS:    Pauline Aldridge patients,    Contact Interventional Radiology at [phone] PATIENTS: Contact Interventional Radiology at [phone]   [facility] PATIENTS: Contact Interventional Radiology at [phone] if:  Blood soaks through your bandage. Your arm or leg feels warm, tender, and painful. It may look swollen and red. You have trouble moving your arm. Your catheter falls out. You have a fever or swelling, redness, pain, or pus where the catheter was inserted. Persistent nausea or vomiting. You cannot flush your catheter, or you feel pain when you flush your catheter. You see a hole or crack in the tubing of your catheter. You see fluid leaking from the insertion site. You run out of supplies to care for your catheter. You have questions or concerns about your condition or care. Peripherally Inserted Central Catheter     WHAT YOU NEED TO KNOW:   A PICC is an IV placed into a large blood vessel near your heart. It is usually inserted through a blood vessel in your arm. Your PICC may have multiple ports. Ports are tubes where you can inject medicine. A PICC can stay in place for several weeks or months. You may need a PICC to get nutrition, medicine, or fluids. Blood samples can be removed from your PICC and sent to the lab for tests.    DISCHARGE INSTRUCTIONS:    Pauline Aldridge patients,    Contact Interventional Radiology at [phone]   [place] PATIENTS: Contact Interventional Radiology at [phone]   [facility] PATIENTS: Contact Interventional Radiology at [phone] if:  Blood soaks through your bandage. Your arm or leg feels warm, tender, and painful. It may look swollen and red. You have trouble moving your arm. Your catheter falls out. You have a fever or swelling, redness, pain, or pus where the catheter was inserted. Persistent nausea or vomiting. You cannot flush your catheter, or you feel pain when you flush your catheter. You see a hole or crack in the tubing of your catheter. You see fluid leaking from the insertion site. You run out of supplies to care for your catheter. You have questions or concerns about your condition or care.

## 2023-10-23 NOTE — PROGRESS NOTES
Patient:    MRN:  [id number]    Shelly Request ID:  [id number]    Level of care reserved:   Infusion    Partner Reserved:  HERI Mariscal, [address] [place] [address] [phone]    Clinical needs requested:    Geography searched:  [id number]    Start of Service:    Request sent:  12:26pm EDT on 10/23/2023 by Viridiana Pelayo    Partner reserved:  12:36pm EDT on 10/23/2023 by Viridiana Pelayo    Choice list shared:

## 2023-10-24 VITALS
DIASTOLIC BLOOD PRESSURE: 72 MMHG | RESPIRATION RATE: 15 BRPM | HEART RATE: 66 BPM | TEMPERATURE: 97.2 F | BODY MASS INDEX: 24.84 KG/M2 | WEIGHT: 177.4 LBS | HEIGHT: 71 IN | OXYGEN SATURATION: 99 % | SYSTOLIC BLOOD PRESSURE: 120 MMHG

## 2023-10-24 PROCEDURE — 99024 POSTOP FOLLOW-UP VISIT: CPT | Performed by: PHYSICIAN ASSISTANT

## 2023-10-24 PROCEDURE — NC001 PR NO CHARGE: Performed by: PHYSICIAN ASSISTANT

## 2023-10-24 PROCEDURE — 99232 SBSQ HOSP IP/OBS MODERATE 35: CPT | Performed by: INTERNAL MEDICINE

## 2023-10-24 RX ADMIN — BUSPIRONE HYDROCHLORIDE 7.5 MG: 15 TABLET ORAL at 08:19

## 2023-10-24 RX ADMIN — CELECOXIB 200 MG: 100 CAPSULE ORAL at 08:19

## 2023-10-24 RX ADMIN — ASPIRIN 81 MG: 81 TABLET, COATED ORAL at 08:21

## 2023-10-24 RX ADMIN — ACETAMINOPHEN 975 MG: 325 TABLET, FILM COATED ORAL at 06:29

## 2023-10-24 RX ADMIN — METOPROLOL SUCCINATE 100 MG: 50 TABLET, EXTENDED RELEASE ORAL at 08:20

## 2023-10-24 RX ADMIN — FOLIC ACID 1 MG: 1 TABLET ORAL at 08:21

## 2023-10-24 RX ADMIN — PANTOPRAZOLE SODIUM 40 MG: 40 TABLET, DELAYED RELEASE ORAL at 06:30

## 2023-10-24 RX ADMIN — CEFAZOLIN SODIUM 2000 MG: 2 SOLUTION INTRAVENOUS at 06:30

## 2023-10-24 RX ADMIN — MULTIPLE VITAMINS W/ MINERALS TAB 1 TABLET: TAB ORAL at 08:21

## 2023-10-24 RX ADMIN — DOCUSATE SODIUM 100 MG: 100 CAPSULE, LIQUID FILLED ORAL at 08:19

## 2023-10-24 RX ADMIN — OXYCODONE HYDROCHLORIDE 5 MG: 5 TABLET ORAL at 06:39

## 2023-10-24 RX ADMIN — SENNOSIDES 8.6 MG: 8.6 TABLET, FILM COATED ORAL at 08:20

## 2023-10-24 RX ADMIN — OXYCODONE HYDROCHLORIDE AND ACETAMINOPHEN 500 MG: 500 TABLET ORAL at 08:19

## 2023-10-24 RX ADMIN — HYDROCORTISONE: 1 CREAM TOPICAL at 08:21

## 2023-10-24 NOTE — PLAN OF CARE
Problem: MOBILITY - ADULT  Goal: Maintain or return to baseline ADL function  Description: INTERVENTIONS:  -  Assess patient's ability to carry out ADLs; assess patient's baseline for ADL function and identify physical deficits which impact ability to perform ADLs (bathing, care of mouth/teeth, toileting, grooming, dressing, etc.)  - Assess/evaluate cause of self-care deficits   - Assess range of motion  - Assess patient's mobility; develop plan if impaired  - Assess patient's need for assistive devices and provide as appropriate  - Encourage maximum independence but intervene and supervise when necessary  - Involve family in performance of ADLs  - Assess for home care needs following discharge   - Consider OT consult to assist with ADL evaluation and planning for discharge  - Provide patient education as appropriate  Outcome: Progressing  Goal: Maintains/Returns to pre admission functional level  Description: INTERVENTIONS:  - Perform BMAT or MOVE assessment daily.   - Set and communicate daily mobility goal to care team and patient/family/caregiver. - Collaborate with rehabilitation services on mobility goals if consulted  - Perform Range of Motion 3 times a day. - Reposition patient every 2 hours.   - Dangle patient 2 times a day  - Stand patient 2 times a day  - Ambulate patient 2 times a day  - Out of bed to chair 2 times a day   - Out of bed for meals 2 times a day  - Out of bed for toileting  - Record patient progress and toleration of activity level   Outcome: Progressing     Problem: PAIN - ADULT  Goal: Verbalizes/displays adequate comfort level or baseline comfort level  Description: Interventions:  - Encourage patient to monitor pain and request assistance  - Assess pain using appropriate pain scale  - Administer analgesics based on type and severity of pain and evaluate response  - Implement non-pharmacological measures as appropriate and evaluate response  - Consider cultural and social influences on pain and pain management  - Notify physician/advanced practitioner if interventions unsuccessful or patient reports new pain  Outcome: Progressing     Problem: INFECTION - ADULT  Goal: Absence or prevention of progression during hospitalization  Description: INTERVENTIONS:  - Assess and monitor for signs and symptoms of infection  - Monitor lab/diagnostic results  - Monitor all insertion sites, i.e. indwelling lines, tubes, and drains  - Monitor endotracheal if appropriate and nasal secretions for changes in amount and color  - [place] appropriate cooling/warming therapies per order  - Administer medications as ordered  - Instruct and encourage patient and family to use good hand hygiene technique  - Identify and instruct in appropriate isolation precautions for identified infection/condition  Outcome: Progressing

## 2023-10-24 NOTE — PROGRESS NOTES
[facility]  Progress Note  Name: Arturo Ham I  MRN: [id number]  Unit/Bed#: -01 I Date of Admission: 10/18/2023   Date of Service: 10/24/2023 I Hospital Day: 6    Assessment/Plan   * Staphylococcal arthritis of right knee [facility])  Assessment & Plan  Discharge per primary team patient with history of right TKR status post PCI with MSSA completed course of antibiotic. Follows up with ID outpatient. Currently s/p right TKA revision for second stage PCI on 10/18/2023. Orthopedic team primary. OR cultures again coming with Staph aureus likely MSSA. Continue Ancef  ID consulted  PICC line placed   Dc on antibiotics per id recommendations     ABLA (acute blood loss anemia)  Assessment & Plan  Recent Labs     10/22/23  0334 10/23/23  0135   HGB 9.6* 9.4*        Patient noted drop of hemoglobin more than 2 g. Likely due to underlying recent surgery. No active bleeding noted otherwise. Currently hemoglobin appears to be stable. Moderate mixed hyperlipidemia not requiring statin therapy  Assessment & Plan  Not on any statin therapy    Major depressive disorder in full remission (HCC)  Assessment & Plan  Continue buspirone, Effexor, nortriptyline    Plaque psoriasis  Assessment & Plan  At home patient is on methotrexate, currently taking Celebrex twice daily    HTN (hypertension)  Assessment & Plan  Blood Pressure reviewed and acceptable. Continue home medication Toprol- mg daily                 VTE Pharmacologic Prophylaxis:   Low Risk (Score 0-2) - Encourage Ambulation. Total Time Spent on Date of Encounter in care of patient: 30 mins.  This time was spent on one or more of the following: performing physical exam; counseling and coordination of care; obtaining or reviewing history; documenting in the medical record; reviewing/ordering tests, medications or procedures; communicating with other healthcare professionals and discussing with patient's family/caregivers. Current Length of Stay: 6 day(s)  Current Patient Status: Inpatient   Certification Statement:  dc today   Discharge Plan: [facility] is following this patient on consult. They are medically stable for discharge when deemed appropriate by primary service. Code Status: Prior    Subjective:   Pain in his right knee. No sob, cp    Objective:     Vitals:   Temp (24hrs), Av °F (36.1 °C), Min:96.6 °F (35.9 °C), Max:97.2 °F (36.2 °C)    Temp:  [96.6 °F (35.9 °C)-97.2 °F (36.2 °C)] 97.2 °F (36.2 °C)  HR:  [62-81] 66  Resp:  [15] 15  BP: (114-121)/(72-80) 120/72  SpO2:  [99 %-100 %] 99 %  Body mass index is 24.74 kg/m². Input and Output Summary (last 24 hours): Intake/Output Summary (Last 24 hours) at 10/24/2023 1443  Last data filed at 10/23/2023 1758  Gross per 24 hour   Intake 240 ml   Output --   Net 240 ml       Physical Exam:   Physical Exam  Vitals and nursing note reviewed. Constitutional:       General: He is not in acute distress. Appearance: He is not diaphoretic. HENT:      Head: Normocephalic. Eyes:      General: No scleral icterus. Right eye: No discharge. Left eye: No discharge. Cardiovascular:      Rate and Rhythm: Normal rate and regular rhythm. Heart sounds: No murmur heard. Pulmonary:      Effort: Pulmonary effort is normal. No respiratory distress. Breath sounds: Normal breath sounds. No wheezing, rhonchi or rales. Abdominal:      General: There is no distension. Palpations: Abdomen is soft. Tenderness: There is no abdominal tenderness. There is no guarding or rebound. Musculoskeletal:      Cervical back: Normal range of motion. Right lower leg: No edema. Left lower leg: No edema. Skin:     General: Skin is warm. Neurological:      Mental Status: He is alert and oriented to person, place, and time.    Psychiatric:         Mood and Affect: Mood normal.         Behavior: Behavior normal.         Thought Content: Thought content normal.         Judgment: Judgment normal.          Additional Data:     Labs:  Results from last 7 days   Lab Units 10/23/23  0135   WBC Thousand/uL 5.02   HEMOGLOBIN g/dL 9.4*   HEMATOCRIT % 29.3*   PLATELETS Thousands/uL 497*     Results from last 7 days   Lab Units 10/23/23  0135   SODIUM mmol/L 136   POTASSIUM mmol/L 3.7   CHLORIDE mmol/L 99   CO2 mmol/L 31   BUN mg/dL 12   CREATININE mg/dL 0.74   ANION GAP mmol/L 6   CALCIUM mg/dL 9.1   GLUCOSE RANDOM mg/dL 120                       Lines/Drains:  Invasive Devices       Peripherally Inserted Central Catheter Line  Duration             PICC Line 36/60/11 Left Basilic 1 day                    Central Line:  Goal for removal: N/A - Discharging with PICC for IV ABX/medications             Imaging: No pertinent imaging reviewed. Recent Cultures (last 7 days):   Results from last 7 days   Lab Units 10/18/23  1554 10/18/23  1552   GRAM STAIN RESULT  No Polys or Bacteria seen  No Polys or Bacteria seen Rare Polys  No organisms seen       Last 24 Hours Medication List:        Today, Patient Was Seen By: Facundo Burk MD    **Please Note: This note may have been constructed using a voice recognition system. **

## 2023-10-24 NOTE — PLAN OF CARE
Problem: MOBILITY - ADULT  Goal: Maintain or return to baseline ADL function  Description: INTERVENTIONS:  -  Assess patient's ability to carry out ADLs; assess patient's baseline for ADL function and identify physical deficits which impact ability to perform ADLs (bathing, care of mouth/teeth, toileting, grooming, dressing, etc.)  - Assess/evaluate cause of self-care deficits   - Assess range of motion  - Assess patient's mobility; develop plan if impaired  - Assess patient's need for assistive devices and provide as appropriate  - Encourage maximum independence but intervene and supervise when necessary  - Involve family in performance of ADLs  - Assess for home care needs following discharge   - Consider OT consult to assist with ADL evaluation and planning for discharge  - Provide patient education as appropriate  Outcome: Progressing  Goal: Maintains/Returns to pre admission functional level  Description: INTERVENTIONS:  - Perform BMAT or MOVE assessment daily.   - Set and communicate daily mobility goal to care team and patient/family/caregiver. - Collaborate with rehabilitation services on mobility goals if consulted  - Perform Range of Motion 3 times a day. - Reposition patient every 2 hours.   - Dangle patient 2 times a day  - Stand patient 2 times a day  - Ambulate patient 2 times a day  - Out of bed to chair 2 times a day   - Out of bed for meals 2 times a day  - Out of bed for toileting  - Record patient progress and toleration of activity level   Outcome: Progressing     Problem: PAIN - ADULT  Goal: Verbalizes/displays adequate comfort level or baseline comfort level  Description: Interventions:  - Encourage patient to monitor pain and request assistance  - Assess pain using appropriate pain scale  - Administer analgesics based on type and severity of pain and evaluate response  - Implement non-pharmacological measures as appropriate and evaluate response  - Consider cultural and social influences on pain and pain management  - Notify physician/advanced practitioner if interventions unsuccessful or patient reports new pain  Outcome: Progressing     Problem: INFECTION - ADULT  Goal: Absence or prevention of progression during hospitalization  Description: INTERVENTIONS:  - Assess and monitor for signs and symptoms of infection  - Monitor lab/diagnostic results  - Monitor all insertion sites, i.e. indwelling lines, tubes, and drains  - Monitor endotracheal if appropriate and nasal secretions for changes in amount and color  - [place] appropriate cooling/warming therapies per order  - Administer medications as ordered  - Instruct and encourage patient and family to use good hand hygiene technique  - Identify and instruct in appropriate isolation precautions for identified infection/condition  Outcome: Progressing  Goal: Absence of fever/infection during neutropenic period  Description: INTERVENTIONS:  - Monitor WBC    Outcome: Progressing     Problem: SAFETY ADULT  Goal: Patient will remain free of falls  Description: INTERVENTIONS:  - Educate patient/family on patient safety including physical limitations  - Instruct patient to call for assistance with activity   - Consult OT/PT to assist with strengthening/mobility   - Keep Call bell within reach  - Keep bed low and locked with side rails adjusted as appropriate  - Keep care items and personal belongings within reach  - Initiate and maintain comfort rounds  - Make Fall Risk Sign visible to staff  - Offer Toileting every 2 Hours, in advance of need  - Initiate/Maintain bed alarm  - Obtain necessary fall risk management equipment:   - Apply yellow socks and bracelet for high fall risk patients  - Consider moving patient to room near nurses station  Outcome: Progressing     Problem: DISCHARGE PLANNING  Goal: Discharge to home or other facility with appropriate resources  Description: INTERVENTIONS:  - Identify barriers to discharge w/patient and caregiver  - Arrange for needed discharge resources and transportation as appropriate  - Identify discharge learning needs (meds, wound care, etc.)  - Arrange for interpretive services to assist at discharge as needed  - Refer to Case Management Department for coordinating discharge planning if the patient needs post-hospital services based on physician/advanced practitioner order or complex needs related to functional status, cognitive ability, or social support system  Outcome: Progressing     Problem: Knowledge Deficit  Goal: Patient/family/caregiver demonstrates understanding of disease process, treatment plan, medications, and discharge instructions  Description: Complete learning assessment and assess knowledge base. Interventions:  - Provide teaching at level of understanding  - Provide teaching via preferred learning methods  Outcome: Progressing     Problem: Potential for Falls  Goal: Patient will remain free of falls  Description: INTERVENTIONS:  - Educate patient/family on patient safety including physical limitations  - Instruct patient to call for assistance with activity   - Consult OT/PT to assist with strengthening/mobility   - Keep Call bell within reach  - Keep bed low and locked with side rails adjusted as appropriate  - Keep care items and personal belongings within reach  - Initiate and maintain comfort rounds  - Make Fall Risk Sign visible to staff  - Offer Toileting every 2 Hours, in advance of need  - Initiate/Maintain bed alarm  - Obtain necessary fall risk management equipment:   - Apply yellow socks and bracelet for high fall risk patients  - Consider moving patient to room near nurses station  Outcome: Progressing     Problem: Nutrition/Hydration-ADULT  Goal: Nutrient/Hydration intake appropriate for improving, restoring or maintaining nutritional needs  Description: Monitor and assess patient's nutrition/hydration status for malnutrition.  Collaborate with interdisciplinary team and initiate plan and interventions as ordered. Monitor patient's weight and dietary intake as ordered or per policy. Utilize nutrition screening tool and intervene as necessary. Determine patient's food preferences and provide high-protein, high-caloric foods as appropriate.      INTERVENTIONS:  - Monitor oral intake, urinary output, labs, and treatment plans  - Assess nutrition and hydration status and recommend course of action  - Evaluate amount of meals eaten  - Assist patient with eating if necessary   - Allow adequate time for meals  - Recommend/ encourage appropriate diets, oral nutritional supplements, and vitamin/mineral supplements  - Order, calculate, and assess calorie counts as needed  - Recommend, monitor, and adjust tube feedings and TPN/PPN based on assessed needs  - Assess need for intravenous fluids  - Provide specific nutrition/hydration education as appropriate  - Include patient/family/caregiver in decisions related to nutrition  Outcome: Progressing

## 2023-10-24 NOTE — ASSESSMENT & PLAN NOTE
Blood Pressure reviewed and acceptable.     Continue home medication Toprol- mg daily Call placed to patient and message relayed, she verbalized understanding.

## 2023-10-24 NOTE — ASSESSMENT & PLAN NOTE
Recent Labs     10/22/23  0334 10/23/23  0135   HGB 9.6* 9.4*        Patient noted drop of hemoglobin more than 2 g. Likely due to underlying recent surgery. No active bleeding noted otherwise. Currently hemoglobin appears to be stable.

## 2023-10-24 NOTE — PROGRESS NOTES
Orthopedics  Ane Bebe Brown 46 y.o. male MRN: [id number]  Unit/Bed#: UB IR        Subjective:    46 y.o.male seen and examined this morning. He is walking in his room to go use the bathroom. No issues overnight. Denies any fevers, chills or sweats. Deborah Milks to go home. PICC placed yesterday.         Objective:    Labs:  0   Lab Value Date/Time    HCT 29.3 (L) 10/23/2023 0135    HCT 30.7 (L) 10/22/2023 0334    HCT 28.9 (L) 10/21/2023 0538    HGB 9.4 (L) 10/23/2023 0135    HGB 9.6 (L) 10/22/2023 0334    HGB 9.2 (L) 10/21/2023 0538    INR 1.01 09/25/2023 1225    WBC 5.02 10/23/2023 0135    WBC 4.50 10/22/2023 0334    WBC 5.10 10/21/2023 0538    ESR 48 (H) 06/18/2023 1108    .2 (H) 06/18/2023 1108       Meds:    Current Facility-Administered Medications:     acetaminophen (TYLENOL) tablet 650 mg, 650 mg, Oral, Q4H PRN, Asia Holder PA-C    acetaminophen (TYLENOL) tablet 975 mg, 975 mg, Oral, Q8H, Asia Holder PA-C, 975 mg at 10/24/23 8095    ascorbic acid (VITAMIN C) tablet 500 mg, 500 mg, Oral, BID, Sheng Trevizo PA-C, 500 mg at 10/23/23 1811    aspirin (ECOTRIN LOW STRENGTH) EC tablet 81 mg, 81 mg, Oral, BID, Sheng Trevizo PA-C, 81 mg at 10/23/23 1811    busPIRone (BUSPAR) tablet 7.5 mg, 7.5 mg, Oral, BID, Sheng Trevizo PA-C, 7.5 mg at 10/23/23 1811    calcium carbonate (TUMS) chewable tablet 1,000 mg, 1,000 mg, Oral, Daily PRN, Asia Holder PA-C    ceFAZolin (ANCEF) IVPB (premix in dextrose) 2,000 mg 50 mL, 2,000 mg, Intravenous, Q8H, Asia Holder PA-C, Last Rate: 100 mL/hr at 10/24/23 0630, 2,000 mg at 10/24/23 0630    celecoxib (CeleBREX) capsule 200 mg, 200 mg, Oral, BID, Sheng Trevizo PA-C, 200 mg at 10/23/23 1811    cyclobenzaprine (FLEXERIL) tablet 5 mg, 5 mg, Oral, TID PRN, Asia Holder PA-C, 5 mg at 10/23/23 1806    docusate sodium (COLACE) capsule 100 mg, 100 mg, Oral, BID, Sheng Trevizo PA-C, 100 mg at 41/49/17 0174    folic acid (FOLVITE) tablet 1 mg, 1 mg, Oral, Daily, BALTAZAR Lyons-C, 1 mg at 10/23/23 3659    gabapentin (NEURONTIN) capsule 300 mg, 300 mg, Oral, HS, Arie Trevizo PA-C, 300 mg at 10/23/23 2151    hydrocortisone 1 % cream, , Topical, BID, Dhruv Cody PA-C, Given at 10/23/23 9953    metoprolol succinate (TOPROL-XL) 24 hr tablet 100 mg, 100 mg, Oral, Daily, Arie Trevizo PA-C, 100 mg at 10/23/23 6144    multivitamin-minerals (CENTRUM) tablet 1 tablet, 1 tablet, Oral, Daily, JERMAN Chand PA-C, 1 tablet at 10/23/23 3348    nortriptyline (PAMELOR) capsule 100 mg, 100 mg, Oral, HS, BALTAZAR Lyons-EDDIE, 100 mg at 10/23/23 2151    ondansetron [facility]) injection 4 mg, 4 mg, Intravenous, Q6H PRN, JERMAN Chand PA-C    oxyCODONE (ROXICODONE) immediate release tablet 10 mg, 10 mg, Oral, Q4H PRN, BALTAZAR Lyons-C, 10 mg at 10/23/23 1806    oxyCODONE (ROXICODONE) IR tablet 5 mg, 5 mg, Oral, Q4H PRN, ALLTEBALTAZAR Cheung-C, 5 mg at 10/24/23 2659    pantoprazole (PROTONIX) EC tablet 40 mg, 40 mg, Oral, Daily Before Breakfast, JERMAN Chand PA-C, 40 mg at 10/24/23 0630    senna (SENOKOT) tablet 8.6 mg, 1 tablet, Oral, Daily, Arie Trevizo PA-C, 8.6 mg at 10/23/23 8642    simethicone (MYLICON) chewable tablet 80 mg, 80 mg, Oral, 4x Daily PRN, JERMAN Chand PA-C    venlafaxine [facility] 24 hr capsule 150 mg, 150 mg, Oral, HS, Arie Trevizo PA-C, 150 mg at 10/23/23 2151    Blood Culture:   No results found for: "BLOODCX"    Wound Culture:   No results found for: "WOUNDCULT"    Ins and Outs:  I/O last 24 hours: In: 360 [P.O.:360]  Out: -       Orthopedic Physical Exam:    Vitals:    10/23/23 2211   BP: 114/72   Pulse: 62   Resp:    Temp: (!) 96.6 °F (35.9 °C)   SpO2: 100%   Lying in bed, comfortable at rest, breathing unlabored, NAD. right Lower Extremity extremity:  RLE is rested in extension, with a pillow underneath the achilles. ACE wrap is in place. The Mepilex Dressing is c/d/I without any saturation.   Post-op knee swelling  Thigh is soft. 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 Nati Land 10/18/2023). Plan:    WBAT RLE  Up and out of bed as tolerated  OR Cx + S aureus. ID following, Will be on IV Ancef for 6 weeks. PICC placed yesterday. DVT prophylaxis - ASA 81mg BID. Patient to be discharged with ASA 81mg BID x 1 month  Analgesics as needed  Pillow underneath achilles, not behind the knee while in bed  PT/OT  Dispo: Ema Zavala for discharge from ortho perspective. Discharge home today. He is set up for home IV antibiotic treatment starting today at 3 PM.  Patient noted to have acute blood loss anemia due to a drop in Hbg of > 2.0g from preop levels, will monitor vital signs and resuscitate with IV fluids as needed Stable.         Rossana Grey PA-C

## 2023-10-24 NOTE — ASSESSMENT & PLAN NOTE
Discharge per primary team patient with history of right TKR status post PCI with MSSA completed course of antibiotic. Follows up with ID outpatient. Currently s/p right TKA revision for second stage PCI on 10/18/2023. Orthopedic team primary. OR cultures again coming with Staph aureus likely MSSA.     Continue Ancef  ID consulted  PICC line placed   Dc on antibiotics per id recommendations

## 2023-10-25 ENCOUNTER — TELEPHONE (OUTPATIENT)
Dept: OBGYN CLINIC | Facility: HOSPITAL | Age: 48
End: 2023-10-25

## 2023-10-25 ENCOUNTER — TELEPHONE (OUTPATIENT)
Age: 48
End: 2023-10-25

## 2023-10-25 ENCOUNTER — TRANSITIONAL CARE MANAGEMENT (OUTPATIENT)
Dept: FAMILY MEDICINE CLINIC | Facility: CLINIC | Age: 48
End: 2023-10-25

## 2023-10-25 NOTE — TELEPHONE ENCOUNTER
Called and spoke with Conchita Vallejo and relayed [employer].  No further questions, she will not have pt take doxycycline

## 2023-10-25 NOTE — TELEPHONE ENCOUNTER
Caller: Marsha Stewart from [facility]    Doctor: Leland Bolivar    Reason for call: should patient be taking Doxycycline? He is getting the IV Cefazolin.     Call back#: [phone]

## 2023-10-25 NOTE — UTILIZATION REVIEW
NOTIFICATION OF ADMISSION DISCHARGE   This is a Notification of Discharge from [facility]. Please be advised that this patient has been discharge from our facility. Below you will find the admission and discharge date and time including the patient’s disposition. UTILIZATION REVIEW CONTACT:  Roseanne Mitchell  Utilization   Network Utilization Review Department  Phone: [phone] carefully listen to the prompts. All voicemails are confidential.  Email: [email or web address]     ADMISSION INFORMATION  PRESENTATION DATE: 10/18/2023 11:39 AM  OBERVATION ADMISSION DATE:   INPATIENT ADMISSION DATE: 10/18/23  6:36 PM   DISCHARGE DATE: 10/24/2023 12:11 PM   DISPOSITION:Home/Self Care    Network Utilization Review Department  ATTENTION: Please call with any questions or concerns to [phone] and carefully listen to the prompts so that you are directed to the right person. All voicemails are confidential.   For Discharge needs, contact Care Management DC Support Team at [phone] opt. 2  Send all requests for admission clinical reviews, approved or denied determinations and any other requests to dedicated fax number below belonging to the campus where the patient is receiving treatment.  List of dedicated fax numbers for the Facilities:  [place] DENIALS (Administrative/Medical Necessity) [phone]   DISCHARGE SUPPORT TEAM (Network) [phone] [facility] (Maternity/NICU/Pediatrics) [phone]   [address] [facility] [address] [phone] Armin Parada [phone]   [address] [place] [address]  [address]  [phone]

## 2023-10-26 NOTE — TELEPHONE ENCOUNTER
Patient contacted for a postoperative follow up assessment. Patient reports "doing good."  Patient states current pain level of a  4/10  when sitting and 5/10 when walking with RW. Patient denies increase in swelling and dressing is clean, dry and intact. Patient is icing the site regularly. PT: Home PT first session completed 10/25, next scheduled for Monday 10/30  Post op: Tuesday 11/7/23    We reviewed patients AVS medication list. Patient is taking Tylenol 1000mg every 8 hours, Celebrex 200mg BID, Norco 5-325mg 1 tablet every 6 hours PRN, ASA 81mg BID, Zofran 4mg PRN x1 (10/25 pm). Patient has had a BM. Patient is not taking Senokot 8.6-50mg Daily or Flexeril 5mg PRN. Educated patient on Tylenol max dosing of 3000-4000mg/24 hr. Discussed options with patient and he agrees with plan to continue Norco 5-325mg 1 tablet every 6 hours PRN but decreasing additional Tylenol to 500mg every 8 hours. He will also consider Flexeril 5mg PRN to offset decrease in Tylenol if needed. Patient receiving IV Cefazolin via PICC and does not need to take doxycycline PO -- see telephone encounter 10/25/23. Patient reports nausea x1 episode 10/25 pm, resolved with Zofran 4mg and no recurrent symptoms. Patient denies vomiting, abdominal pain, chest pain, shortness of breath, fever, dizziness and calf pain. Patient does not have any other questions or concerns at this time. Pt was encouraged to call with any questions, concerns or issues.

## 2023-10-30 ENCOUNTER — TELEPHONE (OUTPATIENT)
Dept: INFECTIOUS DISEASES | Facility: CLINIC | Age: 48
End: 2023-10-30

## 2023-10-30 NOTE — TELEPHONE ENCOUNTER
Contacted and spoke with patient regarding follow up with us. Patient was seen by UB team in hospital and discharged with 6 weeks of antibiotics. He was offered a Thursday appointment, but cannot drive and therefore is unable to accommodate a ride so soon. Patient states he can follow up in [facility] or [place]. Next available office in [facility] is 11/21, which is soonest. He is aware that we can offer that to him, but he would need to contact Zayra's office in the mean time if issues, until he is seen by us for formal transition of care as he was told he will need to transition to possible lifelong suppression.     Dx: PJI Rt TKA    His end date is: 12/2    [facility]

## 2023-11-07 ENCOUNTER — OFFICE VISIT (OUTPATIENT)
Dept: OBGYN CLINIC | Facility: MEDICAL CENTER | Age: 48
End: 2023-11-07

## 2023-11-07 ENCOUNTER — APPOINTMENT (OUTPATIENT)
Dept: RADIOLOGY | Facility: MEDICAL CENTER | Age: 48
End: 2023-11-07
Payer: COMMERCIAL

## 2023-11-07 VITALS
DIASTOLIC BLOOD PRESSURE: 91 MMHG | WEIGHT: 182 LBS | BODY MASS INDEX: 25.48 KG/M2 | SYSTOLIC BLOOD PRESSURE: 138 MMHG | HEART RATE: 65 BPM | HEIGHT: 71 IN

## 2023-11-07 DIAGNOSIS — Z96.651 AFTERCARE FOLLOWING RIGHT KNEE JOINT REPLACEMENT SURGERY: ICD-10-CM

## 2023-11-07 DIAGNOSIS — Z96.651 STATUS POST REVISION OF TOTAL KNEE REPLACEMENT, RIGHT: Primary | ICD-10-CM

## 2023-11-07 DIAGNOSIS — Z47.1 AFTERCARE FOLLOWING RIGHT KNEE JOINT REPLACEMENT SURGERY: ICD-10-CM

## 2023-11-07 PROCEDURE — 99024 POSTOP FOLLOW-UP VISIT: CPT | Performed by: STUDENT IN AN ORGANIZED HEALTH CARE EDUCATION/TRAINING PROGRAM

## 2023-11-07 PROCEDURE — 73562 X-RAY EXAM OF KNEE 3: CPT

## 2023-11-07 RX ORDER — OXYCODONE HYDROCHLORIDE 5 MG/1
5 TABLET ORAL EVERY 4 HOURS PRN
Refills: 0 | Status: CANCELLED | OUTPATIENT
Start: 2023-11-07

## 2023-11-07 RX ORDER — HYDROCODONE BITARTRATE AND ACETAMINOPHEN 5; 325 MG/1; MG/1
1 TABLET ORAL EVERY 4 HOURS PRN
Qty: 42 TABLET | Refills: 0 | Status: SHIPPED | OUTPATIENT
Start: 2023-11-07

## 2023-11-07 NOTE — PROGRESS NOTES
Subjective: 46 y.o. male presents to the office 3 weeks s/p revision right total knee arthroplasty performed on 10/18/2023. He is also s/p R knee I&D, explant, articulating antibiotic spacer for R knee PJI 7/28. Patient seen and examined. Pain controlled with tylenol. He notes the only time he has pain is at night and with physical therapy. Progressing well. Incision without drainage. Denies fevers or chills    Physical Exam:  Incision: clean, dry, and intact  ROM: 5-90 with 10 degree extensor lag present (pre-op motion prior to spacer 50-70)  5/5 IP/Q/HS/TA/GS, 2+ DP/PT, SILT DP/SP/S/S/TN    XR right knee 11/07/2023: s/p attune revision total knee arthroplasty, components in good position. No fracture or dislocation     Assessment/Plan:  3 weeks s/p revision right total knee arthroplasty with second stage PJI performed on 10/18/2023. He is also s/p R knee I&D, explant, articulating antibiotic spacer for R knee PJI 7/28.    - continue multi-modal pain control. Refill of oxycodone 5 mg prescribed for pain control during physical therapy.   - Continue ancef and then transition to PO abx for minimum of 6 months, potentially longer as cultures at time of revision were positive for MSSA  - Weight bearing status: as tolerated  - DVT ppx: 1 more week of aspirin 81 mg BID  - Incision care: avoid submerging  - Continue PT/OT  - F/U in 3 weeks    Scribe Attestation      I,:  Rosi Smith am acting as a scribe while in the presence of the attending physician.:       I,:  Crystal Holt, DO personally performed the services described in this documentation    as scribed in my presence.:

## 2023-11-14 ENCOUNTER — TELEPHONE (OUTPATIENT)
Dept: INFECTIOUS DISEASES | Facility: CLINIC | Age: 48
End: 2023-11-14

## 2023-11-14 NOTE — TELEPHONE ENCOUNTER
Contacted [place] [facility]. Spoke with [employer]. Patient who is on our schedule next Tuesday had previously been seen by [place]. She is going to ensure that Dr. Love Him reaches out to our office doc so that warm handoff can be made, and no disruption with patient's scheduled appointment 11/21 will be made. They state they will contact Jaret Olivia at this time for this, as she is our office doctor.

## 2023-11-15 DIAGNOSIS — F32.5 MAJOR DEPRESSIVE DISORDER IN FULL REMISSION, UNSPECIFIED WHETHER RECURRENT (HCC): ICD-10-CM

## 2023-11-16 RX ORDER — NORTRIPTYLINE HYDROCHLORIDE 50 MG/1
50 CAPSULE ORAL
Qty: 90 CAPSULE | Refills: 0 | Status: SHIPPED | OUTPATIENT
Start: 2023-11-16

## 2023-11-21 ENCOUNTER — CONSULT (OUTPATIENT)
Dept: INFECTIOUS DISEASES | Facility: CLINIC | Age: 48
End: 2023-11-21
Payer: COMMERCIAL

## 2023-11-21 VITALS
SYSTOLIC BLOOD PRESSURE: 130 MMHG | DIASTOLIC BLOOD PRESSURE: 88 MMHG | WEIGHT: 186.2 LBS | HEART RATE: 75 BPM | TEMPERATURE: 97.9 F | OXYGEN SATURATION: 98 % | BODY MASS INDEX: 25.97 KG/M2

## 2023-11-21 DIAGNOSIS — L40.0 PLAQUE PSORIASIS: ICD-10-CM

## 2023-11-21 DIAGNOSIS — Z45.2 PICC (PERIPHERALLY INSERTED CENTRAL CATHETER) IN PLACE: ICD-10-CM

## 2023-11-21 DIAGNOSIS — M06.9 RA (RHEUMATOID ARTHRITIS) (HCC): ICD-10-CM

## 2023-11-21 DIAGNOSIS — T84.53XD INFECTION ASSOCIATED WITH INTERNAL RIGHT KNEE PROSTHESIS, SUBSEQUENT ENCOUNTER: Primary | ICD-10-CM

## 2023-11-21 PROCEDURE — 99213 OFFICE O/P EST LOW 20 MIN: CPT | Performed by: NURSE PRACTITIONER

## 2023-11-21 RX ORDER — CEFADROXIL 500 MG/1
500 CAPSULE ORAL EVERY 12 HOURS SCHEDULED
Qty: 60 CAPSULE | Refills: 2 | Status: SHIPPED | OUTPATIENT
Start: 2023-11-21 | End: 2023-12-21

## 2023-11-21 NOTE — PROGRESS NOTES
Outpatient Progress Note - [facility] Infectious Disease   Keri Brown 46 y.o. male MRN: [id number]  Encounter: [id number]    Impression/Plan:  1. Recurring R prosthetic knee infection. The patient had his initial TKA in 1998 and a revision in 2016. After his revision he had a fall and tore his quadricepts which required surgical repair. Patient has experienced acute worsening pain and limited ROM. He underwent outpatient aspiration and synovasure grew MSSA. Patient then completed R total knee arthroplasty, I&D down to and including bone, and insertion of biodegradable drug delivery device abx spacer (stimulan) on 7/28/23. All hardware and previous sutures from quadriceps repair removed. He completed 6 weeks of post-op Cefazolin treatment. On 10/18/2023 he underwent part 2 of his revision. Operative cultures again grew MSSA. New PICC was placed and [facility] team started him on IV cefazolin with plans of a 6-week course followed by lifelong oral suppression. The patient has been tolerating his antibiotics without difficulty. I reviewed his most recent lab work from [facility] on 11/15/2023 which showed stable normal WBC count = 4.3, and stable creatinine = 0.77. I will continue patient on his 6 week treatment course as planned through 12/2/2023. On 12/3/2023 the patient will transition to Cefodroxil, 500mg BID, for lifelong suppression.   -continue IV cefazolin, 2g q8, through 12/2/2023  -continue weekly CBCD and creatinine while on IV antibiotic  -PICC to be removed by home RN after final dose of IV antibiotic on 12/2/2023  -on 12/3/2023 the patient will transition to long term suppression with oral Duricef, 500mg BID  -prescription for Duricef sent to [facility] in [place]  -continue outpatient orthopedic surgery follow up  -patient to return to the outpatient infectious disease office in 1 month for follow up    2. Rheumatoid arthritis and plaque psoriasis.  Patient on chronic methotrexate. This is risk factor for complications with healing and infection. 3. LUE PICC intact.  -home RN to continue routine exams and care of PICC  -PICC to be removed by home RN after final dose of IV antibiotic on 12/2/2023    Above plan was discussed in detail with patient in the exam room. Antibiotics:  Cefazolin    Subjective:  Patient presents for treatment of infected R knee PJI. He was previously treated by our team after first surgery and placement of spacer. He completed 6 weeks of IV antibiotics. He returned to [facility] for his part-2 surgery and was found to have ongoing growth of MSSA on operative cultures. He was seen by ID at [facility] and had PICC placed to finish a 6-week treatment course of IV Cefazolin. Patient intended to return to [facility] for care instead of transfering to [facility]. They did monitor outpatient lab work but he has not been seen for in person assessment by an ID provider since his hospital discharge on 10/24/2023. [facility]'s ID did request formal handoff from [place] which occurred on 11/15/2023. Patient has been tolerating his antibiotic without difficulty. He's had no issues with his PICC. Reports he is following outpatient with orthopedics for R knee. He has been cleared to drive again, is awaiting clearance on going back to work. He has no fever, chills, sweats, shakes; no nausea, vomiting, abdominal pain, diarrhea, or dysuria; no cough, shortness of breath, or chest pain. No new symptoms. Objective:  Vitals:  Temp 97.9  HR 75  /88  SpO2 98% on room air    Physical Exam:   General Appearance:  Alert, interactive, nontoxic, no acute distress. He appears comfortable sitting in the exam chair. Throat: Oropharynx moist without lesions. Lungs:   Clear to auscultation bilaterally; no wheezes, rhonchi or rales; respirations unlabored on room air. Heart:  RRR; no murmur, rub or gallop.    Extremities: LUE PICC intact, overlying dressing is clean/dry. R knee surgical site well healed, no overlying erythema or edema, mild pain during palpation. Skin: No new rashes or lesions noted on exposed skin. Labs, Imaging, & Other studies:   All pertinent labs and imaging studies were personally reviewed by me including CBCD and BMP collected at [facility] on 11/15/2023.

## 2023-11-21 NOTE — PATIENT INSTRUCTIONS
Continue IV cefazolin through 12/2/2023. Continue weekly labs (CBCD and creatinine) while on IV cefazolin. Home RN to remove PICC after final dose of IV antibiotic on 12/2/2023. On 12/3/2023 patient to begin long term oral suppressive antibiotic, Duricef, 500mg every 12 hours. Prescription for [facility] sent to [facility] in [place]. Continue outpatient follow up with orthopedic surgery. Patient to return to the outpatient infectious disease office in 1 month for follow up.

## 2023-11-24 ENCOUNTER — TELEPHONE (OUTPATIENT)
Dept: INFECTIOUS DISEASES | Facility: CLINIC | Age: 48
End: 2023-11-24

## 2023-11-24 NOTE — TELEPHONE ENCOUNTER
Contacted [facility] for patient's most recent labs. They will fax the labs from 11/22.

## 2023-11-28 ENCOUNTER — OFFICE VISIT (OUTPATIENT)
Dept: OBGYN CLINIC | Facility: MEDICAL CENTER | Age: 48
End: 2023-11-28

## 2023-11-28 VITALS
WEIGHT: 186.29 LBS | SYSTOLIC BLOOD PRESSURE: 145 MMHG | BODY MASS INDEX: 26.08 KG/M2 | HEART RATE: 76 BPM | HEIGHT: 71 IN | DIASTOLIC BLOOD PRESSURE: 91 MMHG

## 2023-11-28 DIAGNOSIS — Z96.651 AFTERCARE FOLLOWING RIGHT KNEE JOINT REPLACEMENT SURGERY: Primary | ICD-10-CM

## 2023-11-28 DIAGNOSIS — Z47.1 AFTERCARE FOLLOWING RIGHT KNEE JOINT REPLACEMENT SURGERY: Primary | ICD-10-CM

## 2023-11-28 PROCEDURE — 99024 POSTOP FOLLOW-UP VISIT: CPT | Performed by: STUDENT IN AN ORGANIZED HEALTH CARE EDUCATION/TRAINING PROGRAM

## 2023-11-28 NOTE — PROGRESS NOTES
aSubjective: 46 y.o. male presents to the office 6 weeks s/p revision right total knee arthroplasty performed on 10/18/2023. He is also s/p R knee I&D, explant, articulating antibiotic spacer for R knee PJI 7/28. Patient seen and examined. Pain controlled. Progressing well. Incision without drainage. Denies fevers or chills    Physical Exam:  Incision: well healed  ROM: 5-100 with 5 degree extensor lag present (pre-op motion prior to spacer 50-70)   5/5 IP/Q/HS/TA/GS, 2+ DP/PT, SILT DP/SP/S/S/TN    No new imaging obtained today    Assessment/Plan:  6 weeks s/p revision right total knee arthroplasty with second stage PJI performed on 10/18/2023. He is also s/p R knee I&D, explant, articulating antibiotic spacer for R knee PJI 7/28     - continue multi-modal pain control   - will be transitioning to oral antibiotics next week  - Weight bearing status: as tolerated  - DVT ppx: completed  - Continue PT/OT exercises, script provided for outpatient   - Work note provided.  Transition back to every other day for 2 weeks starting on 12/04/2023, then tentative return to full duty on 12/18/2023.  - F/U in 6 weeks    Scribe Attestation      I,:  Shalra Zavala am acting as a scribe while in the presence of the attending physician.:       I,:  Lucas Gao,  personally performed the services described in this documentation    as scribed in my presence.:

## 2023-11-28 NOTE — LETTER
November 28, 2023     Patient: Demetrio Hinton  YOB: 1975  Date of Visit: 11/28/2023      To Whom it May Concern:    Nelly Norman is under my professional care. Elo East was seen in my office on 11/28/2023. Elo East may transition back to work every other day starting on 12/04/2023. Return to full duty tentatively on 12/18/2023. If you have any questions or concerns, please don't hesitate to call.          Sincerely,          Tom Blanton, DO

## 2023-12-12 DIAGNOSIS — F32.5 MAJOR DEPRESSIVE DISORDER IN FULL REMISSION, UNSPECIFIED WHETHER RECURRENT (HCC): ICD-10-CM

## 2023-12-13 RX ORDER — VENLAFAXINE HYDROCHLORIDE 150 MG/1
150 CAPSULE, EXTENDED RELEASE ORAL
Qty: 90 CAPSULE | Refills: 0 | Status: SHIPPED | OUTPATIENT
Start: 2023-12-13

## 2023-12-21 ENCOUNTER — TELEPHONE (OUTPATIENT)
Dept: INFECTIOUS DISEASES | Facility: CLINIC | Age: 48
End: 2023-12-21

## 2023-12-21 NOTE — TELEPHONE ENCOUNTER
LM for pt to CB.  Would like to offer tomorrow's appointment as a virtual visit for patient, rather than cancelling/rescheduling if possible.

## 2023-12-21 NOTE — TELEPHONE ENCOUNTER
Spoke with patient. He is agreeable to virtual. He is aware that he will need to be seen for in person evaluation at next visit.

## 2023-12-22 ENCOUNTER — TELEMEDICINE (OUTPATIENT)
Dept: INFECTIOUS DISEASES | Facility: CLINIC | Age: 48
End: 2023-12-22
Payer: COMMERCIAL

## 2023-12-22 DIAGNOSIS — T84.53XD INFECTION ASSOCIATED WITH INTERNAL RIGHT KNEE PROSTHESIS, SUBSEQUENT ENCOUNTER: ICD-10-CM

## 2023-12-22 PROCEDURE — 99212 OFFICE O/P EST SF 10 MIN: CPT | Performed by: NURSE PRACTITIONER

## 2023-12-22 RX ORDER — CEFADROXIL 500 MG/1
500 CAPSULE ORAL EVERY 12 HOURS SCHEDULED
Qty: 60 CAPSULE | Refills: 2 | Status: SHIPPED | OUTPATIENT
Start: 2023-12-22 | End: 2024-01-21

## 2023-12-22 NOTE — PROGRESS NOTES
Outpatient Virtual Progress Note - [facility] Infectious Disease   João Brown 48 y.o. male MRN: [id number]  Encounter: [id number]    REQUIRED DOCUMENTATION:   1. Patient is located at his office in the [facility] in which I hold an active license  2. The visit is being conducted through the Epic Embedded platform. He agrees to proceed.  3. Identify all parties in the room during virtual visit: CRNP, patient  4. After connecting through video, patient was identified by name and date of birth. Patient was informed this is a telemedicine/virtual visit and that the exam was being conducted confidentially over secure lines. My office door was closed. No one else was in the room. Patient acknowledged consent and understanding of privacy and security of telemedicine. The patient agreed to participate. Patient is aware this is a billable service.  5. Total visit duration: 30 minutes    Impression/Plan:  1. Recurring R prosthetic knee infection. The patient had his initial TKA in 1998 and a revision in 2016. After his revision he had a fall and tore his quadricepts which required surgical repair. Patient has experienced acute worsening pain and limited ROM. He underwent outpatient aspiration and synovasure grew MSSA. Patient then completed R total knee arthroplasty, I&D down to and including bone, and insertion of biodegradable drug delivery device abx spacer (stimulan) on 7/28/23.  All hardware and previous sutures from quadriceps repair removed. He completed 6 weeks of post-op Cefazolin treatment. On 10/18/2023 he underwent part 2 of his revision. Operative cultures again grew MSSA. New PICC was placed and [facility] ID team started him on IV cefazolin with plans of a 6-week course followed by lifelong oral suppression. Since his last visit to the ID office the patient completed the IV cefazolin and transitioned to oral Duricef for suppression. He's been tolerating the Duricef without difficulty.   Patient did not complete lab  work prior to this visit, states he will complete in next two weeks. I will continue patient on the Cefodroxil, 500mg BID, as planned for lifelong suppression.   -continue long term suppression with oral Duricef, 500mg BID  -complete CBCD and creatinine as soon as possible and again prior to next outpatient ID office follow up  -prescription for Duricef sent to [facility] in [place]  -continue outpatient orthopedic surgery follow up  -patient to return to the outpatient infectious disease office in 6 weeks for follow up     2. Rheumatoid arthritis and plaque psoriasis. Patient on chronic methotrexate. This is risk factor for complications with healing and infection.      Above plan was discussed in detail with patient over virtual video visit.    Antibiotics:  Duricef     Subjective:  Patient presents for follow up for ongoing treatment of R knee PJI. Patient changed his visit to a virtual visit. Since his last visit he completed his IV treatment and he transitioned to PO Duricef for ongoing antibiotic. He's been tolerating the Duricef without difficulty. His home RN discontinued his PICC. Patient continues to see his orthopedic team for follow up regarding the knee. He's had no new redness, swelling, or drainage. He is back at work part-time and is tolerating it well. He has no fever, chills, sweats, shakes; no nausea, vomiting, abdominal pain, diarrhea, or dysuria; no cough, shortness of breath, or chest pain. No new symptoms.    Objective:  Vitals:  Not obtained, patient changed to virtual visit    Physical Exam:   Limited ROS, patient changed to virtual visit  General Appearance:  Alert, interactive, nontoxic, no acute distress. He appears comfortable sitting in his office.   Lungs:   Respirations unlabored on room air.   Skin: No new rashes or lesions noted on exposed skin.     Labs, Imaging, & Other studies:   Patient did not complete his labs prior to the visit.

## 2023-12-22 NOTE — PATIENT INSTRUCTIONS
Continue oral Cefodroxil, 500mg every 12 hours, for lifelong suppression.    Refills sent to [facility] in [place].    Complete lab work (CBCD and creatinine) now and prior to next outpatient infectious disease office visit.    Continue routine follow up with orthopedic surgery.    Return to the outpatient infectious disease office for follow up in 6 weeks.

## 2023-12-28 ENCOUNTER — TELEPHONE (OUTPATIENT)
Dept: FAMILY MEDICINE CLINIC | Facility: CLINIC | Age: 48
End: 2023-12-28

## 2023-12-28 DIAGNOSIS — F32.5 MAJOR DEPRESSIVE DISORDER IN FULL REMISSION, UNSPECIFIED WHETHER RECURRENT (HCC): ICD-10-CM

## 2023-12-28 RX ORDER — NORTRIPTYLINE HYDROCHLORIDE 50 MG/1
50 CAPSULE ORAL 2 TIMES DAILY
Qty: 180 CAPSULE | Refills: 1 | Status: SHIPPED | OUTPATIENT
Start: 2023-12-28

## 2023-12-28 NOTE — TELEPHONE ENCOUNTER
Please confirm/reviewe with patient what is his actual dosage of his nortriptyline.  In reviewing his medication records from July, it was written previously as 100 mg at bedtime, then 50 mg twice daily.  Once this is confirmed, then I will send a new order for his nortriptyline

## 2023-12-28 NOTE — TELEPHONE ENCOUNTER
Patient calls stating he got his nortriptyline last month and it was only for once a day. He states it is supposed to be twice a day so now he is running out and is wondering why it was called in for once a day. Please advise.

## 2024-02-12 ENCOUNTER — APPOINTMENT (OUTPATIENT)
Age: 49
End: 2024-02-12
Payer: COMMERCIAL

## 2024-02-12 DIAGNOSIS — T84.53XD INFECTION ASSOCIATED WITH INTERNAL RIGHT KNEE PROSTHESIS, SUBSEQUENT ENCOUNTER: ICD-10-CM

## 2024-02-12 LAB
BASOPHILS # BLD AUTO: 0.03 THOUSANDS/ÂΜL (ref 0–0.1)
BASOPHILS NFR BLD AUTO: 1 % (ref 0–1)
CREAT SERPL-MCNC: 0.9 MG/DL (ref 0.6–1.3)
EOSINOPHIL # BLD AUTO: 0.43 THOUSAND/ÂΜL (ref 0–0.61)
EOSINOPHIL NFR BLD AUTO: 7 % (ref 0–6)
ERYTHROCYTE [DISTWIDTH] IN BLOOD BY AUTOMATED COUNT: 14 % (ref 11.6–15.1)
GFR SERPL CREATININE-BSD FRML MDRD: 99 ML/MIN/1.73SQ M
HCT VFR BLD AUTO: 42.4 % (ref 36.5–49.3)
HGB BLD-MCNC: 14 G/DL (ref 12–17)
IMM GRANULOCYTES # BLD AUTO: 0.03 THOUSAND/UL (ref 0–0.2)
IMM GRANULOCYTES NFR BLD AUTO: 1 % (ref 0–2)
LYMPHOCYTES # BLD AUTO: 0.9 THOUSANDS/ÂΜL (ref 0.6–4.47)
LYMPHOCYTES NFR BLD AUTO: 15 % (ref 14–44)
MCH RBC QN AUTO: 29.9 PG (ref 26.8–34.3)
MCHC RBC AUTO-ENTMCNC: 33 G/DL (ref 31.4–37.4)
MCV RBC AUTO: 91 FL (ref 82–98)
MONOCYTES # BLD AUTO: 0.49 THOUSAND/ÂΜL (ref 0.17–1.22)
MONOCYTES NFR BLD AUTO: 8 % (ref 4–12)
NEUTROPHILS # BLD AUTO: 3.97 THOUSANDS/ÂΜL (ref 1.85–7.62)
NEUTS SEG NFR BLD AUTO: 68 % (ref 43–75)
NRBC BLD AUTO-RTO: 0 /100 WBCS
PLATELET # BLD AUTO: 351 THOUSANDS/UL (ref 149–390)
PMV BLD AUTO: 9.7 FL (ref 8.9–12.7)
RBC # BLD AUTO: 4.68 MILLION/UL (ref 3.88–5.62)
WBC # BLD AUTO: 5.85 THOUSAND/UL (ref 4.31–10.16)

## 2024-02-12 PROCEDURE — 36415 COLL VENOUS BLD VENIPUNCTURE: CPT

## 2024-02-12 PROCEDURE — 85025 COMPLETE CBC W/AUTO DIFF WBC: CPT

## 2024-02-12 PROCEDURE — 82565 ASSAY OF CREATININE: CPT

## 2024-02-13 ENCOUNTER — TELEPHONE (OUTPATIENT)
Dept: INFECTIOUS DISEASES | Facility: CLINIC | Age: 49
End: 2024-02-13

## 2024-02-13 ENCOUNTER — TELEMEDICINE (OUTPATIENT)
Dept: INFECTIOUS DISEASES | Facility: CLINIC | Age: 49
End: 2024-02-13
Payer: COMMERCIAL

## 2024-02-13 DIAGNOSIS — T84.53XD INFECTION ASSOCIATED WITH INTERNAL RIGHT KNEE PROSTHESIS, SUBSEQUENT ENCOUNTER: Primary | ICD-10-CM

## 2024-02-13 DIAGNOSIS — M06.9 RA (RHEUMATOID ARTHRITIS) (HCC): ICD-10-CM

## 2024-02-13 PROCEDURE — 99212 OFFICE O/P EST SF 10 MIN: CPT | Performed by: NURSE PRACTITIONER

## 2024-02-13 RX ORDER — CEFADROXIL 500 MG/1
500 CAPSULE ORAL EVERY 12 HOURS SCHEDULED
Qty: 60 CAPSULE | Refills: 2 | Status: SHIPPED | OUTPATIENT
Start: 2024-02-13 | End: 2024-03-14

## 2024-02-13 NOTE — PATIENT INSTRUCTIONS
Continue oral Duricef, 500mg twice daily, for ongoing suppression.    Refills of antibiotic sent to [facility] in [place].    Complete lab work (CBCD and creatinine) prior to next outpatient infectious disease follow up.    Return to the outpatient infectious disease office in 3 months for follow up.

## 2024-02-13 NOTE — TELEPHONE ENCOUNTER
----- Message from DAVID Golden sent at 2/13/2024  3:11 PM EST -----  Regarding: follow up  [facility] team,  I just finished my follow up with Mr. Brown. He is going to need a folllow up with me in 3 months. I sent extra refills to the pharmacy for him today. I let him know I put in lab orders and he will complete before his next visit.  Thanks.  -Brenda

## 2024-02-13 NOTE — PROGRESS NOTES
Outpatient Progress Note - Virtual Visit - [facility] Infectious Disease   João Brown 48 y.o. male MRN: [id number]  Encounter: [id number]    REQUIRED DOCUMENTATION:   1. Patient is located at home in the [facility] in which I hold an active license  2. The visit is being conducted through the Epic Embedded platform. He agrees to proceed.   3. Identify all parties in the room during virtual visit: patient, DAVID  4. After connecting through video, patient was identified by name and date of birth. Patient was informed this is a telemedicine/virtual visit and that the exam was being conducted confidentially over secure lines. My office door was closed. No one else was in the room. Patient acknowledged consent and understanding of privacy and security of telemedicine. The patient agreed to participate. Patient is aware this is a billable service.  5. Total visit duration: 30 minutes    Impression/Plan:  1. Recurring R prosthetic knee infection. The patient had his initial TKA in 1998 and a revision in 2016. After his revision he had a fall and tore his quadricepts which required surgical repair. Patient has experienced acute worsening pain and limited ROM. He underwent outpatient aspiration and synovasure grew MSSA. Patient then completed R total knee arthroplasty, I&D down to and including bone, and insertion of biodegradable drug delivery device abx spacer (stimulan) on 7/28/23.  All hardware and previous sutures from quadriceps repair removed. He completed 6 weeks of post-op Cefazolin treatment. On 10/18/2023 he underwent part 2 of his revision. Operative cultures again grew MSSA. He completed additional 6 week course of IV cefazolin and then transitioned to oral Duricef for suppression. He's been tolerating the Duricef without difficulty. I reviewed his most recent lab work from 2/12/2024 which showed stable normal WBC = 5.85 and stable creatinine = 0.9. I will continue patient on the Cefodroxil,  500mg BID, as planned for lifelong suppression.   -continue long term suppression with oral Duricef, 500mg BID  -complete CBCD and creatinine prior to next outpatient ID office follow up  -prescription for Duricef sent to [facility] in [place]  -continue outpatient orthopedic surgery follow up  -patient to return to the outpatient infectious disease office in 3 months for follow up     2. Rheumatoid arthritis and plaque psoriasis. Patient on chronic methotrexate. This is risk factor for complications with healing and infection.     Above plan was discussed in detail with patient over virtual video visit.    Antibiotics:  Duricef    Subjective:  Patient presents for follow up for ongoing suppressive antibiotic treatment of R knee PJI. He's been taking oral Duricef and has tolerated the antibiotic without difficulty. He has no fever, chills, sweats, shakes; no nausea, vomiting, abdominal pain, diarrhea, or dysuria; no cough, shortness of breath, or chest pain.  He's noticed some symptoms of a cold starting, some mild runny nose and sore throat. He does have OTC items at home to use for treatment as needed. He offers no other symptoms.    Objective:  Vitals:  Not obtained, visit was converted to virtual visit due to inclement weather.    Physical Exam:   General Appearance:  Alert, interactive, nontoxic, no acute distress. He appears comfortable sitting at home.   Lungs:   Respirations unlabored on room air.   Skin: No new rashes noted on exposed skin.     Labs, Imaging, & Other studies:   All pertinent labs and imaging studies were personally reviewed by me including  Results from last 7 days   Lab Units 02/12/24  0853   WBC Thousand/uL 5.85   HEMOGLOBIN g/dL 14.0   PLATELETS Thousands/uL 351     Results from last 7 days   Lab Units 02/12/24  0853   CREATININE mg/dL 0.90   EGFR ml/min/1.73sq m 99

## 2024-02-17 ENCOUNTER — HOSPITAL ENCOUNTER (EMERGENCY)
Facility: HOSPITAL | Age: 49
Discharge: HOME | End: 2024-02-17
Attending: EMERGENCY MEDICINE
Payer: COMMERCIAL

## 2024-02-17 ENCOUNTER — APPOINTMENT (EMERGENCY)
Dept: RADIOLOGY | Facility: HOSPITAL | Age: 49
End: 2024-02-17
Payer: COMMERCIAL

## 2024-02-17 VITALS
OXYGEN SATURATION: 100 % | HEART RATE: 92 BPM | DIASTOLIC BLOOD PRESSURE: 99 MMHG | SYSTOLIC BLOOD PRESSURE: 130 MMHG | RESPIRATION RATE: 20 BRPM | TEMPERATURE: 97.9 F

## 2024-02-17 DIAGNOSIS — J06.9 VIRAL URI WITH COUGH: Primary | ICD-10-CM

## 2024-02-17 DIAGNOSIS — R07.9 CHEST PAIN: ICD-10-CM

## 2024-02-17 LAB
ALBUMIN SERPL BCP-MCNC: 4.6 G/DL (ref 3.5–5)
ALP SERPL-CCNC: 125 U/L (ref 34–104)
ALT SERPL W P-5'-P-CCNC: 16 U/L (ref 7–52)
ANION GAP SERPL CALCULATED.3IONS-SCNC: 8 MMOL/L
AST SERPL W P-5'-P-CCNC: 16 U/L (ref 13–39)
BASOPHILS # BLD AUTO: 0.03 THOUSANDS/ÂΜL (ref 0–0.1)
BASOPHILS NFR BLD AUTO: 0 % (ref 0–1)
BILIRUB SERPL-MCNC: 0.85 MG/DL (ref 0.2–1)
BUN SERPL-MCNC: 15 MG/DL (ref 5–25)
CALCIUM SERPL-MCNC: 9.9 MG/DL (ref 8.4–10.2)
CARDIAC TROPONIN I PNL SERPL HS: <2 NG/L
CHLORIDE SERPL-SCNC: 98 MMOL/L (ref 96–108)
CO2 SERPL-SCNC: 28 MMOL/L (ref 21–32)
CREAT SERPL-MCNC: 0.93 MG/DL (ref 0.6–1.3)
EOSINOPHIL # BLD AUTO: 0.34 THOUSAND/ÂΜL (ref 0–0.61)
EOSINOPHIL NFR BLD AUTO: 5 % (ref 0–6)
ERYTHROCYTE [DISTWIDTH] IN BLOOD BY AUTOMATED COUNT: 13.9 % (ref 11.6–15.1)
FLUAV RNA RESP QL NAA+PROBE: NEGATIVE
FLUBV RNA RESP QL NAA+PROBE: NEGATIVE
GFR SERPL CREATININE-BSD FRML MDRD: 95 ML/MIN/1.73SQ M
GLUCOSE SERPL-MCNC: 149 MG/DL (ref 65–140)
HCT VFR BLD AUTO: 41.7 % (ref 36.5–49.3)
HGB BLD-MCNC: 14.2 G/DL (ref 12–17)
IMM GRANULOCYTES # BLD AUTO: 0.02 THOUSAND/UL (ref 0–0.2)
IMM GRANULOCYTES NFR BLD AUTO: 0 % (ref 0–2)
LYMPHOCYTES # BLD AUTO: 0.64 THOUSANDS/ÂΜL (ref 0.6–4.47)
LYMPHOCYTES NFR BLD AUTO: 9 % (ref 14–44)
MCH RBC QN AUTO: 30.6 PG (ref 26.8–34.3)
MCHC RBC AUTO-ENTMCNC: 34.1 G/DL (ref 31.4–37.4)
MCV RBC AUTO: 90 FL (ref 82–98)
MONOCYTES # BLD AUTO: 0.7 THOUSAND/ÂΜL (ref 0.17–1.22)
MONOCYTES NFR BLD AUTO: 10 % (ref 4–12)
NEUTROPHILS # BLD AUTO: 5.6 THOUSANDS/ÂΜL (ref 1.85–7.62)
NEUTS SEG NFR BLD AUTO: 76 % (ref 43–75)
NRBC BLD AUTO-RTO: 0 /100 WBCS
PLATELET # BLD AUTO: 305 THOUSANDS/UL (ref 149–390)
PMV BLD AUTO: 9.2 FL (ref 8.9–12.7)
POTASSIUM SERPL-SCNC: 3.8 MMOL/L (ref 3.5–5.3)
PROT SERPL-MCNC: 8.2 G/DL (ref 6.4–8.4)
RBC # BLD AUTO: 4.64 MILLION/UL (ref 3.88–5.62)
RSV RNA RESP QL NAA+PROBE: NEGATIVE
SARS-COV-2 RNA RESP QL NAA+PROBE: NEGATIVE
SODIUM SERPL-SCNC: 134 MMOL/L (ref 135–147)
WBC # BLD AUTO: 7.33 THOUSAND/UL (ref 4.31–10.16)

## 2024-02-17 PROCEDURE — 71046 X-RAY EXAM CHEST 2 VIEWS: CPT

## 2024-02-17 PROCEDURE — 36415 COLL VENOUS BLD VENIPUNCTURE: CPT | Performed by: PHYSICIAN ASSISTANT

## 2024-02-17 PROCEDURE — 80053 COMPREHEN METABOLIC PANEL: CPT | Performed by: PHYSICIAN ASSISTANT

## 2024-02-17 PROCEDURE — 0241U HB NFCT DS VIR RESP RNA 4 TRGT: CPT | Performed by: PHYSICIAN ASSISTANT

## 2024-02-17 PROCEDURE — 99285 EMERGENCY DEPT VISIT HI MDM: CPT

## 2024-02-17 PROCEDURE — 93005 ELECTROCARDIOGRAM TRACING: CPT

## 2024-02-17 PROCEDURE — 85025 COMPLETE CBC W/AUTO DIFF WBC: CPT | Performed by: PHYSICIAN ASSISTANT

## 2024-02-17 PROCEDURE — 99284 EMERGENCY DEPT VISIT MOD MDM: CPT | Performed by: PHYSICIAN ASSISTANT

## 2024-02-17 PROCEDURE — 84484 ASSAY OF TROPONIN QUANT: CPT | Performed by: PHYSICIAN ASSISTANT

## 2024-02-17 NOTE — DISCHARGE INSTRUCTIONS
Use Flonase nasal spray and Mucinex for congestion.    Please follow-up with your family doctor. Return to the ER with any worsening symptoms.

## 2024-02-17 NOTE — ED PROVIDER NOTES
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 He reports cough, congestion, and sore throat for a few days. He is also having chest discomfort which feels like a soreness. Pain becomes tight while coughing. He denies any fevers, shortness of breath, nausea, vomiting. He denies any knee pain or swelling. No history of heart disease.       History provided by:  Patient   used: No    Flu Symptoms  Presenting symptoms: cough and sore throat    Presenting symptoms: no fever, no shortness of breath and no vomiting    Associated symptoms: nasal congestion    Associated symptoms: no chills and no neck stiffness        Prior to Admission Medications   Prescriptions Last Dose Informant Patient Reported? Taking?   HYDROcodone-acetaminophen (Norco) 5-325 mg per tablet   No No   Sig: Take 1 tablet by mouth every 6 (six) hours as needed for pain Max Daily Amount: 4 tablets   Patient not taking: Reported on 11/7/2023   HYDROcodone-acetaminophen (Norco) 5-325 mg per tablet   No No   Sig: Take 1 tablet by mouth every 4 (four) hours as needed for pain Max Daily Amount: 6 tablets   Patient not taking: Reported on 11/21/2023   Multiple Vitamins-Minerals (multivitamin with minerals) tablet   No No   Sig: Take 1 tablet by mouth daily   acetaminophen (TYLENOL) 500 mg tablet   No No   Sig: Take 2 tablets (1,000 mg total) by mouth every 8 (eight) hours   ascorbic acid (VITAMIN C) 500 MG tablet   No No   Sig: Take 1 tablet (500 mg total) by mouth 2 (two) times a day   aspirin (ECOTRIN LOW STRENGTH) 81 mg EC tablet   No No   Sig: Take 1 tablet (81 mg total) by mouth 2 (two) times a day   Patient not taking: Reported on 11/21/2023    busPIRone (BUSPAR) 15 mg tablet   Yes No   Sig: TAKE 1/2 TABLET (7.5 MG) BY ORAL ROUTE 2 TIMES PER DAY   cefadroxil (DURICEF) 500 mg capsule   No No   Sig: Take 1 capsule (500 mg total) by mouth every 12 (twelve) hours   celecoxib (CeleBREX) 200 mg capsule   No No   Sig: Take 1 capsule (200 mg total) by mouth 2 (two) times a day   celecoxib (CeleBREX) 200 mg capsule   No No   Sig: Take 1 capsule (200 mg total) by mouth 2 (two) times a day   cholecalciferol (VITAMIN D3) 1,000 units tablet   No No   Sig: Take 2 tablets (2,000 Units total) by mouth daily   cyclobenzaprine (FLEXERIL) 5 mg tablet   No No   Sig: Take 1 tablet (5 mg total) by mouth 3 (three) times a day as needed for muscle spasms   ferrous sulfate 324 (65 Fe) mg   No No   Sig: Take 1 tablet (324 mg total) by mouth 2 (two) times a day before meals   folic acid (FOLVITE) 1 mg tablet   No No   Sig: Take 1 tablet (1 mg total) by mouth daily   methotrexate 2.5 mg tablet   Yes No   Sig: Take 6 tablets by mouth once a week   metoprolol succinate (TOPROL-XL) 100 mg 24 hr tablet   Yes No   Sig: Take 100 mg by mouth daily   nortriptyline (PAMELOR) 50 mg capsule   No No   Sig: Take 1 capsule (50 mg total) by mouth 2 (two) times a day   ondansetron (ZOFRAN-ODT) 4 mg disintegrating tablet   No No   Sig: Take 1 tablet (4 mg total) by mouth every 6 (six) hours as needed for nausea or vomiting   Patient not taking: Reported on 11/7/2023   senna-docusate sodium (SENOKOT S) 8.6-50 mg per tablet   No No   Sig: Take 1 tablet by mouth daily   Patient not taking: Reported on 11/7/2023   venlafaxine (EFFEXOR-XR) 150 mg 24 hr capsule   No No   Sig: Take 1 capsule (150 mg total) by mouth daily at bedtime      Facility-Administered Medications: None       Past Medical History:   Diagnosis Date    Anxiety     Arthritis     Crutches as ambulation aid 07/25/2023    Depression     Difficult intubation 07/28/2023    Hypertension     Limited jaw ROM     Migraine     PONV  (postoperative nausea and vomiting) 2023    Psoriasis     RA (rheumatoid arthritis) (HCC)     Walker as ambulation aid        Past Surgical History:   Procedure Laterality Date    IR PICC PLACEMENT SINGLE LUMEN  10/23/2023    JOINT REPLACEMENT      TN REVJ TOT KNEE ARTHRP FEM&ENTIRE TIBIAL COMPONE Right 2023    Procedure: ARTHROPLASTY KNEE TOTAL REVISION;  Surgeon: Kirk Long DO;  Location: [facility];  Service: Orthopedics    TN REVJ TOT KNEE ARTHRP FEM&ENTIRE TIBIAL COMPONE Right 10/18/2023    Procedure: ARTHROPLASTY KNEE TOTAL REVISION;  Surgeon: Kirk Long DO;  Location: [facility];  Service: Orthopedics    REPLACEMENT TOTAL KNEE Right     x2       Family History   Problem Relation Age of Onset    Breast cancer Mother     Completed Suicide  Father     Asthma Sister      I have reviewed and agree with the history as documented.    E-Cigarette/Vaping    E-Cigarette Use Never User      E-Cigarette/Vaping Substances    Nicotine No     THC No     CBD No     Flavoring No     Other No     Unknown No      Social History     Tobacco Use    Smoking status: Former     Current packs/day: 0.00     Average packs/day: 1 pack/day for 10.0 years (10.0 ttl pk-yrs)     Types: Cigarettes     Start date: 2000     Quit date: 2000     Years since quittin.2    Smokeless tobacco: Current     Types: Chew    Tobacco comments:     Last chewed tobacco 23   Vaping Use    Vaping status: Never Used   Substance Use Topics    Alcohol use: Yes     Comment: rarely    Drug use: Never       Review of Systems   Constitutional:  Negative for chills and fever.   HENT:  Positive for congestion and sore throat. Negative for drooling and voice change.    Eyes:  Negative for discharge and redness.   Respiratory:  Positive for cough. Negative for shortness of breath and stridor.    Cardiovascular:  Positive for chest pain. Negative for leg swelling.   Gastrointestinal:  Negative for abdominal pain and  vomiting.   Musculoskeletal:  Negative for neck pain and neck stiffness.   Skin:  Negative for color change and rash.   Neurological:  Negative for seizures and syncope.   Psychiatric/Behavioral:  Negative for confusion. The patient is not nervous/anxious.    All other systems reviewed and are negative.      Physical Exam  Physical Exam  Vitals and nursing note reviewed.   Constitutional:       General: He is not in acute distress.     Appearance: He is well-developed. He is not diaphoretic.   HENT:      Head: Normocephalic and atraumatic.      Right Ear: Tympanic membrane, ear canal and external ear normal.      Left Ear: Tympanic membrane, ear canal and external ear normal.      Nose: Congestion present.      Mouth/Throat:      Mouth: Mucous membranes are moist.      Pharynx: Oropharynx is clear. No oropharyngeal exudate.   Eyes:      General: No scleral icterus.        Right eye: No discharge.         Left eye: No discharge.      Conjunctiva/sclera: Conjunctivae normal.   Cardiovascular:      Rate and Rhythm: Normal rate and regular rhythm.      Heart sounds: Normal heart sounds. No murmur heard.  Pulmonary:      Effort: Pulmonary effort is normal. No respiratory distress.      Breath sounds: Normal breath sounds. No stridor. No wheezing or rales.   Musculoskeletal:         General: No deformity. Normal range of motion.      Cervical back: Normal range of motion and neck supple.   Skin:     General: Skin is warm and dry.   Neurological:      Mental Status: He is alert. He is not disoriented.      GCS: GCS eye subscore is 4. GCS verbal subscore is 5. GCS motor subscore is 6.   Psychiatric:         Behavior: Behavior normal.         Vital Signs  ED Triage Vitals [02/17/24 1101]   Temperature Pulse Respirations Blood Pressure SpO2   97.9 °F (36.6 °C) 95 22 142/86 100 %      Temp Source Heart Rate Source Patient Position - Orthostatic VS BP Location FiO2 (%)   Temporal Monitor Sitting Left arm --      Pain Score        --           Vitals:    02/17/24 1101 02/17/24 1230   BP: 142/86 130/99   Pulse: 95 92   Patient Position - Orthostatic VS: Sitting          Visual Acuity      ED Medications  Medications - No data to display    Diagnostic Studies  Results Reviewed       Procedure Component Value Units Date/Time    HS Troponin 0hr (reflex protocol) [[id number]]  (Normal) Collected: 02/17/24 1129    Lab Status: Final result Specimen: Blood from Arm, Right Updated: 02/17/24 1203     hs TnI 0hr <2 ng/L     FLU/RSV/COVID - if FLU/RSV clinically relevant [[id number]]  (Normal) Collected: 02/17/24 1115    Lab Status: Final result Specimen: Nares from Nose Updated: 02/17/24 1158     SARS-CoV-2 Negative     INFLUENZA A PCR Negative     INFLUENZA B PCR Negative     RSV PCR Negative    Narrative:      FOR PEDIATRIC PATIENTS - copy/paste COVID Guidelines URL to browser: [email or web address]    SARS-CoV-2 assay is a Nucleic Acid Amplification assay intended for the  qualitative detection of nucleic acid from SARS-CoV-2 in nasopharyngeal  swabs. Results are for the presumptive identification of SARS-CoV-2 RNA.    Positive results are indicative of infection with SARS-CoV-2, the virus  causing COVID-19, but do not rule out bacterial infection or co-infection  with other viruses. Laboratories within the [place] and its  territories are required to report all positive results to the appropriate  public health authorities. Negative results do not preclude SARS-CoV-2  infection and should not be used as the sole basis for treatment or other  patient management decisions. Negative results must be combined with  clinical observations, patient history, and epidemiological information.  This test has not been FDA cleared or approved.    This test has been authorized by FDA under an Emergency Use Authorization  (EUA). This test is only authorized for the duration of time the  declaration that  circumstances exist justifying the authorization of the  emergency use of an in vitro diagnostic tests for detection of SARS-CoV-2  virus and/or diagnosis of COVID-19 infection under section 564(b)(1) of  the Act, 21 U.S.C. 360bbb-3(b)(1), unless the authorization is terminated  or revoked sooner. The test has been validated but independent review by FDA  and CLIA is pending.    Test performed using [employer] GeneXpert: This RT-PCR assay targets N2,  a region unique to SARS-CoV-2. A conserved region in the E-gene was chosen  for pan-Sarbecovirus detection which includes SARS-CoV-2.    According to CMS-2020-01-R, this platform meets the definition of high-throughput technology.    Comprehensive metabolic panel [[id number]]  (Abnormal) Collected: 02/17/24 1129    Lab Status: Final result Specimen: Blood from Arm, Right Updated: 02/17/24 1156     Sodium 134 mmol/L      Potassium 3.8 mmol/L      Chloride 98 mmol/L      CO2 28 mmol/L      ANION GAP 8 mmol/L      BUN 15 mg/dL      Creatinine 0.93 mg/dL      Glucose 149 mg/dL      Calcium 9.9 mg/dL      AST 16 U/L      ALT 16 U/L      Alkaline Phosphatase 125 U/L      Total Protein 8.2 g/dL      Albumin 4.6 g/dL      Total Bilirubin 0.85 mg/dL      eGFR 95 ml/min/1.73sq m     Narrative:      National Kidney Disease Foundation guidelines for Chronic Kidney Disease (CKD):     Stage 1 with normal or high GFR (GFR > 90 mL/min/1.73 square meters)    Stage 2 Mild CKD (GFR = 60-89 mL/min/1.73 square meters)    Stage 3A Moderate CKD (GFR = 45-59 mL/min/1.73 square meters)    Stage 3B Moderate CKD (GFR = 30-44 mL/min/1.73 square meters)    Stage 4 Severe CKD (GFR = 15-29 mL/min/1.73 square meters)    Stage 5 End Stage CKD (GFR <15 mL/min/1.73 square meters)  Note: GFR calculation is accurate only with a steady state creatinine    CBC and differential [[id number]]  (Abnormal) Collected: 02/17/24 1129    Lab Status: Final result Specimen: Blood from Arm, Right Updated: 02/17/24 1145      WBC 7.33 Thousand/uL      RBC 4.64 Million/uL      Hemoglobin 14.2 g/dL      Hematocrit 41.7 %      MCV 90 fL      MCH 30.6 pg      MCHC 34.1 g/dL      RDW 13.9 %      MPV 9.2 fL      Platelets 305 Thousands/uL      nRBC 0 /100 WBCs      Neutrophils Relative 76 %      Immat GRANS % 0 %      Lymphocytes Relative 9 %      Monocytes Relative 10 %      Eosinophils Relative 5 %      Basophils Relative 0 %      Neutrophils Absolute 5.60 Thousands/µL      Immature Grans Absolute 0.02 Thousand/uL      Lymphocytes Absolute 0.64 Thousands/µL      Monocytes Absolute 0.70 Thousand/µL      Eosinophils Absolute 0.34 Thousand/µL      Basophils Absolute 0.03 Thousands/µL                    XR chest 2 views   ED Interpretation by Amalia Arellano PA-C (02/17 1232)   No acute abnormality                 Procedures  ECG 12 Lead Documentation Only    Date/Time: 2/17/2024 3:18 PM    Performed by: Amalia Arellano PA-C  Authorized by: Amalia Arellano PA-C    Indications / Diagnosis:  CP  ECG reviewed by me, the ED Provider: yes    Patient location:  ED  Rate:     ECG rate:  84    ECG rate assessment: normal    Rhythm:     Rhythm: sinus rhythm    Ectopy:     Ectopy: none    QRS:     QRS axis:  Normal  Conduction:     Conduction: abnormal      Abnormal conduction: incomplete RBBB    ST segments:     ST segments:  Normal  T waves:     T waves: normal             ED Course                       Medical Decision Making  48yoM here with URI symptoms x several days. C/o cough, congestion, and chest discomfort with coughing. No fevers. He is well appearing with stable vitals. Exam is reassuring.    Initial ED plan: Check cardiac labs, COVID/flu swab, EKG, and CXR.    Final assessment: Labs reveal a mildly elevated glucose of 149. Remainder of labs unremarkable. EKG shows NSR without ischemic changes and troponin is normal. COVID/flu negative. CXR is clear. No indication for admission. Suspect viral illness. Supportive care discussed.  Advised close PCP follow-up. ED return precautions discussed. Patient expressed understanding and is agreeable to plan. Patient discharged in stable condition.        Problems Addressed:  Chest pain: acute illness or injury  Viral URI with cough: acute illness or injury    Amount and/or Complexity of Data Reviewed  Labs: ordered.  Radiology: ordered and independent interpretation performed.  ECG/medicine tests: ordered and independent interpretation performed.             Disposition  Final diagnoses:   Viral URI with cough   Chest pain     Time reflects when diagnosis was documented in both MDM as applicable and the Disposition within this note       Time User Action Codes Description Comment    2/17/2024 12:35 PM Amalia Arellano Add [J06.9] Viral URI with cough     2/17/2024 12:35 PM Amalia Arellano Add [R07.9] Chest pain           ED Disposition       ED Disposition   Discharge    Condition   Stable    Date/Time   Sat Feb 17, 2024 12:35 PM    Comment   João Brown discharge to home/self care.                   Follow-up Information       Follow up With Specialties Details Why Contact Info Additional Information    [facility] Schedule an appointment as soon as possible for a visit   [address] [facility] [address]  [phone]       [facility] Emergency Department Emergency Medicine  If symptoms worsen [address] [facility] [id number] [phone] [facility] Emergency Department, [address], [place], [address]            Discharge Medication List as of 2/17/2024 12:36 PM        CONTINUE these medications which have NOT CHANGED    Details   nortriptyline (PAMELOR) 50 mg capsule Take 1 capsule (50 mg total) by mouth 2 (two) times a day, Starting Thu 12/28/2023, Normal      acetaminophen (TYLENOL) 500 mg tablet Take 2 tablets (1,000 mg total) by mouth every 8 (eight) hours, Starting Wed 10/18/2023,  Normal      ascorbic acid (VITAMIN C) 500 MG tablet Take 1 tablet (500 mg total) by mouth 2 (two) times a day, Starting Thu 7/20/2023, Normal      aspirin (ECOTRIN LOW STRENGTH) 81 mg EC tablet Take 1 tablet (81 mg total) by mouth 2 (two) times a day, Starting Wed 10/18/2023, Normal      busPIRone (BUSPAR) 15 mg tablet TAKE 1/2 TABLET (7.5 MG) BY ORAL ROUTE 2 TIMES PER DAY, Historical Med      cefadroxil (DURICEF) 500 mg capsule Take 1 capsule (500 mg total) by mouth every 12 (twelve) hours, Starting Tue 2/13/2024, Until Thu 3/14/2024, Normal      !! celecoxib (CeleBREX) 200 mg capsule Take 1 capsule (200 mg total) by mouth 2 (two) times a day, Starting Fri 7/28/2023, Normal      !! celecoxib (CeleBREX) 200 mg capsule Take 1 capsule (200 mg total) by mouth 2 (two) times a day, Starting Wed 10/18/2023, Normal      cholecalciferol (VITAMIN D3) 1,000 units tablet Take 2 tablets (2,000 Units total) by mouth daily, Starting Thu 8/10/2023, Normal      cyclobenzaprine (FLEXERIL) 5 mg tablet Take 1 tablet (5 mg total) by mouth 3 (three) times a day as needed for muscle spasms, Starting Tue 8/15/2023, Normal      ferrous sulfate 324 (65 Fe) mg Take 1 tablet (324 mg total) by mouth 2 (two) times a day before meals, Starting Thu 7/20/2023, Until Wed 10/18/2023, Normal      folic acid (FOLVITE) 1 mg tablet Take 1 tablet (1 mg total) by mouth daily, Starting Thu 8/10/2023, Normal      !! HYDROcodone-acetaminophen (Norco) 5-325 mg per tablet Take 1 tablet by mouth every 6 (six) hours as needed for pain Max Daily Amount: 4 tablets, Starting Tue 10/10/2023, Normal      !! HYDROcodone-acetaminophen (Norco) 5-325 mg per tablet Take 1 tablet by mouth every 4 (four) hours as needed for pain Max Daily Amount: 6 tablets, Starting Tue 11/7/2023, Normal      methotrexate 2.5 mg tablet Take 6 tablets by mouth once a week, Starting Tue 6/13/2023, Historical Med      metoprolol succinate (TOPROL-XL) 100 mg 24 hr tablet Take 100 mg by mouth  daily, Starting Fri 7/7/2023, Historical Med      Multiple Vitamins-Minerals (multivitamin with minerals) tablet Take 1 tablet by mouth daily, Starting Thu 8/10/2023, Normal      ondansetron (ZOFRAN-ODT) 4 mg disintegrating tablet Take 1 tablet (4 mg total) by mouth every 6 (six) hours as needed for nausea or vomiting, Starting Wed 10/18/2023, Normal      senna-docusate sodium (SENOKOT S) 8.6-50 mg per tablet Take 1 tablet by mouth daily, Starting Wed 10/18/2023, Normal      venlafaxine (EFFEXOR-XR) 150 mg 24 hr capsule Take 1 capsule (150 mg total) by mouth daily at bedtime, Starting Wed 12/13/2023, Normal       !! - Potential duplicate medications found. Please discuss with provider.          No discharge procedures on file.    PDMP Review         Value Time User    PDMP Reviewed  Yes 11/7/2023  9:57 AM Josseline Trevizo PA-C            ED Provider  Electronically Signed by             Amalia Arellano PA-C  02/17/24 2516

## 2024-02-18 LAB
ATRIAL RATE: 84 BPM
P AXIS: 55 DEGREES
PR INTERVAL: 162 MS
QRS AXIS: 40 DEGREES
QRSD INTERVAL: 96 MS
QT INTERVAL: 370 MS
QTC INTERVAL: 437 MS
T WAVE AXIS: 57 DEGREES
VENTRICULAR RATE: 84 BPM

## 2024-02-18 PROCEDURE — 93010 ELECTROCARDIOGRAM REPORT: CPT | Performed by: INTERNAL MEDICINE

## 2024-02-20 ENCOUNTER — OFFICE VISIT (OUTPATIENT)
Dept: FAMILY MEDICINE CLINIC | Facility: CLINIC | Age: 49
End: 2024-02-20
Payer: COMMERCIAL

## 2024-02-20 VITALS
BODY MASS INDEX: 26.4 KG/M2 | SYSTOLIC BLOOD PRESSURE: 140 MMHG | HEART RATE: 90 BPM | HEIGHT: 71 IN | DIASTOLIC BLOOD PRESSURE: 100 MMHG | OXYGEN SATURATION: 96 % | WEIGHT: 188.6 LBS | TEMPERATURE: 99.1 F | RESPIRATION RATE: 16 BRPM

## 2024-02-20 DIAGNOSIS — J01.00 ACUTE NON-RECURRENT MAXILLARY SINUSITIS: ICD-10-CM

## 2024-02-20 DIAGNOSIS — M54.50 ACUTE BILATERAL LOW BACK PAIN WITHOUT SCIATICA: ICD-10-CM

## 2024-02-20 DIAGNOSIS — S91.301A WOUND OF RIGHT FOOT: ICD-10-CM

## 2024-02-20 DIAGNOSIS — I10 PRIMARY HYPERTENSION: ICD-10-CM

## 2024-02-20 DIAGNOSIS — H10.32 ACUTE BACTERIAL CONJUNCTIVITIS OF LEFT EYE: Primary | ICD-10-CM

## 2024-02-20 PROCEDURE — 99214 OFFICE O/P EST MOD 30 MIN: CPT | Performed by: NURSE PRACTITIONER

## 2024-02-20 RX ORDER — AMOXICILLIN AND CLAVULANATE POTASSIUM 875; 125 MG/1; MG/1
1 TABLET, FILM COATED ORAL EVERY 12 HOURS SCHEDULED
Qty: 20 TABLET | Refills: 0 | Status: SHIPPED | OUTPATIENT
Start: 2024-02-20 | End: 2024-03-01

## 2024-02-20 RX ORDER — POLYMYXIN B SULFATE AND TRIMETHOPRIM 1; 10000 MG/ML; [USP'U]/ML
1 SOLUTION OPHTHALMIC EVERY 4 HOURS
Qty: 10 ML | Refills: 0 | Status: SHIPPED | OUTPATIENT
Start: 2024-02-20

## 2024-02-20 RX ORDER — LIDOCAINE 50 MG/G
1 PATCH TOPICAL DAILY
Qty: 30 PATCH | Refills: 0 | Status: SHIPPED | OUTPATIENT
Start: 2024-02-20

## 2024-02-20 RX ORDER — METHOCARBAMOL 500 MG/1
500 TABLET, FILM COATED ORAL 3 TIMES DAILY
Qty: 60 TABLET | Refills: 0 | Status: SHIPPED | OUTPATIENT
Start: 2024-02-20

## 2024-02-20 RX ORDER — FLUTICASONE PROPIONATE 50 MCG
1 SPRAY, SUSPENSION (ML) NASAL DAILY
COMMUNITY

## 2024-02-20 NOTE — PROGRESS NOTES
Name: João Brown      : 1975      MRN: [id number]  Encounter Provider: DAVID Bowden  Encounter Date: 2024   Encounter department: [facility] [address]    Assessment & Plan     1. Acute bacterial conjunctivitis of left eye  Comments:  Advised cleaning eyes medially out, clean bed sheets, frequent hand washing, drops as prescribed.  Orders:  -     polymyxin b-trimethoprim (POLYTRIM) ophthalmic solution; Administer 1 drop into the left eye every 4 (four) hours    2. Acute non-recurrent maxillary sinusitis  Comments:  Advised increase hydration, saline rinses twice daily, steam, moist humidified air, continue Flonase, Augmentin as prescribed.  Orders:  -     amoxicillin-clavulanate (AUGMENTIN) 875-125 mg per tablet; Take 1 tablet by mouth every 12 (twelve) hours for 10 days    3. Acute bilateral low back pain without sciatica  Comments:  Advised heat therapy, ROM exercises/stretches, medications as prescribed.  Orders:  -     lidocaine (LIDODERM) 5 %; Apply 1 patch topically over 12 hours daily Remove & Discard patch within 12 hours or as directed by MD  -     methocarbamol (ROBAXIN) 500 mg tablet; Take 1 tablet (500 mg total) by mouth 3 (three) times a day    4. Wound of right foot  Comments:  Advised to keep area clean, dry, absorbant pads as directed. To f/u with wound for consultation.  Orders:  -     Ambulatory Referral to Wound Care; Future    5. Primary hypertension  Assessment & Plan:  Diastolic blood pressure not within goal.  Patient notes not feeling well being in pain.  States he takes his blood pressure medication at nighttime.  Advised patient to monitor blood pressures frequently at home, to return to the office in 1 to 2 weeks for reevaluation for continued elevated blood pressures.             Subjective      Patient presents to the office for 4-month follow-up.  Continues to follow-up with ID and orthopedics.  Denies complaints related  "to chronic issues.    Also has multiple complaints:    Left eye  redness/discharge: Patient seen 3 days ago in ED for flu-like symptoms.  Chest x-ray was negative, negative for COVID/flu.  Was advised to take Mucinex and Flonase.  Patient notes increased nasal congestion, postnasal drip, sinus pain/tenderness.  2 days ago developed left eye redness, drainage.  Denies eye pain, notes pruritus.    Patient notes lower back pain for the past month.  Worse in the morning, \"spasms.\"  States coughing has made pain worse.  Denies radiation of pain, saddle paresthesia, incontinence of bowel or bladder, numbness or tingling lower extremities.  Taking Advil with mild relief.    Patient also notes wound to skin between the 2 toes of right foot.  As per patient, observed wound a few days ago.  Denies drainage, fever, chills, red streaks.  Denies numbness or tingling, inability to bear weight on foot.        Review of Systems   Constitutional:  Positive for fever. Negative for activity change, appetite change, chills and fatigue.   HENT:  Positive for congestion, postnasal drip, sinus pressure and sinus pain. Negative for ear pain, sore throat and trouble swallowing.    Eyes:  Positive for discharge, redness and itching. Negative for photophobia, pain and visual disturbance.   Respiratory:  Positive for cough. Negative for shortness of breath and wheezing.    Cardiovascular:  Negative for chest pain and palpitations.   Gastrointestinal:  Negative for abdominal pain, nausea and vomiting.   Genitourinary:  Negative for decreased urine volume, dysuria, flank pain, frequency and urgency.   Musculoskeletal:  Positive for back pain. Negative for arthralgias, joint swelling and myalgias.   Skin:  Positive for wound.   Neurological:  Negative for dizziness, light-headedness, numbness and headaches.   Hematological:  Negative for adenopathy.   Psychiatric/Behavioral:  Negative for confusion.        Current Outpatient Medications on File " Prior to Visit   Medication Sig    ascorbic acid (VITAMIN C) 500 MG tablet Take 1 tablet (500 mg total) by mouth 2 (two) times a day    busPIRone (BUSPAR) 15 mg tablet TAKE 1/2 TABLET (7.5 MG) BY ORAL ROUTE 2 TIMES PER DAY    cefadroxil (DURICEF) 500 mg capsule Take 1 capsule (500 mg total) by mouth every 12 (twelve) hours    celecoxib (CeleBREX) 200 mg capsule Take 1 capsule (200 mg total) by mouth 2 (two) times a day    cholecalciferol (VITAMIN D3) 1,000 units tablet Take 2 tablets (2,000 Units total) by mouth daily    fluticasone (FLONASE) 50 mcg/act nasal spray 1 spray into each nostril daily    folic acid (FOLVITE) 1 mg tablet Take 1 tablet (1 mg total) by mouth daily    methotrexate 2.5 mg tablet Take 6 tablets by mouth once a week    metoprolol succinate (TOPROL-XL) 100 mg 24 hr tablet Take 100 mg by mouth daily    Multiple Vitamins-Minerals (multivitamin with minerals) tablet Take 1 tablet by mouth daily    nortriptyline (PAMELOR) 50 mg capsule Take 1 capsule (50 mg total) by mouth 2 (two) times a day    venlafaxine (EFFEXOR-XR) 150 mg 24 hr capsule Take 1 capsule (150 mg total) by mouth daily at bedtime    ferrous sulfate 324 (65 Fe) mg Take 1 tablet (324 mg total) by mouth 2 (two) times a day before meals    [DISCONTINUED] acetaminophen (TYLENOL) 500 mg tablet Take 2 tablets (1,000 mg total) by mouth every 8 (eight) hours    [DISCONTINUED] aspirin (ECOTRIN LOW STRENGTH) 81 mg EC tablet Take 1 tablet (81 mg total) by mouth 2 (two) times a day (Patient not taking: Reported on 11/21/2023)    [DISCONTINUED] celecoxib (CeleBREX) 200 mg capsule Take 1 capsule (200 mg total) by mouth 2 (two) times a day    [DISCONTINUED] cyclobenzaprine (FLEXERIL) 5 mg tablet Take 1 tablet (5 mg total) by mouth 3 (three) times a day as needed for muscle spasms    [DISCONTINUED] HYDROcodone-acetaminophen (Norco) 5-325 mg per tablet Take 1 tablet by mouth every 6 (six) hours as needed for pain Max Daily Amount: 4 tablets (Patient  "not taking: Reported on 11/7/2023)    [DISCONTINUED] HYDROcodone-acetaminophen (Norco) 5-325 mg per tablet Take 1 tablet by mouth every 4 (four) hours as needed for pain Max Daily Amount: 6 tablets (Patient not taking: Reported on 11/21/2023)    [DISCONTINUED] ondansetron (ZOFRAN-ODT) 4 mg disintegrating tablet Take 1 tablet (4 mg total) by mouth every 6 (six) hours as needed for nausea or vomiting (Patient not taking: Reported on 11/7/2023)    [DISCONTINUED] senna-docusate sodium (SENOKOT S) 8.6-50 mg per tablet Take 1 tablet by mouth daily (Patient not taking: Reported on 11/7/2023)       Objective     /100   Pulse 90   Temp 99.1 °F (37.3 °C)   Resp 16   Ht 5' 11\" (1.803 m)   Wt 85.5 kg (188 lb 9.6 oz)   SpO2 96%   BMI 26.30 kg/m²     Physical Exam  Vitals reviewed.   Constitutional:       General: He is not in acute distress.     Appearance: Normal appearance. He is not ill-appearing.   HENT:      Head: Normocephalic and atraumatic.      Right Ear: Tympanic membrane, ear canal and external ear normal.      Left Ear: Tympanic membrane, ear canal and external ear normal.      Nose: Congestion present.      Right Turbinates: Enlarged.      Left Turbinates: Enlarged.      Right Sinus: Maxillary sinus tenderness present. No frontal sinus tenderness.      Left Sinus: Maxillary sinus tenderness present. No frontal sinus tenderness.      Mouth/Throat:      Mouth: Mucous membranes are moist.      Pharynx: Oropharynx is clear.   Eyes:      Conjunctiva/sclera: Conjunctivae normal.      Pupils: Pupils are equal, round, and reactive to light.   Neck:      Vascular: No carotid bruit.   Cardiovascular:      Rate and Rhythm: Normal rate and regular rhythm.      Pulses: Normal pulses.      Heart sounds: Normal heart sounds. No murmur heard.  Pulmonary:      Effort: Pulmonary effort is normal.      Breath sounds: Normal breath sounds. No wheezing.   Abdominal:      General: Bowel sounds are normal.      Palpations: " Abdomen is soft.      Tenderness: There is no abdominal tenderness.   Musculoskeletal:         General: Normal range of motion.      Cervical back: Normal range of motion and neck supple. No bony tenderness.      Thoracic back: No bony tenderness.      Lumbar back: Spasms and tenderness (to bilateral lower paraspinal muscles) present. No bony tenderness. Negative right straight leg raise test and negative left straight leg raise test.      Right lower leg: No edema.      Left lower leg: No edema.   Feet:      Right foot:      Skin integrity: Skin breakdown (noted to area between 4th and 5th toe) present.      Left foot:      Skin integrity: Skin integrity normal.      Comments: 1 cm x 0.8 cm open wound noted to area between 4th and 5 toe  Lymphadenopathy:      Cervical: No cervical adenopathy.   Skin:     General: Skin is warm.   Neurological:      General: No focal deficit present.      Mental Status: He is alert and oriented to person, place, and time.   Psychiatric:         Mood and Affect: Mood normal.         Behavior: Behavior normal.       DAVID Bowden

## 2024-02-20 NOTE — ASSESSMENT & PLAN NOTE
Diastolic blood pressure not within goal.  Patient notes not feeling well being in pain.  States he takes his blood pressure medication at nighttime.  Advised patient to monitor blood pressures frequently at home, to return to the office in 1 to 2 weeks for reevaluation for continued elevated blood pressures.

## 2024-02-20 NOTE — PATIENT INSTRUCTIONS
Lower Back Exercises   AMBULATORY CARE:   Lower back exercises  help heal and strengthen your back muscles to prevent another injury. Ask your healthcare provider if you need to see a physical therapist for more advanced exercises.  Seek care immediately if:   You have severe pain that prevents you from moving.       Call your doctor if:   Your pain becomes worse.    You have new pain.    You have questions or concerns about your condition or care.    Do lower back exercises safely:   Do the exercises on a mat or firm surface (not on a bed).  A firm surface will support your spine and prevent low back pain.    Move slowly and smoothly.  Avoid fast or jerky motions.    Breathe normally.  Do not hold your breath.    Stop if you feel pain.  It is normal to feel some discomfort at first, but you should not feel pain. Regular exercise will help decrease your discomfort over time.    Lower back exercises:  Your healthcare provider may recommend that you do back exercises 10 to 30 minutes each day. He or she may also recommend that you do exercises 1 to 3 times each day. Ask your provider which exercises are best for you and how often to do them.  Ankle pumps:  Lie on your back. Move your foot up (with your toes pointing toward your head). Then, move your foot down (with your toes pointing away from you). Repeat this exercise 10 times on each side.         Heel slides:  Lie on your back. Slowly bend one leg and then straighten it. Next, bend the other leg and then straighten it. Repeat 10 times on each side.         Pelvic tilt:  Lie on your back with your knees bent and feet flat on the floor. Place your arms in a relaxed position beside your body. Tighten the muscles of your abdomen and flatten your back against the floor. Hold for 5 seconds. Repeat 5 times.         Back stretch:  Lie on your back with your hands behind your head. Bend your knees and turn the lower half of your body to one side. Hold this position for 10  seconds. Repeat 3 times on each side.         Straight leg raises:  Lie on your back with one leg straight. Bend the other knee. Tighten your abdomen and then slowly lift the straight leg up about 6 to 12 inches off the floor. Hold for 1 to 5 seconds. Lower your leg slowly. Repeat 10 times on each leg.         Knee-to-chest:  Lie on your back with your knees bent and feet flat on the floor. Pull one of your knees toward your chest and hold it there for 5 seconds. Return your leg to the starting position. Lift the other knee toward your chest and hold for 5 seconds. Do this 5 times on each side.         Cat and camel:  Place your hands and knees on the floor. Arch your back upward toward the ceiling and lower your head. Round out your spine as much as you can. Hold for 5 seconds. Lift your head upward and push your chest downward toward the floor. Hold for 5 seconds. Do 3 sets or as directed.         Wall squats:  Stand with your back against a wall. Tighten the muscles of your abdomen. Slowly lower your body until your knees are bent at a 45 degree angle. Hold this position for 5 seconds. Slowly move back up to a standing position. Repeat 10 times.         Curl up:  Lie on your back with your knees bent and feet flat on the floor. Place your hands, palms down, underneath the curve in your lower back. Next, with your elbows on the floor, lift your shoulders and chest 2 to 3 inches. Keep your head in line with your shoulders. Hold this position for 5 seconds. When you can do this exercise without pain for 10 to 15 seconds, you may add a rotation. While your shoulders and chest are lifted off the ground, turn slightly to the left and hold. Repeat on the other side.         Bird dog:  Place your hands and knees on the floor. Keep your wrists directly below your shoulders and your knees directly below your hips. Pull your belly button in toward your spine. Do not flatten or arch your back. Tighten your abdominal muscles.  Raise one arm straight out so that it is aligned with your head. Next, raise the leg opposite your arm. Hold this position for 15 seconds. Lower your arm and leg slowly and change sides. Do 5 sets.       Follow up with your doctor as directed:  Write down your questions so you remember to ask them during your visits.  © Copyright [employer] 2023 Information is for End User's use only and may not be sold, redistributed or otherwise used for commercial purposes.  The above information is an  only. It is not intended as medical advice for individual conditions or treatments. Talk to your doctor, nurse or pharmacist before following any medical regimen to see if it is safe and effective for you.      Sinusitis   AMBULATORY CARE:   Sinusitis  is inflammation or infection of your sinuses. Sinusitis is most often caused by a virus. Acute sinusitis may last up to 12 weeks. Chronic sinusitis lasts longer than 12 weeks. Recurrent sinusitis means you have 4 or more infections in 1 year.        Common signs and symptoms:   Fever    Pain, pressure, redness, or swelling around the forehead, cheeks, or eyes    Thick yellow or green discharge from your nose    Tenderness when you touch your face over your sinuses    Dry cough that happens mostly at night or when you lie down    Headache and face pain that is worse when you lean forward    Tooth pain, or pain when you chew    Seek care immediately if:   You have trouble breathing or wheezing that is getting worse.    You have a stiff neck, a fever, or a bad headache.     You cannot open your eye.     Your eyeball bulges out or you cannot move your eye.     You are more sleepy than normal, or you notice changes in your ability to think, move, or talk.    You have swelling of your forehead or scalp.    Call your doctor if:   You have vision changes, such as double vision.    Your eye and eyelid are red, swollen, and painful.     Your symptoms do not improve or go away  after 10 days.    You have nausea and are vomiting.    Your nose is bleeding.    You have questions or concerns about your condition or care.    Medicines:  Your symptoms may go away on their own. Your healthcare provider may recommend watchful waiting for up to 10 days before starting antibiotics. You may need any of the following:  Acetaminophen  decreases pain and fever. It is available without a doctor's order. Ask how much to take and how often to take it. Follow directions. Read the labels of all other medicines you are using to see if they also contain acetaminophen, or ask your doctor or pharmacist. Acetaminophen can cause liver damage if not taken correctly.    NSAIDs , such as ibuprofen, help decrease swelling, pain, and fever. This medicine is available with or without a doctor's order. NSAIDs can cause stomach bleeding or kidney problems in certain people. If you take blood thinner medicine, always ask your healthcare provider if NSAIDs are safe for you. Always read the medicine label and follow directions.    Nasal steroid sprays  may help decrease inflammation in your nose and sinuses.    Decongestants  help reduce swelling and drain mucus in the nose and sinuses. They may help you breathe easier.     Antihistamines  help dry mucus in the nose and relieve sneezing.     Antibiotics  help treat or prevent a bacterial infection.    Self-care:   Rinse your sinuses as directed.  Use a sinus rinse device to rinse your nasal passages with a saline (salt water) solution or distilled water. Do not use tap water. This will help thin the mucus in your nose and rinse away pollen and dirt. It will also help reduce swelling so you can breathe normally.    Use a humidifier  to increase air moisture in your home. This may make it easier for you to breathe and help decrease your cough.     Sleep with your head elevated.  Place an extra pillow under your head before you go to sleep to help your sinuses drain.     Drink  liquids as directed.  Ask your healthcare provider how much liquid to drink each day and which liquids are best for you. Liquids will thin the mucus in your nose and help it drain. Avoid drinks that contain alcohol or caffeine.     Do not smoke, and avoid secondhand smoke.  Nicotine and other chemicals in cigarettes and cigars can make your symptoms worse. Ask your healthcare provider for information if you currently smoke and need help to quit. E-cigarettes or smokeless tobacco still contain nicotine. Talk to your healthcare provider before you use these products.    Prevent the spread of germs:   Wash your hands often with soap and water.  Wash your hands after you use the bathroom, change a child's diaper, or sneeze. Wash your hands before you prepare or eat food.         Stay away from people who are sick.  Some germs spread easily and quickly through contact.    Follow up with your doctor as directed:  You may be referred to an ear, nose, and throat specialist. Write down your questions so you remember to ask them during your visits.   © Copyright [employer] 2023 Information is for End User's use only and may not be sold, redistributed or otherwise used for commercial purposes.  The above information is an  only. It is not intended as medical advice for individual conditions or treatments. Talk to your doctor, nurse or pharmacist before following any medical regimen to see if it is safe and effective for you.

## 2024-02-26 ENCOUNTER — TELEPHONE (OUTPATIENT)
Dept: FAMILY MEDICINE CLINIC | Facility: CLINIC | Age: 49
End: 2024-02-26

## 2024-02-26 NOTE — TELEPHONE ENCOUNTER
Spoke with Monet from [facility]. She stated that they received a referral for R Foot and have tried contacting patient 4 times to no avail.    Just wanted to make us aware. Thank you!

## 2024-03-12 DIAGNOSIS — F32.5 MAJOR DEPRESSIVE DISORDER IN FULL REMISSION, UNSPECIFIED WHETHER RECURRENT (HCC): ICD-10-CM

## 2024-03-12 RX ORDER — VENLAFAXINE HYDROCHLORIDE 150 MG/1
150 CAPSULE, EXTENDED RELEASE ORAL
Qty: 90 CAPSULE | Refills: 0 | Status: SHIPPED | OUTPATIENT
Start: 2024-03-12

## 2024-03-12 RX ORDER — NORTRIPTYLINE HYDROCHLORIDE 50 MG/1
50 CAPSULE ORAL 2 TIMES DAILY
Qty: 180 CAPSULE | Refills: 0 | Status: SHIPPED | OUTPATIENT
Start: 2024-03-12

## 2024-03-13 DIAGNOSIS — F41.9 ANXIETY: Primary | ICD-10-CM

## 2024-03-14 RX ORDER — BUSPIRONE HYDROCHLORIDE 15 MG/1
7.5 TABLET ORAL 2 TIMES DAILY
Qty: 90 TABLET | Refills: 1 | Status: SHIPPED | OUTPATIENT
Start: 2024-03-14

## 2024-04-02 ENCOUNTER — OFFICE VISIT (OUTPATIENT)
Dept: WOUND CARE | Facility: CLINIC | Age: 49
End: 2024-04-02
Payer: COMMERCIAL

## 2024-04-02 VITALS
RESPIRATION RATE: 18 BRPM | TEMPERATURE: 96.9 F | SYSTOLIC BLOOD PRESSURE: 151 MMHG | DIASTOLIC BLOOD PRESSURE: 97 MMHG | WEIGHT: 185 LBS | BODY MASS INDEX: 25.9 KG/M2 | HEIGHT: 71 IN | HEART RATE: 72 BPM

## 2024-04-02 DIAGNOSIS — S91.104A OPEN WOUND OF FIFTH TOE OF RIGHT FOOT, INITIAL ENCOUNTER: Primary | ICD-10-CM

## 2024-04-02 PROCEDURE — 11042 DBRDMT SUBQ TIS 1ST 20SQCM/<: CPT | Performed by: ORTHOPAEDIC SURGERY

## 2024-04-02 PROCEDURE — 99213 OFFICE O/P EST LOW 20 MIN: CPT | Performed by: ORTHOPAEDIC SURGERY

## 2024-04-02 RX ORDER — LIDOCAINE 40 MG/G
CREAM TOPICAL ONCE
Status: COMPLETED | OUTPATIENT
Start: 2024-04-02 | End: 2024-04-02

## 2024-04-02 RX ADMIN — LIDOCAINE: 40 CREAM TOPICAL at 13:24

## 2024-04-02 NOTE — PATIENT INSTRUCTIONS
Orders Placed This Encounter   Procedures    Wound cleansing and dressings Other (comment) (open wound) Anterior;Right;Inner Toe D5, fifth     Right Toe Wound    Wash your hands with soap and water.  Remove old dressing, discard into plastic bag and place in trash.  Cleanse the wound with normal saline wound wash prior to applying a clean dressing. Do not use tissue or cotton balls. Do not scrub the wound. Pat dry using gauze. (Today at wound center only did a Dakins cleanse)    Shower no unless you get a cast cover to cover wound when shower     Apply skin prep to dillon wound  Apply silver alginate to the toe wound.  Cover with gauze  Secure with tape    Change dressing daily    This was done today at wound center     You will see wound podiatrist specialist here in office     Standing Status:   Future     Standing Expiration Date:   4/2/2025

## 2024-04-02 NOTE — PROGRESS NOTES
Patient ID: João Brown is a 48 y.o. male Date of Birth 1975       Chief Complaint   Patient presents with    New Patient Visit     Right foot wound       Allergies:  Patient has no known allergies.    Diagnosis:      Diagnosis ICD-10-CM Associated Orders   1. Open wound of fifth toe of right foot, initial encounter  S91.104A Wound cleansing and dressings Other (comment) (open wound) Anterior;Right;Inner Toe D5, fifth     lidocaine (LMX) 4 % cream     Debridement              Assessment and Plan :  Initial Evaluation open wound on 5th digit of Right foot. Pink granular wound bed with macerated wound edges and foul smelling serosanguinous drainage. No signs of infection today.  Debrided as below  Wound management with silver alginate followed by DSD. See wound orders below   No harsh cleansers such as alcohol, peroxide, or antibacterial soap, do not submerge in water such as bathtub, hot tub, swimming pool, ocean, etc.   Can cleanse with NSS at dressing changes.   Counseled on importance of maintaining wound dry.   Follow-up in 1 week(s) with podiatry or call sooner with questions or concerns or any signs of infection such as redness, swelling, increased/purulent drainage, fever, chills, increased severe pain.     Subjective:   4/2: Patient is a 48 y.o. 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 Pt states the wound has been intermittently opening since childhood. Pt has been covering the wound with a bandaid. Does have an odor. No fever. Mild serosanguinous drainage.  Pt states he drinks beers twice a week otherwise he denies any tobacco or drug use.  Pt denies any sob, fatigue, N/V, CP, fever or chills.        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)    Prediabetes    Anemia    Mild protein-calorie malnutrition (HCC)    Major depressive disorder in full remission (HCC)    Moderate mixed hyperlipidemia not requiring statin therapy    ABLA (acute blood loss anemia)    RA (rheumatoid arthritis) ([facility])    PICC (peripherally inserted central catheter) in place     Past Medical History:   Diagnosis Date    Anxiety     Arthritis     Crutches as ambulation aid 07/25/2023    Depression     Difficult intubation 07/28/2023    Hypertension     Limited jaw ROM     Migraine     PONV (postoperative nausea and vomiting) 07/28/2023    Psoriasis     RA (rheumatoid arthritis) ([facility])     Walker as ambulation aid      Past Surgical History:   Procedure Laterality Date    IR PICC PLACEMENT SINGLE LUMEN  10/23/2023    JOINT REPLACEMENT      ND REVJ TOT KNEE ARTHRP FEM&ENTIRE TIBIAL COMPONE Right 07/28/2023    Procedure: ARTHROPLASTY KNEE TOTAL REVISION;  Surgeon: Kirk Long DO;  Location: [facility];  Service: Orthopedics    ND REVJ TOT KNEE ARTHRP FEM&ENTIRE TIBIAL COMPONE Right 10/18/2023    Procedure: ARTHROPLASTY KNEE TOTAL REVISION;  Surgeon: Kirk Long DO;  Location: [facility];  Service: Orthopedics    REPLACEMENT TOTAL KNEE Right     x2     Family History   Problem Relation Age of Onset    Breast cancer Mother     Completed Suicide  Father     Asthma Sister       Social History     Socioeconomic History    Marital status: Single     Spouse name: None    Number of children: None    Years of education: None    Highest education level: None   Occupational History    None   Tobacco Use    Smoking status: Former     Current  packs/day: 0.00     Average packs/day: 1 pack/day for 10.0 years (10.0 ttl pk-yrs)     Types: Cigarettes     Start date: 2000     Quit date: 2000     Years since quittin.3    Smokeless tobacco: Current     Types: Chew    Tobacco comments:     Last chewed tobacco 23   Vaping Use    Vaping status: Never Used   Substance and Sexual Activity    Alcohol use: Yes     Comment: rarely    Drug use: Never    Sexual activity: Not Currently     Partners: Female     Birth control/protection: Condom Male   Other Topics Concern    None   Social History Narrative    None     Social Determinants of Health     Financial Resource Strain: Not on file   Food Insecurity: No Food Insecurity (10/30/2023)    Received from [facility]    Hunger Vital Sign     Worried About Running Out of Food in the Last Year: Never true     Ran Out of Food in the Last Year: Never true   Transportation Needs: No Transportation Needs (10/19/2023)    PRAPARE - Transportation     Lack of Transportation (Medical): No     Lack of Transportation (Non-Medical): No   Physical Activity: Not on file   Stress: Not on file   Social Connections: Not on file   Intimate Partner Violence: Not on file   Housing Stability: Low Risk  (10/19/2023)    Housing Stability Vital Sign     Unable to Pay for Housing in the Last Year: No     Number of Places Lived in the Last Year: 1     Unstable Housing in the Last Year: No        Current Outpatient Medications:     ascorbic acid (VITAMIN C) 500 MG tablet, Take 1 tablet (500 mg total) by mouth 2 (two) times a day, Disp: 60 tablet, Rfl: 1    busPIRone (BUSPAR) 15 mg tablet, Take 0.5 tablets (7.5 mg total) by mouth 2 (two) times a day, Disp: 90 tablet, Rfl: 1    celecoxib (CeleBREX) 200 mg capsule, Take 1 capsule (200 mg total) by mouth 2 (two) times a day, Disp: 60 capsule, Rfl: 0    cholecalciferol (VITAMIN D3) 1,000 units tablet, Take 2 tablets (2,000 Units total) by mouth daily, Disp: 60 tablet, Rfl: 1    ferrous  sulfate 324 (65 Fe) mg, Take 1 tablet (324 mg total) by mouth 2 (two) times a day before meals, Disp: 60 tablet, Rfl: 1    fluticasone (FLONASE) 50 mcg/act nasal spray, 1 spray into each nostril daily, Disp: , Rfl:     folic acid (FOLVITE) 1 mg tablet, Take 1 tablet (1 mg total) by mouth daily, Disp: 30 tablet, Rfl: 1    lidocaine (LIDODERM) 5 %, Apply 1 patch topically over 12 hours daily Remove & Discard patch within 12 hours or as directed by MD, Disp: 30 patch, Rfl: 0    methocarbamol (ROBAXIN) 500 mg tablet, Take 1 tablet (500 mg total) by mouth 3 (three) times a day, Disp: 60 tablet, Rfl: 0    methotrexate 2.5 mg tablet, Take 6 tablets by mouth once a week, Disp: , Rfl:     metoprolol succinate (TOPROL-XL) 100 mg 24 hr tablet, Take 100 mg by mouth daily, Disp: , Rfl:     Multiple Vitamins-Minerals (multivitamin with minerals) tablet, Take 1 tablet by mouth daily, Disp: 30 tablet, Rfl: 1    nortriptyline (PAMELOR) 50 mg capsule, Take 1 capsule (50 mg total) by mouth 2 (two) times a day, Disp: 180 capsule, Rfl: 0    polymyxin b-trimethoprim (POLYTRIM) ophthalmic solution, Administer 1 drop into the left eye every 4 (four) hours, Disp: 10 mL, Rfl: 0    venlafaxine (EFFEXOR-XR) 150 mg 24 hr capsule, Take 1 capsule (150 mg total) by mouth daily at bedtime, Disp: 90 capsule, Rfl: 0  No current facility-administered medications for this visit.    Review of Systems   Constitutional:  Negative for appetite change, chills, fatigue, fever and unexpected weight change.   HENT:  Negative for congestion, hearing loss and postnasal drip.    Respiratory:  Negative for cough and shortness of breath.    Cardiovascular:  Negative for leg swelling.   Skin:  Positive for wound (5th digit on R foot). Negative for rash.   Neurological:  Negative for numbness.   Hematological:  Does not bruise/bleed easily.   Psychiatric/Behavioral: Negative.         Objective:  /97   Pulse 72   Temp (!) 96.9 °F (36.1 °C)   Resp 18   Ht 5'  "11\" (1.803 m)   Wt 83.9 kg (185 lb)   BMI 25.80 kg/m²   Pain Score:   6     Physical Exam  Vitals reviewed.   Constitutional:       General: He is not in acute distress.     Appearance: Normal appearance. He is well-developed and normal weight.   HENT:      Head: Normocephalic and atraumatic.   Cardiovascular:      Rate and Rhythm: Normal rate.      Pulses:           Dorsalis pedis pulses are 2+ on the right side.        Posterior tibial pulses are 2+ on the right side.   Pulmonary:      Effort: Pulmonary effort is normal.   Musculoskeletal:         General: No deformity.      Right lower leg: No edema.      Left lower leg: No edema.        Feet:    Feet:      Comments: Foul smelling serosanguinous drainage on pink granulated wound bed with macerated wound edges.  Skin:     General: Skin is warm and dry.      Findings: Wound (5th digit on R foot) present.   Neurological:      General: No focal deficit present.      Mental Status: He is alert and oriented to person, place, and time.   Psychiatric:         Mood and Affect: Mood and affect normal.         Behavior: Behavior normal. Behavior is cooperative.         Wound 04/02/24 Other (comment) Toe D5, fifth Anterior;Right;Inner (Active)   Wound Image Images linked 04/02/24 1320   Wound Description Pink;White 04/02/24 1305   Nancy-wound Assessment Fragile;Maceration 04/02/24 1305   Wound Length (cm) 0.8 cm 04/02/24 1305   Wound Width (cm) 0.3 cm 04/02/24 1305   Wound Depth (cm) 0.5 cm 04/02/24 1305   Wound Surface Area (cm^2) 0.24 cm^2 04/02/24 1305   Wound Volume (cm^3) 0.12 cm^3 04/02/24 1305   Calculated Wound Volume (cm^3) 0.12 cm^3 04/02/24 1305   Drainage Amount Small 04/02/24 1305   Drainage Description Serosanguineous 04/02/24 1305   Non-staged Wound Description Full thickness 04/02/24 1305   Dressing Status Intact 04/02/24 1305       Debridement   Wound 04/02/24 Other (comment) Toe D5, fifth Anterior;Right;Inner    Universal Protocol:  Consent: Verbal consent " "obtained. Written consent obtained.  Risks and benefits: risks, benefits and alternatives were discussed  Consent given by: patient  Time out: Immediately prior to procedure a \"time out\" was called to verify the correct patient, procedure, equipment, support staff and site/side marked as required.  Patient understanding: patient states understanding of the procedure being performed  Patient identity confirmed: verbally with patient    Debridement Details  Performed by: PA  Debridement type: surgical  Level of debridement: subcutaneous tissue  Pain control: lidocaine 4%      Post-debridement measurements  Length (cm): 0.8  Width (cm): 0.3  Depth (cm): 0.6  Percent debrided: 100%  Surface Area (cm^2): 0.24  Area Debrided (cm^2): 0.24  Volume (cm^3): 0.14    Tissue and other material debrided: subcutaneous tissue  Devitalized tissue debrided: exudate and slough  Instrument(s) utilized: curette  Bleeding: small  Hemostasis obtained with: pressure  Procedural pain (0-10): 0  Post-procedural pain: 0   Response to treatment: procedure was tolerated well             Wound Instructions:  Orders Placed This Encounter   Procedures    Wound cleansing and dressings Other (comment) (open wound) Anterior;Right;Inner Toe D5, fifth     Right Toe Wound    Wash your hands with soap and water.  Remove old dressing, discard into plastic bag and place in trash.  Cleanse the wound with normal saline wound wash prior to applying a clean dressing. Do not use tissue or cotton balls. Do not scrub the wound. Pat dry using gauze. (Today at wound center only did a Dakins cleanse)    Shower no unless you get a cast cover to cover wound when shower     Apply skin prep to dillon wound  Apply silver alginate to the toe wound.  Cover with gauze  Secure with tape    Change dressing daily    This was done today at wound center     You will see wound podiatrist specialist here in office     Standing Status:   Future     Standing Expiration Date:   " "4/2/2025    Debridement     This order was created via procedure documentation       Total time spent today:  30 minutes.  This includes reviewing the patient's chart, pertinent physician records MICAH Alexandra, 2/20/24 and Dr. Lockett, Emergency Medicine, 2/17/24.      Ciarra Stanley PA-C, [facility]    Portions of the record may have been created with voice recognition software. Occasional wrong word or \"sound alike\" substitutions may have occurred due to the inherent limitations of voice recognition software. Read the chart carefully and recognize, using context, where substitutions have occurred.    "

## 2024-04-10 ENCOUNTER — OFFICE VISIT (OUTPATIENT)
Dept: WOUND CARE | Facility: CLINIC | Age: 49
End: 2024-04-10
Payer: COMMERCIAL

## 2024-04-10 VITALS
DIASTOLIC BLOOD PRESSURE: 93 MMHG | SYSTOLIC BLOOD PRESSURE: 126 MMHG | RESPIRATION RATE: 18 BRPM | TEMPERATURE: 96.6 F | HEART RATE: 72 BPM

## 2024-04-10 DIAGNOSIS — R73.03 PREDIABETES: ICD-10-CM

## 2024-04-10 DIAGNOSIS — M79.671 PAIN IN RIGHT FOOT: ICD-10-CM

## 2024-04-10 DIAGNOSIS — B35.3 TINEA PEDIS OF RIGHT FOOT: ICD-10-CM

## 2024-04-10 DIAGNOSIS — L97.511 NON-PRESSURE CHRONIC ULCER OF OTHER PART OF RIGHT FOOT LIMITED TO BREAKDOWN OF SKIN (HCC): Primary | ICD-10-CM

## 2024-04-10 PROCEDURE — 11042 DBRDMT SUBQ TIS 1ST 20SQCM/<: CPT | Performed by: STUDENT IN AN ORGANIZED HEALTH CARE EDUCATION/TRAINING PROGRAM

## 2024-04-10 RX ORDER — LIDOCAINE 40 MG/G
CREAM TOPICAL ONCE
Status: COMPLETED | OUTPATIENT
Start: 2024-04-10 | End: 2024-04-10

## 2024-04-10 RX ADMIN — LIDOCAINE: 40 CREAM TOPICAL at 08:17

## 2024-04-10 NOTE — PROGRESS NOTES
Wound Procedure Treatment Other (comment) (open wound) Anterior;Right;Inner Toe D5, fifth    Performed by: Ivon Ruth RN  Authorized by: Marly Cartwright DPM  Associated wounds:   Wound 04/02/24 Other (comment) Toe D5, fifth Anterior;Right;Inner  Applied to periwound:  Skin prep  Applied primary dressing:  Calcium alginate  Applied secondary dressing:  Gauze  Wound secured with:  Tape    Silver Alginate used.

## 2024-04-10 NOTE — PATIENT INSTRUCTIONS
Orders Placed This Encounter   Procedures    Wound cleansing and dressings Other (comment) (open wound) Anterior;Right;Inner Toe D5, fifth     Right Toe Wound     Wash your hands with soap and water. Remove old dressing, discard into plastic bag and place in trash. Cleanse the wound with normal saline wound wash prior to applying a clean dressing. Do not use tissue or cotton balls. Do not scrub the wound. Pat dry using gauze.    Shower no unless you get a cast cover to cover wound when shower     Apply skin prep to dillon wound.  Apply silver alginate to the toe wound.   Cover with gauze.  Secure with tape.    Change dressing daily.    Dr. Cartwright will be ordered 2 medications for your foot.   -One is a cream that you will apply to the bottom of your foot   -One is a powder that you will apply between your toes.     Standing Status:   Future     Standing Expiration Date:   4/17/2024

## 2024-04-10 NOTE — PROGRESS NOTES
Patient ID: João Brown is a 48 y.o. male Date of Birth 1975       Chief Complaint   Patient presents with    Follow Up Wound Care Visit     Right Toe Wound        Allergies  Patient has no known allergies.    Diagnosis:  1. Non-pressure chronic ulcer of other part of right foot limited to breakdown of skin (HCC)  -     Wound cleansing and dressings Other (comment) (open wound) Anterior;Right;Inner Toe D5, fifth; Future  -     lidocaine (LMX) 4 % cream  -     Wound Procedure Treatment Other (comment) (open wound) Anterior;Right;Inner Toe D5, fifth    2. Tinea pedis of right foot    3. Prediabetes    4. Pain in right foot        Diagnosis ICD-10-CM Associated Orders   1. Non-pressure chronic ulcer of other part of right foot limited to breakdown of skin (HCC)  L97.511 Wound cleansing and dressings Other (comment) (open wound) Anterior;Right;Inner Toe D5, fifth     lidocaine (LMX) 4 % cream     Wound Procedure Treatment Other (comment) (open wound) Anterior;Right;Inner Toe D5, fifth      2. Tinea pedis of right foot  B35.3       3. Prediabetes  R73.03       4. Pain in right foot  M79.671              Assessment & Plan:  My initial evaluation of the patients right foot 4th webspace ulceration with interdigital tinea pedis. No signs of infection today and improving. Patient educated on tinea pedis and how interdigital tinea pedis macerates the skin and can break down the skin into an ulceration if present for a long period of time. I also discussed that his skin on the bottom of his foot is very dry and flaky. Ketoconazole cream called into the pharmacy and he is to apply to the bottom of the foot daily. Do not put in between toes. Nystatin powder called into the patients pharmacy. He is to apply between all toes of the right foot daily.   Debrided as below  Continue wound management with maxorb ag between the toes daily, see wound orders below   No harsh cleansers such as alcohol, peroxide, or antibacterial  soap, do not submerge in water. Keep clean and dry in between toes daily to decrease moisture.   No compression at this time to keep the toes from being pushed together. I discussed that once healed he will need to continue to use powder and toe spacers to prevent it from returning. Wear shoes with a wide toe box to prevent toes from being pushed together for offloading.   Counseled on eating protein for wound healing.   Followup in 1 week or call sooner with questions or concerns           Subjective:   Patient is a 48 y.o. male with pmhx HTN, HLD, RA, MDD, Anxiety, Migraines, Plaque psoriasis, R TKA complicated by infection (on chronic suppression with Cefadroxil), Prediabetes who presents for his follow up evaluation and my initial evaluation of an open wound between his right foot 4th and 5th toes.  Pt states the wound has been intermittently opening since childhood. He states that it has opened and closed many times throughout the years. He feels that it has improved since he was last seen. He has been changing it daily.  Pt denies any sob, fatigue, N/V, CP, fever or chills.        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)    Prediabetes    Anemia    Mild protein-calorie malnutrition (HCC)    Major depressive disorder in full remission (HCC)    Moderate mixed hyperlipidemia not requiring statin therapy    ABLA (acute blood loss anemia)    RA (rheumatoid arthritis) (HCC)    PICC (peripherally inserted central catheter) in place     Past Medical History:   Diagnosis Date    Anxiety     Arthritis     Crutches as ambulation aid 07/25/2023    Depression      Difficult intubation 2023    Hypertension     Limited jaw ROM     Migraine     PONV (postoperative nausea and vomiting) 2023    Psoriasis     RA (rheumatoid arthritis) (HCC)     Walker as ambulation aid      Past Surgical History:   Procedure Laterality Date    IR PICC PLACEMENT SINGLE LUMEN  10/23/2023    JOINT REPLACEMENT      AR REVJ TOT KNEE ARTHRP FEM&ENTIRE TIBIAL COMPONE Right 2023    Procedure: ARTHROPLASTY KNEE TOTAL REVISION;  Surgeon: Kirk Long DO;  Location: [facility];  Service: Orthopedics    AR REVJ TOT KNEE ARTHRP FEM&ENTIRE TIBIAL COMPONE Right 10/18/2023    Procedure: ARTHROPLASTY KNEE TOTAL REVISION;  Surgeon: Kirk Lnog DO;  Location: [facility];  Service: Orthopedics    REPLACEMENT TOTAL KNEE Right     x2     Social History     Socioeconomic History    Marital status: Single     Spouse name: None    Number of children: None    Years of education: None    Highest education level: None   Occupational History    None   Tobacco Use    Smoking status: Former     Current packs/day: 0.00     Average packs/day: 1 pack/day for 10.0 years (10.0 ttl pk-yrs)     Types: Cigarettes     Start date: 2000     Quit date: 2000     Years since quittin.3    Smokeless tobacco: Current     Types: Chew    Tobacco comments:     Last chewed tobacco 23   Vaping Use    Vaping status: Never Used   Substance and Sexual Activity    Alcohol use: Yes     Comment: rarely    Drug use: Never    Sexual activity: Not Currently     Partners: Female     Birth control/protection: Condom Male   Other Topics Concern    None   Social History Narrative    None     Social Determinants of Health     Financial Resource Strain: Not on file   Food Insecurity: No Food Insecurity (10/30/2023)    Received from [employer]    Hunger Vital Sign     Worried About Running Out of Food in the Last Year: Never true     Ran Out of Food in the Last Year: Never true   Transportation Needs: No  Transportation Needs (10/19/2023)    PRAPARE - Transportation     Lack of Transportation (Medical): No     Lack of Transportation (Non-Medical): No   Physical Activity: Not on file   Stress: Not on file   Social Connections: Not on file   Intimate Partner Violence: Not on file   Housing Stability: Low Risk  (10/19/2023)    Housing Stability Vital Sign     Unable to Pay for Housing in the Last Year: No     Number of Places Lived in the Last Year: 1     Unstable Housing in the Last Year: No        Current Outpatient Medications:     ascorbic acid (VITAMIN C) 500 MG tablet, Take 1 tablet (500 mg total) by mouth 2 (two) times a day, Disp: 60 tablet, Rfl: 1    busPIRone (BUSPAR) 15 mg tablet, Take 0.5 tablets (7.5 mg total) by mouth 2 (two) times a day, Disp: 90 tablet, Rfl: 1    celecoxib (CeleBREX) 200 mg capsule, Take 1 capsule (200 mg total) by mouth 2 (two) times a day, Disp: 60 capsule, Rfl: 0    cholecalciferol (VITAMIN D3) 1,000 units tablet, Take 2 tablets (2,000 Units total) by mouth daily, Disp: 60 tablet, Rfl: 1    ferrous sulfate 324 (65 Fe) mg, Take 1 tablet (324 mg total) by mouth 2 (two) times a day before meals, Disp: 60 tablet, Rfl: 1    fluticasone (FLONASE) 50 mcg/act nasal spray, 1 spray into each nostril daily, Disp: , Rfl:     folic acid (FOLVITE) 1 mg tablet, Take 1 tablet (1 mg total) by mouth daily, Disp: 30 tablet, Rfl: 1    lidocaine (LIDODERM) 5 %, Apply 1 patch topically over 12 hours daily Remove & Discard patch within 12 hours or as directed by MD, Disp: 30 patch, Rfl: 0    methocarbamol (ROBAXIN) 500 mg tablet, Take 1 tablet (500 mg total) by mouth 3 (three) times a day, Disp: 60 tablet, Rfl: 0    methotrexate 2.5 mg tablet, Take 6 tablets by mouth once a week, Disp: , Rfl:     metoprolol succinate (TOPROL-XL) 100 mg 24 hr tablet, Take 100 mg by mouth daily, Disp: , Rfl:     Multiple Vitamins-Minerals (multivitamin with minerals) tablet, Take 1 tablet by mouth daily, Disp: 30 tablet, Rfl:  1    nortriptyline (PAMELOR) 50 mg capsule, Take 1 capsule (50 mg total) by mouth 2 (two) times a day, Disp: 180 capsule, Rfl: 0    polymyxin b-trimethoprim (POLYTRIM) ophthalmic solution, Administer 1 drop into the left eye every 4 (four) hours, Disp: 10 mL, Rfl: 0    venlafaxine (EFFEXOR-XR) 150 mg 24 hr capsule, Take 1 capsule (150 mg total) by mouth daily at bedtime, Disp: 90 capsule, Rfl: 0  No current facility-administered medications for this visit.  Family History   Problem Relation Age of Onset    Breast cancer Mother     Completed Suicide  Father     Asthma Sister       Review of Systems   Constitutional:  Negative for appetite change, chills, fatigue, fever and unexpected weight change.   Respiratory:  Negative for cough and shortness of breath.    Cardiovascular:  Negative for leg swelling.   Skin:  Positive for wound (5th digit on R foot). Negative for rash.   Neurological:  Negative for numbness.   Hematological:  Does not bruise/bleed easily.   Psychiatric/Behavioral: Negative.  Negative for behavioral problems.        Objective:  /93   Pulse 72   Temp (!) 96.6 °F (35.9 °C)   Resp 18     Physical Exam  Vitals and nursing note reviewed.   Constitutional:       General: He is not in acute distress.     Appearance: Normal appearance. He is not ill-appearing.   Cardiovascular:      Pulses:           Dorsalis pedis pulses are 2+ on the right side and 2+ on the left side.        Posterior tibial pulses are 2+ on the right side and 2+ on the left side.   Musculoskeletal:         General: No tenderness.      Right foot: Deformity and bunion present.      Left foot: Deformity and bunion present.   Feet:      Right foot:      Protective Sensation: 0 sites tested.        Skin integrity: Ulcer and dry skin present.      Left foot:      Protective Sensation: 0 sites tested.        Comments: -Right foot 4th webspace interdigital ulceration with granular wound base and moderate serosang. Drainage with dillon  "wound maceration presents, no odor, no deep probing, no fluctuance or crepitus noted, decrease in size.   Skin:     General: Skin is warm and dry.      Capillary Refill: Capillary refill takes less than 2 seconds.      Findings: No erythema or rash.   Neurological:      Mental Status: He is alert and oriented to person, place, and time.   Psychiatric:         Mood and Affect: Mood normal.         Behavior: Behavior normal.             Wound 07/28/23 Knee Right (Active)       Wound 10/18/23 Knee Right (Active)       Wound 04/02/24 Other (comment) Toe D5, fifth Anterior;Right;Inner (Active)   Wound Image   04/10/24 0822   Wound Description Pink;White 04/10/24 0807   Nancy-wound Assessment Fragile;Maceration 04/10/24 0807   Wound Length (cm) 0.3 cm 04/10/24 0807   Wound Width (cm) 0.2 cm 04/10/24 0807   Wound Depth (cm) 0.1 cm 04/10/24 0807   Wound Surface Area (cm^2) 0.06 cm^2 04/10/24 0807   Wound Volume (cm^3) 0.006 cm^3 04/10/24 0807   Calculated Wound Volume (cm^3) 0.01 cm^3 04/10/24 0807   Change in Wound Size % 91.67 04/10/24 0807   Drainage Amount Small 04/10/24 0807   Drainage Description Serosanguineous 04/10/24 0807   Non-staged Wound Description Full thickness 04/10/24 0807   Dressing Status Intact 04/10/24 0807                             Debridement   Wound 04/02/24 Other (comment) Toe D5, fifth Anterior;Right;Inner    Universal Protocol:  Consent: Verbal consent obtained. Written consent not obtained.  Risks and benefits: risks, benefits and alternatives were discussed  Consent given by: patient  Time out: Immediately prior to procedure a \"time out\" was called to verify the correct patient, procedure, equipment, support staff and site/side marked as required.  Timeout called at: 4/10/2024 8:20 AM.  Patient understanding: patient states understanding of the procedure being performed  Patient identity confirmed: verbally with patient    Debridement Details  Performed by: physician  Debridement type: " "surgical  Level of debridement: subcutaneous tissue  Pain control: lidocaine 4%      Post-debridement measurements  Length (cm): 0.4  Width (cm): 0.3  Depth (cm): 0.2  Percent debrided: 100%  Surface Area (cm^2): 0.12  Area Debrided (cm^2): 0.12  Volume (cm^3): 0.02    Tissue and other material debrided: subcutaneous tissue  Devitalized tissue debrided: biofilm, callus, exudate, fibrin and slough  Instrument(s) utilized: curette  Bleeding: small  Hemostasis obtained with: pressure  Procedural pain (0-10): 0  Post-procedural pain: 0   Response to treatment: procedure was tolerated well               Wound Instructions:  Orders Placed This Encounter   Procedures    Wound cleansing and dressings Other (comment) (open wound) Anterior;Right;Inner Toe D5, fifth     Right Toe Wound     Wash your hands with soap and water. Remove old dressing, discard into plastic bag and place in trash. Cleanse the wound with normal saline wound wash prior to applying a clean dressing. Do not use tissue or cotton balls. Do not scrub the wound. Pat dry using gauze.    Shower no unless you get a cast cover to cover wound when shower     Apply skin prep to dillon wound.  Apply silver alginate to the toe wound.   Cover with gauze.  Secure with tape.    Change dressing daily.    Dr. Cartwright will be ordered 2 medications for your foot.   -One is a cream that you will apply to the bottom of your foot   -One is a powder that you will apply between your toes.     Standing Status:   Future     Standing Expiration Date:   4/17/2024    Wound Procedure Treatment Other (comment) (open wound) Anterior;Right;Inner Toe D5, fifth     This order was created via procedure documentation         Marly Cartwright DPM    Portions of the record may have been created with voice recognition software. Occasional wrong word or \"sound a like\" substitutions may have occurred due to the inherent limitations of voice recognition software. Read the chart carefully and " recognize, using context, where substitutions have occurred.

## 2024-04-15 DIAGNOSIS — I10 PRIMARY HYPERTENSION: Primary | ICD-10-CM

## 2024-04-16 RX ORDER — METOPROLOL SUCCINATE 100 MG/1
100 TABLET, EXTENDED RELEASE ORAL DAILY
Qty: 90 TABLET | Refills: 1 | Status: SHIPPED | OUTPATIENT
Start: 2024-04-16 | End: 2024-04-18

## 2024-04-17 ENCOUNTER — OFFICE VISIT (OUTPATIENT)
Dept: WOUND CARE | Facility: CLINIC | Age: 49
End: 2024-04-17

## 2024-04-17 VITALS
HEART RATE: 68 BPM | DIASTOLIC BLOOD PRESSURE: 80 MMHG | TEMPERATURE: 97.5 F | RESPIRATION RATE: 18 BRPM | SYSTOLIC BLOOD PRESSURE: 118 MMHG

## 2024-04-17 DIAGNOSIS — L97.511 NON-PRESSURE CHRONIC ULCER OF OTHER PART OF RIGHT FOOT LIMITED TO BREAKDOWN OF SKIN (HCC): Primary | ICD-10-CM

## 2024-04-17 RX ORDER — LIDOCAINE 40 MG/G
CREAM TOPICAL ONCE
Status: COMPLETED | OUTPATIENT
Start: 2024-04-17 | End: 2024-04-17

## 2024-04-17 RX ADMIN — LIDOCAINE: 40 CREAM TOPICAL at 08:31

## 2024-04-17 NOTE — PROGRESS NOTES
Wound Procedure Treatment Other (comment) (open wound) Anterior;Right;Inner Toe D5, fifth    Performed by: Ivon Ruth RN  Authorized by: Ciarra Stanley PA-C  Associated wounds:   Wound 04/02/24 Other (comment) Toe D5, fifth Anterior;Right;Inner  Wound cleansed with:  NSS  Applied primary dressing:  Silver  Applied secondary dressing:  Gauze  Wound secured with:  Tape    Nystatin Applied

## 2024-04-17 NOTE — PATIENT INSTRUCTIONS
Orders Placed This Encounter   Procedures    Wound cleansing and dressings Other (comment) (open wound) Anterior;Right;Inner Toe D5, fifth     Right Toe Wound     Wash your hands with soap and water. Remove old dressing, discard into plastic bag and place in trash. Cleanse the wound with normal saline wound wash prior to applying a clean dressing. Do not use tissue or cotton balls. Do not scrub the wound. Pat dry using gauze.     Shower no unless you get a cast cover to cover wound when shower     Apply Betadine to the wound bed.   Then apply Nystatin.   Then apply silver alginate to the toe wound.   Cover with gauze.   Secure with tape.     Change dressing 2 timer per day.     Keep using the medications that Dr. Cartwright ordered.   -One is a cream that you will apply to the bottom of your foot   -One is a powder that you will apply between your toes 2 times a day.     Standing Status:   Future     Standing Expiration Date:   4/24/2024

## 2024-04-17 NOTE — PROGRESS NOTES
Patient ID: João Brown is a 48 y.o. male Date of Birth 1975       Chief Complaint   Patient presents with    Follow Up Wound Care Visit     Right Toe       Allergies:  Patient has no known allergies.    Diagnosis:   Diagnosis ICD-10-CM Associated Orders   1. 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 No signs of infection today.  Debrided as below  Change wound management to betadine paint apply nystatin powder after betadine dries and follow with silver alginate twice a day. See wound orders below   No harsh cleansers such as alcohol, peroxide, or antibacterial soap, do not submerge in water such as bathtub, hot tub, swimming pool, ocean, etc.   Can cleanse with NSS at dressing changes.   Counseled on importance of maintaining wound dry.   Follow-up in 1 week(s) with podiatry or call sooner with questions or concerns or any signs of infection such as redness, swelling, increased/purulent drainage, fever, chills, increased severe pain.     Subjective:   4/2: Patient is a 48 y.o. 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 Pt states the wound has been intermittently opening since childhood. Pt has been covering the wound with a bandaid. Does have an odor. No fever. Mild serosanguinous drainage.  Pt states he drinks beers twice a week  otherwise he denies any tobacco or drug use.  Pt denies any sob, fatigue, N/V, CP, fever or chills.    Patient presents for follow up evaluation evaluation of an interdigital ulceration on the 4th webspace of his Right foot in the web space between his right foot 4th and 5th toes. Pt is followed by Dr. Cartwright.  Pt states wound is not remaining dry despite applying skin prep to dillon-wound, nystatin powder and silver alginate to wound bed daily. Denies fever or chills.          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)    Prediabetes    Anemia    Mild protein-calorie malnutrition (HCC)    Major depressive disorder in full remission ([facility])    Moderate mixed hyperlipidemia not requiring statin therapy    ABLA (acute blood loss anemia)    RA (rheumatoid arthritis) ([facility])    PICC (peripherally inserted central catheter) in place     Past Medical History:   Diagnosis Date    Anxiety     Arthritis     Crutches as ambulation aid 07/25/2023    Depression     Difficult intubation 07/28/2023    Hypertension     Limited jaw ROM     Migraine     PONV (postoperative nausea and vomiting) 07/28/2023    Psoriasis     RA (rheumatoid arthritis) (HCC)     Walker as ambulation aid      Past Surgical History:   Procedure Laterality Date    IR PICC PLACEMENT SINGLE LUMEN  10/23/2023    JOINT REPLACEMENT      MS REVJ TOT KNEE ARTHRP FEM&ENTIRE TIBIAL COMPONE Right 07/28/2023    Procedure: ARTHROPLASTY KNEE TOTAL REVISION;  Surgeon: Kirk Long DO;  Location: [facility];  Service: Orthopedics    MS REVJ TOT KNEE ARTHRP FEM&ENTIRE TIBIAL COMPONE Right 10/18/2023    Procedure:  ARTHROPLASTY KNEE TOTAL REVISION;  Surgeon: Kirk Long DO;  Location:  MAIN OR;  Service: Orthopedics    REPLACEMENT TOTAL KNEE Right     x2     Family History   Problem Relation Age of Onset    Breast cancer Mother     Completed Suicide  Father     Asthma Sister      Social History     Socioeconomic History    Marital status: Single     Spouse name: None    Number of children: None    Years of education: None    Highest education level: None   Occupational History    None   Tobacco Use    Smoking status: Former     Current packs/day: 0.00     Average packs/day: 1 pack/day for 10.0 years (10.0 ttl pk-yrs)     Types: Cigarettes     Start date: 2000     Quit date: 2000     Years since quittin.3    Smokeless tobacco: Current     Types: Chew    Tobacco comments:     Last chewed tobacco 23   Vaping Use    Vaping status: Never Used   Substance and Sexual Activity    Alcohol use: Yes     Comment: rarely    Drug use: Never    Sexual activity: Not Currently     Partners: Female     Birth control/protection: Condom Male   Other Topics Concern    None   Social History Narrative    None     Social Determinants of Health     Financial Resource Strain: Not on file   Food Insecurity: No Food Insecurity (10/30/2023)    Received from [facility]    Hunger Vital Sign     Worried About Running Out of Food in the Last Year: Never true     Ran Out of Food in the Last Year: Never true   Transportation Needs: No Transportation Needs (10/19/2023)    PRAPARE - Transportation     Lack of Transportation (Medical): No     Lack of Transportation (Non-Medical): No   Physical Activity: Not on file   Stress: Not on file   Social Connections: Not on file   Intimate Partner Violence: Not on file   Housing Stability: Low Risk  (10/19/2023)    Housing Stability Vital Sign     Unable to Pay for Housing in the Last Year: No     Number of Places Lived in the Last Year: 1     Unstable Housing in the Last Year: No       Current  Outpatient Medications:     ascorbic acid (VITAMIN C) 500 MG tablet, Take 1 tablet (500 mg total) by mouth 2 (two) times a day, Disp: 60 tablet, Rfl: 1    busPIRone (BUSPAR) 15 mg tablet, Take 0.5 tablets (7.5 mg total) by mouth 2 (two) times a day, Disp: 90 tablet, Rfl: 1    celecoxib (CeleBREX) 200 mg capsule, Take 1 capsule (200 mg total) by mouth 2 (two) times a day, Disp: 60 capsule, Rfl: 0    cholecalciferol (VITAMIN D3) 1,000 units tablet, Take 2 tablets (2,000 Units total) by mouth daily, Disp: 60 tablet, Rfl: 1    ferrous sulfate 324 (65 Fe) mg, Take 1 tablet (324 mg total) by mouth 2 (two) times a day before meals, Disp: 60 tablet, Rfl: 1    fluticasone (FLONASE) 50 mcg/act nasal spray, 1 spray into each nostril daily, Disp: , Rfl:     folic acid (FOLVITE) 1 mg tablet, Take 1 tablet (1 mg total) by mouth daily, Disp: 30 tablet, Rfl: 1    lidocaine (LIDODERM) 5 %, Apply 1 patch topically over 12 hours daily Remove & Discard patch within 12 hours or as directed by MD, Disp: 30 patch, Rfl: 0    methocarbamol (ROBAXIN) 500 mg tablet, Take 1 tablet (500 mg total) by mouth 3 (three) times a day, Disp: 60 tablet, Rfl: 0    methotrexate 2.5 mg tablet, Take 6 tablets by mouth once a week, Disp: , Rfl:     metoprolol succinate (TOPROL-XL) 100 mg 24 hr tablet, Take 1 tablet (100 mg total) by mouth daily, Disp: 90 tablet, Rfl: 1    Multiple Vitamins-Minerals (multivitamin with minerals) tablet, Take 1 tablet by mouth daily, Disp: 30 tablet, Rfl: 1    nortriptyline (PAMELOR) 50 mg capsule, Take 1 capsule (50 mg total) by mouth 2 (two) times a day, Disp: 180 capsule, Rfl: 0    polymyxin b-trimethoprim (POLYTRIM) ophthalmic solution, Administer 1 drop into the left eye every 4 (four) hours, Disp: 10 mL, Rfl: 0    venlafaxine (EFFEXOR-XR) 150 mg 24 hr capsule, Take 1 capsule (150 mg total) by mouth daily at bedtime, Disp: 90 capsule, Rfl: 0  No current facility-administered medications for this visit.    Review of Systems    Constitutional:  Negative for appetite change, chills, fatigue, fever and unexpected weight change.   HENT:  Negative for congestion, hearing loss and postnasal drip.    Respiratory:  Negative for cough and shortness of breath.    Cardiovascular:  Negative for leg swelling.   Skin:  Positive for wound (4th webspace of his Right foot). Negative for rash.   Neurological:  Negative for numbness.   Hematological:  Does not bruise/bleed easily.   Psychiatric/Behavioral: Negative.           Objective:  /80   Pulse 68   Temp 97.5 °F (36.4 °C)   Resp 18   Pain Score: 0-No pain     Physical Exam  Vitals and nursing note reviewed.   Constitutional:       General: He is not in acute distress.     Appearance: Normal appearance. He is not ill-appearing.   Cardiovascular:      Pulses:           Dorsalis pedis pulses are 2+ on the right side and 2+ on the left side.        Posterior tibial pulses are 2+ on the right side and 2+ on the left side.   Musculoskeletal:         General: No tenderness.      Right foot: Deformity and bunion present.      Left foot: Deformity and bunion present.        Feet:    Feet:      Right foot:      Skin integrity: Ulcer and dry skin (on plantar aspect) present.      Comments: Moderate serosanguinous slightly malodorous drainage with dillon wound maceration. Dry skin on plantar aspect of foot.   Skin:     General: Skin is warm and dry.      Capillary Refill: Capillary refill takes less than 2 seconds.      Findings: Wound (4th webspace of his Right foot) present. No erythema or rash.   Neurological:      Mental Status: He is alert and oriented to person, place, and time.   Psychiatric:         Mood and Affect: Mood normal.         Behavior: Behavior normal.         Wound 04/02/24 Other (comment) Toe D5, fifth Anterior;Right;Inner (Active)   Wound Description Pink;White 04/17/24 0825   Dillon-wound Assessment Fragile;Maceration 04/17/24 0825   Wound Length (cm) 0.3 cm 04/17/24 0825   Wound  "Width (cm) 0.2 cm 04/17/24 0825   Wound Depth (cm) 0.1 cm 04/17/24 0825   Wound Surface Area (cm^2) 0.06 cm^2 04/17/24 0825   Wound Volume (cm^3) 0.006 cm^3 04/17/24 0825   Calculated Wound Volume (cm^3) 0.01 cm^3 04/17/24 0825   Change in Wound Size % 91.67 04/17/24 0825   Drainage Amount Small 04/17/24 0825   Drainage Description Serosanguineous 04/17/24 0825   Non-staged Wound Description Full thickness 04/17/24 0825   Dressing Status Intact 04/17/24 0825     Debridement   Wound 04/02/24 Other (comment) Toe D5, fifth Anterior;Right;Inner    Universal Protocol:  Consent: Verbal consent obtained. Written consent obtained.  Risks and benefits: risks, benefits and alternatives were discussed  Consent given by: patient  Time out: Immediately prior to procedure a \"time out\" was called to verify the correct patient, procedure, equipment, support staff and site/side marked as required.  Patient understanding: patient states understanding of the procedure being performed  Patient identity confirmed: verbally with patient    Debridement Details  Performed by: PA  Debridement type: surgical  Level of debridement: subcutaneous tissue  Pain control: lidocaine 4%      Post-debridement measurements  Length (cm): 0.3  Width (cm): 0.3  Depth (cm): 0.2  Percent debrided: 100%  Surface Area (cm^2): 0.09  Area Debrided (cm^2): 0.09  Volume (cm^3): 0.02    Tissue and other material debrided: subcutaneous tissue  Devitalized tissue debrided: biofilm, exudate and slough  Instrument(s) utilized: curette  Bleeding: small  Hemostasis obtained with: pressure  Procedural pain (0-10): 0  Post-procedural pain: 0   Response to treatment: procedure was tolerated well             Wound Instructions:  Orders Placed This Encounter   Procedures    Wound cleansing and dressings Other (comment) (open wound) Anterior;Right;Inner Toe D5, fifth     Right Toe Wound     Wash your hands with soap and water. Remove old dressing, discard into plastic bag and " "place in trash. Cleanse the wound with normal saline wound wash prior to applying a clean dressing. Do not use tissue or cotton balls. Do not scrub the wound. Pat dry using gauze.     Shower no unless you get a cast cover to cover wound when shower     Apply Betadine to the wound bed.   Then apply Nystatin.   Then apply silver alginate to the toe wound.   Cover with gauze.   Secure with tape.     Change dressing 2 timer per day.     Keep using the medications that Dr. Cartwright ordered.   -One is a cream that you will apply to the bottom of your foot   -One is a powder that you will apply between your toes 2 times a day.     Standing Status:   Future     Standing Expiration Date:   4/24/2024    Wound Procedure Treatment Other (comment) (open wound) Anterior;Right;Inner Toe D5, fifth     This order was created via procedure documentation    Debridement     This order was created via procedure documentation       Ciarra Stanley PA-C, [facility]      Portions of the record may have been created with voice recognition software. Occasional wrong word or \"sound alike\" substitutions may have occurred due to the inherent limitations of voice recognition software. Read the chart carefully and recognize, using context, where substitutions have occurred.      "

## 2024-04-18 DIAGNOSIS — T84.53XD INFECTION ASSOCIATED WITH INTERNAL RIGHT KNEE PROSTHESIS, SUBSEQUENT ENCOUNTER: ICD-10-CM

## 2024-04-18 DIAGNOSIS — I10 PRIMARY HYPERTENSION: ICD-10-CM

## 2024-04-18 RX ORDER — CEFADROXIL 500 MG/1
500 CAPSULE ORAL EVERY 12 HOURS SCHEDULED
Qty: 180 CAPSULE | Refills: 0 | Status: SHIPPED | OUTPATIENT
Start: 2024-04-18 | End: 2024-07-17

## 2024-04-18 RX ORDER — METOPROLOL SUCCINATE 100 MG/1
100 TABLET, EXTENDED RELEASE ORAL DAILY
Qty: 90 TABLET | Refills: 1 | Status: SHIPPED | OUTPATIENT
Start: 2024-04-18

## 2024-04-24 ENCOUNTER — OFFICE VISIT (OUTPATIENT)
Dept: WOUND CARE | Facility: CLINIC | Age: 49
End: 2024-04-24
Payer: COMMERCIAL

## 2024-04-24 VITALS
DIASTOLIC BLOOD PRESSURE: 82 MMHG | TEMPERATURE: 97.4 F | RESPIRATION RATE: 18 BRPM | SYSTOLIC BLOOD PRESSURE: 113 MMHG | HEART RATE: 71 BPM

## 2024-04-24 DIAGNOSIS — R73.03 PREDIABETES: ICD-10-CM

## 2024-04-24 DIAGNOSIS — B35.3 TINEA PEDIS OF RIGHT FOOT: ICD-10-CM

## 2024-04-24 DIAGNOSIS — L97.511 NON-PRESSURE CHRONIC ULCER OF OTHER PART OF RIGHT FOOT LIMITED TO BREAKDOWN OF SKIN (HCC): Primary | ICD-10-CM

## 2024-04-24 PROCEDURE — 97597 DBRDMT OPN WND 1ST 20 CM/<: CPT | Performed by: STUDENT IN AN ORGANIZED HEALTH CARE EDUCATION/TRAINING PROGRAM

## 2024-04-24 RX ORDER — LIDOCAINE 40 MG/G
CREAM TOPICAL ONCE
Status: COMPLETED | OUTPATIENT
Start: 2024-04-24 | End: 2024-04-24

## 2024-04-24 RX ADMIN — LIDOCAINE: 40 CREAM TOPICAL at 08:15

## 2024-04-24 NOTE — PROGRESS NOTES
Patient ID: João Brown is a 48 y.o. male Date of Birth 1975       Chief Complaint   Patient presents with    Follow Up Wound Care Visit     R FOOT WOUND       Allergies:  Patient has no known allergies.    Diagnosis:  1. Non-pressure chronic ulcer of other part of right foot limited to breakdown of skin (HCC)  -     lidocaine (LMX) 4 % cream  -     Wound cleansing and dressings Other (comment) (open wound) Anterior;Right;Inner Toe D5, fifth; Future  -     Wound Procedure Treatment Other (comment) (open wound) Anterior;Right;Inner Toe D5, fifth    2. Tinea pedis of right foot    3. Prediabetes       Diagnosis ICD-10-CM Associated Orders   1. Non-pressure chronic ulcer of other part of right foot limited to breakdown of skin (HCC)  L97.511 lidocaine (LMX) 4 % cream     Wound cleansing and dressings Other (comment) (open wound) Anterior;Right;Inner Toe D5, fifth     Wound Procedure Treatment Other (comment) (open wound) Anterior;Right;Inner Toe D5, fifth      2. Tinea pedis of right foot  B35.3       3. Prediabetes  R73.03            Assessment & Plan:  See wound orders.   Follow up evaluation of the patients right foot 4th webspace ulceration with interdigital tinea pedis/maceration that is gradually improving.  No signs of infection today. Patient educated again  on tinea pedis and how interdigital tinea pedis macerates the skin and can break down the skin into an ulceration if present for a long period of time. Dry skin on the bottom of his foot is improving. Continue ketoconazole cream to the bottom of the foot daily.   Debrided as below  Continue wound management with betadine / nystatin and maxorb ag between the toes daily, see wound orders below   No harsh cleansers such as alcohol, peroxide, or antibacterial soap, do not submerge in water. Keep clean and dry in between toes daily to decrease moisture.   No compression at this time to keep the toes from being pushed together. I discussed that once  healed he will need to continue to use powder and toe spacers to prevent it from returning. Wear shoes with a wide toe box to prevent toes from being pushed together for offloading.   Counseled on eating protein for wound healing.   Followup in 1 week or call sooner with questions or concerns      Subjective:   Patient is a 48 y.o. male with pmhx HTN, HLD, RA, MDD, Anxiety, Migraines, Plaque psoriasis, R TKA complicated by infection (on chronic suppression with Cefadroxil), Prediabetes who presents for his follow up evaluation of an open wound between his right foot 4th and 5th toes. Patient states that he has been using the betadine this week as well as other dressings and he feels it has dried up more. He states that it does get sore at times. He is changing it daily. Pt denies any sob, fatigue, N/V, CP, fever or chills.        The following portions of the patient's history were reviewed and updated as appropriate:   Patient Active Problem List   Diagnosis    Anxiety    HTN (hypertension)    Migraine    Plaque psoriasis    Staphylococcal arthritis of right knee (HCC)    History of arthroplasty of right knee    Difficult intubation    PONV (postoperative nausea and vomiting)    Infection of prosthetic right knee joint ([facility])    Prediabetes    Anemia    Mild protein-calorie malnutrition ([facility])    Major depressive disorder in full remission ([facility])    Moderate mixed hyperlipidemia not requiring statin therapy    ABLA (acute blood loss anemia)    RA (rheumatoid arthritis) ([facility])    PICC (peripherally inserted central catheter) in place     Past Medical History:   Diagnosis Date    Anxiety     Arthritis     Crutches as ambulation aid 07/25/2023    Depression     Difficult intubation 07/28/2023    Hypertension     Limited jaw ROM     Migraine     PONV (postoperative nausea and vomiting) 07/28/2023    Psoriasis     RA (rheumatoid arthritis) ([facility])     Walker as ambulation aid      Past Surgical History:   Procedure Laterality  Date    IR PICC PLACEMENT SINGLE LUMEN  10/23/2023    JOINT REPLACEMENT      [place] REVJ TOT KNEE ARTHRP FEM&ENTIRE TIBIAL COMPONE Right 2023    Procedure: ARTHROPLASTY KNEE TOTAL REVISION;  Surgeon: Kirk Long DO;  Location: [facility];  Service: Orthopedics    AZ REVJ TOT KNEE ARTHRP FEM&ENTIRE TIBIAL COMPONE Right 10/18/2023    Procedure: ARTHROPLASTY KNEE TOTAL REVISION;  Surgeon: Kirk Long DO;  Location: [facility];  Service: Orthopedics    REPLACEMENT TOTAL KNEE Right     x2     Social History     Socioeconomic History    Marital status: Single     Spouse name: None    Number of children: None    Years of education: None    Highest education level: None   Occupational History    None   Tobacco Use    Smoking status: Former     Current packs/day: 0.00     Average packs/day: 1 pack/day for 10.0 years (10.0 ttl pk-yrs)     Types: Cigarettes     Start date: 2000     Quit date: 2000     Years since quittin.4    Smokeless tobacco: Current     Types: Chew    Tobacco comments:     Last chewed tobacco 23   Vaping Use    Vaping status: Never Used   Substance and Sexual Activity    Alcohol use: Yes     Comment: rarely    Drug use: Never    Sexual activity: Not Currently     Partners: Female     Birth control/protection: Condom Male   Other Topics Concern    None   Social History Narrative    None     Social Determinants of Health     Financial Resource Strain: Not on file   Food Insecurity: No Food Insecurity (10/30/2023)    Received from [facility]    Hunger Vital Sign     Worried About Running Out of Food in the Last Year: Never true     Ran Out of Food in the Last Year: Never true   Transportation Needs: No Transportation Needs (10/19/2023)    PRAPARE - Transportation     Lack of Transportation (Medical): No     Lack of Transportation (Non-Medical): No   Physical Activity: Not on file   Stress: Not on file   Social Connections: Not on file   Intimate Partner Violence: Not on  file   Housing Stability: Low Risk  (10/19/2023)    Housing Stability Vital Sign     Unable to Pay for Housing in the Last Year: No     Number of Places Lived in the Last Year: 1     Unstable Housing in the Last Year: No        Current Outpatient Medications:     ascorbic acid (VITAMIN C) 500 MG tablet, Take 1 tablet (500 mg total) by mouth 2 (two) times a day, Disp: 60 tablet, Rfl: 1    busPIRone (BUSPAR) 15 mg tablet, Take 0.5 tablets (7.5 mg total) by mouth 2 (two) times a day, Disp: 90 tablet, Rfl: 1    cefadroxil (DURICEF) 500 mg capsule, Take 1 capsule (500 mg total) by mouth every 12 (twelve) hours, Disp: 180 capsule, Rfl: 0    celecoxib (CeleBREX) 200 mg capsule, Take 1 capsule (200 mg total) by mouth 2 (two) times a day, Disp: 60 capsule, Rfl: 0    cholecalciferol (VITAMIN D3) 1,000 units tablet, Take 2 tablets (2,000 Units total) by mouth daily, Disp: 60 tablet, Rfl: 1    ferrous sulfate 324 (65 Fe) mg, Take 1 tablet (324 mg total) by mouth 2 (two) times a day before meals, Disp: 60 tablet, Rfl: 1    fluticasone (FLONASE) 50 mcg/act nasal spray, 1 spray into each nostril daily, Disp: , Rfl:     folic acid (FOLVITE) 1 mg tablet, Take 1 tablet (1 mg total) by mouth daily, Disp: 30 tablet, Rfl: 1    lidocaine (LIDODERM) 5 %, Apply 1 patch topically over 12 hours daily Remove & Discard patch within 12 hours or as directed by MD, Disp: 30 patch, Rfl: 0    methocarbamol (ROBAXIN) 500 mg tablet, Take 1 tablet (500 mg total) by mouth 3 (three) times a day, Disp: 60 tablet, Rfl: 0    methotrexate 2.5 mg tablet, Take 6 tablets by mouth once a week, Disp: , Rfl:     metoprolol succinate (TOPROL-XL) 100 mg 24 hr tablet, TAKE 1 TABLET (100 MG TOTAL) BY MOUTH DAILY, Disp: 90 tablet, Rfl: 1    Multiple Vitamins-Minerals (multivitamin with minerals) tablet, Take 1 tablet by mouth daily, Disp: 30 tablet, Rfl: 1    nortriptyline (PAMELOR) 50 mg capsule, Take 1 capsule (50 mg total) by mouth 2 (two) times a day, Disp: 180  capsule, Rfl: 0    polymyxin b-trimethoprim (POLYTRIM) ophthalmic solution, Administer 1 drop into the left eye every 4 (four) hours, Disp: 10 mL, Rfl: 0    venlafaxine (EFFEXOR-XR) 150 mg 24 hr capsule, Take 1 capsule (150 mg total) by mouth daily at bedtime, Disp: 90 capsule, Rfl: 0  No current facility-administered medications for this visit.  Family History   Problem Relation Age of Onset    Breast cancer Mother     Completed Suicide  Father     Asthma Sister       Review of Systems   Constitutional:  Negative for appetite change, chills, fatigue, fever and unexpected weight change.   Respiratory:  Negative for cough and shortness of breath.    Cardiovascular:  Negative for leg swelling.   Skin:  Positive for wound (5th digit on R foot). Negative for rash.   Neurological:  Negative for numbness.   Hematological:  Does not bruise/bleed easily.   Psychiatric/Behavioral: Negative.  Negative for behavioral problems.          Objective:  /82   Pulse 71   Temp (!) 97.4 °F (36.3 °C)   Resp 18     Physical Exam  Vitals and nursing note reviewed.   Constitutional:       General: He is not in acute distress.     Appearance: Normal appearance. He is not ill-appearing.   Cardiovascular:      Pulses:           Dorsalis pedis pulses are 2+ on the right side and 2+ on the left side.        Posterior tibial pulses are 2+ on the right side and 2+ on the left side.   Musculoskeletal:         General: No tenderness.      Right foot: Deformity and bunion present.      Left foot: Deformity and bunion present.   Feet:      Right foot:      Protective Sensation: 0 sites tested.        Skin integrity: Ulcer and dry skin present.      Left foot:      Protective Sensation: 0 sites tested.        Comments: -Right foot 4th webspace interdigital ulceration with granular wound base and moderate serosang. Drainage with dillon wound maceration present that has decreased, no odor, no deep probing, no fluctuance or crepitus noted,  "decrease in size.     -Decreased dry and flaky skin noted to the plantar foot.   Skin:     General: Skin is warm and dry.      Capillary Refill: Capillary refill takes less than 2 seconds.      Findings: No erythema or rash.   Neurological:      Mental Status: He is alert and oriented to person, place, and time.   Psychiatric:         Mood and Affect: Mood normal.         Behavior: Behavior normal.             Wound 07/28/23 Knee Right (Active)       Wound 10/18/23 Knee Right (Active)       Wound 04/02/24 Other (comment) Toe D5, fifth Anterior;Right;Inner (Active)   Wound Image   04/24/24 0817   Wound Description Pink;White 04/24/24 0817   Nancy-wound Assessment Fragile;Maceration 04/24/24 0817   Wound Length (cm) 0.7 cm 04/24/24 0817   Wound Width (cm) 0.2 cm 04/24/24 0817   Wound Depth (cm) 0.1 cm 04/24/24 0817   Wound Surface Area (cm^2) 0.14 cm^2 04/24/24 0817   Wound Volume (cm^3) 0.014 cm^3 04/24/24 0817   Calculated Wound Volume (cm^3) 0.01 cm^3 04/24/24 0817   Change in Wound Size % 91.67 04/24/24 0817   Drainage Amount Small 04/24/24 0817   Drainage Description Serosanguineous 04/24/24 0817   Non-staged Wound Description Full thickness 04/24/24 0817   Dressing Status Intact 04/24/24 0817                         Debridement    Universal Protocol:  Consent: Verbal consent obtained. Written consent not obtained.  Risks and benefits: risks, benefits and alternatives were discussed  Consent given by: patient  Time out: Immediately prior to procedure a \"time out\" was called to verify the correct patient, procedure, equipment, support staff and site/side marked as required.  Timeout called at: 4/24/2024 8:32 AM.  Patient understanding: patient states understanding of the procedure being performed  Patient identity confirmed: verbally with patient    Debridement Details  Performed by: physician  Debridement type: selective  Pain control: lidocaine 4%      Post-debridement measurements  Length (cm): 0.7  Width (cm): " "0.2  Depth (cm): 0.1  Percent debrided: 100%  Surface Area (cm^2): 0.14  Area Debrided (cm^2): 0.14  Volume (cm^3): 0.01    Devitalized tissue debrided: biofilm, exudate, fibrin and slough  Instrument(s) utilized: curette  Bleeding: small  Hemostasis obtained with: pressure  Procedural pain (0-10): 0  Post-procedural pain: 0   Response to treatment: procedure was tolerated well                 Wound Instructions:  Orders Placed This Encounter   Procedures    Wound cleansing and dressings Other (comment) (open wound) Anterior;Right;Inner Toe D5, fifth     Right Toe Wound     Wash your hands with soap and water. Remove old dressing, discard into plastic bag and place in trash. Cleanse the wound with normal saline wound wash prior to applying a clean dressing. Do not use tissue or cotton balls. Do not scrub the wound. Pat dry using gauze.     Shower:  No, unless you get a cast cover to cover wound when shower.     Apply Betadine to the wound bed.   Then apply Nystatin.   Then apply Silver Alginate to the toe wound.  Cover with gauze.   Secure with tape.     Change dressing 2 timer per day.     Keep using the medications that Dr. Cartwright ordered.    -One is a cream that you will apply to the bottom of your foot    -One is a powder that you will apply between your toes 2 times a day.     Standing Status:   Future     Standing Expiration Date:   5/1/2024    Wound Procedure Treatment Other (comment) (open wound) Anterior;Right;Inner Toe D5, fifth     This order was created via procedure documentation         Marly Cartwright DPM      Portions of the record may have been created with voice recognition software. Occasional wrong word or \"sound a like\" substitutions may have occurred due to the inherent limitations of voice recognition software. Read the chart carefully and recognize, using context, where substitutions have occurred.     "

## 2024-04-24 NOTE — PROGRESS NOTES
Wound Procedure Treatment Other (comment) (open wound) Anterior;Right;Inner Toe D5, fifth    Performed by: Ivon Ruth RN  Authorized by: Marly Cartwright DPM  Associated wounds:   Wound 04/02/24 Other (comment) Toe D5, fifth Anterior;Right;Inner  Applied primary dressing:  Silver  Applied secondary dressing:  Gauze  Wound secured with:  Tape    Silver Alginate

## 2024-04-24 NOTE — PATIENT INSTRUCTIONS
Orders Placed This Encounter   Procedures    Wound cleansing and dressings Other (comment) (open wound) Anterior;Right;Inner Toe D5, fifth     Right Toe Wound     Wash your hands with soap and water. Remove old dressing, discard into plastic bag and place in trash. Cleanse the wound with normal saline wound wash prior to applying a clean dressing. Do not use tissue or cotton balls. Do not scrub the wound. Pat dry using gauze.     Shower:  No, unless you get a cast cover to cover wound when shower.     Apply Betadine to the wound bed.   Then apply Nystatin.   Then apply Silver Alginate to the toe wound.  Cover with gauze.   Secure with tape.     Change dressing 2 timer per day.     Keep using the medications that Dr. Cartwright ordered.    -One is a cream that you will apply to the bottom of your foot    -One is a powder that you will apply between your toes 2 times a day.     Standing Status:   Future     Standing Expiration Date:   5/1/2024

## 2024-05-10 ENCOUNTER — TELEPHONE (OUTPATIENT)
Dept: INFECTIOUS DISEASES | Facility: CLINIC | Age: 49
End: 2024-05-10

## 2024-05-10 NOTE — TELEPHONE ENCOUNTER
Left message to confirm patient appointment for 05/14/24 and to make sure patient has his blood work drawn prior to his appointment.

## 2024-05-13 ENCOUNTER — APPOINTMENT (OUTPATIENT)
Age: 49
End: 2024-05-13
Payer: COMMERCIAL

## 2024-05-13 DIAGNOSIS — E78.2 MODERATE MIXED HYPERLIPIDEMIA NOT REQUIRING STATIN THERAPY: ICD-10-CM

## 2024-05-13 DIAGNOSIS — T84.53XD INFECTION ASSOCIATED WITH INTERNAL RIGHT KNEE PROSTHESIS, SUBSEQUENT ENCOUNTER: ICD-10-CM

## 2024-05-13 DIAGNOSIS — Z96.651 STATUS POST REVISION OF TOTAL KNEE REPLACEMENT, RIGHT: ICD-10-CM

## 2024-05-13 DIAGNOSIS — T84.53XA INFECTION OF TOTAL RIGHT KNEE REPLACEMENT, INITIAL ENCOUNTER (HCC): ICD-10-CM

## 2024-05-13 PROBLEM — Z45.2 PICC (PERIPHERALLY INSERTED CENTRAL CATHETER) IN PLACE: Status: RESOLVED | Noted: 2023-11-21 | Resolved: 2024-05-13

## 2024-05-13 LAB
CHOLEST SERPL-MCNC: 254 MG/DL
HDLC SERPL-MCNC: 54 MG/DL
LDLC SERPL CALC-MCNC: 164 MG/DL (ref 0–100)
TRIGL SERPL-MCNC: 179 MG/DL

## 2024-05-13 PROCEDURE — 80061 LIPID PANEL: CPT

## 2024-05-13 NOTE — PATIENT INSTRUCTIONS
Continue oral Duricef, 500mg every 12 hours, for ongoing lifelong suppression of R knee prosthetic joint infection.    Refills sent to [facility] in [place].    Complete lab work (CBCD and creatinine) prior to next outpatient infectious disease follow up.    Return to the outpatient infectious disease office for follow up in 6 months, visit can be completed virtually if patient chooses.     Maintain outpatient orthopedic surgery follow up.

## 2024-05-13 NOTE — PROGRESS NOTES
Outpatient Progress Note - [facility]   João Brown 48 y.o. male MRN: [id number]  Encounter: [id number]    Impression/Plan:  1. Recurring R prosthetic knee infection. The patient had his initial TKA in 1998 and a revision in 2016. After his revision he had a fall and tore his quadricepts which required surgical repair. Patient has experienced acute worsening pain and limited ROM. He underwent outpatient aspiration and synovasure grew MSSA. Patient then completed R total knee arthroplasty, I&D down to and including bone, and insertion of biodegradable drug delivery device abx spacer (stimulan) on 7/28/23.  All hardware and previous sutures from quadriceps repair removed. He completed 6 weeks of post-op Cefazolin treatment. On 10/18/2023 he underwent part 2 of his revision. Operative cultures again grew MSSA. He completed additional 6 week course of IV cefazolin and then transitioned to oral Duricef for suppression. He's been tolerating the Duricef without difficulty. I reviewed his most recent lab work from 5/13/2024 which showed stable normal WBC = 5.34 and stable creatinine = 0.94. I will continue patient on the Cefodroxil, 500mg BID, as planned for lifelong suppression.   -continue long term suppression with oral Duricef, 500mg BID  -complete CBCD and creatinine prior to next outpatient ID office follow up  -prescription for Duricef sent to [facility] in [place]  -continue outpatient orthopedic surgery follow up  -patient to return to the outpatient infectious disease office in 6 months for follow up     2. Rheumatoid arthritis and plaque psoriasis. Patient on chronic methotrexate. This is risk factor for complications with healing and infection.     3. R foot wounds. Patient no following at the [facility] at [facility]. He's had a couple small macerated wounds of the R 5th toe and 4th webspace associated with tinea pedis. Open wounds are portal of entry for  bacteria and could lead to seeding of hardware. Recommend ongoing follow up with the wound management center, ongoing topical treatment of tinea pedis, and good foot hygiene to promote healthy skin.  -recommend patient examine feet daily  -ongoing tinea pedis treatment per wound management center  -recommend good foot hygiene with dry socks/shoes and well fitting footwear, and maintaining healthy nails    4. L knee effusion. Patient with palpable fluid circumferential to the L patella. Fortunately there is no warmth or overlying erythema and patient has had no changes with ROM/ambulation. He is scheduled for orthopedic follow up next month and plans to discuss this with them further.  -maintain outpatient orthopedic follow up  -if L knee becomes red or warm to touch seek medical care sooner     Above plan was discussed in detail with patient in the exam room.    Antibiotics:  Duricef     Subjective:  Patient presents for routine follow up for suppressive antibiotic treatment of R knee PJI. He remains on oral Duricef which he's been tolerating without difficulty. He has no fever, chills, sweats, shakes; no nausea, vomiting, abdominal pain, diarrhea, or dysuria; no cough, shortness of breath, or chest pain. Patient is please with how his R knee has been, no recent swelling or redness, no new pain. He has noticed that some fluid has accumulated around his left knee. Reports he has a follow up with his orthopedic team on 6/4/2024 and is going to make them aware. He offers no other symptoms.     Objective:  Vitals:  HR 69  /90  O2 saturation 96%    Physical Exam:   General Appearance:  Alert, interactive, nontoxic, no acute distress. He appears comfortable sitting in the exam room.   Lungs:   Clear to auscultation bilaterally; no wheezes, rhonchi or rales; respirations unlabored on room air.   Heart:  RRR; no murmur, rub or gallop.   Extremities: R knee without erythema, swelling, or warmth, is non tender to  palpate, skin without open wounds. L knee non erythematous, mildly edematous with palpable effusion surrounding the patella, no warmth to touch, no tenderness with palpation.   Skin: Scattered psoriasis plaques on the B/L LE.     Labs, Imaging, & Other studies:   All pertinent labs and imaging studies were personally reviewed by me including CBCD and creatinine collected on 5/13/2024.

## 2024-05-14 ENCOUNTER — OFFICE VISIT (OUTPATIENT)
Dept: INFECTIOUS DISEASES | Facility: CLINIC | Age: 49
End: 2024-05-14
Payer: COMMERCIAL

## 2024-05-14 VITALS
HEART RATE: 69 BPM | HEIGHT: 71 IN | WEIGHT: 197.4 LBS | SYSTOLIC BLOOD PRESSURE: 130 MMHG | OXYGEN SATURATION: 96 % | DIASTOLIC BLOOD PRESSURE: 90 MMHG | BODY MASS INDEX: 27.64 KG/M2

## 2024-05-14 DIAGNOSIS — T84.53XD INFECTION ASSOCIATED WITH INTERNAL RIGHT KNEE PROSTHESIS, SUBSEQUENT ENCOUNTER: ICD-10-CM

## 2024-05-14 DIAGNOSIS — L40.0 PLAQUE PSORIASIS: ICD-10-CM

## 2024-05-14 DIAGNOSIS — E78.2 MODERATE MIXED HYPERLIPIDEMIA NOT REQUIRING STATIN THERAPY: ICD-10-CM

## 2024-05-14 DIAGNOSIS — R73.03 PREDIABETES: ICD-10-CM

## 2024-05-14 DIAGNOSIS — M00.061 STAPHYLOCOCCAL ARTHRITIS OF RIGHT KNEE (HCC): Primary | ICD-10-CM

## 2024-05-14 DIAGNOSIS — M25.462 KNEE EFFUSION, LEFT: ICD-10-CM

## 2024-05-14 DIAGNOSIS — M06.9 RA (RHEUMATOID ARTHRITIS) (HCC): ICD-10-CM

## 2024-05-14 DIAGNOSIS — I10 PRIMARY HYPERTENSION: Primary | ICD-10-CM

## 2024-05-14 DIAGNOSIS — Z13.21 ENCOUNTER FOR VITAMIN DEFICIENCY SCREENING: ICD-10-CM

## 2024-05-14 PROCEDURE — 99214 OFFICE O/P EST MOD 30 MIN: CPT | Performed by: NURSE PRACTITIONER

## 2024-05-14 RX ORDER — CEFADROXIL 500 MG/1
500 CAPSULE ORAL EVERY 12 HOURS SCHEDULED
Qty: 180 CAPSULE | Refills: 1 | Status: SHIPPED | OUTPATIENT
Start: 2024-05-14 | End: 2024-11-10

## 2024-05-15 ENCOUNTER — OFFICE VISIT (OUTPATIENT)
Dept: WOUND CARE | Facility: CLINIC | Age: 49
End: 2024-05-15
Payer: COMMERCIAL

## 2024-05-15 VITALS
RESPIRATION RATE: 16 BRPM | TEMPERATURE: 97 F | DIASTOLIC BLOOD PRESSURE: 98 MMHG | SYSTOLIC BLOOD PRESSURE: 138 MMHG | HEART RATE: 65 BPM

## 2024-05-15 DIAGNOSIS — L97.511 NON-PRESSURE CHRONIC ULCER OF OTHER PART OF RIGHT FOOT LIMITED TO BREAKDOWN OF SKIN (HCC): Primary | ICD-10-CM

## 2024-05-15 DIAGNOSIS — B35.3 TINEA PEDIS OF RIGHT FOOT: ICD-10-CM

## 2024-05-15 DIAGNOSIS — R73.03 PREDIABETES: ICD-10-CM

## 2024-05-15 PROCEDURE — 99212 OFFICE O/P EST SF 10 MIN: CPT | Performed by: STUDENT IN AN ORGANIZED HEALTH CARE EDUCATION/TRAINING PROGRAM

## 2024-05-15 RX ORDER — LIDOCAINE 40 MG/G
CREAM TOPICAL ONCE
Status: COMPLETED | OUTPATIENT
Start: 2024-05-15 | End: 2024-05-15

## 2024-05-15 RX ADMIN — LIDOCAINE: 40 CREAM TOPICAL at 08:06

## 2024-05-15 NOTE — PROGRESS NOTES
Patient ID: João Brown is a 48 y.o. male Date of Birth 1975       Chief Complaint   Patient presents with    Follow Up Wound Care Visit     Right Foot Wound       Allergies:  Patient has no known allergies.    Diagnosis:  1. Non-pressure chronic ulcer of other part of right foot limited to breakdown of skin (HCC)  -     lidocaine (LMX) 4 % cream  2. Tinea pedis of right foot  -     lidocaine (LMX) 4 % cream  3. Prediabetes     Diagnosis ICD-10-CM Associated Orders   1. Non-pressure chronic ulcer of other part of right foot limited to breakdown of skin (HCC)  L97.511 lidocaine (LMX) 4 % cream      2. Tinea pedis of right foot  B35.3 lidocaine (LMX) 4 % cream      3. Prediabetes  R73.03            Assessment & Plan:  See wound orders.   Follow up evaluation of the patients right foot 4th webspace ulceration with interdigital tinea pedis/maceration.  No signs of infection today. The ulceration has healed. I discussed with the patient that the maceration has nearly resolved but is still present so he still has to treat his interdigital athletes foot infection.   Patient educated again on tinea pedis and how interdigital tinea pedis macerates the skin and can break down the skin. Discussed the risk of the ulcer returning if he does not continue to treat this. Dry skin on the bottom of his foot is improving. Continue ketoconazole cream to the bottom of the foot daily.   Debrided as below  Continue athletes foot management with betadine / nystatin and maxorb ag between 4th webspace daily, see wound orders below   Continue to use powder and toe spacers to prevent it from returning. Wear shoes with a wide toe box to prevent toes from being pushed together for offloading.   Counseled on eating protein for wound healing.   Followup in office within the next few weeks for follow up and routine foot care, patient now discharged from wound care or call sooner with questions or concerns    Subjective:   Patient is a 48  y.o. male with pmhx HTN, HLD, RA, MDD, Anxiety, Migraines, Plaque psoriasis, R TKA complicated by infection (on chronic suppression with Cefadroxil), Prediabetes who presents for his follow up evaluation of an open wound between his right foot 4th and 5th toes. Patient states that he has been using the recommended dressing and powder. He feels that it is more dry and no longer draining. He denies any pain. Pt denies any sob, fatigue, N/V, CP, fever or chills.        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)    Prediabetes    Anemia    Mild protein-calorie malnutrition (HCC)    Major depressive disorder in full remission (HCC)    Moderate mixed hyperlipidemia not requiring statin therapy    ABLA (acute blood loss anemia)    RA (rheumatoid arthritis) ([facility])    Knee effusion, left     Past Medical History:   Diagnosis Date    Anxiety     Arthritis     Crutches as ambulation aid 07/25/2023    Depression     Difficult intubation 07/28/2023    Hypertension     Limited jaw ROM     Migraine     PONV (postoperative nausea and vomiting) 07/28/2023    Psoriasis     RA (rheumatoid arthritis) (HCC)     Walker as ambulation aid      Past Surgical History:   Procedure Laterality Date    IR PICC PLACEMENT SINGLE LUMEN  10/23/2023    JOINT REPLACEMENT      OH REVJ TOT KNEE ARTHRP FEM&ENTIRE TIBIAL COMPONE Right 07/28/2023    Procedure: ARTHROPLASTY KNEE TOTAL REVISION;  Surgeon: Kirk Long DO;  Location: [facility];  Service: Orthopedics    OH REVJ TOT KNEE ARTHRP FEM&ENTIRE TIBIAL COMPONE Right 10/18/2023    Procedure: ARTHROPLASTY KNEE TOTAL REVISION;  Surgeon:  Kirk Long DO;  Location:  MAIN OR;  Service: Orthopedics    REPLACEMENT TOTAL KNEE Right     x2     Social History     Socioeconomic History    Marital status: Single     Spouse name: None    Number of children: None    Years of education: None    Highest education level: None   Occupational History    None   Tobacco Use    Smoking status: Former     Current packs/day: 0.00     Average packs/day: 1 pack/day for 10.0 years (10.0 ttl pk-yrs)     Types: Cigarettes     Start date: 2000     Quit date: 2000     Years since quittin.4    Smokeless tobacco: Current     Types: Chew    Tobacco comments:     Last chewed tobacco 23   Vaping Use    Vaping status: Never Used   Substance and Sexual Activity    Alcohol use: Yes     Comment: rarely    Drug use: Never    Sexual activity: Not Currently     Partners: Female     Birth control/protection: Condom Male   Other Topics Concern    None   Social History Narrative    None     Social Determinants of Health     Financial Resource Strain: Not on file   Food Insecurity: No Food Insecurity (10/30/2023)    Received from [facility]    Hunger Vital Sign     Worried About Running Out of Food in the Last Year: Never true     Ran Out of Food in the Last Year: Never true   Transportation Needs: No Transportation Needs (10/19/2023)    PRAPARE - Transportation     Lack of Transportation (Medical): No     Lack of Transportation (Non-Medical): No   Physical Activity: Not on file   Stress: Not on file   Social Connections: Not on file   Intimate Partner Violence: Not on file   Housing Stability: Low Risk  (10/19/2023)    Housing Stability Vital Sign     Unable to Pay for Housing in the Last Year: No     Number of Places Lived in the Last Year: 1     Unstable Housing in the Last Year: No        Current Outpatient Medications:     ascorbic acid (VITAMIN C) 500 MG tablet, Take 1 tablet (500 mg total) by mouth 2 (two) times a day, Disp: 60 tablet, Rfl: 1    busPIRone  (BUSPAR) 15 mg tablet, Take 0.5 tablets (7.5 mg total) by mouth 2 (two) times a day, Disp: 90 tablet, Rfl: 1    cefadroxil (DURICEF) 500 mg capsule, Take 1 capsule (500 mg total) by mouth every 12 (twelve) hours, Disp: 180 capsule, Rfl: 1    celecoxib (CeleBREX) 200 mg capsule, Take 1 capsule (200 mg total) by mouth 2 (two) times a day (Patient not taking: Reported on 5/14/2024), Disp: 60 capsule, Rfl: 0    cholecalciferol (VITAMIN D3) 1,000 units tablet, Take 2 tablets (2,000 Units total) by mouth daily, Disp: 60 tablet, Rfl: 1    ferrous sulfate 324 (65 Fe) mg, Take 1 tablet (324 mg total) by mouth 2 (two) times a day before meals, Disp: 60 tablet, Rfl: 1    fluticasone (FLONASE) 50 mcg/act nasal spray, 1 spray into each nostril daily, Disp: , Rfl:     folic acid (FOLVITE) 1 mg tablet, Take 1 tablet (1 mg total) by mouth daily, Disp: 30 tablet, Rfl: 1    lidocaine (LIDODERM) 5 %, Apply 1 patch topically over 12 hours daily Remove & Discard patch within 12 hours or as directed by MD, Disp: 30 patch, Rfl: 0    methocarbamol (ROBAXIN) 500 mg tablet, Take 1 tablet (500 mg total) by mouth 3 (three) times a day (Patient not taking: Reported on 5/14/2024), Disp: 60 tablet, Rfl: 0    methotrexate 2.5 mg tablet, Take 6 tablets by mouth once a week, Disp: , Rfl:     metoprolol succinate (TOPROL-XL) 100 mg 24 hr tablet, TAKE 1 TABLET (100 MG TOTAL) BY MOUTH DAILY, Disp: 90 tablet, Rfl: 1    Multiple Vitamins-Minerals (multivitamin with minerals) tablet, Take 1 tablet by mouth daily, Disp: 30 tablet, Rfl: 1    nortriptyline (PAMELOR) 50 mg capsule, Take 1 capsule (50 mg total) by mouth 2 (two) times a day, Disp: 180 capsule, Rfl: 0    polymyxin b-trimethoprim (POLYTRIM) ophthalmic solution, Administer 1 drop into the left eye every 4 (four) hours (Patient not taking: Reported on 5/14/2024), Disp: 10 mL, Rfl: 0    venlafaxine (EFFEXOR-XR) 150 mg 24 hr capsule, Take 1 capsule (150 mg total) by mouth daily at bedtime, Disp: 90  capsule, Rfl: 0  No current facility-administered medications for this visit.  Family History   Problem Relation Age of Onset    Breast cancer Mother     Completed Suicide  Father     Asthma Sister       Review of Systems   Constitutional:  Negative for appetite change, chills, fatigue, fever and unexpected weight change.   Respiratory:  Negative for cough and shortness of breath.    Cardiovascular:  Negative for leg swelling.   Skin:  Negative for rash and wound (5th digit on R foot).   Neurological:  Negative for numbness.   Hematological:  Does not bruise/bleed easily.   Psychiatric/Behavioral:  Negative for behavioral problems.          Objective:  /98   Pulse 65   Temp (!) 97 °F (36.1 °C)   Resp 16     Physical Exam  Vitals and nursing note reviewed.   Constitutional:       General: He is not in acute distress.     Appearance: Normal appearance. He is not ill-appearing.   Cardiovascular:      Pulses:           Dorsalis pedis pulses are 2+ on the right side and 2+ on the left side.        Posterior tibial pulses are 2+ on the right side and 2+ on the left side.   Musculoskeletal:         General: No tenderness.      Right foot: Deformity and bunion present.      Left foot: Deformity and bunion present.   Feet:      Right foot:      Protective Sensation: 0 sites tested.        Skin integrity: Callus and dry skin present.      Left foot:      Protective Sensation: 0 sites tested.        Comments: -Right foot 4th webspace interdigital ulceration is now healed with no drainage or signs of infection. Maceration is decreased. No odor or signs of infection.     -Decreased dry and flaky skin noted to the plantar foot.   Skin:     General: Skin is warm and dry.      Capillary Refill: Capillary refill takes less than 2 seconds.      Findings: No erythema or rash.   Neurological:      Mental Status: He is alert and oriented to person, place, and time.   Psychiatric:         Mood and Affect: Mood normal.          "Behavior: Behavior normal.             Wound 07/28/23 Knee Right (Active)       Wound 10/18/23 Knee Right (Active)       Wound 04/02/24 Other (comment) Toe D5, fifth Anterior;Right;Inner (Active)   Wound Image   05/15/24 0804   Wound Description Pink;White 05/15/24 0804   Nancy-wound Assessment Fragile;Maceration 05/15/24 0804   Wound Length (cm) 0.1 cm 05/15/24 0804   Wound Width (cm) 0.1 cm 05/15/24 0804   Wound Depth (cm) 0.1 cm 05/15/24 0804   Wound Surface Area (cm^2) 0.01 cm^2 05/15/24 0804   Wound Volume (cm^3) 0.001 cm^3 05/15/24 0804   Calculated Wound Volume (cm^3) 0 cm^3 05/15/24 0804   Change in Wound Size % 100 05/15/24 0804   Drainage Amount None 05/15/24 0804   Drainage Description RICARDO 05/15/24 0804   Non-staged Wound Description Not applicable 05/15/24 0804   Dressing Status Intact 05/15/24 0804                         Procedures             Wound Instructions:  No orders of the defined types were placed in this encounter.        Marly Cartwright DPM      Portions of the record may have been created with voice recognition software. Occasional wrong word or \"sound a like\" substitutions may have occurred due to the inherent limitations of voice recognition software. Read the chart carefully and recognize, using context, where substitutions have occurred.     "

## 2024-05-15 NOTE — PROGRESS NOTES
Wound Procedure Treatment Other (comment) (open wound) Anterior;Right;Inner Toe D5, fifth    Performed by: Ivon Ruth RN  Authorized by: Marly Cartwright DPM    Associated wounds:   Wound 04/02/24 Other (comment) Toe D5, fifth Anterior;Right;Inner  Wound cleansed with:  NSS  Applied secondary dressing:  Gauze  Wound secured with:  Tape    Nystatin Powder

## 2024-05-15 NOTE — PATIENT INSTRUCTIONS
Orders Placed This Encounter   Procedures    Wound cleansing and dressings Other (comment) (open wound) Anterior;Right;Inner Toe D5, fifth     Your Right Toe Wound was [facility].     Continue to keep this area clean, dry, and intact.     Continue with the treatment regimen that we had recommended.    Right Toe Wound     Wash your hands with soap and water. Remove old dressing, discard into plastic bag and place in trash. Cleanse the wound with normal saline wound wash prior to applying a clean dressing. Do not use tissue or cotton balls. Do not scrub the wound. Pat dry using gauze.     Shower: Yes    Apply Betadine to the area.  Then apply Nystatin.     We would like you to follow up with Dr. Cartwright in her [place] office.     [facility]  [id number]  [facility]    [phone]     Standing Status:   Future     Standing Expiration Date:   5/22/2024

## 2024-05-16 ENCOUNTER — TELEPHONE (OUTPATIENT)
Dept: INFECTIOUS DISEASES | Facility: CLINIC | Age: 49
End: 2024-05-16

## 2024-05-16 NOTE — TELEPHONE ENCOUNTER
Called and left message for patient today.   Informed patient to call the office back.   Was calling to get patient scheduled for 6 month follow up appointment.

## 2024-06-04 ENCOUNTER — OFFICE VISIT (OUTPATIENT)
Dept: OBGYN CLINIC | Facility: MEDICAL CENTER | Age: 49
End: 2024-06-04
Payer: COMMERCIAL

## 2024-06-04 VITALS
SYSTOLIC BLOOD PRESSURE: 124 MMHG | BODY MASS INDEX: 27.24 KG/M2 | DIASTOLIC BLOOD PRESSURE: 81 MMHG | HEIGHT: 71 IN | HEART RATE: 79 BPM | WEIGHT: 194.6 LBS

## 2024-06-04 DIAGNOSIS — L40.0 PLAQUE PSORIASIS: ICD-10-CM

## 2024-06-04 DIAGNOSIS — Z96.651 STATUS POST REVISION OF TOTAL KNEE REPLACEMENT, RIGHT: Primary | ICD-10-CM

## 2024-06-04 PROCEDURE — 99213 OFFICE O/P EST LOW 20 MIN: CPT | Performed by: STUDENT IN AN ORGANIZED HEALTH CARE EDUCATION/TRAINING PROGRAM

## 2024-06-04 NOTE — PROGRESS NOTES
Knee New Office Note    Assessment:     1. Status post revision of total knee replacement, right    2. Plaque psoriasis        Plan:     Problem List Items Addressed This Visit          Musculoskeletal and Integument    Plaque psoriasis    Relevant Orders    Ambulatory Referral to Rheumatology     Other Visit Diagnoses       Status post revision of total knee replacement, right    -  Primary           Findings today are consistent with s/p revision right total knee arthroplasty with second stage PJI performed on 10/18/2023. He is also s/p R knee I&D, explant, articulating antibiotic spacer for R knee PJI 7/28. Imaging and prognosis was reviewed with the patient today. He is doing extremely well overall. He was encouraged to continue antibiotics as prescribed. Referral to rheumatology provided for his psoriasis flare. Follow up in 4-5 months for annual check. New x-rays upon arrival.    Subjective:     Patient ID: João Brown is a 48 y.o. male.  Chief Complaint:  HPI:  48 y.o. male presents to the office for follow up s/p revision right total knee arthroplasty with second stage PJI performed on 10/18/2023. He is also s/p R knee I&D, explant, articulating antibiotic spacer for R knee PJI 7/28. He has been compliant with his antibiotics. He has been progressing well. He also notes a psoriasis flare and has not been established with anyone since he moved here. He was previously on stelara.    Allergy:  No Known Allergies  Medications:  all current active meds have been reviewed  Past Medical History:  Past Medical History:   Diagnosis Date    Anxiety     Arthritis     Crutches as ambulation aid 07/25/2023    Depression     Difficult intubation 07/28/2023    Hypertension     Limited jaw ROM     Migraine     PONV (postoperative nausea and vomiting) 07/28/2023    Psoriasis     RA (rheumatoid arthritis) ([facility])     Walker as ambulation aid      Past Surgical History:  Past Surgical History:   Procedure Laterality Date     IR PICC PLACEMENT SINGLE LUMEN  10/23/2023    JOINT REPLACEMENT      NE REVJ TOT KNEE ARTHRP FEM&ENTIRE TIBIAL COMPONE Right 2023    Procedure: ARTHROPLASTY KNEE TOTAL REVISION;  Surgeon: Kirk Long DO;  Location: [facility];  Service: Orthopedics    NE REVJ TOT KNEE ARTHRP FEM&ENTIRE TIBIAL COMPONE Right 10/18/2023    Procedure: ARTHROPLASTY KNEE TOTAL REVISION;  Surgeon: Kirk Long DO;  Location: [facility];  Service: Orthopedics    REPLACEMENT TOTAL KNEE Right     x2     Family History:  Family History   Problem Relation Age of Onset    Breast cancer Mother     Completed Suicide  Father     Asthma Sister      Social History:  Social History     Substance and Sexual Activity   Alcohol Use Yes    Comment: rarely     Social History     Substance and Sexual Activity   Drug Use Never     Social History     Tobacco Use   Smoking Status Former    Current packs/day: 0.00    Average packs/day: 1 pack/day for 10.0 years (10.0 ttl pk-yrs)    Types: Cigarettes    Start date: 2000    Quit date: 2000    Years since quittin.5   Smokeless Tobacco Current    Types: Chew   Tobacco Comments    Last chewed tobacco 23         ROS:  General: Per HPI  Skin: Negative, except if noted below  HEENT: Negative  Respiratory: Negative  Cardiovascular: Negative  Gastrointestinal: Negative  Urinary: Negative  Vascular: Negative  Musculoskeletal: Positive per HPI   Neurologic: Positive per HPI  Endocrine: Negative    Objective:  BP Readings from Last 1 Encounters:   24 124/81      Wt Readings from Last 1 Encounters:   24 88.3 kg (194 lb 9.6 oz)        Respiratory:   non-labored respirations    Lymphatics:  no palpable lymph nodes    Gait:   Steady    Neurologic:   Alert and oriented times 3  Patient with normal sensation except as noted below  Deep tendon reflexes 2+ except as noted in MSK exam    Bilateral Lower Extremity:    Right knee:     Inspection:  well healed total knee incision.  "Areas of psoriasis noted    Overall limb alignment neutral    Effusion: none    ROM 0-110 without pain    Extensor Lag: none    Palpation: no Joint line tenderness to palpation    AP Stability at 90 deg stable    M/L stability in full extension stable    M/L stability in midflexion stable    Motor: 5/5 Q/HS/TA/GS/P    Pulses: 2+ DP / 2+ PT    SILT DP/SP/S/S/TN    Imaging:  No new imaging obtained today    BMI:   Estimated body mass index is 27.14 kg/m² as calculated from the following:    Height as of this encounter: 5' 11\" (1.803 m).    Weight as of this encounter: 88.3 kg (194 lb 9.6 oz).  BSA:   Estimated body surface area is 2.08 meters squared as calculated from the following:    Height as of this encounter: 5' 11\" (1.803 m).    Weight as of this encounter: 88.3 kg (194 lb 9.6 oz).           Scribe Attestation      I,:  Oxana Laguerre am acting as a scribe while in the presence of the attending physician.:       I,:  Kirk Long DO personally performed the services described in this documentation    as scribed in my presence.:              "

## 2024-06-06 DIAGNOSIS — F32.5 MAJOR DEPRESSIVE DISORDER IN FULL REMISSION, UNSPECIFIED WHETHER RECURRENT (HCC): ICD-10-CM

## 2024-06-06 RX ORDER — NORTRIPTYLINE HYDROCHLORIDE 50 MG/1
50 CAPSULE ORAL 2 TIMES DAILY
Qty: 180 CAPSULE | Refills: 1 | Status: SHIPPED | OUTPATIENT
Start: 2024-06-06

## 2024-06-12 DIAGNOSIS — F41.9 ANXIETY: ICD-10-CM

## 2024-06-12 DIAGNOSIS — F32.5 MAJOR DEPRESSIVE DISORDER IN FULL REMISSION, UNSPECIFIED WHETHER RECURRENT (HCC): ICD-10-CM

## 2024-06-12 RX ORDER — VENLAFAXINE HYDROCHLORIDE 150 MG/1
150 CAPSULE, EXTENDED RELEASE ORAL
Qty: 90 CAPSULE | Refills: 1 | Status: SHIPPED | OUTPATIENT
Start: 2024-06-12

## 2024-06-12 RX ORDER — BUSPIRONE HYDROCHLORIDE 15 MG/1
7.5 TABLET ORAL 2 TIMES DAILY
Qty: 90 TABLET | Refills: 1 | Status: SHIPPED | OUTPATIENT
Start: 2024-06-12

## 2024-06-20 ENCOUNTER — APPOINTMENT (OUTPATIENT)
Age: 49
End: 2024-06-20
Payer: COMMERCIAL

## 2024-06-20 ENCOUNTER — OFFICE VISIT (OUTPATIENT)
Dept: RHEUMATOLOGY | Facility: CLINIC | Age: 49
End: 2024-06-20
Payer: COMMERCIAL

## 2024-06-20 VITALS
RESPIRATION RATE: 18 BRPM | SYSTOLIC BLOOD PRESSURE: 122 MMHG | HEIGHT: 71 IN | WEIGHT: 197.4 LBS | BODY MASS INDEX: 27.64 KG/M2 | DIASTOLIC BLOOD PRESSURE: 82 MMHG | TEMPERATURE: 97.9 F

## 2024-06-20 DIAGNOSIS — L40.50 PSORIATIC ARTHRITIS (HCC): Primary | ICD-10-CM

## 2024-06-20 DIAGNOSIS — T84.53XS INFECTION ASSOCIATED WITH INTERNAL RIGHT KNEE PROSTHESIS, SEQUELA: ICD-10-CM

## 2024-06-20 DIAGNOSIS — M06.9 RHEUMATOID ARTHRITIS, INVOLVING UNSPECIFIED SITE, UNSPECIFIED WHETHER RHEUMATOID FACTOR PRESENT (HCC): ICD-10-CM

## 2024-06-20 DIAGNOSIS — Z96.651 STATUS POST REVISION OF TOTAL KNEE REPLACEMENT, RIGHT: ICD-10-CM

## 2024-06-20 DIAGNOSIS — Z96.651 HISTORY OF ARTHROPLASTY OF RIGHT KNEE: ICD-10-CM

## 2024-06-20 DIAGNOSIS — L40.50 PSORIATIC ARTHRITIS (HCC): ICD-10-CM

## 2024-06-20 DIAGNOSIS — Z79.899 HIGH RISK MEDICATION USE: ICD-10-CM

## 2024-06-20 DIAGNOSIS — L40.0 PLAQUE PSORIASIS: ICD-10-CM

## 2024-06-20 DIAGNOSIS — M00.061 STAPHYLOCOCCAL ARTHRITIS OF RIGHT KNEE (HCC): ICD-10-CM

## 2024-06-20 LAB
ALBUMIN SERPL BCG-MCNC: 4.3 G/DL (ref 3.5–5)
ALP SERPL-CCNC: 117 U/L (ref 34–104)
ALT SERPL W P-5'-P-CCNC: 26 U/L (ref 7–52)
ANION GAP SERPL CALCULATED.3IONS-SCNC: 10 MMOL/L (ref 4–13)
AST SERPL W P-5'-P-CCNC: 23 U/L (ref 13–39)
BASOPHILS # BLD AUTO: 0.03 THOUSANDS/ÂΜL (ref 0–0.1)
BASOPHILS NFR BLD AUTO: 1 % (ref 0–1)
BILIRUB SERPL-MCNC: 0.43 MG/DL (ref 0.2–1)
BUN SERPL-MCNC: 19 MG/DL (ref 5–25)
CALCIUM SERPL-MCNC: 9.7 MG/DL (ref 8.4–10.2)
CHLORIDE SERPL-SCNC: 100 MMOL/L (ref 96–108)
CO2 SERPL-SCNC: 29 MMOL/L (ref 21–32)
CREAT SERPL-MCNC: 1.08 MG/DL (ref 0.6–1.3)
CRP SERPL QL: 22.3 MG/L
EOSINOPHIL # BLD AUTO: 0.32 THOUSAND/ÂΜL (ref 0–0.61)
EOSINOPHIL NFR BLD AUTO: 6 % (ref 0–6)
ERYTHROCYTE [DISTWIDTH] IN BLOOD BY AUTOMATED COUNT: 13.2 % (ref 11.6–15.1)
ERYTHROCYTE [SEDIMENTATION RATE] IN BLOOD: 12 MM/HOUR (ref 0–19)
GFR SERPL CREATININE-BSD FRML MDRD: 79 ML/MIN/1.73SQ M
GLUCOSE SERPL-MCNC: 81 MG/DL (ref 65–140)
HCT VFR BLD AUTO: 41.2 % (ref 36.5–49.3)
HGB BLD-MCNC: 13.9 G/DL (ref 12–17)
IMM GRANULOCYTES # BLD AUTO: 0.03 THOUSAND/UL (ref 0–0.2)
IMM GRANULOCYTES NFR BLD AUTO: 1 % (ref 0–2)
LYMPHOCYTES # BLD AUTO: 0.92 THOUSANDS/ÂΜL (ref 0.6–4.47)
LYMPHOCYTES NFR BLD AUTO: 18 % (ref 14–44)
MCH RBC QN AUTO: 31.4 PG (ref 26.8–34.3)
MCHC RBC AUTO-ENTMCNC: 33.7 G/DL (ref 31.4–37.4)
MCV RBC AUTO: 93 FL (ref 82–98)
MONOCYTES # BLD AUTO: 0.55 THOUSAND/ÂΜL (ref 0.17–1.22)
MONOCYTES NFR BLD AUTO: 11 % (ref 4–12)
NEUTROPHILS # BLD AUTO: 3.34 THOUSANDS/ÂΜL (ref 1.85–7.62)
NEUTS SEG NFR BLD AUTO: 63 % (ref 43–75)
NRBC BLD AUTO-RTO: 0 /100 WBCS
PLATELET # BLD AUTO: 308 THOUSANDS/UL (ref 149–390)
PMV BLD AUTO: 9.8 FL (ref 8.9–12.7)
POTASSIUM SERPL-SCNC: 4.1 MMOL/L (ref 3.5–5.3)
PROT SERPL-MCNC: 7.7 G/DL (ref 6.4–8.4)
RBC # BLD AUTO: 4.42 MILLION/UL (ref 3.88–5.62)
SODIUM SERPL-SCNC: 139 MMOL/L (ref 135–147)
URATE SERPL-MCNC: 8 MG/DL (ref 3.5–8.5)
WBC # BLD AUTO: 5.19 THOUSAND/UL (ref 4.31–10.16)

## 2024-06-20 PROCEDURE — 86704 HEP B CORE ANTIBODY TOTAL: CPT | Performed by: INTERNAL MEDICINE

## 2024-06-20 PROCEDURE — 85652 RBC SED RATE AUTOMATED: CPT

## 2024-06-20 PROCEDURE — 86803 HEPATITIS C AB TEST: CPT | Performed by: INTERNAL MEDICINE

## 2024-06-20 PROCEDURE — 3074F SYST BP LT 130 MM HG: CPT | Performed by: INTERNAL MEDICINE

## 2024-06-20 PROCEDURE — 84550 ASSAY OF BLOOD/URIC ACID: CPT | Performed by: INTERNAL MEDICINE

## 2024-06-20 PROCEDURE — 85025 COMPLETE CBC W/AUTO DIFF WBC: CPT

## 2024-06-20 PROCEDURE — 80053 COMPREHEN METABOLIC PANEL: CPT

## 2024-06-20 PROCEDURE — 99205 OFFICE O/P NEW HI 60 MIN: CPT | Performed by: INTERNAL MEDICINE

## 2024-06-20 PROCEDURE — 86140 C-REACTIVE PROTEIN: CPT

## 2024-06-20 PROCEDURE — 3079F DIAST BP 80-89 MM HG: CPT | Performed by: INTERNAL MEDICINE

## 2024-06-20 PROCEDURE — 86705 HEP B CORE ANTIBODY IGM: CPT | Performed by: INTERNAL MEDICINE

## 2024-06-20 PROCEDURE — 86480 TB TEST CELL IMMUN MEASURE: CPT | Performed by: INTERNAL MEDICINE

## 2024-06-20 PROCEDURE — 87340 HEPATITIS B SURFACE AG IA: CPT | Performed by: INTERNAL MEDICINE

## 2024-06-20 PROCEDURE — 36415 COLL VENOUS BLD VENIPUNCTURE: CPT

## 2024-06-20 RX ORDER — METHOTREXATE 2.5 MG/1
TABLET ORAL
Qty: 30 TABLET | Refills: 6 | Status: SHIPPED | OUTPATIENT
Start: 2024-06-20

## 2024-06-20 RX ORDER — CLOBETASOL PROPIONATE 0.5 MG/G
CREAM TOPICAL 2 TIMES DAILY
Qty: 60 G | Refills: 6 | Status: SHIPPED | OUTPATIENT
Start: 2024-06-20

## 2024-06-20 RX ORDER — FOLIC ACID 1 MG/1
1 TABLET ORAL DAILY
Qty: 30 TABLET | Refills: 6 | Status: SHIPPED | OUTPATIENT
Start: 2024-06-20

## 2024-06-20 RX ORDER — PREDNISONE 5 MG/1
TABLET ORAL
Qty: 70 TABLET | Refills: 0 | Status: SHIPPED | OUTPATIENT
Start: 2024-06-20

## 2024-06-20 NOTE — PATIENT INSTRUCTIONS
"Do labs now, then every 3 months  Start prednisone taper  Start methotrexate 6 tabs once a week  Take folic acid daily  Use clobetasol cream as needed for psoriasis  Will get approval for Dominick  Do hand and feet x-rays  Continue OTC ibuprofen as needed, take with food    Return to clinic in 6 months    Psoriatic Arthritis in Adults    What is psoriatic arthritis? -- Psoriatic arthritis is a condition that causes joint pain, swelling, and stiffness. It happens in people who have a long-term skin condition called psoriasis. People with psoriasis have patches of thick, red skin that are often covered by silver or white scales  Doctors don't know what causes psoriasis or psoriatic arthritis.  What are the symptoms of psoriatic arthritis? -- Psoriatic arthritis causes pain, stiffness, and swelling in the affected joints. It can also affect the spine in some people. Because of the joint and spine problems, people can have trouble moving their body. Stiffness in the joints or low back is usually worse in the morning and lasts 30 minutes or longer. It usually gets better with exercise.  Psoriatic arthritis can affect joints on one or both sides of the body. It usually affects more than one joint.  In addition to joint symptoms (and the skin symptoms of psoriasis), people sometimes have other symptoms. These can include:  ?Swelling of a finger or toe, or the hands or feet  ?Swelling and pain in the back of the ankle or in the heel   ?Nail symptoms - The nails can look \"pitted,\" as if they were pricked by a pin. The nail can also come up off the nail bed.  ?Eye pain or redness  Is there a test for psoriatic arthritis? -- Yes. Your doctor or nurse will ask about your symptoms and do an exam. He or she will order X-rays of your painful joints. He or she might order an imaging test called an MRI. Imaging tests create pictures of the inside of the body.  To check that another condition isn't causing your symptoms, your doctor " "or nurse might also order:  ?Blood tests  ?Lab tests on a sample of fluid from a swollen joint - To get a sample of fluid, the doctor will put a thin needle in your joint.  How is psoriatic arthritis treated? -- There is no cure for psoriatic arthritis, but different treatments can help ease and control symptoms. Treatment for joint symptoms usually involves one or more of the following:  ?Medicines called nonsteroidal antiinflammatory drugs, or \"NSAIDs\" for short - Examples of NSAIDs are aspirin, ibuprofen (sample brand names: Advil, Motrin), and naproxen (sample brand name: Aleve).  ?Medicines that are usually used to treat other types of arthritis - Some of these include methotrexate and leflunomide.  ?Medicines that block a substance called tumor necrosis factor, or \"TNF\" for short - TNF plays a role in psoriasis and psoriatic arthritis. Medicines that block TNF are called \"anti-TNF\" medicines. Examples include etanercept (brand name: Enbrel) and adalimumab (brand name: Humira).  ?Other medicines - If the options above don't help, your doctor might suggest trying a different medicine. Examples include ustekinumab (brand name: Stelara), secukinumab (brand name: Cosentyx), tofacitinib (brand name: Xeljanz), abatacept (brand name: Orencia), and apremilast (brand name: Otezla).     ?Shots of medicines called steroids that go into the painful joint - These are not the same as the steroids some athletes take illegally. These steroids help reduce swelling and pain.  ?Heat - Heat, especially in the morning, can help reduce pain and stiffness. Do not use heat for longer than 20 minutes at a time. Also, do not use anything too hot that could burn your skin.  ?Physical and occupational therapy - This involves learning exercises, movements, and ways of doing everyday tasks.  ?Special shoe inserts (called \"orthotics\") - These can help keep your feet, ankles, and knees in the proper position.  ?Treatment for psoriatic " arthritis is usually long term. That's because even after symptoms get better, they sometimes return later on.  Is there anything I can do on my own to feel better? -- Yes. It is very important that you stay active. You might want to avoid being active because you are in pain. But this can make things worse. It can make your muscles weak and your joints stiffer than they already are. Your doctor, nurse, or physical therapist can help you figure out which activities and exercises are right for you.

## 2024-06-20 NOTE — PROGRESS NOTES
RHEUMATOLOGY NEW PATIENT NOTE    Assessment and Plan:     Assessment & Plan  1. Psoriatic arthritis, uncontrolled, progressing, complicated by history of right knee prosthetic joint staph infection.  The patient presents with active psoriatic arthritis, has active dactylitis of various finger and toe joints as well as plaque psoriasis especially of right distal lower extremity. Patient admits to joint pain, neck pain, muscle pain, persistent itching, and elevated blood pressure. Gout, which patient may have as well, is a known condition associated with uncontrolled psoriasis. A comprehensive discussion was held with the patient regarding various treatment options for psoriatic arthritis, including Skyrizi, Rinvoq, Tremfya, Cosentyx, Taltz, and TNF inhibitors. The risks and benefits of Skyrizi were thoroughly discussed; after discussing with ID and my other rheumatology colleagues, given patient's infected prosthetic joint history, though it is under control now, have decided to pursue prior auth for Otezla in addition to methotrexate for safest medication combination instead of introducing a biologic that has the potential of being further immunosuppressive. The patient will recommence methotrexate, taking 6 tablets once a week, and folic acid daily, since he had a good response on this medication in the past for both his psoriasis and arthritis symptoms. A steroid taper will be prescribed, starting with 5 mg 4 tablets daily in the morning with food for a week, then reducing to 3 tablets for a week, 2 tablets for a week, and 1 tablet for a week. Labs will be ordered, followed by every 3 months. The patient will also continue ibuprofen 600 mg twice a day with food. A prescription for clobetasol cream will be provided. The patient is advised to monitor for any significant joint flare-ups or uncontrolled psoriasis. Hand and feet x-rays will be ordered. Patient's rheumatologic disease(s) threaten long-term function if  not appropriately treated.    2. Psoriasis, especially active plaque psoriasis of distal right lower extremity.  The patient will recommence methotrexate, taking 6 tablets once a week, and folic acid daily. Additionally, Otezla prior auth will be pursued. A prescription for clobetasol cream will be provided. A steroid taper will be prescribed, starting with 5 mg 4 tablets daily in the morning with food for a week, then reducing to 3 tablets for a week, 2 tablets for a week, and 1 tablet for a week to address his active psoriasis and psoriatic arthritis (especially dactylitis) symptoms. Fasting labs will be ordered, followed by every 3 months. The patient will also monitor for any significant joint flare-ups or uncontrolled psoriasis.    Do labs now, then every 3 months  Start prednisone taper  Start methotrexate 6 tabs once a week, which patient was on with success in the past before 10/2023, and prior to his septic arthritis history; will closely monitor for re-development of infection  Take folic acid daily  Use clobetasol cream as needed for psoriasis  Will work on approval for Otezla  Do hand and feet x-rays  Continue OTC ibuprofen as needed, take with food    Return to clinic in 6 months      Plan:  Diagnoses and all orders for this visit:    Psoriatic arthritis (HCC)  -     methotrexate 2.5 MG tablet; 6 tablet po weekly (take all 6 tablets on the same day every week)  -     predniSONE 5 mg tablet; 4 tabs x7 days, then 3 tabs x7 days, then 2 tabs x7 days, then 1 tab x7 days, then stop.  -     Quantiferon TB Gold Plus Assay  -     Chronic Hepatitis Panel  -     CBC and differential; Standing  -     Comprehensive metabolic panel; Standing  -     C-reactive protein; Standing  -     Sedimentation rate, automated; Standing  -     Uric acid  -     XR hand 3+ vw left; Future  -     XR hand 3+ vw right; Future  -     XR foot 3+ vw left; Future  -     XR foot 3+ vw right; Future    Plaque psoriasis  -     Ambulatory  Referral to Rheumatology  -     methotrexate 2.5 MG tablet; 6 tablet po weekly (take all 6 tablets on the same day every week)  -     predniSONE 5 mg tablet; 4 tabs x7 days, then 3 tabs x7 days, then 2 tabs x7 days, then 1 tab x7 days, then stop.  -     Uric acid  -     clobetasol (TEMOVATE) 0.05 % cream; Apply topically 2 (two) times a day    Rheumatoid arthritis, involving unspecified site, unspecified whether rheumatoid factor present (HCC)    Staphylococcal arthritis of right knee (HCC)    History of arthroplasty of right knee    Status post revision of total knee replacement, right  -     folic acid (FOLVITE) 1 mg tablet; Take 1 tablet (1 mg total) by mouth daily    Infection associated with internal right knee prosthesis, sequela    High risk medication use    High risk medication use - Benefits and risks of methotrexate use, including but not limited to gastrointestinal disturbances such as diarrhea, hair loss, fatigue, stomatitis, leukopenia, lung hypersensitivity reaction, hepatotoxicity, and lymphoma were discussed with the patient. Baseline viral hepatitis panel was ordered.  CBC,CMP will be regularly monitored.  Patient was prescribed Folic Acid 1 mg po daily to take while on methotrexate to help prevent side effects. Patient counseled on abstinence from alcohol while using methotrexate.     Follow-up plan: RTC in 6 months        Chief Complaint  Chief Complaint   Patient presents with    Consult     Consult- Plaque Psoriosis        HPI  João Brown is a 48 y.o.  male who presents as a Rheumatology consult referred by Kirk Long, * for evaluation of psoriatic arthritis and psoriasis.    History of Present Illness  The patient is a 48-year-old male who presents here for evaluation of psoriatic arthritis.    Over the past year, the patient underwent a right knee replacement due to an infection detected during the procedure. Post-procedure, he was prescribed an antibiotic and subsequently  regained the functionality of the knee. A year ago, he experienced severe knee pain following a fall, which was initially inconclusive. Subsequently, Dr. Long identified a liquid sample and diagnosed the infection. Prior to this, the patient had undergone two prior replacements. The second replacement was subsequently infected, necessitating removal of the prosthesis and subsequent antibiotic treatment for a duration of four months. Despite initial improvement, the infection persisted, necessitating a second replacement in 10/2023. Cultures revealed a minor staph infection, necessitating the lifelong antibiotic treatment. The patient experienced significant pain and stiffness in the knee post-procedure, but currently, he reports no such symptoms. He consulted with Dr. Singer two weeks ago, who found no fluid in the knee and deemed satisfactory.    The patient has been suffering from arthritis for over twenty years, with the most severe symptoms in his hands, which has been progressively worsening. He has consulted with a rheumatologist twice in [facility], but the rheumatologist has relocated. He occasionally experiences severe pain in his knuckles after hitting them, which he suspects may be related to his arthritis. He also experiences flare-ups on his knuckles and last three toes, but denies any palpable bumps or interdigital bumps. He rates his joint pain as a 5 on a scale of 1 to 10, with a loss of strength in his hands. He denies any back pain. He missed his follow-up appointment with Dr. Jorgensen due to pancreatic issues, but was able to resume follow-ups. He discontinued methotrexate two months ago due to knee issues. Despite this, he continued to experience joint pain, albeit less severe than before, and his psoriasis had almost completely resolved. He expresses a desire to resume methotrexate and was previously tolerating it well. He was previously on a regimen of six tablets once a week. He is  currently on antibiotics twice a day due to a staph infection in his knee replacement. He is scheduled to see an infectious disease doctor in 5 months. He does not recall the last time he was on steroids. He takes ibuprofen 600 mg twice a day, which provides some relief.    The patient's plaque psoriasis is worsening, with patches beginning to manifest on his legs, arms, and thighs. He denies any psoriasis in his groin, back, or chest. He reports flaky skin on his face, but his scalp is unaffected. He has become accustomed to his psoriasis and uses cortisone on his face.    Supplemental Information  He denies any history of blood clots. He was on aspirin.   The patient denies smoking. He drinks occasionally. 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   His father had gout. He denies any family history of psoriasis or psoriatic concerns.       Review of Systems  See HPI    Allergies  No Known Allergies    Home Medications    Current Outpatient Medications:     ascorbic acid (VITAMIN C) 500 MG tablet, Take 1 tablet (500 mg total) by mouth 2 (two) times a day, Disp: 60 tablet, Rfl: 1    busPIRone (BUSPAR) 15 mg tablet, Take 0.5 tablets (7.5 mg total) by mouth 2 (two) times a day, Disp: 90 tablet, Rfl: 1    cefadroxil (DURICEF) 500 mg capsule, Take 1 capsule (500 mg total) by mouth every 12 (twelve) hours, Disp: 180 capsule, Rfl: 1    cholecalciferol (VITAMIN D3) 1,000 units tablet, Take 2 tablets (2,000 Units total) by mouth daily, Disp: 60 tablet, Rfl: 1    clobetasol (TEMOVATE) 0.05 % cream, Apply topically 2 (two) times a day, Disp: 60 g, Rfl: 6    ferrous sulfate 324 (65 Fe) mg, Take 1 tablet (324 mg total) by mouth 2 (two) times a day before meals, Disp: 60 tablet, Rfl: 1    fluticasone (FLONASE) 50 mcg/act nasal spray, 1 spray into each nostril daily, Disp: , Rfl:     folic acid (FOLVITE) 1 mg tablet, Take 1 tablet (1 mg total) by mouth daily, Disp: 30 tablet, Rfl: 6    methotrexate 2.5 MG tablet, 6 tablet po  weekly (take all 6 tablets on the same day every week), Disp: 30 tablet, Rfl: 6    metoprolol succinate (TOPROL-XL) 100 mg 24 hr tablet, TAKE 1 TABLET (100 MG TOTAL) BY MOUTH DAILY, Disp: 90 tablet, Rfl: 1    Multiple Vitamins-Minerals (multivitamin with minerals) tablet, Take 1 tablet by mouth daily, Disp: 30 tablet, Rfl: 1    nortriptyline (PAMELOR) 50 mg capsule, Take 1 capsule (50 mg total) by mouth 2 (two) times a day, Disp: 180 capsule, Rfl: 1    predniSONE 5 mg tablet, 4 tabs x7 days, then 3 tabs x7 days, then 2 tabs x7 days, then 1 tab x7 days, then stop., Disp: 70 tablet, Rfl: 0    venlafaxine (EFFEXOR-XR) 150 mg 24 hr capsule, Take 1 capsule (150 mg total) by mouth daily at bedtime, Disp: 90 capsule, Rfl: 1    celecoxib (CeleBREX) 200 mg capsule, Take 1 capsule (200 mg total) by mouth 2 (two) times a day (Patient not taking: Reported on 5/14/2024), Disp: 60 capsule, Rfl: 0    lidocaine (LIDODERM) 5 %, Apply 1 patch topically over 12 hours daily Remove & Discard patch within 12 hours or as directed by MD, Disp: 30 patch, Rfl: 0    methocarbamol (ROBAXIN) 500 mg tablet, Take 1 tablet (500 mg total) by mouth 3 (three) times a day (Patient not taking: Reported on 5/14/2024), Disp: 60 tablet, Rfl: 0    polymyxin b-trimethoprim (POLYTRIM) ophthalmic solution, Administer 1 drop into the left eye every 4 (four) hours (Patient not taking: Reported on 5/14/2024), Disp: 10 mL, Rfl: 0    Past Medical History  Past Medical History:   Diagnosis Date    Anxiety     Arthritis     Crutches as ambulation aid 07/25/2023    Depression     Difficult intubation 07/28/2023    Hypertension     Limited jaw ROM     Migraine     PONV (postoperative nausea and vomiting) 07/28/2023    Psoriasis     RA (rheumatoid arthritis) (HCC)     Walker as ambulation aid        Past Surgical History   Past Surgical History:   Procedure Laterality Date    IR PICC PLACEMENT SINGLE LUMEN  10/23/2023    JOINT REPLACEMENT      PA REVJ TOT KNEE ARTHRP  "FEM&ENTIRE TIBIAL COMPONE Right 2023    Procedure: ARTHROPLASTY KNEE TOTAL REVISION;  Surgeon: Kirk Long DO;  Location: [facility];  Service: Orthopedics    WA REVJ TOT KNEE ARTHRP FEM&ENTIRE TIBIAL COMPONE Right 10/18/2023    Procedure: ARTHROPLASTY KNEE TOTAL REVISION;  Surgeon: Kirk Long DO;  Location: [facility];  Service: Orthopedics    REPLACEMENT TOTAL KNEE Right     x2       Family History    Family History   Problem Relation Age of Onset    Breast cancer Mother     Completed Suicide  Father     Asthma Sister        Social History  Social History     Substance and Sexual Activity   Alcohol Use Yes    Comment: rarely     Social History     Substance and Sexual Activity   Drug Use Never     Social History     Tobacco Use   Smoking Status Former    Current packs/day: 0.00    Average packs/day: 1 pack/day for 10.0 years (10.0 ttl pk-yrs)    Types: Cigarettes    Start date: 2000    Quit date: 2000    Years since quittin.5   Smokeless Tobacco Current    Types: Chew   Tobacco Comments    Last chewed tobacco 23       Objective:  Vitals:    24 1308   BP: 122/82   BP Location: Left arm   Patient Position: Sitting   Cuff Size: Standard   Resp: 18   Temp: 97.9 °F (36.6 °C)   TempSrc: Temporal   Weight: 89.5 kg (197 lb 6.4 oz)   Height: 5' 11\" (1.803 m)       Physical Exam  Constitutional:       General: He is not in acute distress.  HENT:      Head: Normocephalic and atraumatic.   Eyes:      Conjunctiva/sclera: Conjunctivae normal.   Cardiovascular:      Rate and Rhythm: Normal rate and regular rhythm.      Heart sounds: S1 normal and S2 normal.      No friction rub.   Pulmonary:      Effort: Pulmonary effort is normal. No respiratory distress.      Breath sounds: Normal breath sounds. No wheezing, rhonchi or rales.   Musculoskeletal:         General: Swelling and tenderness present.      Cervical back: Neck supple.   Skin:     General: Skin is warm and dry.      " Coloration: Skin is not pale.      Findings: Rash present.   Neurological:      Mental Status: He is alert. Mental status is at baseline.   Psychiatric:         Mood and Affect: Mood normal.         Behavior: Behavior normal.       Physical Exam  There is swelling and tenderness in the right 2nd and 3rd MCP joints. There is swelling and tenderness in the left 2nd PIP joints. Psoriasis is noted in various areas of the skin, especially prominent plaque psoriasis of distal right lower extremity. Dactylitis is noted in the right 3rd, 4th, and 5th toes.    Reviewed labs and imaging.    Imaging:     No results found.     Labs:   Appointment on 06/20/2024   Component Date Value Ref Range Status    WBC 06/20/2024 5.19  4.31 - 10.16 Thousand/uL Final    RBC 06/20/2024 4.42  3.88 - 5.62 Million/uL Final    Hemoglobin 06/20/2024 13.9  12.0 - 17.0 g/dL Final    Hematocrit 06/20/2024 41.2  36.5 - 49.3 % Final    MCV 06/20/2024 93  82 - 98 fL Final    MCH 06/20/2024 31.4  26.8 - 34.3 pg Final    MCHC 06/20/2024 33.7  31.4 - 37.4 g/dL Final    RDW 06/20/2024 13.2  11.6 - 15.1 % Final    MPV 06/20/2024 9.8  8.9 - 12.7 fL Final    Platelets 06/20/2024 308  149 - 390 Thousands/uL Final    nRBC 06/20/2024 0  /100 WBCs Final    Segmented % 06/20/2024 63  43 - 75 % Final    Immature Grans % 06/20/2024 1  0 - 2 % Final    Lymphocytes % 06/20/2024 18  14 - 44 % Final    Monocytes % 06/20/2024 11  4 - 12 % Final    Eosinophils Relative 06/20/2024 6  0 - 6 % Final    Basophils Relative 06/20/2024 1  0 - 1 % Final    Absolute Neutrophils 06/20/2024 3.34  1.85 - 7.62 Thousands/µL Final    Absolute Immature Grans 06/20/2024 0.03  0.00 - 0.20 Thousand/uL Final    Absolute Lymphocytes 06/20/2024 0.92  0.60 - 4.47 Thousands/µL Final    Absolute Monocytes 06/20/2024 0.55  0.17 - 1.22 Thousand/µL Final    Eosinophils Absolute 06/20/2024 0.32  0.00 - 0.61 Thousand/µL Final    Basophils Absolute 06/20/2024 0.03  0.00 - 0.10 Thousands/µL Final     Sodium 06/20/2024 139  135 - 147 mmol/L Final    Potassium 06/20/2024 4.1  3.5 - 5.3 mmol/L Final    Chloride 06/20/2024 100  96 - 108 mmol/L Final    CO2 06/20/2024 29  21 - 32 mmol/L Final    ANION GAP 06/20/2024 10  4 - 13 mmol/L Final    BUN 06/20/2024 19  5 - 25 mg/dL Final    Creatinine 06/20/2024 1.08  0.60 - 1.30 mg/dL Final    Standardized to IDMS reference method    Glucose 06/20/2024 81  65 - 140 mg/dL Final    If the patient is fasting, the ADA then defines impaired fasting glucose as > 100 mg/dL and diabetes as > or equal to 123 mg/dL.    Calcium 06/20/2024 9.7  8.4 - 10.2 mg/dL Final    AST 06/20/2024 23  13 - 39 U/L Final    ALT 06/20/2024 26  7 - 52 U/L Final    Specimen collection should occur prior to Sulfasalazine administration due to the potential for falsely depressed results.     Alkaline Phosphatase 06/20/2024 117 (H)  34 - 104 U/L Final    Total Protein 06/20/2024 7.7  6.4 - 8.4 g/dL Final    Albumin 06/20/2024 4.3  3.5 - 5.0 g/dL Final    Total Bilirubin 06/20/2024 0.43  0.20 - 1.00 mg/dL Final    Use of this assay is not recommended for patients undergoing treatment with eltrombopag due to the potential for falsely elevated results.  N-acetyl-p-benzoquinone imine (metabolite of Acetaminophen) will generate erroneously low results in samples for patients that have taken an overdose of Acetaminophen.    eGFR 06/20/2024 79  ml/min/1.73sq m Final    CRP 06/20/2024 22.3 (H)  <3.0 mg/L Final    Sed Rate 06/20/2024 12  0 - 19 mm/hour Final   Office Visit on 06/20/2024   Component Date Value Ref Range Status    Hepatitis B Surface Ag 06/20/2024 Non-reactive  Non-Reactive Final    Hepatitis C Ab 06/20/2024 Non-reactive  Non-Reactive Final    Hep B C IgM 06/20/2024 Non-reactive  Non-Reactive Final    Hep B Core Total Ab 06/20/2024 Non-reactive  Non-Reactive Final    Uric Acid 06/20/2024 8.0  3.5 - 8.5 mg/dL Final    Specimen collection should occur prior to Metamizole administration due to the  potential for falsely depressed results.    QFT Nil 06/20/2024 0.01  0 - 8.0 IU/ml Final    QFT TB1-NIL 06/20/2024 0.01  IU/ml Final    QFT TB2-NIL 06/20/2024 0.01  IU/ml Final    QFT Mitogen-NIL 06/20/2024 5.94  IU/ml Final    QFT Final Interpretation 06/20/2024 Negative  Negative Final    No Interferon-gamma response to M. tuberculosis antigens detected.  Infection with M. tuberculosis is unlikely.  A single negative result does not exclude infection with M. tuberculosis. In patients at high risk for M. tuberculosis infection, a second test should be considered in accordance with the 2017 ATS/IDSA/CDC Clinical Practice Guidelines for Diagnosis of Tuberculosis in Adults and Children. False negative results can be a result of incorrect blood sample collection or handling of the specimen affecting lymphocyte function.   Appointment on 05/13/2024   Component Date Value Ref Range Status    Cholesterol 05/13/2024 254 (H)  See Comment mg/dL Final    Cholesterol:         Pediatric <18 Years        Desirable          <170 mg/dL      Borderline High    170-199 mg/dL      High               >=200 mg/dL        Adult >=18 Years            Desirable         <200 mg/dL      Borderline High   200-239 mg/dL      High              >239 mg/dL      Triglycerides 05/13/2024 179 (H)  See Comment mg/dL Final    Triglyceride:     0-9Y            <75mg/dL     10Y-17Y         <90 mg/dL       >=18Y     Normal          <150 mg/dL     Borderline High 150-199 mg/dL     High            200-499 mg/dL        Very High       >499 mg/dL    Specimen collection should occur prior to Metamizole administration due to the potential for falsely depressed results.    HDL, Direct 05/13/2024 54  >=40 mg/dL Final    LDL Calculated 05/13/2024 164 (H)  0 - 100 mg/dL Final    LDL Cholesterol:     Optimal           <100 mg/dl     Near Optimal      100-129 mg/dl     Above Optimal       Borderline High 130-159 mg/dl       High            160-189 mg/dl       Very  High       >189 mg/dl         This screening LDL is a calculated result.   It does not have the accuracy of the Direct Measured LDL in the monitoring of patients with hyperlipidemia and/or statin therapy.   Direct Measure LDL (CTK765) must be ordered separately in these patients.   Admission on 02/17/2024, Discharged on 02/17/2024   Component Date Value Ref Range Status    SARS-CoV-2 02/17/2024 Negative  Negative Final    INFLUENZA A PCR 02/17/2024 Negative  Negative Final    INFLUENZA B PCR 02/17/2024 Negative  Negative Final    RSV PCR 02/17/2024 Negative  Negative Final    WBC 02/17/2024 7.33  4.31 - 10.16 Thousand/uL Final    RBC 02/17/2024 4.64  3.88 - 5.62 Million/uL Final    Hemoglobin 02/17/2024 14.2  12.0 - 17.0 g/dL Final    Hematocrit 02/17/2024 41.7  36.5 - 49.3 % Final    MCV 02/17/2024 90  82 - 98 fL Final    MCH 02/17/2024 30.6  26.8 - 34.3 pg Final    MCHC 02/17/2024 34.1  31.4 - 37.4 g/dL Final    RDW 02/17/2024 13.9  11.6 - 15.1 % Final    MPV 02/17/2024 9.2  8.9 - 12.7 fL Final    Platelets 02/17/2024 305  149 - 390 Thousands/uL Final    nRBC 02/17/2024 0  /100 WBCs Final    Segmented % 02/17/2024 76 (H)  43 - 75 % Final    Immature Grans % 02/17/2024 0  0 - 2 % Final    Lymphocytes % 02/17/2024 9 (L)  14 - 44 % Final    Monocytes % 02/17/2024 10  4 - 12 % Final    Eosinophils Relative 02/17/2024 5  0 - 6 % Final    Basophils Relative 02/17/2024 0  0 - 1 % Final    Absolute Neutrophils 02/17/2024 5.60  1.85 - 7.62 Thousands/µL Final    Absolute Immature Grans 02/17/2024 0.02  0.00 - 0.20 Thousand/uL Final    Absolute Lymphocytes 02/17/2024 0.64  0.60 - 4.47 Thousands/µL Final    Absolute Monocytes 02/17/2024 0.70  0.17 - 1.22 Thousand/µL Final    Eosinophils Absolute 02/17/2024 0.34  0.00 - 0.61 Thousand/µL Final    Basophils Absolute 02/17/2024 0.03  0.00 - 0.10 Thousands/µL Final    Sodium 02/17/2024 134 (L)  135 - 147 mmol/L Final    Potassium 02/17/2024 3.8  3.5 - 5.3 mmol/L Final    Chloride  "02/17/2024 98  96 - 108 mmol/L Final    CO2 02/17/2024 28  21 - 32 mmol/L Final    ANION GAP 02/17/2024 8  mmol/L Final    BUN 02/17/2024 15  5 - 25 mg/dL Final    Creatinine 02/17/2024 0.93  0.60 - 1.30 mg/dL Final    Standardized to IDMS reference method    Glucose 02/17/2024 149 (H)  65 - 140 mg/dL Final    If the patient is fasting, the ADA then defines impaired fasting glucose as > 100 mg/dL and diabetes as > or equal to 123 mg/dL.    Calcium 02/17/2024 9.9  8.4 - 10.2 mg/dL Final    AST 02/17/2024 16  13 - 39 U/L Final    ALT 02/17/2024 16  7 - 52 U/L Final    Specimen collection should occur prior to Sulfasalazine administration due to the potential for falsely depressed results.     Alkaline Phosphatase 02/17/2024 125 (H)  34 - 104 U/L Final    Total Protein 02/17/2024 8.2  6.4 - 8.4 g/dL Final    Albumin 02/17/2024 4.6  3.5 - 5.0 g/dL Final    Total Bilirubin 02/17/2024 0.85  0.20 - 1.00 mg/dL Final    Use of this assay is not recommended for patients undergoing treatment with eltrombopag due to the potential for falsely elevated results.  N-acetyl-p-benzoquinone imine (metabolite of Acetaminophen) will generate erroneously low results in samples for patients that have taken an overdose of Acetaminophen.    eGFR 02/17/2024 95  ml/min/1.73sq m Final    hs TnI 0hr 02/17/2024 <2  \"Refer to ACS Flowchart\"- see link ng/L Final    Comment:                                              Initial (time 0) result  If >=50 ng/L, Myocardial injury suggested ;  Type of myocardial injury and treatment strategy  to be determined.  If 5-49 ng/L, a delta result at 2 hours and or 4 hours will be needed to further evaluate.  If <4 ng/L, and chest pain has been >3 hours since onset, patient may qualify for discharge based on the HEART score in the ED.  If <5 ng/L and <3hours since onset of chest pain, a delta result at 2 hours will be needed to further evaluate.    HS Troponin 99th Percentile URL of a Health Population=12 ng/L " with a 95% Confidence Interval of 8-18 ng/L.    Second Troponin (time 2 hours)  If calculated delta >= 20 ng/L,  Myocardial injury suggested ; Type of myocardial injury and treatment strategy to be determined.  If 5-49 ng/L and the calculated delta is 5-19 ng/L, consult medical service for evaluation.  Continue evaluation for ischemia on ecg and other possible etiology and repeat hs troponin at 4 hours.  If delta                            is <5 ng/L at 2 hours, consider discharge based on risk stratification via the HEART score (if in ED), or ESTER risk score in IP/Observation.    HS Troponin 99th Percentile URL of a Health Population=12 ng/L with a 95% Confidence Interval of 8-18 ng/L.    Ventricular Rate 02/17/2024 84  BPM Final    Atrial Rate 02/17/2024 84  BPM Final    SD Interval 02/17/2024 162  ms Final    QRSD Interval 02/17/2024 96  ms Final    QT Interval 02/17/2024 370  ms Final    QTC Interval 02/17/2024 437  ms Final    P Axis 02/17/2024 55  degrees Final    QRS Axis 02/17/2024 40  degrees Final    T Wave Lloyd 02/17/2024 57  degrees Final   Appointment on 02/12/2024   Component Date Value Ref Range Status    WBC 02/12/2024 5.85  4.31 - 10.16 Thousand/uL Final    RBC 02/12/2024 4.68  3.88 - 5.62 Million/uL Final    Hemoglobin 02/12/2024 14.0  12.0 - 17.0 g/dL Final    Hematocrit 02/12/2024 42.4  36.5 - 49.3 % Final    MCV 02/12/2024 91  82 - 98 fL Final    MCH 02/12/2024 29.9  26.8 - 34.3 pg Final    MCHC 02/12/2024 33.0  31.4 - 37.4 g/dL Final    RDW 02/12/2024 14.0  11.6 - 15.1 % Final    MPV 02/12/2024 9.7  8.9 - 12.7 fL Final    Platelets 02/12/2024 351  149 - 390 Thousands/uL Final    nRBC 02/12/2024 0  /100 WBCs Final    Segmented % 02/12/2024 68  43 - 75 % Final    Immature Grans % 02/12/2024 1  0 - 2 % Final    Lymphocytes % 02/12/2024 15  14 - 44 % Final    Monocytes % 02/12/2024 8  4 - 12 % Final    Eosinophils Relative 02/12/2024 7 (H)  0 - 6 % Final    Basophils Relative 02/12/2024 1  0 - 1 %  Final    Absolute Neutrophils 02/12/2024 3.97  1.85 - 7.62 Thousands/µL Final    Absolute Immature Grans 02/12/2024 0.03  0.00 - 0.20 Thousand/uL Final    Absolute Lymphocytes 02/12/2024 0.90  0.60 - 4.47 Thousands/µL Final    Absolute Monocytes 02/12/2024 0.49  0.17 - 1.22 Thousand/µL Final    Eosinophils Absolute 02/12/2024 0.43  0.00 - 0.61 Thousand/µL Final    Basophils Absolute 02/12/2024 0.03  0.00 - 0.10 Thousands/µL Final    Creatinine 02/12/2024 0.90  0.60 - 1.30 mg/dL Final    Standardized to IDMS reference method    eGFR 02/12/2024 99  ml/min/1.73sq m Final

## 2024-06-21 LAB
HBV CORE AB SER QL: NORMAL
HBV CORE IGM SER QL: NORMAL
HBV SURFACE AG SER QL: NORMAL
HCV AB SER QL: NORMAL

## 2024-06-22 LAB
GAMMA INTERFERON BACKGROUND BLD IA-ACNC: 0.01 IU/ML
M TB IFN-G BLD-IMP: NEGATIVE
M TB IFN-G CD4+ BCKGRND COR BLD-ACNC: 0.01 IU/ML
M TB IFN-G CD4+ BCKGRND COR BLD-ACNC: 0.01 IU/ML
MITOGEN IGNF BCKGRD COR BLD-ACNC: 5.94 IU/ML

## 2024-06-30 ENCOUNTER — TELEPHONE (OUTPATIENT)
Dept: RHEUMATOLOGY | Facility: CLINIC | Age: 49
End: 2024-06-30

## 2024-06-30 NOTE — TELEPHONE ENCOUNTER
Rheumatology Prior Authorization Request      Medication/Disease Information:   Diagnosis:  psoriatic arthritis and plaque psoriasis  Medication:  Otezla (apremilast) starter pack and maintenance dose of 30mg po bid  Failed or Intolerant to or Contraindicated:  history of septic arthritis x2, other biologics such as Humira, Enbrel, Rinvoq, and Cosentyx would be contraindicated; currently on methotrexate  Screening  Hepatitis B/C:  recently negative  Tuberculosis:  recently negative  No active infections, on chronic prophylactic antibiotics to prevent septic arthritis  Up to Date on immunizations

## 2024-09-04 DIAGNOSIS — F32.5 MAJOR DEPRESSIVE DISORDER IN FULL REMISSION, UNSPECIFIED WHETHER RECURRENT (HCC): ICD-10-CM

## 2024-09-04 RX ORDER — NORTRIPTYLINE HYDROCHLORIDE 50 MG/1
50 CAPSULE ORAL 2 TIMES DAILY
Qty: 180 CAPSULE | Refills: 1 | Status: SHIPPED | OUTPATIENT
Start: 2024-09-04

## 2024-09-06 DIAGNOSIS — T84.53XD INFECTION ASSOCIATED WITH INTERNAL RIGHT KNEE PROSTHESIS, SUBSEQUENT ENCOUNTER: ICD-10-CM

## 2024-09-06 DIAGNOSIS — F32.5 MAJOR DEPRESSIVE DISORDER IN FULL REMISSION, UNSPECIFIED WHETHER RECURRENT (HCC): ICD-10-CM

## 2024-09-06 RX ORDER — VENLAFAXINE HYDROCHLORIDE 150 MG/1
150 CAPSULE, EXTENDED RELEASE ORAL
Qty: 90 CAPSULE | Refills: 1 | Status: SHIPPED | OUTPATIENT
Start: 2024-09-06

## 2024-09-06 RX ORDER — CEFADROXIL 500 MG/1
500 CAPSULE ORAL EVERY 12 HOURS SCHEDULED
Qty: 180 CAPSULE | Refills: 0 | Status: SHIPPED | OUTPATIENT
Start: 2024-09-06 | End: 2024-12-05

## 2024-09-12 DIAGNOSIS — F41.9 ANXIETY: ICD-10-CM

## 2024-09-12 RX ORDER — BUSPIRONE HYDROCHLORIDE 15 MG/1
7.5 TABLET ORAL 2 TIMES DAILY
Qty: 90 TABLET | Refills: 1 | Status: SHIPPED | OUTPATIENT
Start: 2024-09-12

## 2024-10-02 ENCOUNTER — TELEPHONE (OUTPATIENT)
Age: 49
End: 2024-10-02

## 2024-10-04 DIAGNOSIS — L40.50 PSORIATIC ARTHRITIS (HCC): ICD-10-CM

## 2024-10-04 DIAGNOSIS — I10 PRIMARY HYPERTENSION: ICD-10-CM

## 2024-10-04 DIAGNOSIS — L40.0 PLAQUE PSORIASIS: ICD-10-CM

## 2024-10-07 RX ORDER — METHOTREXATE 2.5 MG/1
TABLET ORAL
Qty: 30 TABLET | Refills: 5 | Status: SHIPPED | OUTPATIENT
Start: 2024-10-07

## 2024-10-07 RX ORDER — METOPROLOL SUCCINATE 100 MG/1
100 TABLET, EXTENDED RELEASE ORAL DAILY
Qty: 90 TABLET | Refills: 0 | Status: SHIPPED | OUTPATIENT
Start: 2024-10-07

## 2024-11-19 ENCOUNTER — OFFICE VISIT (OUTPATIENT)
Age: 49
End: 2024-11-19
Payer: COMMERCIAL

## 2024-11-19 ENCOUNTER — APPOINTMENT (OUTPATIENT)
Age: 49
End: 2024-11-19
Payer: COMMERCIAL

## 2024-11-19 VITALS — HEIGHT: 71 IN | BODY MASS INDEX: 27.72 KG/M2 | WEIGHT: 198 LBS

## 2024-11-19 DIAGNOSIS — L40.0 PLAQUE PSORIASIS: ICD-10-CM

## 2024-11-19 DIAGNOSIS — Z96.651 STATUS POST REVISION OF TOTAL KNEE REPLACEMENT, RIGHT: ICD-10-CM

## 2024-11-19 DIAGNOSIS — Z96.651 STATUS POST REVISION OF TOTAL KNEE REPLACEMENT, RIGHT: Primary | ICD-10-CM

## 2024-11-19 DIAGNOSIS — Z96.651 HISTORY OF REVISION OF TOTAL REPLACEMENT OF RIGHT KNEE JOINT: ICD-10-CM

## 2024-11-19 PROCEDURE — 99213 OFFICE O/P EST LOW 20 MIN: CPT | Performed by: STUDENT IN AN ORGANIZED HEALTH CARE EDUCATION/TRAINING PROGRAM

## 2024-11-19 PROCEDURE — 73562 X-RAY EXAM OF KNEE 3: CPT

## 2024-11-19 NOTE — PROGRESS NOTES
Knee New Office Note    Assessment:     1. Status post revision of total knee replacement, right    2. Plaque psoriasis    3. 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  X-rays demonstrate stability of the prosthesis without evidence of implant failure.  There is heterotopic bone formation posteriorly that can be contributing to some of the posterior discomfort into his hamstrings.  I did note that he did have long-term range of motion limitations prior to surgery which can also contribute to his stiffness and discomfort posteriorly.  It is our hope that this will continue to improve.  He will continue the Duricef and Otezla use as per infectious disease and rheumatology respectively.  He does appear to be doing well and does not appear to be ill.  His psoriasis plaques also appear to be under control and improving.  He demonstrates excellent range of motion and is grossly stable on exam.  He may continue with activities of daily living as tolerated.  He is encouraged to continue his home exercises to maintain strength.  I would like him to follow-up with me in 1 years time for repeat clinical evaluation and repeat x-ray of the right knee.    Subjective:     Patient ID: João Brown is a 49 y.o. male.  Chief Complaint:  HPI:  João is a pleasant 49-year-old male presenting for follow-up evaluation of his right knee.  He is status post  right total knee arthroplasty with second stage PJI performed on 10/18/2023.  Additionally, he is status post right knee I&D explant and articulating antibiotic spacer for right knee PJI 7/28/2023.  He states that he is doing well overall and has been able to return to most activities of daily living without significant difficulty.  He states that over the past couple of months he has developed some discomfort diffusely about the calf but denies any recurrent swelling or recurrent injury.  He describes the discomfort as a burning sensation that does not radiate to the foot.  He denies any other radicular pains or back pain currently.  He states he is not sure if he strained his back a few months ago as he continues to recover or compensating for his antalgic gait.  He has been able to receive rheumatology with regards to his psoriasis.  He was seen on June 20, 2024 and was prescribed methotrexate and oral taper prednisone.  Folic acid regimen has been initiated as well as topical clobetasol cream has also been provided. He has been also on otezla over the past 3-4 months. He does feel that this has helped significantly. He is on Duricef long-term due to his knee being chronically infected at this point despite surgical intervention and revision.    Allergy:  No Known Allergies  Medications:  all current active meds have been reviewed  Past Medical History:  Past Medical History:   Diagnosis Date    Anxiety     Arthritis     Crutches as ambulation aid 07/25/2023    Depression     Difficult intubation 07/28/2023    Hypertension     Limited jaw ROM     Migraine     PONV (postoperative nausea and vomiting) 07/28/2023    Psoriasis     RA (rheumatoid arthritis) ([facility])     Walker as ambulation aid      Past Surgical History:  Past Surgical History:   Procedure Laterality Date    IR PICC PLACEMENT SINGLE LUMEN  10/23/2023    JOINT REPLACEMENT      AL REVJ TOT KNEE ARTHRP FEM&ENTIRE TIBIAL COMPONE Right 07/28/2023     Procedure: ARTHROPLASTY KNEE TOTAL REVISION;  Surgeon: Kirk Long DO;  Location: [facility];  Service: Orthopedics    AL REVJ TOT KNEE ARTHRP FEM&ENTIRE TIBIAL COMPONE Right 10/18/2023    Procedure: ARTHROPLASTY KNEE TOTAL REVISION;  Surgeon: Kirk Long DO;  Location: [facility];  Service: Orthopedics    REPLACEMENT TOTAL KNEE Right     x2     Family History:  Family History   Problem Relation Age of Onset    Breast cancer Mother     Completed Suicide  Father     Asthma Sister      Social History:  Social History     Substance and Sexual Activity   Alcohol Use Yes    Comment: rarely     Social History     Substance and Sexual Activity   Drug Use Never     Social History     Tobacco Use   Smoking Status Former    Current packs/day: 0.00    Average packs/day: 1 pack/day for 10.0 years (10.0 ttl pk-yrs)    Types: Cigarettes    Start date: 2000    Quit date: 2000    Years since quittin.9   Smokeless Tobacco Current    Types: Chew   Tobacco Comments    Last chewed tobacco 23           ROS:  General: Per HPI  Skin: Negative, except if noted below  HEENT: Negative  Respiratory: Negative  Cardiovascular: Negative  Gastrointestinal: Negative  Urinary: Negative  Vascular: Negative  Musculoskeletal: Positive per HPI   Neurologic: Positive per HPI  Endocrine: Negative    Objective:  BP Readings from Last 1 Encounters:   24 122/82      Wt Readings from Last 1 Encounters:   24 89.8 kg (198 lb)        Respiratory:   non-labored respirations    Lymphatics:  no palpable lymph nodes    Gait:   Steady     Neurologic:   Alert and oriented times 3  Patient with normal sensation except as noted below  Deep tendon reflexes 2+ except as noted in MSK exam    Bilateral Lower Extremity:    Right knee:     Inspection: Well-healed vertical midline incision.  Areas of psoriasis with associated superficial plaques.  No evidence of deep fissuring    Overall limb alignment neutral    Effusion:  "None    ROM 0-115 without pain    Extensor Lag: None    Palpation: no joint line tenderness to palpation    AP Stability at 90 deg stable    M/L stability in full extension stable    M/L stability in midflexion able    Motor: 5/5 IP/Q/HS/TA/GS    Pulses: 2+ DP /2+ PT    SILT DP/SP/S/S/TN    Imaging:  My interpretation XR of the right knee obtained today November 19, 2024 demonstrate stable appearing right total knee arthroplasty revision prosthesis without evidence of lucency or implant failure.  Heterotopic bone formation is observed posteriorly of the distal femur when compared to prior films from 1 year ago.  No acute fractures observed.    BMI:   Estimated body mass index is 27.62 kg/m² as calculated from the following:    Height as of this encounter: 5' 11\" (1.803 m).    Weight as of this encounter: 89.8 kg (198 lb).  BSA:   Estimated body surface area is 2.1 meters squared as calculated from the following:    Height as of this encounter: 5' 11\" (1.803 m).    Weight as of this encounter: 89.8 kg (198 lb).           Scribe Attestation      I,:  Jean Pierre Mann am acting as a scribe while in the presence of the attending physician.:       I,:  Kirk Long, DO personally performed the services described in this documentation    as scribed in my presence.:              "

## 2024-11-27 DIAGNOSIS — T84.53XD INFECTION ASSOCIATED WITH INTERNAL RIGHT KNEE PROSTHESIS, SUBSEQUENT ENCOUNTER: ICD-10-CM

## 2024-11-27 RX ORDER — CEFADROXIL 500 MG/1
500 CAPSULE ORAL EVERY 12 HOURS SCHEDULED
Qty: 60 CAPSULE | Refills: 0 | Status: SHIPPED | OUTPATIENT
Start: 2024-11-27 | End: 2024-12-27

## 2024-11-27 NOTE — TELEPHONE ENCOUNTER
Spoke with patient to schedule routine follow up. Patient prefers [facility] office. Patient agreeable to 12/17 at 2 PM.

## 2024-12-12 PROBLEM — L97.519 ULCER OF RIGHT FOOT (HCC): Status: ACTIVE | Noted: 2024-12-12

## 2024-12-13 DIAGNOSIS — F41.9 ANXIETY: ICD-10-CM

## 2024-12-13 RX ORDER — BUSPIRONE HYDROCHLORIDE 15 MG/1
7.5 TABLET ORAL 2 TIMES DAILY
Qty: 90 TABLET | Refills: 0 | Status: SHIPPED | OUTPATIENT
Start: 2024-12-13

## 2025-01-02 DIAGNOSIS — I10 PRIMARY HYPERTENSION: ICD-10-CM

## 2025-01-03 DIAGNOSIS — T84.53XD INFECTION ASSOCIATED WITH INTERNAL RIGHT KNEE PROSTHESIS, SUBSEQUENT ENCOUNTER: ICD-10-CM

## 2025-01-03 RX ORDER — CEFADROXIL 500 MG/1
500 CAPSULE ORAL EVERY 12 HOURS SCHEDULED
Qty: 60 CAPSULE | Refills: 0 | Status: SHIPPED | OUTPATIENT
Start: 2025-01-03 | End: 2025-02-02

## 2025-01-03 RX ORDER — METOPROLOL SUCCINATE 100 MG/1
100 TABLET, EXTENDED RELEASE ORAL DAILY
Qty: 30 TABLET | Refills: 0 | Status: SHIPPED | OUTPATIENT
Start: 2025-01-03

## 2025-01-03 NOTE — TELEPHONE ENCOUNTER
Patient needs an appointment. Please contact the patient to schedule an appointment. Last office visit: 2/20/2024

## 2025-02-03 ENCOUNTER — APPOINTMENT (OUTPATIENT)
Age: 50
End: 2025-02-03
Payer: COMMERCIAL

## 2025-02-03 DIAGNOSIS — L40.50 PSORIATIC ARTHRITIS (HCC): ICD-10-CM

## 2025-02-03 DIAGNOSIS — T84.53XD INFECTION ASSOCIATED WITH INTERNAL RIGHT KNEE PROSTHESIS, SUBSEQUENT ENCOUNTER: ICD-10-CM

## 2025-02-03 LAB
BASOPHILS # BLD AUTO: 0.04 THOUSANDS/ΜL (ref 0–0.1)
BASOPHILS NFR BLD AUTO: 1 % (ref 0–1)
EOSINOPHIL # BLD AUTO: 0.44 THOUSAND/ΜL (ref 0–0.61)
EOSINOPHIL NFR BLD AUTO: 9 % (ref 0–6)
ERYTHROCYTE [DISTWIDTH] IN BLOOD BY AUTOMATED COUNT: 13.6 % (ref 11.6–15.1)
ERYTHROCYTE [SEDIMENTATION RATE] IN BLOOD: 5 MM/HOUR (ref 0–19)
HCT VFR BLD AUTO: 43.6 % (ref 36.5–49.3)
HGB BLD-MCNC: 14.9 G/DL (ref 12–17)
IMM GRANULOCYTES # BLD AUTO: 0.02 THOUSAND/UL (ref 0–0.2)
IMM GRANULOCYTES NFR BLD AUTO: 0 % (ref 0–2)
LYMPHOCYTES # BLD AUTO: 0.75 THOUSANDS/ΜL (ref 0.6–4.47)
LYMPHOCYTES NFR BLD AUTO: 15 % (ref 14–44)
MCH RBC QN AUTO: 32.8 PG (ref 26.8–34.3)
MCHC RBC AUTO-ENTMCNC: 34.2 G/DL (ref 31.4–37.4)
MCV RBC AUTO: 96 FL (ref 82–98)
MONOCYTES # BLD AUTO: 0.38 THOUSAND/ΜL (ref 0.17–1.22)
MONOCYTES NFR BLD AUTO: 8 % (ref 4–12)
NEUTROPHILS # BLD AUTO: 3.27 THOUSANDS/ΜL (ref 1.85–7.62)
NEUTS SEG NFR BLD AUTO: 67 % (ref 43–75)
NRBC BLD AUTO-RTO: 0 /100 WBCS
PLATELET # BLD AUTO: 357 THOUSANDS/UL (ref 149–390)
PMV BLD AUTO: 10 FL (ref 8.9–12.7)
RBC # BLD AUTO: 4.54 MILLION/UL (ref 3.88–5.62)
WBC # BLD AUTO: 4.9 THOUSAND/UL (ref 4.31–10.16)

## 2025-02-03 PROCEDURE — 80053 COMPREHEN METABOLIC PANEL: CPT

## 2025-02-03 PROCEDURE — 85025 COMPLETE CBC W/AUTO DIFF WBC: CPT

## 2025-02-03 PROCEDURE — 86140 C-REACTIVE PROTEIN: CPT

## 2025-02-03 PROCEDURE — 36415 COLL VENOUS BLD VENIPUNCTURE: CPT

## 2025-02-03 PROCEDURE — 85652 RBC SED RATE AUTOMATED: CPT

## 2025-02-04 LAB
ALBUMIN SERPL BCG-MCNC: 4.6 G/DL (ref 3.5–5)
ALP SERPL-CCNC: 84 U/L (ref 34–104)
ALT SERPL W P-5'-P-CCNC: 53 U/L (ref 7–52)
ANION GAP SERPL CALCULATED.3IONS-SCNC: 7 MMOL/L (ref 4–13)
AST SERPL W P-5'-P-CCNC: 44 U/L (ref 13–39)
BILIRUB SERPL-MCNC: 0.49 MG/DL (ref 0.2–1)
BUN SERPL-MCNC: 20 MG/DL (ref 5–25)
CALCIUM SERPL-MCNC: 9.8 MG/DL (ref 8.4–10.2)
CHLORIDE SERPL-SCNC: 100 MMOL/L (ref 96–108)
CO2 SERPL-SCNC: 30 MMOL/L (ref 21–32)
CREAT SERPL-MCNC: 1.13 MG/DL (ref 0.6–1.3)
CRP SERPL QL: 7.9 MG/L
GFR SERPL CREATININE-BSD FRML MDRD: 74 ML/MIN/1.73SQ M
GLUCOSE SERPL-MCNC: 126 MG/DL (ref 65–140)
POTASSIUM SERPL-SCNC: 4 MMOL/L (ref 3.5–5.3)
PROT SERPL-MCNC: 7 G/DL (ref 6.4–8.4)
SODIUM SERPL-SCNC: 137 MMOL/L (ref 135–147)

## 2025-02-11 ENCOUNTER — OFFICE VISIT (OUTPATIENT)
Dept: INFECTIOUS DISEASES | Facility: CLINIC | Age: 50
End: 2025-02-11
Payer: COMMERCIAL

## 2025-02-11 VITALS
DIASTOLIC BLOOD PRESSURE: 82 MMHG | HEIGHT: 71 IN | TEMPERATURE: 97.7 F | WEIGHT: 204.6 LBS | BODY MASS INDEX: 28.64 KG/M2 | HEART RATE: 71 BPM | SYSTOLIC BLOOD PRESSURE: 135 MMHG | OXYGEN SATURATION: 99 %

## 2025-02-11 DIAGNOSIS — R74.01 TRANSAMINITIS: ICD-10-CM

## 2025-02-11 DIAGNOSIS — M06.9 RHEUMATOID ARTHRITIS, INVOLVING UNSPECIFIED SITE, UNSPECIFIED WHETHER RHEUMATOID FACTOR PRESENT (HCC): ICD-10-CM

## 2025-02-11 DIAGNOSIS — L40.0 PLAQUE PSORIASIS: ICD-10-CM

## 2025-02-11 DIAGNOSIS — T84.53XS INFECTION ASSOCIATED WITH INTERNAL RIGHT KNEE PROSTHESIS, SEQUELA: Primary | ICD-10-CM

## 2025-02-11 PROCEDURE — 99214 OFFICE O/P EST MOD 30 MIN: CPT | Performed by: NURSE PRACTITIONER

## 2025-02-11 RX ORDER — CEFADROXIL 500 MG/1
500 CAPSULE ORAL EVERY 12 HOURS SCHEDULED
Qty: 60 CAPSULE | Refills: 5 | Status: SHIPPED | OUTPATIENT
Start: 2025-02-11 | End: 2025-08-10

## 2025-02-11 RX ORDER — CEFADROXIL 500 MG/1
500 CAPSULE ORAL EVERY 12 HOURS SCHEDULED
COMMUNITY

## 2025-02-11 NOTE — PROGRESS NOTES
Name: João Brown      : 1975      MRN: [id number]  Encounter Provider: DAVID Golden  Encounter Date: 2025   [facility]  :  Assessment & Plan  Infection associated with internal right knee prosthesis, sequela  Initial R TKA in  with a revision in 2016. After his revision he had a fall and tore his quadricepts which required surgical repair. Patient developed worsening pain and limited ROM. He underwent outpatient aspiration and synovasure grew MSSA. Patient then completed R total knee arthroplasty, I&D down to and including bone, and insertion of biodegradable drug delivery device abx spacer (stimulan) on 23.  All hardware and previous sutures from quadriceps repair removed. He completed 6 weeks of post-op Cefazolin treatment. On 10/18/2023 he underwent part 2 of his revision. Operative cultures again grew MSSA. He completed additional 6 week course of IV cefazolin and then transitioned to oral Duricef for suppression. He's been tolerating the Duricef without difficulty. I reviewed his most recent lab work from 2/3/2025 which showed stable normal WBC = 4.9 and stable creatinine = 1.13. Patient with baseline eosinophil but has trended up to 9. Recommend he complete repeat CBCD within the week to reevaluate. I will continue patient on the Cefodroxil, 500mg BID, as planned for lifelong suppression.   -continue long term suppression with oral Duricef, 500mg BID  -repeat CBCD later this week  -complete CBCD and creatinine prior to next outpatient ID office follow up  -prescription for Duricef sent to [facility] in [place], will attempt 2 90-day supplies   -continue outpatient orthopedic surgery follow up  -patient to return to the outpatient infectious disease office in 6 months for follow up  Rheumatoid arthritis, involving unspecified site, unspecified whether rheumatoid factor present (HCC)  Patient on  chronic methotrexate. This is risk factor for complications with healing and infection.   -continue outpatient follow up with rheumatology  Plaque psoriasis  Patient on chronic methotrexate. This is risk factor for complications with healing and infection.   -continue outpatient follow up with rheumatology   Transaminitis  AST and ALT noted to have elevation on most recent CMP from 2/3/2025. Tbili normal. Patient without acute RUQ symptoms, changes to his eating, or new bowel habits. Will have patient complete repeat CMP within the week. If still elevated will recommend RUQ US.  -complete repeat CMP later this week     Above plan was discussed in detail with patient in the exam room.    Antibiotics:  Duricef    Subjective:  Patient presents to the outpatient infectious disease office for follow up for ongoing suppressive antibiotic treatment after R knee PJI. Since his last visit he has continued on oral Duricef. He's been tolerating the antibiotic treatment without difficulty. Patient reports his R knee has been doing well. Has noticed some mild pain in his R anterior thigh as well as lower in the leg and in his toes. His father had gout but he's not noticed any changes to his joints. He has not noticed any new psoriasis plaques. R knee has had no new redness, pain, drainage, or open wound. Today he has no fever, chills, sweats, shakes; no nausea, vomiting, abdominal pain, diarrhea, or dysuria; no cough, shortness of breath, or chest pain. No new symptoms.    Objective:  Vitals:  Temp 97.7  HR 71  O2 saturation 99% on room air  /82    Physical Exam:   General Appearance:  Alert, interactive, nontoxic, no acute distress. He appears comfortable sitting in the exam room.   Lungs:   Clear to auscultation bilaterally; no wheezes, rhonchi or rales; respirations unlabored on room air.   Heart:  RRR; no murmur, rub or gallop.   Extremities: R knee without edema or erythema, non tender to palpation, no warmth to  touch, no open wounds or rash to the skin.   Skin: No new rashes noted on exposed skin.     Labs, Imaging, & Other studies:   All pertinent labs and imaging studies were personally reviewed by me including CBCD and creatinine collected on 2/3/2025.

## 2025-02-11 NOTE — ASSESSMENT & PLAN NOTE
Patient on chronic methotrexate. This is risk factor for complications with healing and infection.   -continue outpatient follow up with rheumatology

## 2025-02-11 NOTE — PATIENT INSTRUCTIONS
Continue oral Duricef, 500mg every 12 hours, for indefinite suppression.    Refills sent to [facility] in [place].    Complete lab work (CBCD and creatinine) prior to next outpatient infectious disease office visit.    Return to the outpatient infectious disease office for follow up in 6 months.

## 2025-02-11 NOTE — ASSESSMENT & PLAN NOTE
Initial R TKA in 1998 with a revision in 2016. After his revision he had a fall and tore his quadricepts which required surgical repair. Patient developed worsening pain and limited ROM. He underwent outpatient aspiration and synovasure grew MSSA. Patient then completed R total knee arthroplasty, I&D down to and including bone, and insertion of biodegradable drug delivery device abx spacer (stimulan) on 7/28/23.  All hardware and previous sutures from quadriceps repair removed. He completed 6 weeks of post-op Cefazolin treatment. On 10/18/2023 he underwent part 2 of his revision. Operative cultures again grew MSSA. He completed additional 6 week course of IV cefazolin and then transitioned to oral Duricef for suppression. He's been tolerating the Duricef without difficulty. I reviewed his most recent lab work from 2/3/2025 which showed stable normal WBC = 4.9 and stable creatinine = 1.13. Patient with baseline eosinophil but has trended up to 9. Recommend he complete repeat CBCD within the week to reevaluate. I will continue patient on the Cefodroxil, 500mg BID, as planned for lifelong suppression.   -continue long term suppression with oral Duricef, 500mg BID  -repeat CBCD later this week  -complete CBCD and creatinine prior to next outpatient ID office follow up  -prescription for Duricef sent to [facility] in [place], will attempt 2 90-day supplies   -continue outpatient orthopedic surgery follow up  -patient to return to the outpatient infectious disease office in 6 months for follow up

## 2025-02-13 ENCOUNTER — TELEPHONE (OUTPATIENT)
Dept: RHEUMATOLOGY | Facility: CLINIC | Age: 50
End: 2025-02-13

## 2025-02-13 ENCOUNTER — TELEMEDICINE (OUTPATIENT)
Dept: RHEUMATOLOGY | Facility: CLINIC | Age: 50
End: 2025-02-13
Payer: COMMERCIAL

## 2025-02-13 DIAGNOSIS — L40.0 PLAQUE PSORIASIS: ICD-10-CM

## 2025-02-13 DIAGNOSIS — Z96.651 STATUS POST REVISION OF TOTAL KNEE REPLACEMENT, RIGHT: ICD-10-CM

## 2025-02-13 DIAGNOSIS — Z79.899 HIGH RISK MEDICATION USE: ICD-10-CM

## 2025-02-13 DIAGNOSIS — L40.50 PSORIATIC ARTHRITIS (HCC): Primary | ICD-10-CM

## 2025-02-13 PROCEDURE — 99214 OFFICE O/P EST MOD 30 MIN: CPT | Performed by: INTERNAL MEDICINE

## 2025-02-13 RX ORDER — APREMILAST 30 MG/1
30 TABLET, FILM COATED ORAL 2 TIMES DAILY
Qty: 60 TABLET | Refills: 6 | Status: SHIPPED | OUTPATIENT
Start: 2025-02-13

## 2025-02-13 RX ORDER — FOLIC ACID 1 MG/1
1 TABLET ORAL DAILY
Qty: 90 TABLET | Refills: 1 | Status: SHIPPED | OUTPATIENT
Start: 2025-02-13

## 2025-02-13 RX ORDER — METHOTREXATE 2.5 MG/1
TABLET ORAL
Qty: 90 TABLET | Refills: 1 | Status: SHIPPED | OUTPATIENT
Start: 2025-02-13

## 2025-02-13 NOTE — PROGRESS NOTES
Virtual Regular Visit  Name: João Brown      : 1975      MRN: [id number]  Encounter Provider: Shannan Costa MD  Encounter Date: 2025   Encounter department: [facility] [place]      Verification of patient location:  Patient is located at Home in the following state in which I hold an active license [place] :  Assessment & Plan  Psoriatic arthritis (HCC)  48yo male with psoriatic arthritis, well-controlled on methotrexate and Otezla. LFTs slightly worsening. No recurrence of septic arthritis infection of prosthetic joint. Patient's rheumatologic disease(s) threaten long-term function if not appropriately managed.    Orders:    Apremilast (Otezla) 30 MG TABS; Take 1 tablet by mouth 2 (two) times a day    methotrexate 2.5 MG tablet; 6 tablet po weekly (take all 6 tablets on the same day every week)    CBC and differential; Standing    Comprehensive metabolic panel; Standing    C-reactive protein; Standing    Sedimentation rate, automated; Standing    Plaque psoriasis  controlled  Orders:    Apremilast (Otezla) 30 MG TABS; Take 1 tablet by mouth 2 (two) times a day    methotrexate 2.5 MG tablet; 6 tablet po weekly (take all 6 tablets on the same day every week)    Status post revision of total knee replacement, right    Orders:    folic acid (FOLVITE) 1 mg tablet; Take 1 tablet (1 mg total) by mouth daily    High risk medication use  Benefits and risks of methotrexate use, including but not limited to gastrointestinal disturbances such as diarrhea, hair loss, fatigue, stomatitis, leukopenia, lung hypersensitivity reaction, hepatotoxicity, and lymphoma were discussed with the patient. Baseline viral hepatitis panel was ordered. CBC,CMP will be regularly monitored. Patient was prescribed Folic Acid 1 mg po daily to take while on methotrexate to help prevent side effects. Patient counseled on abstinence from alcohol while using methotrexate.     Orders:    CBC and differential;  Standing    Comprehensive metabolic panel; Standing    Do labs every 3 months  Continue methotrexate 6 tabs once a week  Continue folic acid daily  Continue Otezla twice a day  Limit alcohol intake  Decrease ibuprofen intake  Use diclofenac gel as needed for joint pain  Continue clobetasol cream as needed for psoriasis  Do hand and feet x-rays     Return to clinic 8/13/25 at 2pm        Encounter provider Sahnnan Costa MD    The patient was identified by name and date of birth. João Brown was informed that this is a telemedicine visit and that the visit is being conducted through the Epic Embedded platform. He agrees to proceed..  My office door was closed. No one else was in the room.  He acknowledged consent and understanding of privacy and security of the video platform. The patient has agreed to participate and understands they can discontinue the visit at any time.    Patient is aware this is a billable service.     History of Present Illness     HPI  48yo male presents for follow-up of his psoriatic arthritis, much improved since starting methotrexate and Otezla. Complains of discomfort in his knees, toes, and knuckles. Has a patch of psoriasis behind his left leg. Has been taking ibuprofen 600mg daily. Is happy with current regimen.     Review of Systems   Musculoskeletal:  Positive for arthralgias. Negative for joint swelling.   Skin:  Negative for rash.       Objective   There were no vitals taken for this visit.    Physical Exam  Constitutional:       General: He is not in acute distress.  HENT:      Head: Normocephalic and atraumatic.   Eyes:      Conjunctiva/sclera: Conjunctivae normal.   Pulmonary:      Effort: Pulmonary effort is normal. No respiratory distress.   Musculoskeletal:      Cervical back: Neck supple.   Skin:     Coloration: Skin is not pale.   Neurological:      Mental Status: He is alert. Mental status is at baseline.   Psychiatric:         Mood and Affect: Mood normal.          Behavior: Behavior normal.         Visit Time  Total Visit Duration: 21 minutes

## 2025-02-13 NOTE — PATIENT INSTRUCTIONS
Do labs every 3 months  Continue methotrexate 6 tabs once a week  Continue folic acid daily  Continue Otezla twice a day  Limit alcohol intake  Decrease ibuprofen intake  Use diclofenac gel as needed for joint pain  Continue clobetasol cream as needed for psoriasis  Do hand and feet x-rays     Return to clinic 8/13/25 at 2pm

## 2025-02-24 PROBLEM — L40.50 PSORIATIC ARTHRITIS (HCC): Status: ACTIVE | Noted: 2025-02-24

## 2025-02-24 PROBLEM — Z79.899 HIGH RISK MEDICATION USE: Status: ACTIVE | Noted: 2025-02-24

## 2025-02-24 NOTE — ASSESSMENT & PLAN NOTE
50yo male with psoriatic arthritis, well-controlled on methotrexate and Otezla. LFTs slightly worsening. No recurrence of septic arthritis infection of prosthetic joint. Patient's rheumatologic disease(s) threaten long-term function if not appropriately managed.    Orders:    Apremilast (Otezla) 30 MG TABS; Take 1 tablet by mouth 2 (two) times a day    methotrexate 2.5 MG tablet; 6 tablet po weekly (take all 6 tablets on the same day every week)    CBC and differential; Standing    Comprehensive metabolic panel; Standing    C-reactive protein; Standing    Sedimentation rate, automated; Standing

## 2025-02-24 NOTE — ASSESSMENT & PLAN NOTE
Benefits and risks of methotrexate use, including but not limited to gastrointestinal disturbances such as diarrhea, hair loss, fatigue, stomatitis, leukopenia, lung hypersensitivity reaction, hepatotoxicity, and lymphoma were discussed with the patient. Baseline viral hepatitis panel was ordered. CBC,CMP will be regularly monitored. Patient was prescribed Folic Acid 1 mg po daily to take while on methotrexate to help prevent side effects. Patient counseled on abstinence from alcohol while using methotrexate.     Orders:    CBC and differential; Standing    Comprehensive metabolic panel; Standing

## 2025-02-24 NOTE — ASSESSMENT & PLAN NOTE
controlled  Orders:    Apremilast (Otezla) 30 MG TABS; Take 1 tablet by mouth 2 (two) times a day    methotrexate 2.5 MG tablet; 6 tablet po weekly (take all 6 tablets on the same day every week)

## 2025-03-03 DIAGNOSIS — F32.5 MAJOR DEPRESSIVE DISORDER IN FULL REMISSION, UNSPECIFIED WHETHER RECURRENT (HCC): ICD-10-CM

## 2025-03-04 RX ORDER — NORTRIPTYLINE HYDROCHLORIDE 50 MG/1
50 CAPSULE ORAL 2 TIMES DAILY
Qty: 60 CAPSULE | Refills: 0 | Status: SHIPPED | OUTPATIENT
Start: 2025-03-04

## 2025-03-04 RX ORDER — VENLAFAXINE HYDROCHLORIDE 150 MG/1
150 CAPSULE, EXTENDED RELEASE ORAL
Qty: 30 CAPSULE | Refills: 0 | Status: SHIPPED | OUTPATIENT
Start: 2025-03-04

## 2025-03-04 NOTE — TELEPHONE ENCOUNTER
Patient is overdue for physical and blood work.  Blood work has been ordered since May 2024.  Will refill for 1 month.  Please have patient schedule physical and obtain labs.  Thank you

## 2025-03-13 DIAGNOSIS — F41.9 ANXIETY: ICD-10-CM

## 2025-03-14 RX ORDER — BUSPIRONE HYDROCHLORIDE 15 MG/1
7.5 TABLET ORAL 2 TIMES DAILY
Qty: 90 TABLET | Refills: 0 | Status: SHIPPED | OUTPATIENT
Start: 2025-03-14

## 2025-04-02 ENCOUNTER — OFFICE VISIT (OUTPATIENT)
Dept: FAMILY MEDICINE CLINIC | Facility: CLINIC | Age: 50
End: 2025-04-02
Payer: COMMERCIAL

## 2025-04-02 VITALS
OXYGEN SATURATION: 97 % | SYSTOLIC BLOOD PRESSURE: 136 MMHG | WEIGHT: 201.8 LBS | HEART RATE: 74 BPM | TEMPERATURE: 97.6 F | DIASTOLIC BLOOD PRESSURE: 88 MMHG | BODY MASS INDEX: 28.25 KG/M2 | HEIGHT: 71 IN

## 2025-04-02 DIAGNOSIS — I10 PRIMARY HYPERTENSION: ICD-10-CM

## 2025-04-02 DIAGNOSIS — Z12.11 SCREENING FOR COLON CANCER: ICD-10-CM

## 2025-04-02 DIAGNOSIS — E78.2 MODERATE MIXED HYPERLIPIDEMIA NOT REQUIRING STATIN THERAPY: ICD-10-CM

## 2025-04-02 DIAGNOSIS — F32.5 MAJOR DEPRESSIVE DISORDER IN FULL REMISSION, UNSPECIFIED WHETHER RECURRENT (HCC): ICD-10-CM

## 2025-04-02 DIAGNOSIS — F41.9 ANXIETY: ICD-10-CM

## 2025-04-02 DIAGNOSIS — Z12.5 SCREENING FOR PROSTATE CANCER: ICD-10-CM

## 2025-04-02 DIAGNOSIS — R73.03 PREDIABETES: ICD-10-CM

## 2025-04-02 DIAGNOSIS — Z00.00 ANNUAL PHYSICAL EXAM: Primary | ICD-10-CM

## 2025-04-02 PROCEDURE — 99396 PREV VISIT EST AGE 40-64: CPT | Performed by: NURSE PRACTITIONER

## 2025-04-02 RX ORDER — VENLAFAXINE HYDROCHLORIDE 225 MG/1
225 TABLET, EXTENDED RELEASE ORAL
Qty: 90 TABLET | Refills: 1 | Status: SHIPPED | OUTPATIENT
Start: 2025-04-02

## 2025-04-02 NOTE — PATIENT INSTRUCTIONS
"Patient Education     Routine physical for adults   The Basics   Written by the doctors and editors at [facility]   What is a physical? -- A physical is a routine visit, or \"check-up,\" with your doctor. You might also hear it called a \"wellness visit\" or \"preventive visit.\"  During each visit, the doctor will:   Ask about your physical and mental health   Ask about your habits, behaviors, and lifestyle   Do an exam   Give you vaccines if needed   Talk to you about any medicines you take   Give advice about your health   Answer your questions  Getting regular check-ups is an important part of taking care of your health. It can help your doctor find and treat any problems you have. But it's also important for preventing health problems.  A routine physical is different from a \"sick visit.\" A sick visit is when you see a doctor because of a health concern or problem. Since physicals are scheduled ahead of time, you can think about what you want to ask the doctor.  How often should I get a physical? -- It depends on your age and health. In general, for people age 21 years and older:   If you are younger than 50 years, you might be able to get a physical every 3 years.   If you are 50 years or older, your doctor might recommend a physical every year.  If you have an ongoing health condition, like diabetes or high blood pressure, your doctor will probably want to see you more often.  What happens during a physical? -- In general, each visit will include:   Physical exam - The doctor or nurse will check your height, weight, heart rate, and blood pressure. They will also look at your eyes and ears. They will ask about how you are feeling and whether you have any symptoms that bother you.   Medicines - It's a good idea to bring a list of all the medicines you take to each doctor visit. Your doctor will talk to you about your medicines and answer any questions. Tell them if you are having any side effects that bother you. You " "should also tell them if you are having trouble paying for any of your medicines.   Habits and behaviors - This includes:   Your diet   Your exercise habits   Whether you smoke, drink alcohol, or use drugs   Whether you are sexually active   Whether you feel safe at home  Your doctor will talk to you about things you can do to improve your health and lower your risk of health problems. They will also offer help and support. For example, if you want to quit smoking, they can give you advice and might prescribe medicines. If you want to improve your diet or get more physical activity, they can help you with this, too.   Lab tests, if needed - The tests you get will depend on your age and situation. For example, your doctor might want to check your:   Cholesterol   Blood sugar   Iron level   Vaccines - The recommended vaccines will depend on your age, health, and what vaccines you already had. Vaccines are very important because they can prevent certain serious or deadly infections.   Discussion of screening - \"Screening\" means checking for diseases or other health problems before they cause symptoms. Your doctor can recommend screening based on your age, risk, and preferences. This might include tests to check for:   Cancer, such as breast, prostate, cervical, ovarian, colorectal, prostate, lung, or skin cancer   Sexually transmitted infections, such as chlamydia and gonorrhea   Mental health conditions like depression and anxiety  Your doctor will talk to you about the different types of screening tests. They can help you decide which screenings to have. They can also explain what the results might mean.   Answering questions - The physical is a good time to ask the doctor or nurse questions about your health. If needed, they can refer you to other doctors or specialists, too.  Adults older than 65 years often need other care, too. As you get older, your doctor will talk to you about:   How to prevent falling at " home   Hearing or vision tests   Memory testing   How to take your medicines safely   Making sure that you have the help and support you need at home  All topics are updated as new evidence becomes available and our peer review process is complete.  This topic retrieved from [employer] on: May 02, 2024.  Topic 140202 Version 1.0  Release: 32.4.3 - C32.122  © 2024 UpToDate, Inc. and/or its affiliates. All rights reserved.  Consumer Information Use and Disclaimer   Disclaimer: This generalized information is a limited summary of diagnosis, treatment, and/or medication information. It is not meant to be comprehensive and should be used as a tool to help the user understand and/or assess potential diagnostic and treatment options. It does NOT include all information about conditions, treatments, medications, side effects, or risks that may apply to a specific patient. It is not intended to be medical advice or a substitute for the medical advice, diagnosis, or treatment of a health care provider based on the health care provider's examination and assessment of a patient's specific and unique circumstances. Patients must speak with a health care provider for complete information about their health, medical questions, and treatment options, including any risks or benefits regarding use of medications. This information does not endorse any treatments or medications as safe, effective, or approved for treating a specific patient. UpToDate, Inc. and its affiliates disclaim any warranty or liability relating to this information or the use thereof.The use of this information is governed by the Terms of Use, available at https://www.woltersCureTechuwer.com/en/know/clinical-effectiveness-terms. 2024© UpToDate, Inc. and its affiliates and/or licensors. All rights reserved.  Copyright   © 2024 UpToDate, Inc. and/or its affiliates. All rights reserved.

## 2025-04-02 NOTE — ASSESSMENT & PLAN NOTE
Initial blood pressure elevated, better controlled upon recheck.  Patient does admit to drinking Mountain Dew prior to visit.  Encourage patient to monitor blood pressures at home.  To return for readings greater than then 140/90.  Discussed importance of heart healthy diet, limiting caffeine and sodium intake.  Encouraged increased hydration, physical activity as tolerated.  To continue on metoprolol as prescribed.  To obtain blood work as ordered.  To follow-up in 3 to 4 months.    Orders:    TSH, 3rd generation with Free T4 reflex; Future    Hemoglobin A1C; Future    Comprehensive metabolic panel; Future    CBC and differential; Future    Lipid panel; Future    Albumin / creatinine urine ratio; Future

## 2025-04-02 NOTE — ASSESSMENT & PLAN NOTE
Denies SI/HI, auditory/visual sedations.  Feels he has had increase in anxiety.  Patient declines behavioral therapy when offered at this time.  To continue on buspirone, nortriptyline.  Will increase venlafaxine from 150 mg to 225 mg.  Advised to follow-up in 3 to 4 months for reevaluation.    Orders:    venlafaxine 225 MG TB24 24 hr tablet; Take 1 tablet (225 mg total) by mouth daily at bedtime

## 2025-04-02 NOTE — ASSESSMENT & PLAN NOTE
Orders:    TSH, 3rd generation with Free T4 reflex; Future    Hemoglobin A1C; Future    Comprehensive metabolic panel; Future

## 2025-04-02 NOTE — PROGRESS NOTES
Adult Annual Physical  Name: João Brown      : 1975      MRN: [id number]  Encounter Provider: DAVID Bowden  Encounter Date: 2025   Encounter department: [facility] [address] [place] [address]    Assessment & Plan  Annual physical exam         Primary hypertension  Initial blood pressure elevated, better controlled upon recheck.  Patient does admit to drinking Mountain Dew prior to visit.  Encourage patient to monitor blood pressures at home.  To return for readings greater than then 140/90.  Discussed importance of heart healthy diet, limiting caffeine and sodium intake.  Encouraged increased hydration, physical activity as tolerated.  To continue on metoprolol as prescribed.  To obtain blood work as ordered.  To follow-up in 3 to 4 months.    Orders:    TSH, 3rd generation with Free T4 reflex; Future    Hemoglobin A1C; Future    Comprehensive metabolic panel; Future    CBC and differential; Future    Lipid panel; Future    Albumin / creatinine urine ratio; Future    Prediabetes    Orders:    TSH, 3rd generation with Free T4 reflex; Future    Hemoglobin A1C; Future    Comprehensive metabolic panel; Future    Moderate mixed hyperlipidemia not requiring statin therapy    Orders:    Comprehensive metabolic panel; Future    Lipid panel; Future    Major depressive disorder in full remission, unspecified whether recurrent (HCC)  Depression Screening Follow-up Plan: Patient's depression screening was positive with a PHQ-9 score of 5. Patient with underlying depression and was advised to continue current medications as prescribed. Patient decline BHT at this time.    Patient denies SI/HI.  To continue on nortriptyline, buspirone twice daily.  With increase in anxiety, will increase venlafaxine from 150 to 225 mg.  Encouraged self-care.  To follow-up in 3 to 4 months.    Orders:    venlafaxine 225 MG TB24 24 hr tablet; Take 1 tablet (225 mg total) by mouth daily at  bedtime    Anxiety  Denies SI/HI, auditory/visual sedations.  Feels he has had increase in anxiety.  Patient declines behavioral therapy when offered at this time.  To continue on buspirone, nortriptyline.  Will increase venlafaxine from 150 mg to 225 mg.  Advised to follow-up in 3 to 4 months for reevaluation.    Orders:    venlafaxine 225 MG TB24 24 hr tablet; Take 1 tablet (225 mg total) by mouth daily at bedtime    Screening for colon cancer    Orders:    Ambulatory Referral to Gastroenterology; Future    Screening for prostate cancer    Orders:    PSA, Total Screen; Future      Preventive Screenings:  - Diabetes Screening: orders placed  - Cholesterol Screening: orders placed and has hyperlipidemia   - Hepatitis C screening: screening up-to-date   - HIV screening: screening up-to-date   - Colon cancer screening: orders placed   - Lung cancer screening: screening not indicated   - Prostate cancer screening: orders placed     Immunizations:  - Immunizations due: Influenza, Prevnar 20 and Zoster (Shingrix)    Counseling/Anticipatory Guidance:    - Diet: discussed recommendations for a healthy/well-balanced diet.   - Exercise: the importance of regular exercise/physical activity was discussed. Recommend exercise 3-5 times per week for at least 30 minutes.       Depression Screening and Follow-up Plan: Patient's depression screening was positive with a PHQ-9 score of 5.   Patient with underlying depression and was advised to continue current medications as prescribed. Patient declines BHT at this time    Tobacco Cessation Counseling: Tobacco cessation counseling was provided. The patient is sincerely urged to quit consumption of tobacco. He is ready to quit tobacco.         History of Present Illness     Adult Annual Physical:  Patient presents for annual physical.     Diet and Physical Activity:  - Diet/Nutrition: no special diet.  - Exercise: no formal exercise.    Depression Screening:    - PHQ-9 Score:  5    General Health:  - Sleep: 4-6 hours of sleep on average.  - Hearing: normal hearing bilateral ears.  - Vision: no vision problems.  - Dental: brushes teeth once daily.     Health:  - History of STDs: no.   - Urinary symptoms: none.     Advanced Care Planning:  - Has an advanced directive?: no      Review of Systems   Constitutional:  Negative for activity change, appetite change, fatigue and unexpected weight change.   HENT:  Negative for ear pain and sore throat.    Eyes:  Negative for photophobia and visual disturbance.   Respiratory:  Negative for cough, chest tightness and shortness of breath.    Cardiovascular:  Negative for chest pain, palpitations and leg swelling.   Gastrointestinal:  Negative for abdominal pain, blood in stool, constipation, diarrhea and vomiting.   Endocrine: Negative for polydipsia, polyphagia and polyuria.   Genitourinary:  Negative for decreased urine volume, dysuria, flank pain, hematuria, testicular pain and urgency.   Musculoskeletal:  Negative for arthralgias, back pain and myalgias.   Skin:  Negative for color change and rash.   Neurological:  Negative for dizziness, syncope, weakness, light-headedness, numbness and headaches.   Hematological:  Negative for adenopathy. Does not bruise/bleed easily.   Psychiatric/Behavioral:  Negative for agitation, dysphoric mood and sleep disturbance. The patient is not nervous/anxious.      Pertinent Medical History           Medical History Reviewed by provider this encounter:  Tobacco  Allergies  Meds  Problems  Med Hx  Surg Hx  Fam Hx  Soc   Hx    .  Past Medical History   Past Medical History:   Diagnosis Date    Anxiety     Arthritis     Crutches as ambulation aid 07/25/2023    Depression     Difficult intubation 07/28/2023    Hypertension     Limited jaw ROM     Migraine     PONV (postoperative nausea and vomiting) 07/28/2023    Psoriasis     Psoriatic arthritis (HCC)     RA (rheumatoid arthritis) ([facility])     Walker as  ambulation aid      Past Surgical History:   Procedure Laterality Date    IR PICC PLACEMENT SINGLE LUMEN  10/23/2023    JOINT REPLACEMENT      NH REVJ TOT KNEE ARTHRP FEM&ENTIRE TIBIAL COMPONE Right 07/28/2023    Procedure: ARTHROPLASTY KNEE TOTAL REVISION;  Surgeon: Kirk Long DO;  Location: [facility];  Service: Orthopedics    NH REVJ TOT KNEE ARTHRP FEM&ENTIRE TIBIAL COMPONE Right 10/18/2023    Procedure: ARTHROPLASTY KNEE TOTAL REVISION;  Surgeon: Kirk Long DO;  Location: [facility];  Service: Orthopedics    REPLACEMENT TOTAL KNEE Right     x2     Family History   Problem Relation Age of Onset    Breast cancer Mother     Cancer Mother     Completed Suicide  Father     Gout Father     Early death Father     Asthma Sister       reports that he quit smoking about 24 years ago. His smoking use included cigarettes. He started smoking about 24 years ago. He has a 10 pack-year smoking history. He has never been exposed to tobacco smoke. His smokeless tobacco use includes chew. He reports current alcohol use of about 3.0 standard drinks of alcohol per week. He reports that he does not use drugs.  Current Outpatient Medications   Medication Instructions    Apremilast (Otezla) 30 MG TABS 1 tablet, Oral, 2 times daily    ascorbic acid (VITAMIN C) 500 mg, Oral, 2 times daily    busPIRone (BUSPAR) 7.5 mg, Oral, 2 times daily    cefadroxil (DURICEF) 500 mg, Oral, Every 12 hours scheduled    cholecalciferol (VITAMIN D3) 2,000 Units, Oral, Daily    clobetasol (TEMOVATE) 0.05 % cream Topical, 2 times daily    ferrous sulfate 324 mg, Oral, 2 times daily before meals    fluticasone (FLONASE) 50 mcg/act nasal spray 1 spray, Daily    folic acid (FOLVITE) 1 mg, Oral, Daily    methotrexate 2.5 MG tablet 6 tablet po weekly (take all 6 tablets on the same day every week)    metoprolol succinate (TOPROL-XL) 100 mg, Oral, Daily    Multiple Vitamins-Minerals (multivitamin with minerals) tablet 1 tablet, Oral, Daily     nortriptyline (PAMELOR) 50 mg, Oral, 2 times daily    venlafaxine 225 mg, Oral, Daily at bedtime   No Known Allergies   Current Outpatient Medications on File Prior to Visit   Medication Sig Dispense Refill    Apremilast (Otezla) 30 MG TABS Take 1 tablet by mouth 2 (two) times a day 60 tablet 6    ascorbic acid (VITAMIN C) 500 MG tablet Take 1 tablet (500 mg total) by mouth 2 (two) times a day 60 tablet 1    busPIRone (BUSPAR) 15 mg tablet Take 0.5 tablets (7.5 mg total) by mouth 2 (two) times a day 90 tablet 0    cefadroxil (DURICEF) 500 mg capsule Take 1 capsule (500 mg total) by mouth every 12 (twelve) hours 60 capsule 5    cholecalciferol (VITAMIN D3) 1,000 units tablet Take 2 tablets (2,000 Units total) by mouth daily 60 tablet 1    clobetasol (TEMOVATE) 0.05 % cream Apply topically 2 (two) times a day 60 g 6    fluticasone (FLONASE) 50 mcg/act nasal spray 1 spray into each nostril daily      folic acid (FOLVITE) 1 mg tablet Take 1 tablet (1 mg total) by mouth daily 90 tablet 1    methotrexate 2.5 MG tablet 6 tablet po weekly (take all 6 tablets on the same day every week) 90 tablet 1    metoprolol succinate (TOPROL-XL) 100 mg 24 hr tablet Take 1 tablet (100 mg total) by mouth daily 30 tablet 0    Multiple Vitamins-Minerals (multivitamin with minerals) tablet Take 1 tablet by mouth daily 30 tablet 1    nortriptyline (PAMELOR) 50 mg capsule Take 1 capsule (50 mg total) by mouth 2 (two) times a day 60 capsule 0    [DISCONTINUED] venlafaxine (EFFEXOR-XR) 150 mg 24 hr capsule Take 1 capsule (150 mg total) by mouth daily at bedtime 30 capsule 0    ferrous sulfate 324 (65 Fe) mg Take 1 tablet (324 mg total) by mouth 2 (two) times a day before meals 60 tablet 1    [DISCONTINUED] cefadroxil (DURICEF) 500 mg capsule Take 500 mg by mouth every 12 (twelve) hours (Patient not taking: Reported on 4/2/2025)      [DISCONTINUED] lidocaine (LIDODERM) 5 % Apply 1 patch topically over 12 hours daily Remove & Discard patch  "within 12 hours or as directed by MD 30 patch 0    [DISCONTINUED] methocarbamol (ROBAXIN) 500 mg tablet Take 1 tablet (500 mg total) by mouth 3 (three) times a day (Patient not taking: Reported on 2024) 60 tablet 0    [DISCONTINUED] polymyxin b-trimethoprim (POLYTRIM) ophthalmic solution Administer 1 drop into the left eye every 4 (four) hours (Patient not taking: Reported on 2024) 10 mL 0     No current facility-administered medications on file prior to visit.      Social History     Tobacco Use    Smoking status: Former     Current packs/day: 0.00     Average packs/day: 1 pack/day for 10.0 years (10.0 ttl pk-yrs)     Types: Cigarettes     Start date: 2000     Quit date: 2000     Years since quittin.3     Passive exposure: Never    Smokeless tobacco: Current     Types: Chew    Tobacco comments:     Last chewed tobacco 23   Vaping Use    Vaping status: Never Used   Substance and Sexual Activity    Alcohol use: Yes     Alcohol/week: 3.0 standard drinks of alcohol     Types: 3 Standard drinks or equivalent per week     Comment: rarely    Drug use: Never    Sexual activity: Not Currently     Partners: Female     Birth control/protection: Condom Male       Objective   /88 (BP Location: Left arm, Patient Position: Sitting)   Pulse 74   Temp 97.6 °F (36.4 °C)   Ht 5' 11\" (1.803 m)   Wt 91.5 kg (201 lb 12.8 oz)   SpO2 97%   BMI 28.15 kg/m²     Physical Exam  Vitals reviewed.   Constitutional:       General: He is not in acute distress.     Appearance: Normal appearance. He is well-developed. He is not ill-appearing.   HENT:      Head: Normocephalic and atraumatic.      Right Ear: Tympanic membrane, ear canal and external ear normal.      Left Ear: Tympanic membrane, ear canal and external ear normal.      Nose: Nose normal.      Mouth/Throat:      Mouth: Mucous membranes are moist.      Pharynx: Oropharynx is clear.   Eyes:      Extraocular Movements: Extraocular movements intact.     "  Conjunctiva/sclera: Conjunctivae normal.      Pupils: Pupils are equal, round, and reactive to light.   Neck:      Vascular: No carotid bruit.   Cardiovascular:      Rate and Rhythm: Normal rate and regular rhythm.      Pulses: Normal pulses.      Heart sounds: Normal heart sounds. No murmur heard.  Pulmonary:      Effort: Pulmonary effort is normal. No respiratory distress.      Breath sounds: Normal breath sounds.   Abdominal:      General: Bowel sounds are normal.      Palpations: Abdomen is soft.      Tenderness: There is no abdominal tenderness.   Musculoskeletal:         General: No swelling. Normal range of motion.      Cervical back: Normal range of motion and neck supple.      Right lower leg: No edema.      Left lower leg: No edema.   Lymphadenopathy:      Cervical: No cervical adenopathy.   Skin:     General: Skin is warm and dry.      Capillary Refill: Capillary refill takes less than 2 seconds.   Neurological:      General: No focal deficit present.      Mental Status: He is alert and oriented to person, place, and time.   Psychiatric:         Mood and Affect: Mood normal.         Behavior: Behavior normal.

## 2025-04-02 NOTE — ASSESSMENT & PLAN NOTE
Depression Screening Follow-up Plan: Patient's depression screening was positive with a PHQ-9 score of 5. Patient with underlying depression and was advised to continue current medications as prescribed. Patient decline BHT at this time.    Patient denies SI/HI.  To continue on nortriptyline, buspirone twice daily.  With increase in anxiety, will increase venlafaxine from 150 to 225 mg.  Encouraged self-care.  To follow-up in 3 to 4 months.    Orders:    venlafaxine 225 MG TB24 24 hr tablet; Take 1 tablet (225 mg total) by mouth daily at bedtime

## 2025-04-03 DIAGNOSIS — I10 PRIMARY HYPERTENSION: ICD-10-CM

## 2025-04-03 RX ORDER — METOPROLOL SUCCINATE 100 MG/1
100 TABLET, EXTENDED RELEASE ORAL DAILY
Qty: 30 TABLET | Refills: 5 | Status: SHIPPED | OUTPATIENT
Start: 2025-04-03

## 2025-04-04 ENCOUNTER — VBI (OUTPATIENT)
Dept: ADMINISTRATIVE | Facility: OTHER | Age: 50
End: 2025-04-04

## 2025-04-04 NOTE — TELEPHONE ENCOUNTER
04/04/25 10:36 AM     Chart reviewed for CRC: Colonoscopy was/were not submitted to the patient's insurance.     Lisa Donald MA   [facility]

## 2025-04-17 ENCOUNTER — TELEPHONE (OUTPATIENT)
Age: 50
End: 2025-04-17

## 2025-04-17 NOTE — TELEPHONE ENCOUNTER
Pt called, his job is requiring a signed letter from his pcp. 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 Letter just needs to have his name and info on it and faxed to this number #[phone]. Please advise.

## 2025-04-21 ENCOUNTER — TELEPHONE (OUTPATIENT)
Dept: INFECTIOUS DISEASES | Facility: CLINIC | Age: 50
End: 2025-04-21

## 2025-04-21 NOTE — TELEPHONE ENCOUNTER
I called João to reschedule his appt scheduled for 8/12. Brenda will not be in the office that day. No answer, I  left message to call the office back to r/s. I let him know she does have availability 8/5 and 8/19 in [place].

## 2025-04-23 NOTE — TELEPHONE ENCOUNTER
Pt called to inform office that the facility didn't receive the fax yet. Pt confirmed the fax number was correct.   Office staff please refax this letter to the above fax number, again.  Thank you

## 2025-04-23 NOTE — TELEPHONE ENCOUNTER
Pt called in to check if another PCP could fill his letter out since PCP would be out. Triage nurse informed pt that the letter was already filled out and sent via fax. Pt was pleased and said thank you to office and PCP.

## 2025-06-23 ENCOUNTER — TELEPHONE (OUTPATIENT)
Dept: INFECTIOUS DISEASES | Facility: CLINIC | Age: 50
End: 2025-06-23

## 2025-06-23 NOTE — TELEPHONE ENCOUNTER
I called João to try to get his appt on 8/12 with Brenda rescheduled. No answer, I left a message with this information and asked that he call the office back to r/s.

## 2025-06-27 DIAGNOSIS — F32.5 MAJOR DEPRESSIVE DISORDER IN FULL REMISSION, UNSPECIFIED WHETHER RECURRENT (HCC): ICD-10-CM

## 2025-06-27 RX ORDER — NORTRIPTYLINE HYDROCHLORIDE 50 MG/1
50 CAPSULE ORAL 2 TIMES DAILY
Qty: 60 CAPSULE | Refills: 5 | Status: SHIPPED | OUTPATIENT
Start: 2025-06-27

## 2025-06-30 ENCOUNTER — TELEPHONE (OUTPATIENT)
Dept: INFECTIOUS DISEASES | Facility: CLINIC | Age: 50
End: 2025-06-30

## 2025-06-30 NOTE — TELEPHONE ENCOUNTER
I tried calling João again today to r/s his appt with Brenda on 8/12. No answer. I left another message letting him know she won't be in the office on that day and his appt needs to be r/s. I told him she does have availability on 8/5. I asked that he call the office back to reschedule this appt.

## 2025-07-03 DIAGNOSIS — L40.0 PLAQUE PSORIASIS: ICD-10-CM

## 2025-07-03 DIAGNOSIS — F32.5 MAJOR DEPRESSIVE DISORDER IN FULL REMISSION, UNSPECIFIED WHETHER RECURRENT (HCC): ICD-10-CM

## 2025-07-03 DIAGNOSIS — L40.50 PSORIATIC ARTHRITIS (HCC): ICD-10-CM

## 2025-07-03 DIAGNOSIS — F41.9 ANXIETY: ICD-10-CM

## 2025-07-03 RX ORDER — VENLAFAXINE HYDROCHLORIDE 225 MG/1
225 TABLET, EXTENDED RELEASE ORAL
Qty: 90 TABLET | Refills: 1 | Status: SHIPPED | OUTPATIENT
Start: 2025-07-03

## 2025-07-05 RX ORDER — METHOTREXATE 2.5 MG/1
TABLET ORAL
Qty: 90 TABLET | Refills: 2 | Status: SHIPPED | OUTPATIENT
Start: 2025-07-05

## 2025-07-11 ENCOUNTER — TELEPHONE (OUTPATIENT)
Dept: INFECTIOUS DISEASES | Facility: CLINIC | Age: 50
End: 2025-07-11

## 2025-07-11 NOTE — TELEPHONE ENCOUNTER
Called to teetee/della guerrero with Brenda on 8/12, no answer.   I left a message to call the office back to reschedule.

## 2025-07-29 DIAGNOSIS — Z96.651 STATUS POST REVISION OF TOTAL KNEE REPLACEMENT, RIGHT: ICD-10-CM

## 2025-07-30 ENCOUNTER — TELEPHONE (OUTPATIENT)
Dept: RHEUMATOLOGY | Facility: CLINIC | Age: 50
End: 2025-07-30

## 2025-07-30 RX ORDER — FOLIC ACID 1 MG/1
1000 TABLET ORAL DAILY
Qty: 90 TABLET | Refills: 0 | Status: SHIPPED | OUTPATIENT
Start: 2025-07-30

## 2025-08-07 DIAGNOSIS — L40.0 PLAQUE PSORIASIS: ICD-10-CM

## 2025-08-07 DIAGNOSIS — L40.50 PSORIATIC ARTHRITIS (HCC): ICD-10-CM

## 2025-08-07 RX ORDER — APREMILAST 30 MG/1
30 TABLET, FILM COATED ORAL 2 TIMES DAILY
Qty: 180 TABLET | Refills: 3 | Status: SHIPPED | OUTPATIENT
Start: 2025-08-07

## (undated) DEVICE — BETHLEHEM UNIV MAJ EXT ,KIT: Brand: CARDINAL HEALTH

## (undated) DEVICE — HEAVY DUTY TABLE COVER: Brand: CONVERTORS

## (undated) DEVICE — HANDPIECE SET WITH RETRACTABLE COAXIAL FAN SPRAY TIP AND SUCTION TUBE: Brand: INTERPULSE

## (undated) DEVICE — IMMOBILIZER KNEE UNIVERSAL 19 IN

## (undated) DEVICE — SAW BLADE OSCILLATING 552

## (undated) DEVICE — DRAPE SHEET THREE QUARTER

## (undated) DEVICE — 3M™ STERI-DRAPE™ U-DRAPE 1015: Brand: STERI-DRAPE™

## (undated) DEVICE — SURGICAL GOWN, XL SMARTSLEEVE: Brand: CONVERTORS

## (undated) DEVICE — SUT STRATAFIX SPIRAL 3-0 PGA/PCL 30 X 30 CM SXMD2B408

## (undated) DEVICE — PLUMEPEN PRO 10FT

## (undated) DEVICE — SAW BLADE OSCILLATING BRAZOL 167

## (undated) DEVICE — SYRINGE 3ML LL

## (undated) DEVICE — SYRINGE 50ML LL

## (undated) DEVICE — SUT VICRYL 2-0 CT-1 36 IN J945H

## (undated) DEVICE — TRAY FOLEY 16FR URIMETER SURESTEP

## (undated) DEVICE — 450 ML BOTTLE OF 0.05% CHLORHEXIDINE GLUCONATE IN 99.95% STERILE WATER FOR IRRIGATION, USP AND APPLICATOR.: Brand: IRRISEPT ANTIMICROBIAL WOUND LAVAGE

## (undated) DEVICE — ANTIBACTERIAL UNDYED BRAIDED (POLYGLACTIN 910), SYNTHETIC ABSORBABLE SUTURE: Brand: COATED VICRYL

## (undated) DEVICE — GAUZE SPONGES,16 PLY: Brand: CURITY

## (undated) DEVICE — INTENDED FOR TISSUE SEPARATION, AND OTHER PROCEDURES THAT REQUIRE A SHARP SURGICAL BLADE TO PUNCTURE OR CUT.: Brand: BARD-PARKER SAFETY BLADES SIZE 10, STERILE

## (undated) DEVICE — SPECIMEN CONTAINER STERILE PEEL PACK

## (undated) DEVICE — SCD SEQUENTIAL COMPRESSION COMFORT SLEEVE MEDIUM KNEE LENGTH: Brand: KENDALL SCD

## (undated) DEVICE — SUT VICRYL 0 CT-1 27 IN J260H

## (undated) DEVICE — PROXIMATE SKIN STAPLERS (35 WIDE) CONTAINS 35 STAINLESS STEEL STAPLES (FIXED HEAD): Brand: PROXIMATE

## (undated) DEVICE — GLOVE SRG BIOGEL 6.5

## (undated) DEVICE — GLOVE SRG BIOGEL 8.5

## (undated) DEVICE — NEEDLE 18 G X 1 1/2

## (undated) DEVICE — JP 3-SPRING RES W/10FR PVC DRAIN/TR: Brand: CARDINAL HEALTH

## (undated) DEVICE — COBAN 6 IN STERILE

## (undated) DEVICE — DUAL CUT SAGITTAL BLADE

## (undated) DEVICE — SUT ETHILON 2-0 FSLX 30 IN 1674H

## (undated) DEVICE — IMPERVIOUS STOCKINETTE: Brand: DEROYAL

## (undated) DEVICE — GLOVE SRG BIOGEL ORTHOPEDIC 8.5

## (undated) DEVICE — SUT STRATAFIX SPIRAL 1-0 PDO 36 X 36 CM SXPD2B405

## (undated) DEVICE — ADHESIVE SKIN HIGH VISCOSITY EXOFIN 1ML

## (undated) DEVICE — I DISPOSABLE MIXING BOWL AND SPATULA: Brand: HOWMEDICA, MIX-KIT

## (undated) DEVICE — ANTIBACTERIAL VIOLET BRAIDED (POLYGLACTIN 910), SYNTHETIC ABSORBABLE SURGICAL SUTURE: Brand: COATED VICRYL

## (undated) DEVICE — SUT VICRYL 1 CT-1 27 IN J261H

## (undated) DEVICE — YELLOW BOAT

## (undated) DEVICE — BAG WOUND LAVAGE 1L BACTISURE

## (undated) DEVICE — DRESSING MEPILEX AG BORDER POST-OP 4 X 12 IN

## (undated) DEVICE — BETHLEHEM UNIV TOTAL KNEE, KIT: Brand: CARDINAL HEALTH

## (undated) DEVICE — ACE WRAP 6 IN UNSTERILE

## (undated) DEVICE — INTENDED FOR TISSUE SEPARATION, AND OTHER PROCEDURES THAT REQUIRE A SHARP SURGICAL BLADE TO PUNCTURE OR CUT.: Brand: BARD-PARKER ® CARBON RIB-BACK BLADES

## (undated) DEVICE — CEMENT MIXING SYSTEM WITH FEMORAL BREAKWAY NOZZLE: Brand: REVOLUTION

## (undated) DEVICE — 3000CC GUARDIAN II: Brand: GUARDIAN

## (undated) DEVICE — CHLORAPREP HI-LITE 26ML ORANGE

## (undated) DEVICE — HOOD: Brand: FLYTE, SURGICOOL

## (undated) DEVICE — GLOVE INDICATOR PI UNDERGLOVE SZ 8.5 BLUE

## (undated) DEVICE — CAPIT KNEE REVISION ATTUNE W/ SLEEVES

## (undated) DEVICE — WEBRIL 6 IN UNSTERILE

## (undated) DEVICE — 3M™ IOBAN™ 2 ANTIMICROBIAL INCISE DRAPE 6650EZ: Brand: IOBAN™ 2

## (undated) DEVICE — GLOVE INDICATOR PI UNDERGLOVE SZ 6.5 BLUE

## (undated) DEVICE — SYRINGE 30ML LL

## (undated) DEVICE — SAW BLADE RECIPROCATING SINGLE SIDED 258 ORTHO

## (undated) DEVICE — CUFF TOURNIQUET 30 X 4 IN QUICK CONNECT DISP 1BLA

## (undated) DEVICE — SPONGE LAP 18 X 18 IN STRL RFD

## (undated) DEVICE — SPONGE SCRUB 4 PCT CHLORHEXIDINE

## (undated) DEVICE — 3M™ TEGADERM™ TRANSPARENT FILM DRESSING FRAME STYLE, 1626W, 4 IN X 4-3/4 IN (10 CM X 12 CM), 50/CT 4CT/CASE: Brand: 3M™ TEGADERM™